# Patient Record
Sex: MALE | Race: WHITE | NOT HISPANIC OR LATINO | Employment: FULL TIME | URBAN - NONMETROPOLITAN AREA
[De-identification: names, ages, dates, MRNs, and addresses within clinical notes are randomized per-mention and may not be internally consistent; named-entity substitution may affect disease eponyms.]

---

## 2018-01-09 ENCOUNTER — OFFICE VISIT CONVERTED (OUTPATIENT)
Dept: FAMILY MEDICINE CLINIC | Age: 53
End: 2018-01-09
Attending: NURSE PRACTITIONER

## 2018-01-23 ENCOUNTER — OFFICE VISIT CONVERTED (OUTPATIENT)
Dept: FAMILY MEDICINE CLINIC | Age: 53
End: 2018-01-23
Attending: NURSE PRACTITIONER

## 2018-02-12 ENCOUNTER — OFFICE VISIT CONVERTED (OUTPATIENT)
Dept: FAMILY MEDICINE CLINIC | Age: 53
End: 2018-02-12
Attending: NURSE PRACTITIONER

## 2018-02-13 LAB
ALBUMIN SERPL-MCNC: 4 G/DL
ALBUMIN/GLOB SERPL: 1.5 {RATIO}
ALP SERPL-CCNC: 41 IU/L
ALT SERPL-CCNC: 14 IU/L
AST SERPL-CCNC: 12 IU/L
BILIRUB SERPL-MCNC: 0.7 MG/DL
BUN SERPL-MCNC: 20 MG/DL
BUN/CREAT SERPL: 29
CALCIUM SERPL-MCNC: 9.4 MG/DL
CHLORIDE SERPL-SCNC: 101 MMOL/L
CHOLEST SERPL-MCNC: 155 MG/DL
CO2 SERPL-SCNC: 26 MMOL/L
CONV TOTAL PROTEIN: 6.6 G/DL
CREAT UR-MCNC: 0.68 MG/DL
ERYTHROCYTE [DISTWIDTH] IN BLOOD BY AUTOMATED COUNT: 13 %
GLOBULIN UR ELPH-MCNC: 2.6 G/DL
GLUCOSE SERPL-MCNC: 170 MG/DL
HBA1C MFR BLD: 8.2 %
HCT VFR BLD AUTO: 45.2 %
HDLC SERPL-MCNC: 52 MG/DL
HGB BLD-MCNC: 15.4 G/DL
LDLC SERPL CALC-MCNC: 68 MG/DL
MCH RBC QN AUTO: 30.9 PG
MCHC RBC AUTO-ENTMCNC: 34.1 G/DL
MCV RBC AUTO: 91 FL
MICROALBUMIN UR-MCNC: 3.7 UG/ML
PLATELET # BLD AUTO: 236 X10E3/UL
POTASSIUM SERPL-SCNC: 4.7 MMOL/L
RBC # BLD AUTO: 4.99 X10E6/UL
SODIUM SERPL-SCNC: 141 MMOL/L
TRIGL SERPL-MCNC: 173 MG/DL
TSH SERPL-ACNC: 2.91 UIU/ML
VLDLC SERPL-MCNC: 35 MG/DL
WBC # BLD AUTO: 6.8 X10E3/UL

## 2018-02-21 ENCOUNTER — OFFICE VISIT CONVERTED (OUTPATIENT)
Dept: FAMILY MEDICINE CLINIC | Age: 53
End: 2018-02-21
Attending: NURSE PRACTITIONER

## 2018-03-01 ENCOUNTER — OFFICE VISIT CONVERTED (OUTPATIENT)
Dept: OTHER | Facility: HOSPITAL | Age: 53
End: 2018-03-01
Attending: PODIATRIST

## 2018-03-15 ENCOUNTER — OFFICE VISIT CONVERTED (OUTPATIENT)
Dept: OTHER | Facility: HOSPITAL | Age: 53
End: 2018-03-15
Attending: PODIATRIST

## 2018-03-27 ENCOUNTER — OFFICE VISIT CONVERTED (OUTPATIENT)
Dept: FAMILY MEDICINE CLINIC | Age: 53
End: 2018-03-27
Attending: NURSE PRACTITIONER

## 2018-04-16 ENCOUNTER — OFFICE VISIT CONVERTED (OUTPATIENT)
Dept: FAMILY MEDICINE CLINIC | Age: 53
End: 2018-04-16
Attending: NURSE PRACTITIONER

## 2018-04-17 LAB
APPEARANCE UR: ABNORMAL
BILIRUB SERPL-MCNC: NEGATIVE MG/DL
CASTS URNS MICRO: ABNORMAL
CASTS URNS QL MICRO: PRESENT /LPF
COLOR UR: YELLOW
CONV BACTERIA IN URINE MICRO: ABNORMAL
CONV UROBILINOGEN IN URINE BY AUTOMATED TEST STRIP: 0.2 MG/DL
GLUCOSE SERPL-MCNC: NEGATIVE MG/DL
HEMOCCULT STL QL IA: NEGATIVE
KETONES UR QL STRIP: NEGATIVE
LEUKOCYTE ESTERASE UR QL STRIP: NEGATIVE
MICRO URNS: ABNORMAL
MUCOUS THREADS URNS QL MICRO: PRESENT
NITRITE UR QL STRIP: NEGATIVE
PH FLD: 5.5 [PH]
PROT FLD-MCNC: NEGATIVE G/DL
PSA SERPL-MCNC: 0.6 NG/ML
RBC # BLD AUTO: ABNORMAL /HPF
SP GR UR: 1.02
SQUAMOUS SPT QL MICRO: ABNORMAL /HPF
WBC # BLD AUTO: ABNORMAL /HPF

## 2018-04-19 ENCOUNTER — OFFICE VISIT CONVERTED (OUTPATIENT)
Dept: OTHER | Facility: HOSPITAL | Age: 53
End: 2018-04-19
Attending: PODIATRIST

## 2018-05-03 ENCOUNTER — OFFICE VISIT CONVERTED (OUTPATIENT)
Dept: OTHER | Facility: HOSPITAL | Age: 53
End: 2018-05-03
Attending: PODIATRIST

## 2018-05-08 ENCOUNTER — OFFICE VISIT CONVERTED (OUTPATIENT)
Dept: OTHER | Facility: HOSPITAL | Age: 53
End: 2018-05-08
Attending: INTERNAL MEDICINE

## 2018-05-15 ENCOUNTER — OFFICE VISIT CONVERTED (OUTPATIENT)
Dept: FAMILY MEDICINE CLINIC | Age: 53
End: 2018-05-15
Attending: NURSE PRACTITIONER

## 2018-05-17 ENCOUNTER — OFFICE VISIT CONVERTED (OUTPATIENT)
Dept: OTHER | Facility: HOSPITAL | Age: 53
End: 2018-05-17
Attending: PODIATRIST

## 2018-05-18 LAB
ALBUMIN SERPL-MCNC: 4.2 G/DL
ALBUMIN/GLOB SERPL: 1.8 {RATIO}
ALP SERPL-CCNC: 43 IU/L
ALT SERPL-CCNC: 14 IU/L
AST SERPL-CCNC: 14 IU/L
BILIRUB SERPL-MCNC: 0.5 MG/DL
BNP SERPL-MCNC: 12 PG/ML
BUN SERPL-MCNC: 17 MG/DL
BUN/CREAT SERPL: 20
CALCIUM SERPL-MCNC: 9.4 MG/DL
CHLORIDE SERPL-SCNC: 102 MMOL/L
CHOLEST SERPL-MCNC: 136 MG/DL
CO2 SERPL-SCNC: 26 MMOL/L
CONV TOTAL PROTEIN: 6.5 G/DL
CREAT 24H UR-MCNC: 197.5 MG/DL
CREAT UR-MCNC: 0.83 MG/DL
GLOBULIN UR ELPH-MCNC: 2.3 G/DL
GLUCOSE SERPL-MCNC: 73 MG/DL
HBA1C MFR BLD: 6.8 %
HDLC SERPL-MCNC: 40 MG/DL
LDLC SERPL CALC-MCNC: 37 MG/DL
MICROALBUMIN UR-MCNC: 19.7 UG/ML
MICROALBUMIN/CREAT UR: 10 MG/G CREAT
POTASSIUM SERPL-SCNC: 4.1 MMOL/L
SODIUM SERPL-SCNC: 143 MMOL/L
TRIGL SERPL-MCNC: 296 MG/DL
TSH SERPL-ACNC: 3.24 UIU/ML
VLDLC SERPL-MCNC: 59 MG/DL

## 2018-07-02 ENCOUNTER — OFFICE VISIT CONVERTED (OUTPATIENT)
Dept: FAMILY MEDICINE CLINIC | Age: 53
End: 2018-07-02
Attending: NURSE PRACTITIONER

## 2018-07-19 ENCOUNTER — OFFICE VISIT CONVERTED (OUTPATIENT)
Dept: OTHER | Facility: HOSPITAL | Age: 53
End: 2018-07-19
Attending: PODIATRIST

## 2018-08-21 ENCOUNTER — OFFICE VISIT CONVERTED (OUTPATIENT)
Dept: FAMILY MEDICINE CLINIC | Age: 53
End: 2018-08-21
Attending: NURSE PRACTITIONER

## 2018-08-28 ENCOUNTER — OFFICE VISIT CONVERTED (OUTPATIENT)
Dept: OTHER | Facility: HOSPITAL | Age: 53
End: 2018-08-28
Attending: INTERNAL MEDICINE

## 2018-11-06 ENCOUNTER — OFFICE VISIT CONVERTED (OUTPATIENT)
Dept: FAMILY MEDICINE CLINIC | Age: 53
End: 2018-11-06
Attending: NURSE PRACTITIONER

## 2018-11-15 ENCOUNTER — OFFICE VISIT CONVERTED (OUTPATIENT)
Dept: PODIATRY | Facility: CLINIC | Age: 53
End: 2018-11-15
Attending: PODIATRIST

## 2018-12-01 ENCOUNTER — OFFICE VISIT CONVERTED (OUTPATIENT)
Dept: FAMILY MEDICINE CLINIC | Age: 53
End: 2018-12-01
Attending: FAMILY MEDICINE

## 2019-01-07 ENCOUNTER — OFFICE VISIT CONVERTED (OUTPATIENT)
Dept: FAMILY MEDICINE CLINIC | Age: 54
End: 2019-01-07
Attending: NURSE PRACTITIONER

## 2019-01-07 ENCOUNTER — HOSPITAL ENCOUNTER (OUTPATIENT)
Dept: OTHER | Facility: HOSPITAL | Age: 54
Discharge: HOME | End: 2019-01-07
Attending: NURSE PRACTITIONER

## 2019-02-13 ENCOUNTER — OFFICE VISIT CONVERTED (OUTPATIENT)
Dept: FAMILY MEDICINE CLINIC | Age: 54
End: 2019-02-13
Attending: NURSE PRACTITIONER

## 2019-02-19 ENCOUNTER — HOSPITAL ENCOUNTER (OUTPATIENT)
Dept: OTHER | Facility: HOSPITAL | Age: 54
Setting detail: RECURRING SERIES
Discharge: HOME | End: 2019-03-22
Attending: NURSE PRACTITIONER

## 2019-03-26 ENCOUNTER — HOSPITAL ENCOUNTER (OUTPATIENT)
Dept: OTHER | Facility: HOSPITAL | Age: 54
Discharge: HOME | End: 2019-03-26
Attending: NURSE PRACTITIONER

## 2019-03-26 ENCOUNTER — OFFICE VISIT CONVERTED (OUTPATIENT)
Dept: FAMILY MEDICINE CLINIC | Age: 54
End: 2019-03-26
Attending: NURSE PRACTITIONER

## 2019-03-26 LAB
ALBUMIN SERPL-MCNC: 4.2 G/DL (ref 3.5–5)
ALBUMIN/GLOB SERPL: 1.4 {RATIO} (ref 1.4–2.6)
ALP SERPL-CCNC: 53 U/L (ref 56–119)
ALT SERPL-CCNC: 17 U/L (ref 10–40)
ANION GAP SERPL CALC-SCNC: 14 MMOL/L (ref 8–19)
AST SERPL-CCNC: 15 U/L (ref 15–50)
BASOPHILS # BLD MANUAL: 0.04 10*3/UL (ref 0–0.2)
BASOPHILS NFR BLD MANUAL: 0.4 % (ref 0–3)
BILIRUB SERPL-MCNC: 0.87 MG/DL (ref 0.2–1.3)
BUN SERPL-MCNC: 16 MG/DL (ref 5–25)
BUN/CREAT SERPL: 24 {RATIO} (ref 6–20)
CALCIUM SERPL-MCNC: 9.1 MG/DL (ref 8.7–10.4)
CHLORIDE SERPL-SCNC: 103 MMOL/L (ref 99–111)
CHOLEST SERPL-MCNC: 120 MG/DL (ref 107–200)
CHOLEST/HDLC SERPL: 2.4 {RATIO} (ref 3–6)
CONV CO2: 28 MMOL/L (ref 22–32)
CONV CREATININE URINE, RANDOM: 206.8 MG/DL (ref 10–300)
CONV MICROALBUM.,U,RANDOM: 28.3 MG/L (ref 0–20)
CONV TOTAL PROTEIN: 7.2 G/DL (ref 6.3–8.2)
CREAT UR-MCNC: 0.68 MG/DL (ref 0.7–1.2)
DEPRECATED RDW RBC AUTO: 40.1 FL
EOSINOPHIL # BLD MANUAL: 0.03 10*3/UL (ref 0–0.7)
EOSINOPHIL NFR BLD MANUAL: 0.3 % (ref 0–7)
ERYTHROCYTE [DISTWIDTH] IN BLOOD BY AUTOMATED COUNT: 12.1 % (ref 11.5–14.5)
EST. AVERAGE GLUCOSE BLD GHB EST-MCNC: 151 MG/DL
GFR SERPLBLD BASED ON 1.73 SQ M-ARVRAT: >60 ML/MIN/{1.73_M2}
GLOBULIN UR ELPH-MCNC: 3 G/DL (ref 2–3.5)
GLUCOSE SERPL-MCNC: 164 MG/DL (ref 70–99)
GRANS (ABSOLUTE): 7.46 10*3/UL (ref 2–8)
GRANS: 67.8 % (ref 30–85)
HBA1C MFR BLD: 15.5 G/DL (ref 14–18)
HBA1C MFR BLD: 6.9 % (ref 3.5–5.7)
HCT VFR BLD AUTO: 44.8 % (ref 42–52)
HDLC SERPL-MCNC: 49 MG/DL (ref 40–60)
IMM GRANULOCYTES # BLD: 0.02 10*3/UL (ref 0–0.54)
IMM GRANULOCYTES NFR BLD: 0.2 % (ref 0–0.43)
LDLC SERPL CALC-MCNC: 50 MG/DL (ref 70–100)
LYMPHOCYTES # BLD MANUAL: 2.33 10*3/UL (ref 1–5)
LYMPHOCYTES NFR BLD MANUAL: 10.1 % (ref 3–10)
MCH RBC QN AUTO: 31.3 PG (ref 27–31)
MCHC RBC AUTO-ENTMCNC: 34.6 G/DL (ref 33–37)
MCV RBC AUTO: 90.3 FL (ref 80–96)
MICROALBUMIN/CREAT UR: 13.7 MG/G{CRE} (ref 0–25)
MONOCYTES # BLD AUTO: 1.11 10*3/UL (ref 0.2–1.2)
OSMOLALITY SERPL CALC.SUM OF ELEC: 297 MOSM/KG (ref 273–304)
PLATELET # BLD AUTO: 257 10*3/UL (ref 130–400)
PMV BLD AUTO: 9 FL (ref 7.4–10.4)
POTASSIUM SERPL-SCNC: 4.2 MMOL/L (ref 3.5–5.3)
RBC # BLD AUTO: 4.96 10*6/UL (ref 4.7–6.1)
SODIUM SERPL-SCNC: 141 MMOL/L (ref 135–147)
TRIGL SERPL-MCNC: 103 MG/DL (ref 40–150)
TSH SERPL-ACNC: 2.52 M[IU]/L (ref 0.27–4.2)
VARIANT LYMPHS NFR BLD MANUAL: 21.2 % (ref 20–45)
VLDLC SERPL-MCNC: 21 MG/DL (ref 5–37)
WBC # BLD AUTO: 10.99 10*3/UL (ref 4.8–10.8)

## 2019-03-29 LAB
SHBG SERPL-SCNC: 41.2 NMOL/L (ref 19.3–76.4)
TESTOST SERPL-MCNC: 334 NG/DL (ref 264–916)
TESTOSTERONE, FREE: 2.9 PG/ML (ref 7.2–24)

## 2019-04-06 ENCOUNTER — HOSPITAL ENCOUNTER (OUTPATIENT)
Dept: OTHER | Facility: HOSPITAL | Age: 54
Discharge: HOME | End: 2019-04-06
Attending: FAMILY MEDICINE

## 2019-04-18 ENCOUNTER — OFFICE VISIT CONVERTED (OUTPATIENT)
Dept: UROLOGY | Facility: CLINIC | Age: 54
End: 2019-04-18
Attending: NURSE PRACTITIONER

## 2019-04-18 ENCOUNTER — HOSPITAL ENCOUNTER (OUTPATIENT)
Dept: UROLOGY | Facility: CLINIC | Age: 54
Discharge: HOME | End: 2019-04-18
Attending: NURSE PRACTITIONER

## 2019-04-18 LAB — PSA SERPL-MCNC: 1 NG/ML (ref 0–4)

## 2019-04-29 ENCOUNTER — OFFICE VISIT CONVERTED (OUTPATIENT)
Dept: FAMILY MEDICINE CLINIC | Age: 54
End: 2019-04-29
Attending: NURSE PRACTITIONER

## 2019-06-12 ENCOUNTER — OFFICE VISIT CONVERTED (OUTPATIENT)
Dept: FAMILY MEDICINE CLINIC | Age: 54
End: 2019-06-12
Attending: NURSE PRACTITIONER

## 2019-07-08 ENCOUNTER — HOSPITAL ENCOUNTER (OUTPATIENT)
Dept: OTHER | Facility: HOSPITAL | Age: 54
Discharge: HOME | End: 2019-07-08
Attending: NURSE PRACTITIONER

## 2019-07-08 ENCOUNTER — OFFICE VISIT CONVERTED (OUTPATIENT)
Dept: FAMILY MEDICINE CLINIC | Age: 54
End: 2019-07-08
Attending: NURSE PRACTITIONER

## 2019-07-08 LAB
ALBUMIN SERPL-MCNC: 4 G/DL (ref 3.5–5)
ALBUMIN/GLOB SERPL: 1.5 {RATIO} (ref 1.4–2.6)
ALP SERPL-CCNC: 59 U/L (ref 56–119)
ALT SERPL-CCNC: 15 U/L (ref 10–40)
ANION GAP SERPL CALC-SCNC: 16 MMOL/L (ref 8–19)
AST SERPL-CCNC: 19 U/L (ref 15–50)
BILIRUB SERPL-MCNC: 1.23 MG/DL (ref 0.2–1.3)
BUN SERPL-MCNC: 14 MG/DL (ref 5–25)
BUN/CREAT SERPL: 21 {RATIO} (ref 6–20)
CALCIUM SERPL-MCNC: 8.9 MG/DL (ref 8.7–10.4)
CHLORIDE SERPL-SCNC: 102 MMOL/L (ref 99–111)
CHOLEST SERPL-MCNC: 99 MG/DL (ref 107–200)
CHOLEST/HDLC SERPL: 2.6 {RATIO} (ref 3–6)
CONV CO2: 25 MMOL/L (ref 22–32)
CONV CREATININE URINE, RANDOM: 312.7 MG/DL (ref 10–300)
CONV MICROALBUM.,U,RANDOM: 15.6 MG/L (ref 0–20)
CONV TOTAL PROTEIN: 6.7 G/DL (ref 6.3–8.2)
CREAT UR-MCNC: 0.68 MG/DL (ref 0.7–1.2)
EST. AVERAGE GLUCOSE BLD GHB EST-MCNC: 131 MG/DL
GFR SERPLBLD BASED ON 1.73 SQ M-ARVRAT: >60 ML/MIN/{1.73_M2}
GLOBULIN UR ELPH-MCNC: 2.7 G/DL (ref 2–3.5)
GLUCOSE SERPL-MCNC: 103 MG/DL (ref 70–99)
HBA1C MFR BLD: 6.2 % (ref 3.5–5.7)
HDLC SERPL-MCNC: 38 MG/DL (ref 40–60)
LDLC SERPL CALC-MCNC: 36 MG/DL (ref 70–100)
MICROALBUMIN/CREAT UR: 5 MG/G{CRE} (ref 0–25)
OSMOLALITY SERPL CALC.SUM OF ELEC: 289 MOSM/KG (ref 273–304)
POTASSIUM SERPL-SCNC: 4.1 MMOL/L (ref 3.5–5.3)
SODIUM SERPL-SCNC: 139 MMOL/L (ref 135–147)
TRIGL SERPL-MCNC: 124 MG/DL (ref 40–150)
VLDLC SERPL-MCNC: 25 MG/DL (ref 5–37)

## 2019-09-27 ENCOUNTER — OFFICE VISIT CONVERTED (OUTPATIENT)
Dept: FAMILY MEDICINE CLINIC | Age: 54
End: 2019-09-27
Attending: NURSE PRACTITIONER

## 2019-09-27 ENCOUNTER — HOSPITAL ENCOUNTER (OUTPATIENT)
Dept: OTHER | Facility: HOSPITAL | Age: 54
Discharge: HOME | End: 2019-09-27
Attending: NURSE PRACTITIONER

## 2019-09-29 LAB — BACTERIA SPEC AEROBE CULT: NORMAL

## 2019-10-15 ENCOUNTER — HOSPITAL ENCOUNTER (OUTPATIENT)
Dept: OTHER | Facility: HOSPITAL | Age: 54
Discharge: HOME | End: 2019-10-15

## 2019-10-15 ENCOUNTER — OFFICE VISIT CONVERTED (OUTPATIENT)
Dept: FAMILY MEDICINE CLINIC | Age: 54
End: 2019-10-15
Attending: NURSE PRACTITIONER

## 2019-10-29 ENCOUNTER — OFFICE VISIT CONVERTED (OUTPATIENT)
Dept: FAMILY MEDICINE CLINIC | Age: 54
End: 2019-10-29
Attending: NURSE PRACTITIONER

## 2019-11-08 ENCOUNTER — HOSPITAL ENCOUNTER (OUTPATIENT)
Dept: OTHER | Facility: HOSPITAL | Age: 54
Discharge: HOME | End: 2019-11-08
Attending: NURSE PRACTITIONER

## 2019-11-08 LAB
ALBUMIN SERPL-MCNC: 4.2 G/DL (ref 3.5–5)
ALBUMIN/GLOB SERPL: 1.7 {RATIO} (ref 1.4–2.6)
ALP SERPL-CCNC: 46 U/L (ref 56–119)
ALT SERPL-CCNC: 16 U/L (ref 10–40)
ANION GAP SERPL CALC-SCNC: 19 MMOL/L (ref 8–19)
AST SERPL-CCNC: 18 U/L (ref 15–50)
BILIRUB SERPL-MCNC: 0.9 MG/DL (ref 0.2–1.3)
BUN SERPL-MCNC: 21 MG/DL (ref 5–25)
BUN/CREAT SERPL: 26 {RATIO} (ref 6–20)
CALCIUM SERPL-MCNC: 9.7 MG/DL (ref 8.7–10.4)
CHLORIDE SERPL-SCNC: 98 MMOL/L (ref 99–111)
CONV CO2: 25 MMOL/L (ref 22–32)
CONV TOTAL PROTEIN: 6.7 G/DL (ref 6.3–8.2)
CREAT UR-MCNC: 0.81 MG/DL (ref 0.7–1.2)
ERYTHROCYTE [SEDIMENTATION RATE] IN BLOOD: 7 MM/H (ref 0–20)
EST. AVERAGE GLUCOSE BLD GHB EST-MCNC: 123 MG/DL
GFR SERPLBLD BASED ON 1.73 SQ M-ARVRAT: >60 ML/MIN/{1.73_M2}
GLOBULIN UR ELPH-MCNC: 2.5 G/DL (ref 2–3.5)
GLUCOSE SERPL-MCNC: 83 MG/DL (ref 70–99)
HBA1C MFR BLD: 5.9 % (ref 3.5–5.7)
OSMOLALITY SERPL CALC.SUM OF ELEC: 288 MOSM/KG (ref 273–304)
POTASSIUM SERPL-SCNC: 3.8 MMOL/L (ref 3.5–5.3)
SODIUM SERPL-SCNC: 138 MMOL/L (ref 135–147)

## 2019-12-03 ENCOUNTER — HOSPITAL ENCOUNTER (OUTPATIENT)
Dept: OTHER | Facility: HOSPITAL | Age: 54
Setting detail: RECURRING SERIES
Discharge: HOME | End: 2020-01-14
Attending: ORTHOPAEDIC SURGERY

## 2019-12-17 ENCOUNTER — OFFICE VISIT CONVERTED (OUTPATIENT)
Dept: FAMILY MEDICINE CLINIC | Age: 54
End: 2019-12-17
Attending: FAMILY MEDICINE

## 2020-01-21 ENCOUNTER — OFFICE VISIT CONVERTED (OUTPATIENT)
Dept: FAMILY MEDICINE CLINIC | Age: 55
End: 2020-01-21
Attending: NURSE PRACTITIONER

## 2020-02-06 ENCOUNTER — HOSPITAL ENCOUNTER (OUTPATIENT)
Dept: OTHER | Facility: HOSPITAL | Age: 55
Discharge: HOME | End: 2020-02-06
Attending: NURSE PRACTITIONER

## 2020-02-06 LAB
ALBUMIN SERPL-MCNC: 4.1 G/DL (ref 3.5–5)
ALBUMIN/GLOB SERPL: 1.5 {RATIO} (ref 1.4–2.6)
ALP SERPL-CCNC: 43 U/L (ref 56–119)
ALT SERPL-CCNC: 21 U/L (ref 10–40)
ANION GAP SERPL CALC-SCNC: 18 MMOL/L (ref 8–19)
AST SERPL-CCNC: 19 U/L (ref 15–50)
BILIRUB SERPL-MCNC: 0.96 MG/DL (ref 0.2–1.3)
BUN SERPL-MCNC: 25 MG/DL (ref 5–25)
BUN/CREAT SERPL: 28 {RATIO} (ref 6–20)
CALCIUM SERPL-MCNC: 9.6 MG/DL (ref 8.7–10.4)
CHLORIDE SERPL-SCNC: 102 MMOL/L (ref 99–111)
CHOLEST SERPL-MCNC: 100 MG/DL (ref 107–200)
CHOLEST/HDLC SERPL: 2.8 {RATIO} (ref 3–6)
CONV CO2: 24 MMOL/L (ref 22–32)
CONV CREATININE URINE, RANDOM: 234.8 MG/DL (ref 10–300)
CONV MICROALBUM.,U,RANDOM: 17.8 MG/L (ref 0–20)
CONV TOTAL PROTEIN: 6.8 G/DL (ref 6.3–8.2)
CREAT UR-MCNC: 0.9 MG/DL (ref 0.7–1.2)
EST. AVERAGE GLUCOSE BLD GHB EST-MCNC: 128 MG/DL
GFR SERPLBLD BASED ON 1.73 SQ M-ARVRAT: >60 ML/MIN/{1.73_M2}
GLOBULIN UR ELPH-MCNC: 2.7 G/DL (ref 2–3.5)
GLUCOSE SERPL-MCNC: 106 MG/DL (ref 70–99)
HBA1C MFR BLD: 6.1 % (ref 3.5–5.7)
HDLC SERPL-MCNC: 36 MG/DL (ref 40–60)
LDLC SERPL CALC-MCNC: 38 MG/DL (ref 70–100)
MICROALBUMIN/CREAT UR: 7.6 MG/G{CRE} (ref 0–25)
OSMOLALITY SERPL CALC.SUM OF ELEC: 295 MOSM/KG (ref 273–304)
POTASSIUM SERPL-SCNC: 4.3 MMOL/L (ref 3.5–5.3)
SODIUM SERPL-SCNC: 140 MMOL/L (ref 135–147)
TRIGL SERPL-MCNC: 132 MG/DL (ref 40–150)
VLDLC SERPL-MCNC: 26 MG/DL (ref 5–37)

## 2020-04-30 ENCOUNTER — HOSPITAL ENCOUNTER (OUTPATIENT)
Dept: OTHER | Facility: HOSPITAL | Age: 55
Discharge: HOME | End: 2020-04-30
Attending: PODIATRIST

## 2020-04-30 LAB
ANION GAP SERPL CALC-SCNC: 16 MMOL/L (ref 8–19)
BASOPHILS # BLD MANUAL: 0.04 10*3/UL (ref 0–0.2)
BASOPHILS NFR BLD MANUAL: 0.5 % (ref 0–3)
BUN SERPL-MCNC: 21 MG/DL (ref 5–25)
BUN/CREAT SERPL: 24 {RATIO} (ref 6–20)
CALCIUM SERPL-MCNC: 9.6 MG/DL (ref 8.7–10.4)
CHLORIDE SERPL-SCNC: 103 MMOL/L (ref 99–111)
CONV CO2: 25 MMOL/L (ref 22–32)
CREAT UR-MCNC: 0.89 MG/DL (ref 0.7–1.2)
DEPRECATED RDW RBC AUTO: 43 FL
EOSINOPHIL # BLD MANUAL: 0.1 10*3/UL (ref 0–0.7)
EOSINOPHIL NFR BLD MANUAL: 1.3 % (ref 0–7)
ERYTHROCYTE [DISTWIDTH] IN BLOOD BY AUTOMATED COUNT: 12.4 % (ref 11.5–14.5)
GFR SERPLBLD BASED ON 1.73 SQ M-ARVRAT: >60 ML/MIN/{1.73_M2}
GLUCOSE SERPL-MCNC: 102 MG/DL (ref 70–99)
GRANS (ABSOLUTE): 4.06 10*3/UL (ref 2–8)
GRANS: 53.2 % (ref 30–85)
HBA1C MFR BLD: 15.2 G/DL (ref 14–18)
HCT VFR BLD AUTO: 45 % (ref 42–52)
IMM GRANULOCYTES # BLD: 0.01 10*3/UL (ref 0–0.54)
IMM GRANULOCYTES NFR BLD: 0.1 % (ref 0–0.43)
LYMPHOCYTES # BLD MANUAL: 2.44 10*3/UL (ref 1–5)
LYMPHOCYTES NFR BLD MANUAL: 13 % (ref 3–10)
MCH RBC QN AUTO: 31.6 PG (ref 27–31)
MCHC RBC AUTO-ENTMCNC: 33.8 G/DL (ref 33–37)
MCV RBC AUTO: 93.6 FL (ref 80–96)
MONOCYTES # BLD AUTO: 0.99 10*3/UL (ref 0.2–1.2)
OSMOLALITY SERPL CALC.SUM OF ELEC: 293 MOSM/KG (ref 273–304)
PLATELET # BLD AUTO: 241 10*3/UL (ref 130–400)
PMV BLD AUTO: 9 FL (ref 7.4–10.4)
POTASSIUM SERPL-SCNC: 4.2 MMOL/L (ref 3.5–5.3)
RBC # BLD AUTO: 4.81 10*6/UL (ref 4.7–6.1)
SODIUM SERPL-SCNC: 140 MMOL/L (ref 135–147)
VARIANT LYMPHS NFR BLD MANUAL: 31.9 % (ref 20–45)
WBC # BLD AUTO: 7.64 10*3/UL (ref 4.8–10.8)

## 2020-05-08 PROCEDURE — 88304 TISSUE EXAM BY PATHOLOGIST: CPT | Performed by: PODIATRIST

## 2020-05-11 ENCOUNTER — LAB REQUISITION (OUTPATIENT)
Dept: LAB | Facility: HOSPITAL | Age: 55
End: 2020-05-11

## 2020-05-11 DIAGNOSIS — M19.272 SECONDARY OSTEOARTHRITIS, LEFT ANKLE AND FOOT: ICD-10-CM

## 2020-05-11 DIAGNOSIS — M25.572 PAIN IN LEFT ANKLE AND JOINTS OF LEFT FOOT: ICD-10-CM

## 2020-05-11 DIAGNOSIS — R26.2 DIFFICULTY IN WALKING, NOT ELSEWHERE CLASSIFIED: ICD-10-CM

## 2020-05-12 LAB
LAB AP CASE REPORT: NORMAL
PATH REPORT.FINAL DX SPEC: NORMAL
PATH REPORT.GROSS SPEC: NORMAL

## 2020-07-31 ENCOUNTER — OFFICE VISIT CONVERTED (OUTPATIENT)
Dept: FAMILY MEDICINE CLINIC | Age: 55
End: 2020-07-31
Attending: NURSE PRACTITIONER

## 2020-08-01 ENCOUNTER — HOSPITAL ENCOUNTER (OUTPATIENT)
Dept: OTHER | Facility: HOSPITAL | Age: 55
Discharge: HOME | End: 2020-08-01
Attending: NURSE PRACTITIONER

## 2020-08-01 LAB
ALBUMIN SERPL-MCNC: 3.8 G/DL (ref 3.5–5)
ALBUMIN/GLOB SERPL: 1.4 {RATIO} (ref 1.4–2.6)
ALP SERPL-CCNC: 40 U/L (ref 56–119)
ALT SERPL-CCNC: 18 U/L (ref 10–40)
ANION GAP SERPL CALC-SCNC: 15 MMOL/L (ref 8–19)
APPEARANCE UR: CLEAR
AST SERPL-CCNC: 19 U/L (ref 15–50)
BACTERIA UR QL AUTO: ABNORMAL
BILIRUB SERPL-MCNC: 1.15 MG/DL (ref 0.2–1.3)
BILIRUB UR QL: NEGATIVE
BUN SERPL-MCNC: 26 MG/DL (ref 5–25)
BUN/CREAT SERPL: 32 {RATIO} (ref 6–20)
CALCIUM SERPL-MCNC: 10 MG/DL (ref 8.7–10.4)
CASTS URNS QL MICRO: ABNORMAL /[LPF]
CHLORIDE SERPL-SCNC: 101 MMOL/L (ref 99–111)
CHOLEST SERPL-MCNC: 112 MG/DL (ref 107–200)
CHOLEST/HDLC SERPL: 2.7 {RATIO} (ref 3–6)
COLOR UR: YELLOW
CONV CO2: 27 MMOL/L (ref 22–32)
CONV CREATININE URINE, RANDOM: 122.7 MG/DL (ref 10–300)
CONV LEUKOCYTE ESTERASE: NEGATIVE
CONV MICROALBUM.,U,RANDOM: <12 MG/L (ref 0–20)
CONV TOTAL PROTEIN: 6.5 G/DL (ref 6.3–8.2)
CONV UROBILINOGEN IN URINE BY AUTOMATED TEST STRIP: 1 {EHRLICHU}/DL (ref 0.1–1)
CREAT UR-MCNC: 0.81 MG/DL (ref 0.7–1.2)
EPI CELLS #/AREA URNS HPF: ABNORMAL /[HPF]
EST. AVERAGE GLUCOSE BLD GHB EST-MCNC: 117 MG/DL
GFR SERPLBLD BASED ON 1.73 SQ M-ARVRAT: >60 ML/MIN/{1.73_M2}
GLOBULIN UR ELPH-MCNC: 2.7 G/DL (ref 2–3.5)
GLUCOSE 24H UR-MCNC: NEGATIVE MG/DL
GLUCOSE SERPL-MCNC: 99 MG/DL (ref 70–99)
HBA1C MFR BLD: 5.7 % (ref 3.5–5.7)
HDLC SERPL-MCNC: 42 MG/DL (ref 40–60)
HGB UR QL STRIP: NEGATIVE
KETONES UR QL STRIP: NEGATIVE MG/DL
LDLC SERPL CALC-MCNC: 43 MG/DL (ref 70–100)
MICROALBUMIN/CREAT UR: 9.8 MG/G{CRE} (ref 0–25)
MUCOUS THREADS URNS QL MICRO: ABNORMAL
NITRITE UR-MCNC: NEGATIVE MG/ML
OSMOLALITY SERPL CALC.SUM OF ELEC: 291 MOSM/KG (ref 273–304)
PH UR STRIP.AUTO: 7 [PH] (ref 5–8)
POTASSIUM SERPL-SCNC: 4.5 MMOL/L (ref 3.5–5.3)
PROT UR-MCNC: NEGATIVE MG/DL
RBC # BLD AUTO: ABNORMAL /[HPF]
SODIUM SERPL-SCNC: 138 MMOL/L (ref 135–147)
SP GR UR STRIP: 1.02 (ref 1–1.03)
SPECIMEN SOURCE: ABNORMAL
TRIGL SERPL-MCNC: 133 MG/DL (ref 40–150)
UNIDENT CRYS URNS QL MICRO: ABNORMAL /[HPF]
VLDLC SERPL-MCNC: 27 MG/DL (ref 5–37)
WBC #/AREA URNS HPF: ABNORMAL /[HPF]

## 2020-08-27 ENCOUNTER — OFFICE VISIT CONVERTED (OUTPATIENT)
Dept: FAMILY MEDICINE CLINIC | Age: 55
End: 2020-08-27
Attending: FAMILY MEDICINE

## 2020-08-27 ENCOUNTER — HOSPITAL ENCOUNTER (OUTPATIENT)
Dept: OTHER | Facility: HOSPITAL | Age: 55
Discharge: HOME | End: 2020-08-27
Attending: FAMILY MEDICINE

## 2020-08-29 LAB — SARS-COV-2 RNA SPEC QL NAA+PROBE: NOT DETECTED

## 2021-01-11 ENCOUNTER — OFFICE VISIT CONVERTED (OUTPATIENT)
Dept: FAMILY MEDICINE CLINIC | Age: 56
End: 2021-01-11

## 2021-01-20 ENCOUNTER — OFFICE VISIT CONVERTED (OUTPATIENT)
Dept: FAMILY MEDICINE CLINIC | Age: 56
End: 2021-01-20
Attending: NURSE PRACTITIONER

## 2021-01-27 ENCOUNTER — HOSPITAL ENCOUNTER (OUTPATIENT)
Dept: OTHER | Facility: HOSPITAL | Age: 56
Discharge: HOME | End: 2021-01-27
Attending: NURSE PRACTITIONER

## 2021-01-27 LAB
ALBUMIN SERPL-MCNC: 3.9 G/DL (ref 3.5–5)
ALBUMIN/GLOB SERPL: 1.4 {RATIO} (ref 1.4–2.6)
ALP SERPL-CCNC: 52 U/L (ref 56–119)
ALT SERPL-CCNC: 16 U/L (ref 10–40)
ANION GAP SERPL CALC-SCNC: 15 MMOL/L (ref 8–19)
AST SERPL-CCNC: 17 U/L (ref 15–50)
BASOPHILS # BLD MANUAL: 0.04 10*3/UL (ref 0–0.2)
BASOPHILS NFR BLD MANUAL: 0.6 % (ref 0–3)
BILIRUB SERPL-MCNC: 1 MG/DL (ref 0.2–1.3)
BUN SERPL-MCNC: 18 MG/DL (ref 5–25)
BUN/CREAT SERPL: 25 {RATIO} (ref 6–20)
CALCIUM SERPL-MCNC: 9.4 MG/DL (ref 8.7–10.4)
CHLORIDE SERPL-SCNC: 104 MMOL/L (ref 99–111)
CHOLEST SERPL-MCNC: 96 MG/DL (ref 107–200)
CHOLEST/HDLC SERPL: 1.8 {RATIO} (ref 3–6)
CONV CO2: 28 MMOL/L (ref 22–32)
CONV TOTAL PROTEIN: 6.7 G/DL (ref 6.3–8.2)
CREAT UR-MCNC: 0.73 MG/DL (ref 0.7–1.2)
DEPRECATED RDW RBC AUTO: 46.1 FL
EOSINOPHIL # BLD MANUAL: 0.13 10*3/UL (ref 0–0.7)
EOSINOPHIL NFR BLD MANUAL: 1.9 % (ref 0–7)
ERYTHROCYTE [DISTWIDTH] IN BLOOD BY AUTOMATED COUNT: 13 % (ref 11.5–14.5)
EST. AVERAGE GLUCOSE BLD GHB EST-MCNC: 111 MG/DL
GFR SERPLBLD BASED ON 1.73 SQ M-ARVRAT: >60 ML/MIN/{1.73_M2}
GLOBULIN UR ELPH-MCNC: 2.8 G/DL (ref 2–3.5)
GLUCOSE SERPL-MCNC: 88 MG/DL (ref 70–99)
GRANS (ABSOLUTE): 3.39 10*3/UL (ref 2–8)
GRANS: 49.2 % (ref 30–85)
HBA1C MFR BLD: 15 G/DL (ref 14–18)
HBA1C MFR BLD: 5.5 % (ref 3.5–5.7)
HCT VFR BLD AUTO: 45.2 % (ref 42–52)
HDLC SERPL-MCNC: 53 MG/DL (ref 40–60)
IMM GRANULOCYTES # BLD: 0.02 10*3/UL (ref 0–0.54)
IMM GRANULOCYTES NFR BLD: 0.3 % (ref 0–0.43)
LDLC SERPL CALC-MCNC: 30 MG/DL (ref 70–100)
LYMPHOCYTES # BLD MANUAL: 2.49 10*3/UL (ref 1–5)
LYMPHOCYTES NFR BLD MANUAL: 11.9 % (ref 3–10)
MCH RBC QN AUTO: 31.5 PG (ref 27–31)
MCHC RBC AUTO-ENTMCNC: 33.2 G/DL (ref 33–37)
MCV RBC AUTO: 95 FL (ref 80–96)
MONOCYTES # BLD AUTO: 0.82 10*3/UL (ref 0.2–1.2)
OSMOLALITY SERPL CALC.SUM OF ELEC: 295 MOSM/KG (ref 273–304)
PLATELET # BLD AUTO: 234 10*3/UL (ref 130–400)
PMV BLD AUTO: 8.9 FL (ref 7.4–10.4)
POTASSIUM SERPL-SCNC: 4.5 MMOL/L (ref 3.5–5.3)
RBC # BLD AUTO: 4.76 10*6/UL (ref 4.7–6.1)
SODIUM SERPL-SCNC: 142 MMOL/L (ref 135–147)
TRIGL SERPL-MCNC: 66 MG/DL (ref 40–150)
TSH SERPL-ACNC: 1.37 M[IU]/L (ref 0.27–4.2)
VARIANT LYMPHS NFR BLD MANUAL: 36.1 % (ref 20–45)
VLDLC SERPL-MCNC: 13 MG/DL (ref 5–37)
WBC # BLD AUTO: 6.89 10*3/UL (ref 4.8–10.8)

## 2021-02-19 ENCOUNTER — APPOINTMENT (OUTPATIENT)
Dept: VACCINE CLINIC | Facility: HOSPITAL | Age: 56
End: 2021-02-19

## 2021-02-24 ENCOUNTER — IMMUNIZATION (OUTPATIENT)
Dept: VACCINE CLINIC | Facility: HOSPITAL | Age: 56
End: 2021-02-24

## 2021-02-24 PROCEDURE — 91300 HC SARSCOV02 VAC 30MCG/0.3ML IM: CPT | Performed by: INTERNAL MEDICINE

## 2021-02-24 PROCEDURE — 0001A: CPT | Performed by: INTERNAL MEDICINE

## 2021-03-17 ENCOUNTER — IMMUNIZATION (OUTPATIENT)
Dept: VACCINE CLINIC | Facility: HOSPITAL | Age: 56
End: 2021-03-17

## 2021-03-17 PROCEDURE — 0002A: CPT | Performed by: INTERNAL MEDICINE

## 2021-03-17 PROCEDURE — 91300 HC SARSCOV02 VAC 30MCG/0.3ML IM: CPT | Performed by: INTERNAL MEDICINE

## 2021-05-15 VITALS
DIASTOLIC BLOOD PRESSURE: 56 MMHG | SYSTOLIC BLOOD PRESSURE: 106 MMHG | HEIGHT: 64 IN | WEIGHT: 295.37 LBS | HEART RATE: 106 BPM | BODY MASS INDEX: 50.43 KG/M2 | TEMPERATURE: 97.9 F

## 2021-05-16 VITALS — WEIGHT: 295 LBS | HEART RATE: 99 BPM | BODY MASS INDEX: 50.36 KG/M2 | OXYGEN SATURATION: 96 % | HEIGHT: 64 IN

## 2021-05-16 VITALS — HEIGHT: 65 IN | BODY MASS INDEX: 49.15 KG/M2 | OXYGEN SATURATION: 95 % | WEIGHT: 295 LBS | HEART RATE: 95 BPM

## 2021-05-16 VITALS — BODY MASS INDEX: 48.48 KG/M2 | HEIGHT: 65 IN | HEART RATE: 95 BPM | WEIGHT: 291 LBS | OXYGEN SATURATION: 96 %

## 2021-05-16 VITALS — OXYGEN SATURATION: 96 % | HEART RATE: 94 BPM | HEIGHT: 64 IN | WEIGHT: 294 LBS | BODY MASS INDEX: 50.19 KG/M2

## 2021-05-16 VITALS — BODY MASS INDEX: 49.65 KG/M2 | HEART RATE: 93 BPM | HEIGHT: 65 IN | WEIGHT: 298 LBS | OXYGEN SATURATION: 97 %

## 2021-05-16 VITALS — WEIGHT: 299 LBS | BODY MASS INDEX: 49.81 KG/M2 | OXYGEN SATURATION: 97 % | HEART RATE: 99 BPM | HEIGHT: 65 IN

## 2021-05-16 VITALS
DIASTOLIC BLOOD PRESSURE: 74 MMHG | OXYGEN SATURATION: 96 % | HEART RATE: 91 BPM | SYSTOLIC BLOOD PRESSURE: 137 MMHG | HEIGHT: 64 IN | WEIGHT: 291 LBS | BODY MASS INDEX: 49.68 KG/M2

## 2021-05-18 NOTE — PROGRESS NOTES
Fabio JuárezJustin 1965     Office/Outpatient Visit    Visit Date: Mon, Jul 8, 2019 08:54 am    Provider: Karley Amor N.P. (Assistant: Sarah Spurling, MA)    Location: [facility]        Electronically signed by Karley Amor N.P. on  07/08/2019 09:58:37 AM                             SUBJECTIVE:        CC:     Mauri is a 54 year old White male.  This is a follow-up visit.  Blood work/Refills.          HPI:         Patient to be evaluated for mixed hyperlipidemia and hypertriglyceridemia.  Compliance with treatment has been good.  He specifically denies associated symptoms, including muscle pain and weakness.          Additionally, he presents with history of hypertension.  he did not bring his blood pressure diary, but says that pressures have been okay.  He is tolerating the medication well without side effects.  Compliance with treatment has been good.          Dx with type 2 diabetes; specifically, this is type 2, non-insulin requiring diabetes.  Compliance with treatment has been good.  Patient's diabetes was first diagnosed several years ago.  He follows no particular diet.  Current meds include an oral hypoglycemic and insulin/injectable ( ozempic ).  Most recent lab results include Weight (lb):  292.4 (03/26/2019), Systolic BP:  125 (03/26/2019), Diastolic BP:  77 (03/26/2019), Hemoglobin A1c:  6.9 (%) (03/26/2019), LDL:  50 (mg/dL) (03/26/2019).  has been losing weight since on the ozempic     has been continuing to have the bilateral shoulder pain.  Worse at night - feels like a tooth ache sometimes numbness and burning but is always present.  Is already on Mobic and oxycodone chronically.  no position improves or worsens other than at night when in bed     ROS:     CONSTITUTIONAL:  Positive for unintentional weight loss ( since on this medication ).   Negative for chills, fatigue or fever.      CARDIOVASCULAR:  Negative for chest pain, orthopnea, paroxysmal nocturnal dyspnea and  pedal edema.      RESPIRATORY:  Negative for dyspnea and cough.      MUSCULOSKELETAL:  Positive for limb pain ( bilateral upper extremity pain ) and bialteral shoulder and upper arm.      NEUROLOGICAL:  Positive for tingling in both arms occasionally.          PMH/FMH/SH:     Last Reviewed on 7/08/2019 09:58 AM by Karley Amor    Past Medical History:             PAST MEDICAL HISTORY         Sleep Apnea     hemochromatosis     Renal Stones: dx'd in 2017 - March;     Chronic Pain: affecting the low back;     Type 2 Diabetes         CURRENT MEDICAL PROVIDERS:    Cardiologist: Pancho chavira - Manages CAD, HTN, Mixed hyperlipidemia - Annual follow up    Dermatologist: Deshawn (PRN only)    E/N/T: Pardeep (Trach) - PRN -    Pulmonologist: Candie - PRN    Pain Management - Flaget (every 3 months and PRN)    [facility] -  Dr Lewis  - annual PRN  follow up([place]) Podiatrist Dr. Mc         PREVENTIVE HEALTH MAINTENANCE             COLORECTAL CANCER SCREENING: Up to date (colonoscopy q10y; sigmoidoscopy q5y; Cologuard q3y) was last done 3/2017, Results are in chart; colonoscopy with normal results     DENTAL CLEANING: does not go     EYE EXAM: was last done 5/18/2018 [facility]         PAST MEDICAL HISTORY         Positive for    Sleep Apnea,    tracheostomy and    lung nodule;     Positive for    Gastroesophageal Reflux Disease;     Positive for    Chronic Pain;     Positive for    Type 2 Diabetes: uncontrolled; ;          hemachromatosis     Positive for    Allergies;         Surgical History:         Cholecystectomy: 7-2013;     Joint Replacement: BOTH KNEES;;; Right knee 1-2016      Appendectomy    Tonsillectomy/Adenoidectomy    tracheostomy secondary to sleep apnea;    right achilles tendon repair 10/2018 ([facility]);     Procedures: heat cath 11-3-15, left ventriulogram, coronary angiogram     COLONOSCOPY: was last done 7-28-14 with normal results Dr Anderson         Family History:      Father: ;  Myocardial Infarction     Mother: Arrhythmia     Brother(s): 2 brother(s) total; 1 ;  Hypertension;  cerebral aneurysm  at 38;  Type 2 Diabetes     Positive for Coronary Artery Disease.      Positive for Type 2 Diabetes ( mother ).          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          Alcohol: Frequency: Socially         Substance Abuse History:     Last Reviewed on 3/26/2019 12:13 PM by Karley Amor    None         Mental Health History:     Last Reviewed on 3/26/2019 12:13 PM by Karley Amor    NEGATIVE         Communicable Diseases (eg STDs):     Last Reviewed on 3/26/2019 12:13 PM by Karley Amor    Reportable health conditions; NEGATIVE             Current Problems:     Last Reviewed on 2019 09:58 AM by Karley Amor    Dizziness     Hypertension     Mixed hyperlipidemia and hypertriglyceridemia     CAD     Other hemochromatosis     Low back pain     Peripheral neuropathy     Chronic tension type headache     Obstructive sleep apnea     Tracheostomy status     Testosterone deficiency     History of GERD     Type 2 diabetes     Arm pain     R otitis media     Shoulder pain     Foot pain         Immunizations:     Fluzone (3 + years dose) 2013     Fluzone (3 + years dose) 2017     Fluzone pf (3+ years dose) 2018     Fluzone Quadrivalent (3+ years) 2018     Pneomovax 2016     Tdap (Tetanus, reduced diph, acellular pertussis) 3/26/2019         Allergies:     Last Reviewed on 2019 08:57 AM by Spurling, Sarah C      No Known Drug Allergies.         Current Medications:     Last Reviewed on 2019 09:00 AM by Spurling, Sarah C    Baclofen 10mg Tablet 1/2 to 1 tablet at bedtime for low back pain/muscle  spasms     Metformin HCl 500mg Tablet Take 2 tablet(s) by mouth bid     Pioglitazone HCl 45mg Tablet Take 1 tablet(s) by mouth daily     Hydrochlorothiazide (HCTZ) 12.5mg Tablet 1 po daily     Lisinopril 10mg Tablet 1 in am     Lipitor 80mg Tablet One PO Q HS     Glimepiride 2mg Tablet ONE AND A HALF TABLET DAILY     Mobic 15mg Tablet 1 tab daily     Cetirizine HCl 5mg Tablet Take 1 tablet(s) by mouth daily     Neurontin 100mg Capsules Take 2 capsule(s) by mouth tid     Fexofenadine HCl 180mg Tablet 1 tab daily     Fluticasone Propionate 50mcg/1actuation Nasal Spray 1 spray each nostil daily     OTC Probiotic with Vitamin C take one capsule once daily     Oxycodone HCl 5mg Capsules Take 1 capsule(s) by mouth q 6 hr prn for pain     Vitamin E 400IU Capsules 2 tab q day     Gabapentin 300mg Capsules 1 capsule po bid         OBJECTIVE:        Vitals:         Historical:     06/12/2019  Wt:   272.6lbs        Current: 7/8/2019 9:01:51 AM    Ht:  5 ft, 6 in;  Wt: 268.4 lbs;  BMI: 43.3    T: 97.4 F (oral);  BP: 117/73 mm Hg (right arm, sitting);  P: 73 bpm (right arm (BP Cuff), sitting);  sCr: 0.68 mg/dL;  GFR: 137.95        Exams:     PHYSICAL EXAM:     GENERAL: Vitals recorded well developed, well nourished;  well groomed;  no apparent distress;     NECK:  supple, full ROM; no thyromegaly; no carotid bruits;     RESPIRATORY: normal respiratory rate and pattern with no distress; normal breath sounds with no rales, rhonchi, wheezes or rubs;     CARDIOVASCULAR: normal rate; rhythm is regular;  normal S1; normal S2; no systolic murmur; no cyanosis; no edema;     MUSCULOSKELETAL:  Normal range of motion, strength and tone;     NEUROLOGICAL:  cranial nerves, motor and sensory function, reflexes, gait and coordination are all intact;     PSYCHIATRIC:  appropriate affect and demeanor; normal speech pattern; grossly normal memory;         ASSESSMENT:           272.2   E78.2  Mixed hyperlipidemia and hypertriglyceridemia               DDx:     401.1   I10  Hypertension              DDx:     250.00   E11.40  Type 2 diabetes              DDx:     729.5   M25.512  Arm pain              DDx:     719.41   M25.511  Shoulder pain              DDx:         ORDERS:         Radiology/Test Orders:       [id number]  Radiologic examination, spine, cervical; minimum of four views  (Send-Out)           Lab Orders:       [id number]  [facility] [place] [address] CMP A1C LIPID AND MICRO ALBUM CR RATIO: [id number]  (Send-Out)                   PLAN:          Mixed hyperlipidemia and hypertriglyceridemia ok to fill x 3 months when needed          Hypertension ok to fill x 3 months when needed          Type 2 diabetes     LABORATORY:  Labs ordered to be performed today include Diabetes Panel 2;CMP, A1C, Lipid, Microalbumin:Creatinine Ratio.            Orders:       [id number]  [facility] [place] [address] CMP A1C LIPID AND MICRO ALBUM CR RATIO: [id number]  (Send-Out)            Arm pain will repeat the xray - previous bulging disc - this may be due to worsening Cspine - otherwise plan on PT referral         RADIOLOGY:  I have ordered a C-Spine x-ray series to be done today.            Orders:       [id number]  Radiologic examination, spine, cervical; minimum of four views  (Send-Out)            Shoulder pain as above             CHARGE CAPTURE:           Primary Diagnosis:     272.2 Mixed hyperlipidemia and hypertriglyceridemia            E78.2    Mixed hyperlipidemia              Orders:          [id number]   Office/outpatient visit; established patient, level 4  (In-House)           401.1 Hypertension            I10    Essential (primary) hypertension    250.00 Type 2 diabetes            E11.40    Type 2 diabetes mellitus with diabetic neuropathy, unspecified    729.5 Arm pain            M25.512    Pain in left shoulder    719.41 Shoulder pain            M25.511    Pain in right shoulder

## 2021-05-18 NOTE — PROGRESS NOTES
Fabio Juárez  1965     Office/Outpatient Visit    Visit Date: Mon, Jan 11, 2021 01:01 pm    Provider: Sheila Thorpe N.P. (Assistant: Gris Nunez, )    Location: [facility]        Electronically signed by Sheila Thorpe N.P. on  01/11/2021 01:20:34 PM                             Subjective:        CC: Mauri is a 55 year old White male.  ear pain, headache, sore throat         HPI: Patient report right ear pain, sore throat, headache for 2 days now. Denies fever, nausea, vomiting, diarrhea. Covid + in November. No known exposure to illness. No OTC medications.     ROS:     CONSTITUTIONAL:  Negative for fatigue and fever.      EYES:  Negative for blurred vision.      E/N/T:  Positive for ear pain and sore throat.   Negative for nasal congestion, frequent rhinorrhea or sinus pressure.      CARDIOVASCULAR:  Negative for chest pain and palpitations.      RESPIRATORY:  Negative for recent cough and dyspnea.      MUSCULOSKELETAL:  Negative for arthralgias and myalgias.      NEUROLOGICAL:  Positive for headaches.   Negative for dizziness or weakness.      PSYCHIATRIC:  Negative for anxiety and depression.          Past Medical History / Family History / Social History:         Last Reviewed on 1/11/2021 01:11 PM by Sheila Thorpe    Past Medical History:             PAST MEDICAL HISTORY         Sleep Apnea     hemochromatosis     Renal Stones: dx'd in 2017 - March;     Chronic Pain: affecting the low back;     Type 2 Diabetes         CURRENT MEDICAL PROVIDERS:    Cardiologist: Pancho chavira - Manages CAD, HTN, Mixed hyperlipidemia - Annual follow up    Dermatologist: Deshawn (PRN only)    E/N/T: Pardeep (Trach) - PRN -    Pulmonologist: Candie - PRN    Pain Management - Flaget (every 3 months and PRN)    [facility] -  Dr Lewis  - annual PRN  follow up([place]) Podiatrist Dr. Mc         PREVENTIVE HEALTH MAINTENANCE             COLORECTAL CANCER SCREENING: Up to date  (colonoscopy q10y; sigmoidoscopy q5y; Cologuard q3y) was last done 3/2017, Results are in chart; colonoscopy with normal results     DENTAL CLEANING: does not go     EYE EXAM: was last done 2018 [facility]         PAST MEDICAL HISTORY         Positive for    Sleep Apnea,    tracheostomy and    lung nodule;     Positive for    Gastroesophageal Reflux Disease;     Positive for    Chronic Pain;     Positive for    Type 2 Diabetes: uncontrolled; ;         hemachromatosis     Positive for    Allergies;         Surgical History:         Cholecystectomy: ;     Joint Replacement: BOTH KNEES;;; Right knee -     Appendectomy    Tonsillectomy/Adenoidectomy    tracheostomy secondary to sleep apnea;    right achilles tendon repair 10/2018 ([facility]);     Procedures: heat cath 11-3-15, left ventriulogram, coronary angiogram     COLONOSCOPY: was last done 14 with normal results Dr Anderson         Family History:     Father: ;  Myocardial Infarction     Mother: Arrhythmia     Brother(s): 2 brother(s) total; 1 ;  Hypertension;  cerebral aneurysm  at 38;  Type 2 Diabetes     Positive for Coronary Artery Disease.      Positive for Type 2 Diabetes ( mother ).          Social History:     Occupation: [employer] / [employer]     Marital Status:      Children: 2 children and 1 step-child         Tobacco/Alcohol/Supplements:     Last Reviewed on 2021 01:11 PM by Sheila Thorpe    Tobacco: He has never smoked.          Alcohol: Frequency: Socially         Substance Abuse History:     Last Reviewed on 2021 01:11 PM by Sheila Thorpe    None         Mental Health History:     Last Reviewed on 2021 01:11 PM by Sheila Thorpe    NEGATIVE         Communicable Diseases (eg STDs):     Last Reviewed on 2019 08:28 PM by Pancho Wyatt    Reportable health conditions; NEGATIVE         Immunizations:     Fluzone (3 + years dose) 2013    Fluzone (3 +  years dose) 1/18/2017    Fluzone pf (3+ years dose) 1/9/2018    Fluzone Quadrivalent (3+ years) 11/6/2018    Fluzone Quadrivalent (3+ years) 10/29/2019    Pneomovax 8/8/2016    Tdap (Tetanus, reduced diph, acellular pertussis) 3/26/2019        Allergies:     Last Reviewed on 1/11/2021 01:11 PM by Sheila Thorpe    No Known Allergies.        Current Medications:     Last Reviewed on 1/11/2021 01:11 PM by Sheila Thorpe    Omega-3 acid ethyl esters 1 gram oral capsule [Take 1 capsule(s) by mouth bid]    baclofen 10 mg oral tablet [TAKE 1/2 TO 1 TABLET BY MOUTH AT BEDTIME FOR LOWER BACK PAIN/ MUSCLE SPASMS]    atorvastatin 80 mg oral tablet [TAKE ONE TABLET BY MOUTH EVERY NIGHT AT BEDTIME]    Fluticasone Propionate 50 mcg/actuation Intranasal Spray, Suspension [1 or 2 sprays in each nostril qday ]    lisinopriL 10 mg oral tablet [TAKE ONE TABLET BY MOUTH EVERY MORNING]    cetirizine 5 mg oral tablet [Take 1 tablet(s) by mouth daily]    glimepiride 2 mg oral tablet [TAKE 1 AND 1/2 TABLETS BY MOUTH DAILY]    Gabapentin 300 mg oral capsule [1 capsule po bid]    hydroCHLOROthiazide 12.5 mg oral tablet [1 po daily]    oxyCODONE 5 mg oral capsule [Take 1 capsule(s) by mouth q 6 hr prn for pain]    traZODone 50 mg oral tablet [TAKE ONE-HALF TO ONE TABLET BY MOUTH EVERY NIGHT AT BEDTIME]    Ozempic 1 mg/dose (2 mg/1.5 mL) subcutaneous Pen Injector [DIAL AND INJECT SUBCUTANEOUSLY 1 MG WEEKLY]    DICLOFENAC SODIUM DR 75 MG TBEC  [take one tablet PO BID]    pioglitazone-metformin  mg oral tablet [TAKE TWO TABLETS BY MOUTH TWICE A DAY]        Objective:        Vitals:         Current: 1/11/2021 1:04:54 PM    Ht:  5 ft, 6 in;  Wt: 263.7 lbs;  BMI: 42.6T: 97 F (temporal);  BP: 127/82 mm Hg (left arm, sitting);  P: 75 bpm (left arm (BP Cuff), sitting);  sCr: 0.81 mg/dL;  GFR: 113.66O2 Sat: 97 % (room air)        Exams:     PHYSICAL EXAM:     GENERAL: Vitals recorded well developed, well nourished;  no apparent distress;      EYES: extraocular movements intact; conjunctiva and cornea are normal; PERRLA;     E/N/T: EARS: the left TM is bulging and the right TM is bulging, dull, has fluid behind it, and red;  NOSE:  normal nasal mucosa, septum, turbinates, and sinuses; OROPHARYNX:  normal mucosa, dentition, gingiva, and posterior pharynx;     RESPIRATORY: normal appearance and symmetric expansion of chest wall; normal respiratory rate and pattern with no distress; normal breath sounds with no rales, rhonchi, wheezes or rubs; trach in place;     CARDIOVASCULAR: normal rate; rhythm is regular;  no systolic murmur; no edema;     GASTROINTESTINAL: nontender; normal bowel sounds;     LYMPHATIC: no enlargement of cervical or facial nodes;     MUSCULOSKELETAL: normal gait;     NEUROLOGIC: mental status: alert and oriented x 3;     PSYCHIATRIC:  appropriate affect and demeanor; normal speech pattern; grossly normal memory;         Assessment:         H66.001   Acute suppurative otitis media without spontaneous rupture of ear drum, right ear           ORDERS:         Meds Prescribed:       [New Rx] amoxicillin 500 mg oral tablet [take 1 tablet (500 mg) by oral route 2 times a day for 10 days], #20 (twenty) tablets, Refills: 0 (zero)                 Plan:         Acute suppurative otitis media without spontaneous rupture of ear drum, right eareducated patient on abx use and ear infection. Rest and fluids. Call with any concerns.           Prescriptions:       [New Rx] amoxicillin 500 mg oral tablet [take 1 tablet (500 mg) by oral route 2 times a day for 10 days], #20 (twenty) tablets, Refills: 0 (zero)             Charge Capture:         Primary Diagnosis:     H66.001  Acute suppurative otitis media without spontaneous rupture of ear drum, right ear           Orders:      [id number]  Office/outpatient visit; established patient, level 3  (In-House)

## 2021-05-18 NOTE — PROGRESS NOTES
Fabio Juárez 1965     Office/Outpatient Visit    Visit Date: Tue, Oct 15, 2019 02:13 pm    Provider: Kalyn Lira N.P. (Assistant: Jami Randolph MA)    Location: [facility]        Electronically signed by Kalyn Lira N.P. on  10/18/2019 11:29:54 PM                             SUBJECTIVE:        CC:     Mauri is a 54 year old White male.  presents today due to complaints of pain around right side rib area X 2 years, right shoulder pain X 6 months, flu shot (not taking Vit E)         HPI:         Shoulder pain details; pt states R shoulder pain x 6 months or longer. States no injury. States progressing pain with popping noises when lifting items. States feeling weakness when he lifts items. States picking up heavy items at work. Taking pain pills for his back and helps his shoulder.      ROS:     CONSTITUTIONAL:  Negative for chills, fatigue, fever and weight change.      CARDIOVASCULAR:  Negative for chest pain, orthopnea, paroxysmal nocturnal dyspnea and pedal edema.      RESPIRATORY:  Negative for dyspnea and cough.      GASTROINTESTINAL:  Negative for abdominal pain, heartburn, constipation, diarrhea, and stool changes.      PSYCHIATRIC:  Negative for anxiety and depression.          PMH/FMH/SH:     Last Reviewed on 10/15/2019 02:36 PM by Kalyn Lira    Past Medical History:             PAST MEDICAL HISTORY         Sleep Apnea     hemochromatosis     Renal Stones: dx'd in 2017 - March;     Chronic Pain: affecting the low back;     Type 2 Diabetes         CURRENT MEDICAL PROVIDERS:    Cardiologist: Pancho chavira - Manages CAD, HTN, Mixed hyperlipidemia - Annual follow up    Dermatologist: Deshawn (PRN only)    E/N/T: Pardeep (Trach) - PRN -    Pulmonologist: Candie - PRN    Pain Management - Flaget (every 3 months and PRN)    [facility] -  Dr Lewis  - annual PRN  follow up([facility]) Podiatrist Dr. Mc         PREVENTIVE HEALTH MAINTENANCE              COLORECTAL CANCER SCREENING: Up to date (colonoscopy q10y; sigmoidoscopy q5y; Cologuard q3y) was last done 3/2017, Results are in chart; colonoscopy with normal results     DENTAL CLEANING: does not go     EYE EXAM: was last done 2018 [facility]         PAST MEDICAL HISTORY         Positive for    Sleep Apnea,    tracheostomy and    lung nodule;     Positive for    Gastroesophageal Reflux Disease;     Positive for    Chronic Pain;     Positive for    Type 2 Diabetes: uncontrolled; ;          hemachromatosis     Positive for    Allergies;         Surgical History:         Cholecystectomy: ;     Joint Replacement: BOTH KNEES;;; Right knee -      Appendectomy    Tonsillectomy/Adenoidectomy    tracheostomy secondary to sleep apnea;    right achilles tendon repair 10/2018 ([facility]);     Procedures: heat cath 11-3-15, left ventriulogram, coronary angiogram     COLONOSCOPY: was last done 14 with normal results Dr Anderson         Family History:     Father: ;  Myocardial Infarction     Mother: Arrhythmia     Brother(s): 2 brother(s) total; 1 ;  Hypertension;  cerebral aneurysm  at 38;  Type 2 Diabetes     Positive for Coronary Artery Disease.      Positive for Type 2 Diabetes ( mother ).          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          Alcohol: Frequency: Socially         Substance Abuse History:     Last Reviewed on 10/15/2019 02:36 PM by Kalyn Lira    None         Mental Health History:     Last Reviewed on 10/15/2019 02:36 PM by Kalyn Lira    NEGATIVE         Communicable Diseases (eg STDs):     Last Reviewed on 10/15/2019 02:36 PM by Kalyn Lira    Reportable health conditions; NEGATIVE             Current  Problems:     Last Reviewed on 10/15/2019 02:36 PM by Kalyn Lira    Dizziness     Hypertension     Mixed hyperlipidemia and hypertriglyceridemia     CAD     Other hemochromatosis     Low back pain     Peripheral neuropathy     Chronic tension type headache     Obstructive sleep apnea     Tracheostomy status     Testosterone deficiency     History of GERD     Type 2 diabetes     L otitis media     Diarrhea     Sore throat     Shoulder pain     Foot pain         Immunizations:     Fluzone (3 + years dose) 1/8/2013     Fluzone (3 + years dose) 1/18/2017     Fluzone pf (3+ years dose) 1/9/2018     Fluzone Quadrivalent (3+ years) 11/6/2018     Pneomovax 8/8/2016     Tdap (Tetanus, reduced diph, acellular pertussis) 3/26/2019         Allergies:     Last Reviewed on 10/15/2019 02:36 PM by Kalyn Lira      No Known Drug Allergies.         Current Medications:     Last Reviewed on 10/15/2019 02:36 PM by Kalyn Lira    Lipitor 80mg Tablet One PO Q HS     Omega-3 acid ethyl esters 1gm Capsules Take 1 capsule(s) by mouth bid     Hydrochlorothiazide (HCTZ) 12.5mg Tablet 1 po daily     Cetirizine HCl 5mg Tablet Take 1 tablet(s) by mouth daily     Pioglitazone HCl 45mg Tablet Take 1 tablet(s) by mouth daily     Glimepiride 2mg Tablet ONE AND A HALF TABLET DAILY     Baclofen 10mg Tablet 1/2 to 1 tablet at bedtime for low back pain/muscle spasms     Metformin HCl 500mg Tablet Take 2 tablet(s) by mouth bid     Lisinopril 10mg Tablet 1 in am     Mobic 15mg Tablet 1 tab daily     Neurontin 100mg Capsules Take 2 capsule(s) by mouth tid     Trazodone HCl 50mg Tablet 1/2 -- 1 tablet at bedtime     Ozempic pen     Gabapentin 300mg Capsules 1 capsule po bid     Fluticasone Propionate 50mcg/1actuation Nasal Spray 1 or 2 sprays in each nostril qday     OTC Probiotic with Vitamin C take one capsule once daily     Oxycodone HCl 5mg Capsules Take 1 capsule(s) by mouth q 6 hr prn for pain     Vitamin E 400IU Capsules 2 tab q  day         OBJECTIVE:        Vitals:         Current: 10/15/2019 2:17:41 PM    Ht:  5 ft, 6 in;  Wt: 252.8 lbs;  BMI: 40.8    T: 97.8 F (oral);  BP: 108/79 mm Hg (left arm, sitting);  P: 107 bpm (left arm (BP Cuff), sitting);  sCr: 0.68 mg/dL;  GFR: 134.48        Exams:     PHYSICAL EXAM:     GENERAL: Vitals recorded well developed, well nourished;  well groomed;  no apparent distress;     EYES: lids and lacrimal system are normal in appearance; extraocular movements intact; conjunctiva and cornea are normal; PERRLA;     E/N/T:  normal EACs, TMs, nasal/oral mucosa, teeth, gingiva, and oropharynx;     NECK:  supple, full ROM; no thyromegaly; no carotid bruits;     RESPIRATORY: normal respiratory rate and pattern with no distress; normal breath sounds with no rales, rhonchi, wheezes or rubs;     CARDIOVASCULAR: normal rate; rhythm is regular;  normal S1; normal S2; no systolic murmur; no cyanosis; no edema;     GASTROINTESTINAL: nontender, nondistended; no hepatosplenomegaly or masses; no bruits;     SKIN:  no significant rashes or lesions; no suspicious moles;     MUSCULOSKELETAL:  Normal range of motion, strength and tone;     NEUROLOGICAL:  cranial nerves, motor and sensory function, reflexes, gait and coordination are all intact;     PSYCHIATRIC:  appropriate affect and demeanor; normal speech pattern; grossly normal memory;         ASSESSMENT:           786.50   S23.41XD  Rib pain              DDx:     719.41   M25.511  Shoulder pain              DDx:         ORDERS:         Radiology/Test Orders:       [id number]  Radiologic exam chest 2 views  (Send-Out)         [id number]  Radiologic exam, ribs, left; 3 views  (Send-Out)         [id number]  MRI Right upper extremity, w/o contrast  (Send-Out)                   PLAN:          Rib pain         RADIOLOGY:  I have ordered CXR 2 view with PA and Left Unilateral Rib 2 view Chest XRAY Left Unilateral Rib to be done today.            Orders:       [id number]  Radiologic exam chest 2  views  (Send-Out)         [id number]  Radiologic exam, ribs, left; 3 views  (Send-Out)            Shoulder pain         FOLLOW-UP TESTING #1:    RADIOLOGY:  I have ordered a right shoulder MRI to be done today.            Orders:       [id number]  MRI Right upper extremity, w/o contrast  (Send-Out)               CHARGE CAPTURE:           Primary Diagnosis:     786.50 Rib pain            S23.41XD    Sprain of ribs, subsequent encounter    719.41 Shoulder pain            M25.511    Pain in right shoulder

## 2021-05-18 NOTE — PROGRESS NOTES
Fabio Juárez  1965     Office/Outpatient Visit    Visit Date: Tue, Dec 17, 2019 10:12 am    Provider: Pancho Wyatt MD (Assistant: Elsi Dacosta MA)    Location: [facility]        Electronically signed by Pancho Wyatt MD on  12/17/2019 08:29:25 PM                             Subjective:        CC: (NOT TAKING PROBIOTIC, ALPRAZOLAM) Mauri is a 54 year old White male.  He presents with bilateral ear pain.          HPI:       Pt has bilateral ear pain for 3-4 days. He believes he has 8-9 ear infections this year and he has been on several different antibiotics. He saw Dr. Aleksandr Randolph about 1 year ago to evaluate his tracheotomy (placed in 1996 for NEAL).    ROS:     CONSTITUTIONAL:  Negative for chills, fatigue and fever.      E/N/T:  Positive for ear pain ( bilateral ) and trachetomy in place.      CARDIOVASCULAR:  Negative for chest pain, orthopnea, paroxysmal nocturnal dyspnea and pedal edema.      RESPIRATORY:  Negative for dyspnea and cough.      GASTROINTESTINAL:  Positive for abdominal pain ( LUQ ).      MUSCULOSKELETAL:  Positive for joint stiffness (right shoulder).      PSYCHIATRIC:  Negative for anxiety and depression.          Past Medical History / Family History / Social History:         Last Reviewed on 12/17/2019 08:28 PM by Pancho Wyatt    Past Medical History:             PAST MEDICAL HISTORY         Sleep Apnea     hemochromatosis     Renal Stones: dx'd in 2017 - March;     Chronic Pain: affecting the low back;     Type 2 Diabetes         CURRENT MEDICAL PROVIDERS:    Cardiologist: Pancho chavira - Manages CAD, HTN, Mixed hyperlipidemia - Annual follow up    Dermatologist: Deshawn (PRN only)    E/N/T: Pardeep (Trach) - PRN -    Pulmonologist: Candie - PRN    Pain Management - Flaget (every 3 months and PRN)    [facility] -  Dr Lewis  - annual PRN  follow up([facility]) Podiatrist Dr. Mc         PREVENTIVE HEALTH MAINTENANCE             COLORECTAL  CANCER SCREENING: Up to date (colonoscopy q10y; sigmoidoscopy q5y; Cologuard q3y) was last done 3/2017, Results are in chart; colonoscopy with normal results     DENTAL CLEANING: does not go     EYE EXAM: was last done 2018 [facility]         PAST MEDICAL HISTORY         Positive for    Sleep Apnea,    tracheostomy and    lung nodule;     Positive for    Gastroesophageal Reflux Disease;     Positive for    Chronic Pain;     Positive for    Type 2 Diabetes: uncontrolled; ;         hemachromatosis     Positive for    Allergies;         Surgical History:         Cholecystectomy: ;     Joint Replacement: BOTH KNEES;;; Right knee      Appendectomy    Tonsillectomy/Adenoidectomy    tracheostomy secondary to sleep apnea;    right achilles tendon repair 10/2018 ([facility]);     Procedures: heat cath 11-3-15, left ventriulogram, coronary angiogram     COLONOSCOPY: was last done 14 with normal results Dr Anderson         Family History:     Father: ;  Myocardial Infarction     Mother: Arrhythmia     Brother(s): 2 brother(s) total; 1 ;  Hypertension;  cerebral aneurysm  at 38;  Type 2 Diabetes     Positive for Coronary Artery Disease.      Positive for Type 2 Diabetes ( mother ).          Social History:     Occupation: [employer] / [employer]     Marital Status:      Children: 2 children and 1 step-child         Tobacco/Alcohol/Supplements:     Last Reviewed on 2019 08:28 PM by Pancho Wyatt    Tobacco: He has never smoked.          Alcohol: Frequency: Socially         Substance Abuse History:     Last Reviewed on 2019 08:28 PM by Pancho Wyatt    None         Mental Health History:     Last Reviewed on 2019 08:28 PM by Pancho Wyatt    NEGATIVE         Communicable Diseases (eg STDs):     Last Reviewed on 2019 08:28 PM by Pancho Wyatt    Reportable health conditions; NEGATIVE         Current Problems:     Last  Reviewed on 12/17/2019 08:28 PM by Pancho Wyatt    Type 2 diabetes mellitus with diabetic neuropathy, unspecified    Type 2 diabetes    History of GERD    Testosterone deficiency    Other testicular dysfunction    Obstructive sleep apnea    Chronic tension type headache    Chronic tension-type headache, intractable    Chronic tension-type headache, not intractable    Obstructive sleep apnea (adult) (pediatric)    Tracheostomy status    Tracheostomy status    Diabetes mellitus due to underlying condition with diabetic neuropathy, unspecified    Peripheral neuropathy    Low back pain    Low back pain    Other hemochromatosis     Pain in right foot    Foot pain    CAD    Mixed hyperlipidemia and hypertriglyceridemia    Mixed hyperlipidemia    Essential (primary) hypertension    Hypertension    Shoulder pain    Dizziness and giddiness    Pain in right shoulder    Dizziness    Sprain of ribs, subsequent encounter    Rib pain    Claustrophobia    Abdominal pain, other specified site    Claustrophobia    Acute serous otitis media, right ear    Upper abdominal pain, unspecified        Immunizations:     Fluzone (3 + years dose) 1/8/2013    Fluzone (3 + years dose) 1/18/2017    Fluzone pf (3+ years dose) 1/9/2018    Fluzone Quadrivalent (3+ years) 11/6/2018    Fluzone Quadrivalent (3+ years) 10/29/2019    Pneomovax 8/8/2016    Tdap (Tetanus, reduced diph, acellular pertussis) 3/26/2019        Allergies:     Last Reviewed on 12/17/2019 08:28 PM by Pancho Wyatt    No Known Allergies.        Current Medications:     Last Reviewed on 12/17/2019 08:28 PM by Pancho Wyatt    meloxicam 15 mg oral tablet [TAKE ONE TABLET BY MOUTH DAILY]    metFORMIN 500 mg oral tablet [TAKE TWO TABLETS BY MOUTH TWICE A DAY]    Omega-3 acid ethyl esters 1 gram oral capsule [Take 1 capsule(s) by mouth bid]    Neurontin 100mg Capsules [Take 2 capsule(s) by mouth tid]    Baclofen 10 mg oral tablet [1/2 to 1 tablet at bedtime for low back  pain/muscle spasms]    Lipitor 80mg Tablet [One PO Q HS]    Fluticasone Propionate 50 mcg/actuation Intranasal Spray, Suspension [1 or 2 sprays in each nostril qday ]    Lisinopril 10 mg oral tablet [1 in am ]    cetirizine 5 mg oral tablet [Take 1 tablet(s) by mouth daily]    pioglitazone 45 mg oral tablet [Take 1 tablet(s) by mouth daily]    Glimepiride 2 mg oral tablet [ONE AND A HALF TABLET DAILY]    Glimepiride 2 mg oral tablet [one every am]    Gabapentin 300 mg oral capsule [1 capsule po bid]    OTC Probiotic with Vitamin C take one capsule once daily     oxyCODONE 5 mg oral capsule [Take 1 capsule(s) by mouth q 6 hr prn for pain]    hydroCHLOROthiazide 12.5 mg oral tablet [1 po daily]    traZODone 50 mg oral tablet [1/2 -- 1 tablet at bedtime]    Ozempic pen     Alprazolam 1 mg oral tablet [1/2 1 hour prior to procedure then the other half 30 min before]        Objective:        Vitals:         Current: 12/17/2019 10:19:08 AM    Ht:  5 ft, 6 in;  Wt: 260.4 lbs;  BMI: 42.0T: 97.3 F (oral);  BP: 105/68 mm Hg (left arm, sitting);  P: 67 bpm (left arm (BP Cuff), sitting);  sCr: 0.81 mg/dL;  GFR: 114.33        Exams:     PHYSICAL EXAM:     GENERAL: Vitals recorded well developed, well nourished;  well groomed;  no apparent distress;     E/N/T: EARS: external auditory canal erythematous on the right;  the right TM is red;     NECK: tracheostomy w/ collar in place;     RESPIRATORY: normal respiratory rate and pattern with no distress; normal breath sounds with no rales, rhonchi, wheezes or rubs;     CARDIOVASCULAR: normal rate; rhythm is regular;  normal S1; normal S2; no systolic murmur; no cyanosis; no edema;     GASTROINTESTINAL: nontender, nondistended; no hepatosplenomegaly or masses; no bruits;     MUSCULOSKELETAL: normal gait; normal overall tone     NEUROLOGICAL:  cranial nerves, motor and sensory function, reflexes, gait and coordination are all intact;     PSYCHIATRIC:  appropriate affect and demeanor;  normal speech pattern; grossly normal memory;         Assessment:         H65.01   Acute serous otitis media, right ear           ORDERS:         Meds Prescribed:       [New Rx] Augmentin 875-125 mg oral tablet [take 1 tablet by oral route every 12 hours], #10 (ten) tablets, Refills: 0 (zero)         Procedures Ordered:       REFER  Referral to Specialist or Other Facility  (Send-Out)                      Plan:         Acute serous otitis media, right earPt referred to ENT Aleksandr Randolph for recurrent AOM, chronic sinusitis, and because he has a tracheotomy in place and he would benefit from having this evaluated annually. Pt prescribed augmentin for right AOM.         REFERRALS:  Referral initiated to an E/N/T ( Dr Nathanael Randolph, [facility] ).            Prescriptions:       [New Rx] Augmentin 875-125 mg oral tablet [take 1 tablet by oral route every 12 hours], #10 (ten) tablets, Refills: 0 (zero)           Orders:       REFER  Referral to Specialist or Other Facility  (Send-Out)                  Charge Capture:         Primary Diagnosis:     H65.01  Acute serous otitis media, right ear           Orders:      [id number]  Office/outpatient visit; established patient, level 3  (In-House)

## 2021-05-18 NOTE — PROGRESS NOTES
Fabio Juárez 1965     Office/Outpatient Visit    Visit Date: Wed, Feb 21, 2018 09:49 am    Provider: Karley Amor N.P. (Assistant: Phyllis Trent RN)    Location: [facility]        Electronically signed by Karley Amro N.P. on  02/21/2018 01:55:14 PM                             SUBJECTIVE:        CC:     Mauri is a 52 year old White male.  Right foot pain         HPI:         Patient to be evaluated for foot pain.  Today's visit is for evaluation of the right foot.  The location of the discomfort is primarily the heel and Achilles' tendon.  It radiates to the calf.  The pain initially began several years ago.  The precipitating event was does work on feet all day.  Past history is significant for diagnosed with plantar fasciitis years ago, was treated with injections and has been using NSAIDs, ice/roll, previous  shoe inserts.      ROS:     CONSTITUTIONAL:  Negative for chills, fatigue, fever and weight change.      CARDIOVASCULAR:  Negative for chest pain, orthopnea, paroxysmal nocturnal dyspnea and pedal edema.      RESPIRATORY:  Negative for dyspnea and cough.      GASTROINTESTINAL:  Negative for abdominal pain, heartburn, constipation, diarrhea, and stool changes.      MUSCULOSKELETAL:  Positive for limb pain ( right foot ).      PSYCHIATRIC:  Negative for anxiety and depression.          PMH/FMH/SH:     Last Reviewed on 6/20/2016 02:30 PM by Karley Amor    Past Medical History:             PAST MEDICAL HISTORY         Sleep Apnea     hemochromatosis     Chronic Pain: affecting the low back;     Type 2 Diabetes         CURRENT MEDICAL PROVIDERS:    Cardiologist: Pancho chavira -Last visit - 10/2016 - (recommend 1 year follow up)    Dermatologist    Pulmonologist: Dr. Davis    pain mgmt    liver specialist Dr Lewis         PREVENTIVE HEALTH MAINTENANCE             COLORECTAL CANCER SCREENING: Up to date (colonoscopy q10y; sigmoidoscopy q5y; Cologuard q3y) was last done  3/2017; colonoscopy with normal results     DENTAL CLEANING: does not go     EYE EXAM: was last done  Jeff     INFLUENZA VACCINE: was last done          PAST MEDICAL HISTORY         Positive for    Sleep Apnea,    tracheostomy and    lung nodule;     Positive for    Gastroesophageal Reflux Disease;     Positive for    Chronic Pain;     Positive for    Type 2 Diabetes: uncontrolled; ;          hemachromatosis     Positive for    Allergies;         CURRENT MEDICAL PROVIDERS:    E/N/T: Pardeep         Surgical History:         Cholecystectomy: ;     Joint Replacement: BOTH KNEES;;; Right knee       Appendectomy    Tonsillectomy/Adenoidectomy    tracheostomy secondary to sleep apnea;     Procedures: heat cath 11-3-15, left ventriulogram, coronary angiogram     COLONOSCOPY: was last done 14 with normal results Dr Anderson         Family History:     Father: ;  Myocardial Infarction     Mother: Arrhythmia     Brother(s): 2 brother(s) total; 1 ;  Hypertension;  cerebral aneurysm  at 38;  Type 2 Diabetes     Positive for Coronary Artery Disease.      Positive for Type 2 Diabetes ( mother ).          Social History:     Occupation: [employer] / [employer]     Marital Status:      Children: 2 children and 1 step-child         Tobacco/Alcohol/Supplements:     Last Reviewed on 2018 10:40 AM by Nancy Alvarez    Tobacco: He has never smoked.          Alcohol: Frequency: Socially         Substance Abuse History:     Last Reviewed on 2016 02:30 PM by Karley Amor        Mental Health History:     Last Reviewed on 2016 02:30 PM by Karley Amor        Communicable Diseases (eg STDs):     Last Reviewed on 2016 02:30 PM by Karley Amor            Current Problems:     Last Reviewed on 5/10/2017 06:37 AM by Karley Amor    Leukocytosis, unspecified     Lung nodule     Other hemochromatosis     Low back pain      "Peripheral neuropathy     Chronic tension type headache     Obstructive sleep apnea     Tracheostomy status     Testosterone deficiency     History of GERD     Hypercholesterolemia     Type 2 diabetes     Edema     Foot pain     Acute bronchitis         Immunizations:     Fluzone (3 + years dose) 1/8/2013     Fluzone (3 + years dose) 1/18/2017     Fluzone pf (3+ years dose) 1/9/2018         Allergies:     Last Reviewed on 2/21/2018 09:52 AM by Phyllis Trent      No Known Drug Allergies.         Current Medications:     Last Reviewed on 2/21/2018 09:52 AM by Phyllis Trent    Baclofen 10mg Tablet 1/2 to 1 tablet at bedtime for low back pain/muscle spasms     Lipitor 80mg Tablet One PO Q HS     Metformin HCl 500mg Tablet Take 2 tablet(s) by mouth bid     Pioglitazone HCl 45mg Tablet Take 1 tablet(s) by mouth daily     Mobic 15mg Tablet 1 tab daily     Neurontin 100mg Capsules Take 2 capsule(s) by mouth tid     Bydureon 2mg/1vial Injection Suspension, Extended-Release Inject 2 mg subcutaneously q week     BD Ultra-Fine Short Pen Needle 31G x 5/16\"  Pen Needle use daily with Victoza  DX E11.9     Glimepiride 2mg Tablet Take 1 tablet daily with first main meal of the day for 2 weeks then increase to 2 tablets  daily     Fexofenadine HCl 180mg Tablet 1 tab daily     Lovaza 1gm Capsules Take 1 capsule(s) by mouth bid     Oxycodone/Acetaminophen  5mg/300mg Tablet Take 1 tablet(s) by mouth q6h prn for pain     Vitamin E 400IU Capsules 2 tab q day     Gabapentin 300mg Capsules 1 cap TID     Lisinopril 10mg Tablet 1 in am         OBJECTIVE:        Vitals:         Current: 2/21/2018 9:51:32 AM    Ht:  5 ft, 6 in;  Wt: 295.4 lbs;  BMI: 47.7    T: 97 F (oral);  BP: 112/71 mm Hg (left arm, sitting);  P: 79 bpm (left arm (BP Cuff), sitting);  sCr: 0.68 mg/dL;  GFR: 146.89        Exams:     PHYSICAL EXAM:     GENERAL: Vitals recorded well developed, well nourished;  well groomed;  no apparent distress;     NECK:  supple, full " ROM; no thyromegaly; no carotid bruits;     RESPIRATORY: normal respiratory rate and pattern with no distress; normal breath sounds with no rales, rhonchi, wheezes or rubs;     CARDIOVASCULAR: normal rate; rhythm is regular;  normal S1; normal S2; no systolic murmur; no cyanosis; no edema;     GASTROINTESTINAL: nontender, nondistended; no hepatosplenomegaly or masses; no bruits;     SKIN:  no significant rashes or lesions; no suspicious moles;     MUSCULOSKELETAL: gait: affected by a right leg limp;  pain with range of motion in: right ankle flexion and extension;     NEUROLOGICAL:  cranial nerves, motor and sensory function, reflexes, gait and coordination are all intact;     PSYCHIATRIC:  appropriate affect and demeanor; normal speech pattern; grossly normal memory;         ASSESSMENT:           728.71   M72.2  Plantar fasciitis              DDx:         ORDERS:         Procedures Ordered:       REFER  Referral to Specialist or Other Facility  (Send-Out)                   PLAN:          Plantar fasciitis         REFERRALS:  Referral initiated to a podiatrist ( Dr. Dr. Hubbard - right foot pain/ previous injections ).      RECOMMENDATIONS given include: instructed Mauri to get some more shoe inserts, stretches for feet, continue with NSAIDs, and ice roll to feet - will send to podiatry for evalaution and treatment - deferred xray today - previous xray from 2012.  School/Work Excuse for Today           Orders:       REFER  Referral to Specialist or Other Facility  (Send-Out)             Patient Education Handouts:       [facility] Medication Compliance              Patient Recommendations:        For  Plantar fasciitis:     I also recommend instructed Mauri to get some more shoe inserts, stretches for feet, continue with NSAIDs, and ice roll to feet - will send to podiatry for evalaution and treatment - deferred xray today - previous xray from 2012.              CHARGE CAPTURE:           Primary Diagnosis:     728.71  Plantar fasciitis            M72.2    Plantar fascial fibromatosis              Orders:          [id number]   Office/outpatient visit; established patient, level 3  (In-House)               ADDENDUMS:      ____________________________________    Date: 02/21/2018 03:17 PM    Author: Christina Yoo         Visit Note Faxed to:        User Entered Recipient; Number [phone]     Health Summary Faxed to:        User Entered Recipient; Number [phone]

## 2021-05-18 NOTE — PROGRESS NOTES
Fabio Juárez 1965     Office/Outpatient Visit    Visit Date: Mon, Feb 12, 2018 10:35 am    Provider: Karley Amor N.P. (Assistant: Nancy Alvarez MA)    Location: [facility]        Electronically signed by Karley Amor N.P. on  02/12/2018 01:50:13 PM                             SUBJECTIVE:        CC:     Mauri is a 52 year old White male.  Patient complains of pain in the right foot.          HPI:         Patient to be evaluated for foot pain.  Today's visit is for evaluation of the right foot.  The location of the discomfort is primarily the heel and Achilles' tendon.  It radiates to the calf.  The pain initially began 5 days ago.  No precipitating event or injury is identified.  He characterizes the pain as of moderate severity and pain worse in the morning, better as the day goes on - but worsens the next morning.  Palliative factors include rest.  The pain increases with compression and dorsiflexion.  Past history is significant for previous xray from 2010 showed moderate spur on right heel.          Concerning type 2 diabetes, specifically, this is type 2, non-insulin requiring diabetes.  Compliance with treatment has been fair.  Patient's diabetes was first diagnosed several years ago.  He follows no particular diet.  Current meds include an oral hypoglycemic.  Not applicable He reports home blood glucose readings have been fairly good, with average fasting glucoses running in the 120-150 mg/dL range.  Most recent lab results include Hemoglobin A1c:  8.3 (%) (08/07/2017),  8.0 (%) (10/30/2017).      ROS:     CONSTITUTIONAL:  Negative for chills, fatigue and fever.      CARDIOVASCULAR:  Negative for chest pain and pedal edema.      RESPIRATORY:  Negative for recent cough and dyspnea.      GASTROINTESTINAL:  Negative for abdominal pain, constipation, diarrhea, heartburn, nausea and vomiting.      GENITOURINARY:  Negative for dysuria and change in urine stream.       MUSCULOSKELETAL:  Positive for limb pain ( right foot/ heel/ calf ).   Negative for arthralgias or myalgias.      NEUROLOGICAL:  Negative for dizziness, fainting, headaches and paresthesias.          PMH/FMH/SH:     Last Reviewed on 2016 02:30 PM by Karley Amor    Past Medical History:             PAST MEDICAL HISTORY         Sleep Apnea     hemochromatosis     Chronic Pain: affecting the low back;     Type 2 Diabetes         CURRENT MEDICAL PROVIDERS:    Cardiologist: Pancho chavira -Last visit - 10/2016 - (recommend 1 year follow up)    Dermatologist    Pulmonologist: Dr. Davis    pain mgmt    liver specialist Dr Lewis         PREVENTIVE HEALTH MAINTENANCE             COLORECTAL CANCER SCREENING: Up to date (colonoscopy q10y; sigmoidoscopy q5y; Cologuard q3y) was last done 3/2017; colonoscopy with normal results     DENTAL CLEANING: does not go     EYE EXAM: was last done  Jeff     INFLUENZA VACCINE: was last done          PAST MEDICAL HISTORY         Positive for    Sleep Apnea,    tracheostomy and    lung nodule;     Positive for    Gastroesophageal Reflux Disease;     Positive for    Chronic Pain;     Positive for    Type 2 Diabetes: uncontrolled; ;          hemachromatosis     Positive for    Allergies;         CURRENT MEDICAL PROVIDERS:    E/N/T: Pardeep         Surgical History:         Cholecystectomy: ;     Joint Replacement: BOTH KNEES;;; Right knee -      Appendectomy    Tonsillectomy/Adenoidectomy    tracheostomy secondary to sleep apnea;     Procedures: heat cath 11-3-15, left ventriulogram, coronary angiogram     COLONOSCOPY: was last done 14 with normal results Dr Anderson         Family History:     Father: ;  Myocardial Infarction     Mother: Arrhythmia     Brother(s): 2 brother(s) total; 1 ;  Hypertension;  cerebral aneurysm  at 38;  Type 2 Diabetes     Positive for Coronary Artery Disease.      Positive for Type 2 Diabetes  ( mother ).          Social History:     Occupation: [employer]     Marital Status:      Children: 2 children and 1 step-child         Tobacco/Alcohol/Supplements:     Last Reviewed on 2/12/2018 10:40 AM by Nancy Alvarez    Tobacco: He has never smoked.          Alcohol: Frequency: Socially         Substance Abuse History:     Last Reviewed on 6/20/2016 02:30 PM by Karley Amor        Mental Health History:     Last Reviewed on 6/20/2016 02:30 PM by Karley Amor        Communicable Diseases (eg STDs):     Last Reviewed on 6/20/2016 02:30 PM by Karley Amor            Current Problems:     Last Reviewed on 5/10/2017 06:37 AM by Karley Amor    Leukocytosis, unspecified     Lung nodule     Other hemochromatosis     Low back pain     Peripheral neuropathy     Chronic tension type headache     Obstructive sleep apnea     Tracheostomy status     Testosterone deficiency     History of GERD     Hypercholesterolemia     Type 2 diabetes     Edema     Foot pain     Acute bronchitis         Immunizations:     Fluzone (3 + years dose) 1/8/2013     Fluzone (3 + years dose) 1/18/2017     Fluzone pf (3+ years dose) 1/9/2018         Allergies:     Last Reviewed on 2/12/2018 10:35 AM by Nancy Alvarez      No Known Drug Allergies.         Current Medications:     Last Reviewed on 2/12/2018 10:40 AM by Nancy Alvarez    Lipitor 80mg Tablet One PO Q HS     Metformin HCl 500mg Tablet Take 2 tablet(s) by mouth bid     Pioglitazone HCl 45mg Tablet Take 1 tablet(s) by mouth daily     Baclofen 10mg Tablet 1/2 to 1 tablet at bedtime for low back pain/muscle spasms     Mobic 15mg Tablet 1 tab daily     Neurontin 100mg Capsules Take 2 capsule(s) by mouth tid     Glimepiride 2mg Tablet Take 1 tablet daily with first main meal of the day for 2 weeks then increase to 2 tablets  daily     Fexofenadine HCl 180mg Tablet 1 tab daily     Lovaza 1gm Capsules Take 1 capsule(s) by mouth bid      Oxycodone/Acetaminophen  5mg/300mg Tablet Take 1 tablet(s) by mouth q6h prn for pain     Vitamin E 400IU Capsules 2 tab q day         OBJECTIVE:        Vitals:         Current: 2/12/2018 10:38:06 AM    Ht:  5 ft, 6 in;  Wt: 292.6 lbs;  BMI: 47.2    T: 97 F (oral);  BP: 116/76 mm Hg (left arm, sitting);  P: 84 bpm (left arm (BP Cuff), sitting);  sCr: 0.55 mg/dL;  GFR: 180.88        Exams:     PHYSICAL EXAM:     GENERAL: Vitals recorded well developed, well nourished;  no apparent distress;     EYES: PERRL, EOMI     E/N/T: EARS: external auditory canal normal bilaterally;  bilateral TMs are normal;  NOSE:  normal nasal mucosa, septum, turbinates, and sinuses; OROPHARYNX:  normal mucosa, dentition, gingiva, and posterior pharynx;     NECK: range of motion is normal;     RESPIRATORY: normal appearance and symmetric expansion of chest wall; normal respiratory rate and pattern with no distress; normal breath sounds with no rales, rhonchi, wheezes or rubs;     CARDIOVASCULAR: normal rate; rhythm is regular;  no edema;     GASTROINTESTINAL: nontender; normal bowel sounds; no organomegaly;     LYMPHATIC: no enlargement of cervical or facial nodes; no supraclavicular nodes;     MUSCULOSKELETAL: normal gait; normal range of motion of all major muscle groups; pain with range of motion in: right ankle right foot with dorsiflexion, rotation;     NEUROLOGIC: mental status: alert and oriented x 3;     PSYCHIATRIC: appropriate affect and demeanor; normal speech pattern; normal thought and perception;         ASSESSMENT           729.5   M79.674  Foot pain              DDx:     250.00   E11.40  Type 2 diabetes              DDx:     782.3   R60.0  Edema              DDx:         ORDERS:         Meds Prescribed:       Mobic (Meloxicam) 15mg Tablet 1 tab daily  #30 (Thirty) tablet(s) Refills: 2         Lab Orders:       [id number]  [id number] [facility] CBC without diff  (Send-Out)         [id number] [facility] Comp Metabolic Panel (14)   (Send-Out)         [id number]  [id number] [facility] Hemoglobin A1c  (Send-Out)         [id number]  [id number] [facility] Lipid Panel (Excludes LDL/HDL ratio)  (Send-Out)         [id number]  [id number] [facility] Microalbumin, Random Urine  (Send-Out)         [id number]  [id number] [facility] TSH  (Send-Out)                   PLAN:          Foot pain           Prescriptions:       Mobic (Meloxicam) 15mg Tablet 1 tab daily  #30 (Thirty) tablet(s) Refills: 2          Type 2 diabetes     LABORATORY:  Labs ordered to be performed today at [facility] include CBC W/O DIFF.  CMP HgbA1C Lipid panel Microalbumin, random urine He does not need rerfills at this time to the best of his knowledge.  We will get labs and determine need for further appt.  He would like something to help him lose some of his weight.            Orders:       [id number]  [id number] [facility] CBC without diff  (Send-Out)         [id number]  [id number] [facility] Comp Metabolic Panel (14)  (Send-Out)         [id number]  [id number] [facility] Hemoglobin A1c  (Send-Out)         [id number]  [id number] [facility] Lipid Panel (Excludes LDL/HDL ratio)  (Send-Out)         [id number]  [id number] [facility] Microalbumin, Random Urine  (Send-Out)            Edema     LABORATORY:  Labs ordered to be performed today at [facility] include.  TSH     RECOMMENDATIONS given include: This may be related to the weight gain,  I do not see anything that appears to be clot, We will have him elevate his foot/leg, weight loss instructions.            Orders:       [id number]  [id number] [facility] TSH  (Send-Out)               Patient Recommendations:        For  Edema:     I also recommend This may be related to the weight gain,  I do not see anything that appears to be clot, We will have him elevate his foot/leg, weight loss instructions.              CHARGE CAPTURE           **Please note: ICD descriptions below are intended for billing purposes only and may not represent clinical diagnoses**        Primary Diagnosis:         729.5 Foot pain            M79.674    Pain in right toe(s)               Orders:          [id number]   Office/outpatient visit; established patient, level 4  (In-House)           250.00 Type 2 diabetes            E11.40    Type 2 diabetes mellitus with diabetic neuropathy, unspecified    782.3 Edema            R60.0    Localized edema

## 2021-05-18 NOTE — PROGRESS NOTES
Fabio Juárez 1965     Office/Outpatient Visit    Visit Date: Sat, Dec 1, 2018 10:06 am    Provider: Pancho Wyatt MD (Assistant: Sarah Spurling, MA)    Location: [facility]        Electronically signed by Pancho Wyatt MD on  12/09/2018 10:53:00 PM                             SUBJECTIVE:        CC:     Mauri is a 53 year old White male.  Ears are hurting, pt thinks he has a sinus infection.          HPI:     Sinus and nasal congestion and pain for the last day or so. Maybe a little chest congestion. No fever but patient does have diarrhea. Pt has a tracheotomy for chronic sleep apnea (placed in 1996). Coughing up a lot of yellow phlegm. Tracheostomy feels sore, which is not typical for him. He has a little bit of a sore throat.     As above, pt has a sore throat in setting of chronic tracheotomy for sleep apnea.     ROS:     CONSTITUTIONAL:  Negative for fatigue and fever.      EYES:  Negative for blurred vision.      E/N/T:  Positive for nasal congestion, sinus pressure and sore throat ( acute ).   Negative for diminished hearing.      CARDIOVASCULAR:  Negative for chest pain and palpitations.      RESPIRATORY:  Positive for recent cough ( with copious amounts of purulent sputum ).   Negative for dyspnea.      GASTROINTESTINAL:  Positive for diarrhea.   Negative for abdominal pain, constipation, nausea or vomiting.      GENITOURINARY:  Negative for dysuria.      MUSCULOSKELETAL:  Negative for arthralgias and myalgias.      NEUROLOGICAL:  Negative for paresthesias and weakness.      PSYCHIATRIC:  Negative for anxiety, depression and sleep disturbance.          PMH/FMH/SH:     Last Reviewed on 12/09/2018 10:50 PM by Pancho Wyatt    Past Medical History:             PAST MEDICAL HISTORY         Sleep Apnea     hemochromatosis     Renal Stones: dx'd in 2017 - March;     Chronic Pain: affecting the low back;     Type 2 Diabetes         CURRENT MEDICAL PROVIDERS:    Cardiologist: Pancho  cait - Manages CAD, HTN, Mixed hyperlipidemia - Annual follow up    Dermatologist: Deshawn (PRN only)    E/N/T: Pardeep (Trach) - PRN -    Pulmonologist: Candie - PRN    Pain Management - Flaget (every 3 months and PRN)    [facility] -  Dr Lewis  - annual follow up Podiatrist Dr. Hubbard (PRN)         PREVENTIVE HEALTH MAINTENANCE             COLORECTAL CANCER SCREENING: Up to date (colonoscopy q10y; sigmoidoscopy q5y; Cologuard q3y) was last done 3/2017, Results are in chart; colonoscopy with normal results     DENTAL CLEANING: does not go     EYE EXAM: was last done 2018 [facility]         PAST MEDICAL HISTORY         Positive for    Sleep Apnea,    tracheostomy and    lung nodule;     Positive for    Gastroesophageal Reflux Disease;     Positive for    Chronic Pain;     Positive for    Type 2 Diabetes: uncontrolled; ;          hemachromatosis     Positive for    Allergies;         Surgical History:         Cholecystectomy: ;     Joint Replacement: BOTH KNEES;;; Right knee -      Appendectomy    Tonsillectomy/Adenoidectomy    tracheostomy secondary to sleep apnea;    right achilles tendon repair 10/2018 ([place]);     Procedures: heat cath 11-3-15, left ventriulogram, coronary angiogram     COLONOSCOPY: was last done 14 with normal results Dr Anderson         Family History:     Father: ;  Myocardial Infarction     Mother: Arrhythmia     Brother(s): 2 brother(s) total; 1 ;  Hypertension;  cerebral aneurysm  at 38;  Type 2 Diabetes     Positive for Coronary Artery Disease.      Positive for Type 2 Diabetes ( mother ).          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          Alcohol: Frequency: Socially          Substance Abuse History:     Last Reviewed on 12/09/2018 10:50 PM by Pancho Wyatt    None         Mental Health History:     Last Reviewed on 12/09/2018 10:50 PM by Pancho Wyatt    NEGATIVE         Communicable Diseases (eg STDs):     Last Reviewed on 12/09/2018 10:50 PM by Pancho Wyatt    Reportable health conditions; NEGATIVE             Current Problems:     Last Reviewed on 12/09/2018 10:50 PM by Pancho Wyatt    Mixed hyperlipidemia and hypertriglyceridemia     CAD     Other hemochromatosis     Low back pain     Peripheral neuropathy     Chronic tension type headache     Obstructive sleep apnea     Tracheostomy status     Testosterone deficiency     History of GERD     Type 2 diabetes     Sore throat     Acute sinusitis, other     Insomnia     Foot pain         Immunizations:     Fluzone (3 + years dose) 1/8/2013     Fluzone (3 + years dose) 1/18/2017     Fluzone pf (3+ years dose) 1/9/2018     Fluzone Quadrivalent (3+ years) 11/6/2018     Pneomovax 8/8/2016         Allergies:     Last Reviewed on 12/09/2018 10:50 PM by Pancho Wyatt      No Known Drug Allergies.         Current Medications:     Last Reviewed on 12/09/2018 10:50 PM by Pancho Wyatt    Glimepiride 2mg Tablet ONE AND A HALF TABLET DAILY     Hydrochlorothiazide (HCTZ) 12.5mg Tablet 1 po daily     Lipitor 80mg Tablet One PO Q HS     Lisinopril 10mg Tablet 1 in am     Metformin HCl 500mg Tablet Take 2 tablet(s) by mouth bid     Mobic 15mg Tablet 1 tab daily     Pioglitazone HCl 45mg Tablet Take 1 tablet(s) by mouth daily     Baclofen 10mg Tablet 1/2 to 1 tablet at bedtime for low back pain/muscle spasms     Cetirizine HCl 5mg Tablet Take 1 tablet(s) by mouth daily     Neurontin 100mg Capsules Take 2 capsule(s) by mouth tid     Trazodone HCl 50mg Tablet 1/2 -- 1 tablet at bedtime     Bydureon 2mg/1vial Injection Suspension, Extended-Release Inject 2 mg subcutaneously q week     Fluticasone Propionate 50mcg/1actuation Nasal Spray  1 or 2 sprays in each nostril qday     OTC Probiotic with Vitamin C take one capsule once daily     Oxycodone HCl 5mg Capsules Take 1 capsule(s) by mouth q 6 hr prn for pain     Vitamin E 400IU Capsules 2 tab q day         OBJECTIVE:        Vitals:         Current: 12/1/2018 10:16:00 AM    Ht:  5 ft, 6 in;  Wt: 294.8 lbs;  BMI: 47.6    T: 97.6 F (oral);  BP: 117/73 mm Hg (right arm, sitting);  P: 93 bpm (right arm (BP Cuff), sitting);  sCr: 0.74 mg/dL;  GFR: 133.39        Exams:     PHYSICAL EXAM:     GENERAL: Vitals recorded well developed, well nourished, obese;  well groomed;  no apparent distress;     NECK: trachea is tracheotomy with trach collar in place;     RESPIRATORY: normal respiratory rate and pattern with no distress; normal breath sounds with no rales, rhonchi, wheezes or rubs;     CARDIOVASCULAR: normal rate; rhythm is regular;  normal S1; normal S2; no systolic murmur; no cyanosis; no edema;     SKIN: surgical site without s/s infection, no active drainage, no tenderness over the site;     MUSCULOSKELETAL:  Normal range of motion, strength and tone;     NEUROLOGICAL:  cranial nerves, motor and sensory function, reflexes, gait and coordination are all intact;     PSYCHIATRIC:  appropriate affect and demeanor; normal speech pattern; grossly normal memory;         Lab/Test Results:             Rapid Strep Screen:  Negative (12/01/2018),             ASSESSMENT           461.8   J01.90  Acute sinusitis, other              DDx:     462   J02.8  Sore throat              DDx:         ORDERS:         Meds Prescribed:       Azithromycin 250mg Tablet 2 po today, 1 po x 4 days  #1 (One) tablet(s) Refills: 0         Lab Orders:       [id number]  Group A Streptococcus detection by immunoassay with direct optical observation  (In-House)         [id number]  [facility] Throat culture, strep  (Send-Out)                   PLAN:          Acute sinusitis, other Rapid strep is negative, although given patient's tracheotmy and new  onset sore throat will give z-pack.           Prescriptions:       Azithromycin 250mg Tablet 2 po today, 1 po x 4 days  #1 (One) tablet(s) Refills: 0          Sore throat           Orders:       [id number]  Group A Streptococcus detection by immunoassay with direct optical observation  (In-House)         [id number]  [facility] Throat culture, strep  (Send-Out)               CHARGE CAPTURE           **Please note: ICD descriptions below are intended for billing purposes only and may not represent clinical diagnoses**        Primary Diagnosis:         461.8 Acute sinusitis, other            J01.90    Acute sinusitis, unspecified              Orders:          [id number]   Office/outpatient visit; established patient, level 4  (In-House)           462 Sore throat            J02.8    Acute pharyngitis due to other specified organisms              Orders:          [id number]   Group A Streptococcus detection by immunoassay with direct optical observation  (In-House)

## 2021-05-18 NOTE — PROGRESS NOTES
Fabio Juárez 1965     Office/Outpatient Visit    Visit Date: Tue, Aug 21, 2018 04:15 pm    Provider: Karley Amor N.P. (Assistant: Flaca Ontiveros LPN)    Location: [facility]        Electronically signed by Karley Amor N.P. on  08/26/2018 07:04:51 PM                             SUBJECTIVE:        CC:     Mauri is a 53 year old White male.  This is a follow-up visit.  diabetes         HPI:         Mauri presents with type 2 diabetes.  Specifically, this is type 2, non-insulin requiring diabetes.  Compliance with treatment has been fair; he does not follow a diet and exercise regimen.  Patient's diabetes was first diagnosed several years ago.  He follows no particular diet.  Depression screen is performed and is negative.  He reports home blood glucose readings have been high, with average fasting readings in the mid-200s mg/dL range.  Most recent lab results include [facility] Diabetes Mellitus Flowsheet.      Regarding the ongoing foot pain - surgery planned  September 4  achilles tendon tear with Dr. Hubbard         Additionally, he presents with history of upper respiratory illness.  these have been present for the past 10 days.  The symptoms include body aches, ear complaints, headache, nasal congestion, nasal discharge, sinus pain/pressure and sneezing.  He denies exposure to ill contacts.  He has already tried to relieve the symptoms with antihistamines and nasal spray.  Medical history is significant for allergies and frequent sinusitis.      ROS:     CONSTITUTIONAL:  Negative for chills, fatigue, fever and weight change.      E/N/T:  Positive for ear pain, nasal congestion, frequent rhinorrhea and sinus pressure.      CARDIOVASCULAR:  Negative for chest pain, orthopnea, paroxysmal nocturnal dyspnea and pedal edema.      RESPIRATORY:  Positive for recent cough ( seems to be exacerbated by allergies ).   Negative for dyspnea or cough.      PSYCHIATRIC:  Negative for anxiety and  depression.          PMH/FMH/SH:     Last Reviewed on 3/29/2018 12:43 PM by Karley Amor    Past Medical History:             PAST MEDICAL HISTORY         Sleep Apnea     hemochromatosis     Renal Stones: dx'd in  - March;     Chronic Pain: affecting the low back;     Type 2 Diabetes         CURRENT MEDICAL PROVIDERS:    Cardiologist: Pancho chavira - Manages CAD, HTN, Mixed hyperlipidemia - Annual follow up    Dermatologist: Deshawn (PRN only)    E/N/T: Pardeep (Trach) - PRN -    Pulmonologist: Melissa (annually & PRN)    Pain Management - Flaget (every 3 months and PRN)    [facility] -  Dr Lewis  - annual follow up Podiatrist Dr. Hubbard (PRN)         PREVENTIVE HEALTH MAINTENANCE             COLORECTAL CANCER SCREENING: Up to date (colonoscopy q10y; sigmoidoscopy q5y; Cologuard q3y) was last done 3/2017, Results are in chart; colonoscopy with normal results     DENTAL CLEANING: does not go     EYE EXAM: was last done  [place]         PAST MEDICAL HISTORY         Positive for    Sleep Apnea,    tracheostomy and    lung nodule;     Positive for    Gastroesophageal Reflux Disease;     Positive for    Chronic Pain;     Positive for    Type 2 Diabetes: uncontrolled; ;          hemachromatosis     Positive for    Allergies;         Surgical History:         Cholecystectomy: ;     Joint Replacement: BOTH KNEES;;; Right knee -      Appendectomy    Tonsillectomy/Adenoidectomy    tracheostomy secondary to sleep apnea;     Procedures: heat cath 11-3-15, left ventriulogram, coronary angiogram     COLONOSCOPY: was last done 14 with normal results Dr Anderson         Family History:     Father: ;  Myocardial Infarction     Mother: Arrhythmia     Brother(s): 2 brother(s) total; 1 ;  Hypertension;  cerebral aneurysm  at 38;  Type 2 Diabetes     Positive for Coronary Artery Disease.      Positive for Type 2 Diabetes ( mother ).          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          Alcohol: Frequency: Socially         Substance Abuse History:     Last Reviewed on 3/29/2018 12:43 PM by Karley Amor    None         Mental Health History:     Last Reviewed on 3/29/2018 12:43 PM by Karley Amor    NEGATIVE         Communicable Diseases (eg STDs):     Last Reviewed on 3/29/2018 12:43 PM by Karley Amor    Reportable health conditions; NEGATIVE             Current Problems:     Last Reviewed on 3/29/2018 12:43 PM by Karley Amor    Mixed hyperlipidemia and hypertriglyceridemia     CAD     Lung nodule     Other hemochromatosis     Low back pain     Peripheral neuropathy     Chronic tension type headache     Obstructive sleep apnea     Tracheostomy status     Testosterone deficiency     History of GERD     Type 2 diabetes     Upper respiratory illness     Costochondritis     Insomnia     Foot pain         Immunizations:     Fluzone (3 + years dose) 1/8/2013     Fluzone (3 + years dose) 1/18/2017     Fluzone pf (3+ years dose) 1/9/2018     Pneomovax 8/8/2016         Allergies:     Last Reviewed on 7/02/2018 03:11 PM by Monica Pride      No Known Drug Allergies.         Current Medications:     Last Reviewed on 8/21/2018 04:25 PM by Bertha Busby    Mobic 15mg Tablet 1 tab daily     Baclofen 10mg Tablet 1/2 to 1 tablet at bedtime for low back pain/muscle spasms     Cetirizine HCl 5mg Tablet Take 1 tablet(s) by mouth daily     Metformin HCl 500mg Tablet Take 2 tablet(s) by mouth bid     Pioglitazone HCl 45mg Tablet Take 1 tablet(s) by mouth daily     Lipitor 80mg Tablet One PO Q HS     Neurontin 100mg Capsules Take 2 capsule(s) by mouth tid     Fluticasone Propionate 50mcg/1actuation Nasal Spray 1 or 2 sprays in  each nostril qday     OTC Probiotic with Vitamin C take one capsule once daily     Oxycodone HCl 5mg Capsules Take 1 capsule(s) by mouth q 6 hr prn for pain     Vitamin E 400IU Capsules 2 tab q day     Bydureon 2mg/1vial Injection Suspension, Extended-Release Inject 2 mg subcutaneously q week     Glimepiride 2mg Tablet one every am     Hydrochlorothiazide (HCTZ) 12.5mg Tablet 1 po daily     Lisinopril 10mg Tablet 1 in am     Omega-3 acid ethyl esters 1gm Capsules Take 1 capsule(s) by mouth bid         OBJECTIVE:        Vitals:         Historical:     07/02/2018  Wt:   291.7lbs        Current: 8/21/2018 4:27:27 PM    Ht:  5 ft, 6 in;  Wt: 284.6 lbs;  BMI: 45.9    T: 97.5 F (oral);  BP: 117/80 mm Hg (right arm, sitting);  P: 110 bpm (right arm (BP Cuff), sitting);  sCr: 0.83 mg/dL;  GFR: 117.16        Exams:     PHYSICAL EXAM:     GENERAL: Vitals recorded well developed, well nourished;  well groomed;  no apparent distress;     EYES: lids and lacrimal system are normal in appearance; extraocular movements intact; conjunctiva and cornea are normal; PERRLA;     E/N/T:  normal EACs, TMs, nasal/oral mucosa, teeth, gingiva, and oropharynx;     NECK:  supple, full ROM; no thyromegaly; no carotid bruits;     RESPIRATORY: normal respiratory rate and pattern with no distress; normal breath sounds with no rales, rhonchi, wheezes or rubs;     CARDIOVASCULAR: normal rate; rhythm is regular;  normal S1; normal S2; no systolic murmur; no cyanosis; no edema;     GASTROINTESTINAL: nontender, nondistended; no hepatosplenomegaly or masses; no bruits;     SKIN:  no significant rashes or lesions; no suspicious moles;     MUSCULOSKELETAL:  Normal range of motion, strength and tone;     NEUROLOGICAL:  cranial nerves, motor and sensory function, reflexes, gait and coordination are all intact;     PSYCHIATRIC:  appropriate affect and demeanor; normal speech pattern; grossly normal memory;         ASSESSMENT:           250.00   E11.40  Type 2  diabetes              DDx:     729.5   M79.671  Foot pain              DDx:     465.8   J20.9  Upper respiratory illness              DDx:     272.2   E78.2  Mixed hyperlipidemia and hypertriglyceridemia              DDx:         ORDERS:         Meds Prescribed:       Refill of: Baclofen 10mg Tablet 1/2 to 1 tablet at bedtime for low back pain/muscle spasms  #30 (Thirty) tablet(s) Refills: 2       Refill of: Cetirizine HCl 5mg Tablet Take 1 tablet(s) by mouth daily  #90 (Ninety) tablet(s) Refills: 2       Refill of: Metformin HCl 500mg Tablet Take 2 tablet(s) by mouth bid  #180 (One [place]cad[place] and Eighty) tablet(s) Refills: 0       Refill of: Pioglitazone HCl 45mg Tablet Take 1 tablet(s) by mouth daily  #90 (Ninety) tablet(s) Refills: 0       Refill of: Lisinopril 10mg Tablet 1 in am  #90 (Ninety) tablet(s) Refills: 0       Refill of: Omega-3 acid ethyl esters 1gm Capsules Take 1 capsule(s) by mouth bid  #180 (One [place] and Eighty) capsule(s) Refills: 1       Refill of: Bydureon (Exenatide) 2mg/1vial Injection Suspension, Extended-Release Inject 2 mg subcutaneously q week QS for 90 day(s) Refills: 0       Refill of: Glimepiride 2mg Tablet one every am  #90 (Ninety) tablet(s) Refills: 0       Refill of: Hydrochlorothiazide (HCTZ) 12.5mg Tablet 1 po daily  #90 (Ninety) tablet(s) Refills: 0         Lab Orders:       FUTURE  Future order to be done at patients convenience  (Send-Out)         [id number]  [facility] [place] [address] LIPID,CMP, A1C: [id number], [id number], [id number]  (Send-Out)                   PLAN:          Type 2 diabetes         FOLLOW-UP TESTING #1: FOLLOW-UP LABORATORY:  Labs to be scheduled in the future include Diabetes Panel 1; CMP, Lipid, A1C.            Prescriptions:       Refill of: Metformin HCl 500mg Tablet Take 2 tablet(s) by mouth bid  #180 (One [place]cad[place] and Eighty) tablet(s) Refills: 0       Refill of: Pioglitazone HCl 45mg Tablet Take 1 tablet(s) by mouth daily  #90 (Ninety) tablet(s) Refills: 0       Refill of: Bydureon  (Exenatide) 2mg/1vial Injection Suspension, Extended-Release Inject 2 mg subcutaneously q week QS for 90 day(s) Refills: 0       Refill of: Glimepiride 2mg Tablet one every am  #90 (Ninety) tablet(s) Refills: 0       Refill of: Lisinopril 10mg Tablet 1 in am  #90 (Ninety) tablet(s) Refills: 0           Orders:       FUTURE  Future order to be done at patients convenience  (Send-Out)         [id number]  [address] LIPID,CMP, A1C: [id number], [id number], [id number]  (Send-Out)            Foot pain follow up with Dr. Hubbard as scheduled          Upper respiratory illness           Prescriptions:       Refill of: Cetirizine HCl 5mg Tablet Take 1 tablet(s) by mouth daily  #90 (Ninety) tablet(s) Refills: 2          Mixed hyperlipidemia and hypertriglyceridemia           Prescriptions:       Refill of: Omega-3 acid ethyl esters 1gm Capsules Take 1 capsule(s) by mouth bid  #180 (One Vincent and Eighty) capsule(s) Refills: 1             Other Prescriptions:       Refill of: Baclofen 10mg Tablet 1/2 to 1 tablet at bedtime for low back pain/muscle spasms  #30 (Thirty) tablet(s) Refills: 2       Refill of: Hydrochlorothiazide (HCTZ) 12.5mg Tablet 1 po daily  #90 (Ninety) tablet(s) Refills: 0         Patient Recommendations:        For  Type 2 diabetes:             The following laboratory testing has been ordered:             CHARGE CAPTURE:           Primary Diagnosis:     250.00 Type 2 diabetes            E11.40    Type 2 diabetes mellitus with diabetic neuropathy, unspecified              Orders:          [id number]   Office/outpatient visit; established patient, level 4  (In-House)           729.5 Foot pain            M79.671    Pain in right foot    465.8 Upper respiratory illness            J20.9    Acute bronchitis, unspecified    272.2 Mixed hyperlipidemia and hypertriglyceridemia            E78.2    Mixed hyperlipidemia

## 2021-05-18 NOTE — PROGRESS NOTES
Fabio JuárezJustin 1965     Office/Outpatient Visit    Visit Date: Tue, Nov 6, 2018 01:00 pm    Provider: Karley Amor N.P. (Assistant: Russ Schneider LPN)    Location: [facility]        Electronically signed by Karley Amor N.P. on  11/07/2018 08:41:50 AM                             SUBJECTIVE:        CC:     Mauri is a 53 year old White male.  This is a follow-up visit.  diabetes         HPI: med refills, influenza vaccine         Patient presents with type 2 diabetes.  Specifically, this is type 2, non-insulin requiring diabetes.  Compliance with treatment has been fair; he does not follow a diet and exercise regimen.  Patient's diabetes was first diagnosed several years ago.          In regard to the foot pain, this involves the right foot. recent achilles surgery - reports doing well - he did have some complications of infection after the suregery that he went to ER and got treated for;  He also reports that he has been unable to wear the boot/shoe that was recommended due to causing mal-alignment of his foot;  schedlued for follow upwith Dr. Hubbard December         Additionally, he presents with history of insomnia.  this has been noted for the past several years.  Sleep has been disrupted by a delay in initiation of sleep and frequent awakenings.  On average, he estimates that he gets 3-5 hours of sleep per night.  Medical history is positive for sleep apnea.  Current medications include an antihypertensive and a diuretic.  Remedies that he has already tried include OTC sleep aids and benadryl.          Mixed hyperlipidemia and hypertriglyceridemia details; compliance with treatment has been good.  He specifically denies associated symptoms, including muscle pain and weakness.  Most recent lab tests include Total Cholesterol:  137 (mg/dL) (09/05/2018), HDL:  45 (mg/dL) (09/05/2018), Triglycerides:  191 (mg/dL) (09/05/2018), LDL:  54 (mg/dL) (09/05/2018), VLDL Cholesterol:  38  (mg/dl) (09/05/2018), CHOL/HDLC RATIO:  3.0 (NA) (09/05/2018).  Concurrent health problems include diabetes and hypertension.      ROS:     CONSTITUTIONAL:  Negative for chills, fatigue, fever and weight change.      CARDIOVASCULAR:  Negative for chest pain, orthopnea, paroxysmal nocturnal dyspnea and pedal edema.      RESPIRATORY:  Negative for dyspnea and cough.      GASTROINTESTINAL:  Negative for abdominal pain, heartburn, constipation, diarrhea, and stool changes.      MUSCULOSKELETAL:  Positive for right foot  - healing but the pain is improved.      INTEGUMENTARY:  Positive for surgical scar healing.      PSYCHIATRIC:  Positive for insomnia.   Negative for anxiety, crying spells, depression, feelings of stress or sadness.          PMH/FMH/SH:     Last Reviewed on 11/06/2018 01:17 PM by Karley Amor    Past Medical History:             PAST MEDICAL HISTORY         Sleep Apnea     hemochromatosis     Renal Stones: dx'd in 2017 - March;     Chronic Pain: affecting the low back;     Type 2 Diabetes         CURRENT MEDICAL PROVIDERS:    Cardiologist: Pancho chavira - Manages CAD, HTN, Mixed hyperlipidemia - Annual follow up    Dermatologist: Deshawn (PRN only)    E/N/T: Pardeep (Trach) - PRN -    Pulmonologist: Candie - PRN    Pain Management - Flaget (every 3 months and PRN)    [facility] -  Dr Lewis  - annual follow up Podiatrist Dr. Hubbard (PRN)         PREVENTIVE HEALTH MAINTENANCE             COLORECTAL CANCER SCREENING: Up to date (colonoscopy q10y; sigmoidoscopy q5y; Cologuard q3y) was last done 3/2017, Results are in chart; colonoscopy with normal results     DENTAL CLEANING: does not go     EYE EXAM: was last done 2015 [place]         PAST MEDICAL HISTORY         Positive for    Sleep Apnea,    tracheostomy and    lung nodule;     Positive for    Gastroesophageal Reflux Disease;     Positive for    Chronic Pain;     Positive for    Type 2 Diabetes: uncontrolled; ;           hemachromatosis     Positive for    Allergies;         Surgical History:         Cholecystectomy: ;     Joint Replacement: BOTH KNEES;;; Right knee       Appendectomy    Tonsillectomy/Adenoidectomy    tracheostomy secondary to sleep apnea;     Procedures: heat cath 11-3-15, left ventriulogram, coronary angiogram     COLONOSCOPY: was last done 14 with normal results Dr Anderson         Family History:     Father: ;  Myocardial Infarction     Mother: Arrhythmia     Brother(s): 2 brother(s) total; 1 ;  Hypertension;  cerebral aneurysm  at 38;  Type 2 Diabetes     Positive for Coronary Artery Disease.      Positive for Type 2 Diabetes ( mother ).          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          Alcohol: Frequency: Socially         Substance Abuse History:     Last Reviewed on 3/29/2018 12:43 PM by Karley Amor    None         Mental Health History:     Last Reviewed on 3/29/2018 12:43 PM by Karley Amor    NEGATIVE         Communicable Diseases (eg STDs):     Last Reviewed on 3/29/2018 12:43 PM by Karley Amor    Reportable health conditions; NEGATIVE             Current Problems:     Last Reviewed on 3/29/2018 12:43 PM by Karley Amor    Mixed hyperlipidemia and hypertriglyceridemia     CAD     Other hemochromatosis     Low back pain     Peripheral neuropathy     Chronic tension type headache     Obstructive sleep apnea     Tracheostomy status     Testosterone deficiency     History of GERD     Type 2 diabetes     Insomnia     Foot pain         Immunizations:     Fluzone (3 + years dose) 2013     Fluzone (3 + years dose) 2017     Fluzone pf (3+ years dose) 2018     Fluzone Quadrivalent (3+ years) 2018      Pneomovax 8/8/2016         Allergies:     Last Reviewed on 11/06/2018 01:09 PM by Russ Schneider      No Known Drug Allergies.         Current Medications:     Last Reviewed on 8/21/2018 04:25 PM by Bertha Busby    Lipitor 80mg Tablet One PO Q HS     Glimepiride 2mg Tablet ONE AND A HALF TABLET DAILY     Baclofen 10mg Tablet 1/2 to 1 tablet at bedtime for low back pain/muscle spasms     Cetirizine HCl 5mg Tablet Take 1 tablet(s) by mouth daily     Metformin HCl 500mg Tablet Take 2 tablet(s) by mouth bid     Pioglitazone HCl 45mg Tablet Take 1 tablet(s) by mouth daily     Mobic 15mg Tablet 1 tab daily     Neurontin 100mg Capsules Take 2 capsule(s) by mouth tid     Bydureon 2mg/1vial Injection Suspension, Extended-Release Inject 2 mg subcutaneously q week     Fluticasone Propionate 50mcg/1actuation Nasal Spray 1 or 2 sprays in each nostril qday     OTC Probiotic with Vitamin C take one capsule once daily     Oxycodone HCl 5mg Capsules Take 1 capsule(s) by mouth q 6 hr prn for pain     Vitamin E 400IU Capsules 2 tab q day     Hydrochlorothiazide (HCTZ) 12.5mg Tablet 1 po daily     Lisinopril 10mg Tablet 1 in am         OBJECTIVE:        Vitals:         Historical:     08/21/2018  Wt:   284.6lbs    07/02/2018  Wt:   291.7lbs        Current: 11/6/2018 1:08:31 PM    Ht:  5 ft, 6 in;  Wt: 292.6 lbs;  BMI: 47.2    T: 96.5 F (oral);  BP: 110/69 mm Hg (right arm, sitting);  P: 94 bpm (finger clip, sitting);  sCr: 0.74 mg/dL;  GFR: 132.97        Exams:     PHYSICAL EXAM:     GENERAL: Vitals recorded well developed, well nourished, obese;  well groomed;  no apparent distress;     NECK:  supple, full ROM; no thyromegaly; no carotid bruits;     RESPIRATORY: normal respiratory rate and pattern with no distress; normal breath sounds with no rales, rhonchi, wheezes or rubs;     CARDIOVASCULAR: normal rate; rhythm is regular;  normal S1; normal S2; no systolic murmur; no cyanosis; no edema;     SKIN: surgical site without  s/s infection, no active drainage, no tenderness over the site;     MUSCULOSKELETAL:  Normal range of motion, strength and tone;     NEUROLOGICAL:  cranial nerves, motor and sensory function, reflexes, gait and coordination are all intact;     PSYCHIATRIC:  appropriate affect and demeanor; normal speech pattern; grossly normal memory;         Procedures:     Vaccination against other viral diseases, Influenza     1. Influenza, seasonal (children 3 years to adult): 0.5 ml unit dose given IM in the right upper arm; administered by AND;  lot number [id number]; expires 6/30/19 Regarding contraindications to an Influenza vaccine:        No contraindications were noted.              ASSESSMENT:           250.00   E11.40  Type 2 diabetes              DDx:     V04.81   Z23  Vaccination against other viral diseases, Influenza              DDx:     729.5   M79.671  Foot pain              DDx:     Insomnia    780.52   G47.00  Insomnia              DDx:     272.2   E78.2  Mixed hyperlipidemia and hypertriglyceridemia              DDx:         ORDERS:         Meds Prescribed:       Refill of: Lipitor (Atorvastatin Calcium) 80mg Tablet One PO Q HS  #90 (Ninety) tablet(s) Refills: 0       Refill of: Glimepiride 2mg Tablet ONE AND A HALF TABLET DAILY  #135 (One [place] and [place] Five) tablet(s) Refills: 0       Refill of: Hydrochlorothiazide (HCTZ) 12.5mg Tablet 1 po daily  #90 (Ninety) tablet(s) Refills: 0       Refill of: Lisinopril 10mg Tablet 1 in am  #90 (Ninety) tablet(s) Refills: 0       Refill of: Metformin HCl 500mg Tablet Take 2 tablet(s) by mouth bid  #180 (One [place] and Eighty) tablet(s) Refills: 0       Refill of: Pioglitazone HCl 45mg Tablet Take 1 tablet(s) by mouth daily  #90 (Ninety) tablet(s) Refills: 0       Refill of: Mobic (Meloxicam) 15mg Tablet 1 tab daily  #90 (Ninety) tablet(s) Refills: 0       Refill of: Bydureon (Exenatide) 2mg/1vial Injection Suspension, Extended-Release Inject 2 mg subcutaneously q week  QS for 90 day(s) Refills: 0       Trazodone HCl 50mg Tablet 1/2 -- 1 tablet at bedtime  #20 (Twenty) tablet(s) Refills: 0         Procedures Ordered:       79464  Immunization administration; one vaccine  (In-House)           Other Orders:       83565  Influenza virus vaccine, quadrivalent, split virus, preservative free 3 years of age & older  (In-House)                   PLAN:          Type 2 diabetes Follow up appt Late February or March - Wellness visit at that time as well           Prescriptions:       Refill of: Glimepiride 2mg Tablet ONE AND A HALF TABLET DAILY  #135 (One [place] and Thirty Five) tablet(s) Refills: 0       Refill of: Lisinopril 10mg Tablet 1 in am  #90 (Ninety) tablet(s) Refills: 0       Refill of: Metformin HCl 500mg Tablet Take 2 tablet(s) by mouth bid  #180 (One [place] and Eighty) tablet(s) Refills: 0       Refill of: Pioglitazone HCl 45mg Tablet Take 1 tablet(s) by mouth daily  #90 (Ninety) tablet(s) Refills: 0       Refill of: Bydureon (Exenatide) 2mg/1vial Injection Suspension, Extended-Release Inject 2 mg subcutaneously q week QS for 90 day(s) Refills: 0          Vaccination against other viral diseases, Influenza           Orders:       41262  Influenza virus vaccine, quadrivalent, split virus, preservative free 3 years of age & older  (In-House)         11395  Immunization administration; one vaccine  (In-House)            Foot pain           Prescriptions:       Refill of: Mobic (Meloxicam) 15mg Tablet 1 tab daily  #90 (Ninety) tablet(s) Refills: 0          Insomnia           Prescriptions:       Trazodone HCl 50mg Tablet 1/2 -- 1 tablet at bedtime  #20 (Twenty) tablet(s) Refills: 0          Mixed hyperlipidemia and hypertriglyceridemia           Prescriptions:       Refill of: Lipitor (Atorvastatin Calcium) 80mg Tablet One PO Q HS  #90 (Ninety) tablet(s) Refills: 0       Refill of: Hydrochlorothiazide (HCTZ) 12.5mg Tablet 1 po daily  #90 (Ninety) tablet(s) Refills: 0              CHARGE CAPTURE:           Primary Diagnosis:     250.00 Type 2 diabetes            E11.40    Type 2 diabetes mellitus with diabetic neuropathy, unspecified              Orders:          71529   Office/outpatient visit; established patient, level 4  (In-House)           V04.81 Vaccination against other viral diseases, Influenza            Z23    Encounter for immunization              Orders:          98700   Influenza virus vaccine, quadrivalent, split virus, preservative free 3 years of age & older  (In-House)             26740   Immunization administration; one vaccine  (In-House)           729.5 Foot pain            M79.671    Pain in right foot    Insomnia    780.52 Insomnia            G47.00    Insomnia, unspecified    272.2 Mixed hyperlipidemia and hypertriglyceridemia            E78.2    Mixed hyperlipidemia

## 2021-05-18 NOTE — PROGRESS NOTES
Fabio JuárezJustin 1965     Office/Outpatient Visit    Visit Date: Wed, Feb 13, 2019 03:01 pm    Provider: Karley Amor N.P. (Assistant: Sarah Spurling, MA)    Location: [facility]        Electronically signed by Karley Amor N.P. on  02/17/2019 10:41:53 PM                             SUBJECTIVE:        CC:     Mauri is a 53 year old White male.  Pulled something in his left arm, and it's hurting across his chest down his arm. (left) No med changes.          HPI:         Complaint of arm pain..  The symptom began 3 days ago.  The severity is of moderate intensity.  This is the first episode of this type of pain.  The typical duration of an episode is the majority of the day.  Aggravating factors include lifting arm or reaching across his chest or reaching behind him.  Symptoms are relieved with rest.  Associated symptoms include myalgias.  He denies nausea or sweats.  has been reaching overhead a lot for work     ROS:     CONSTITUTIONAL:  Negative for chills, fatigue, fever and weight change.      CARDIOVASCULAR:  Negative for chest pain, orthopnea, paroxysmal nocturnal dyspnea and pedal edema.      RESPIRATORY:  Negative for dyspnea and cough.      GASTROINTESTINAL:  Negative for abdominal pain, heartburn, constipation, diarrhea, and stool changes.      MUSCULOSKELETAL:  Positive for anterior left side chest wall pain (pec area).      NEUROLOGICAL:  Negative for paresthesias and weakness.          PMH/FMH/SH:     Last Reviewed on 1/07/2019 11:04 AM by Maddie Alvarez    Past Medical History:             PAST MEDICAL HISTORY         Sleep Apnea     hemochromatosis     Renal Stones: dx'd in 2017 - March;     Chronic Pain: affecting the low back;     Type 2 Diabetes         CURRENT MEDICAL PROVIDERS:    Cardiologist: Pancho chavira - Manages CAD, HTN, Mixed hyperlipidemia - Annual follow up    Dermatologist: Deshawn (PRN only)    E/N/T: Pardeep (Trach) - PRN -    Pulmonologist: Candie -  PRN    Pain Management - Flaget (every 3 months and PRN)    [facility] -  Dr Lewis  - annual follow up Podiatrist Dr. Hubbard (PRN)         PREVENTIVE HEALTH MAINTENANCE             COLORECTAL CANCER SCREENING: Up to date (colonoscopy q10y; sigmoidoscopy q5y; Cologuard q3y) was last done 3/2017, Results are in chart; colonoscopy with normal results     DENTAL CLEANING: does not go     EYE EXAM: was last done 2018 [place] [facility]         PAST MEDICAL HISTORY         Positive for    Sleep Apnea,    tracheostomy and    lung nodule;     Positive for    Gastroesophageal Reflux Disease;     Positive for    Chronic Pain;     Positive for    Type 2 Diabetes: uncontrolled; ;          hemachromatosis     Positive for    Allergies;         Surgical History:         Cholecystectomy: ;     Joint Replacement: BOTH KNEES;;; Right knee       Appendectomy    Tonsillectomy/Adenoidectomy    tracheostomy secondary to sleep apnea;    right achilles tendon repair 10/2018 ([place]);     Procedures: heat cath 11-3-15, left ventriulogram, coronary angiogram     COLONOSCOPY: was last done 14 with normal results Dr Anderson         Family History:     Father: ;  Myocardial Infarction     Mother: Arrhythmia     Brother(s): 2 brother(s) total; 1 ;  Hypertension;  cerebral aneurysm  at 38;  Type 2 Diabetes     Positive for Coronary Artery Disease.      Positive for Type 2 Diabetes ( mother ).          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          Alcohol: Frequency: Socially         Substance Abuse History:     Last Reviewed on 2018 10:50 PM by Pancho Wyatt         Mental Health History:     Last Reviewed on 2018 10:50 PM by  Pancho Wyatt    NEGATIVE         Communicable Diseases (eg STDs):     Last Reviewed on 12/09/2018 10:50 PM by Pancho Wyatt    Reportable health conditions; NEGATIVE             Current Problems:     Last Reviewed on 12/09/2018 10:50 PM by Pancho Wyatt    Mixed hyperlipidemia and hypertriglyceridemia     CAD     Other hemochromatosis     Low back pain     Peripheral neuropathy     Chronic tension type headache     Obstructive sleep apnea     Tracheostomy status     Testosterone deficiency     History of GERD     Type 2 diabetes     Shoulder pain     Arm pain     Acute bronchitis     Foot pain         Immunizations:     Fluzone (3 + years dose) 1/8/2013     Fluzone (3 + years dose) 1/18/2017     Fluzone pf (3+ years dose) 1/9/2018     Fluzone Quadrivalent (3+ years) 11/6/2018     Pneomovax 8/8/2016         Allergies:     Last Reviewed on 2/13/2019 03:01 PM by Spurling, Sarah C      No Known Drug Allergies.         Current Medications:     Last Reviewed on 2/13/2019 03:06 PM by Spurling, Sarah C    Baclofen 10mg Tablet 1/2 to 1 tablet at bedtime for low back pain/muscle spasms     Lisinopril 10mg Tablet 1 in am     Metformin HCl 500mg Tablet Take 2 tablet(s) by mouth bid     Lipitor 80mg Tablet One PO Q HS     Glimepiride 2mg Tablet ONE AND A HALF TABLET DAILY     Hydrochlorothiazide (HCTZ) 12.5mg Tablet 1 po daily     Mobic 15mg Tablet 1 tab daily     Pioglitazone HCl 45mg Tablet Take 1 tablet(s) by mouth daily     Cetirizine HCl 5mg Tablet Take 1 tablet(s) by mouth daily     Neurontin 100mg Capsules Take 2 capsule(s) by mouth tid     Bydureon 2mg/1pen Injection Suspension, Extended-Release Inject 2 mg subcutaneously q week     Trazodone HCl 50mg Tablet 1/2 -- 1 tablet at bedtime     Fluticasone Propionate 50mcg/1actuation Nasal Spray 1 or 2 sprays in each nostril qday     OTC Probiotic with Vitamin C take one capsule once daily     Oxycodone HCl 5mg Capsules Take 1 capsule(s) by mouth q 6 hr prn for pain      Vitamin E 400IU Capsules 2 tab q day         OBJECTIVE:        Vitals:         Historical:     01/07/2019  Wt:   296.6lbs        Current: 2/13/2019 3:08:54 PM    Ht:  5 ft, 6 in;  Wt: 300.6 lbs;  BMI: 48.5    T: 97.8 F (oral);  BP: 118/77 mm Hg (left arm, sitting);  P: 77 bpm (left arm (BP Cuff), sitting);  sCr: 0.74 mg/dL;  GFR: 134.50        Exams:     PHYSICAL EXAM:     GENERAL: Vitals recorded well developed, well nourished;  well groomed;  no apparent distress;     NECK:  supple, full ROM; no thyromegaly; no carotid bruits;     RESPIRATORY: normal respiratory rate and pattern with no distress; normal breath sounds with no rales, rhonchi, wheezes or rubs;     CARDIOVASCULAR: normal rate; rhythm is regular;  normal S1; normal S2; no systolic murmur; no cyanosis; no edema;     MUSCULOSKELETAL: normal gait; normal range of motion of all major muscle groups; pain with range of motion in: left shoulder abduction, internal rotation, and external rotation;     NEUROLOGICAL:  cranial nerves, motor and sensory function, reflexes, gait and coordination are all intact;     PSYCHIATRIC:  appropriate affect and demeanor; normal speech pattern; grossly normal memory;         ASSESSMENT:           729.5   M25.512  Arm pain              DDx:     719.41   M25.512  Shoulder pain              DDx:         ORDERS:         Meds Prescribed:       Capsaicin 0.1% Cream Apply thin film to affected area tid  #57 ([place]) gm Refills: 0       Refill of: Metformin HCl 500mg Tablet Take 2 tablet(s) by mouth bid  #180 (One [place] and Eighty) tablet(s) Refills: 0         Procedures Ordered:       REFER  Referral to Specialist or Other Facility  (Send-Out)                   PLAN:          Arm pain         REFERRALS:  Referral initiated to physical therapy ( [facility] ).            Prescriptions:       Capsaicin 0.1% Cream Apply thin film to affected area tid  #57 ([place]) gm Refills: 0            Orders:       REFER  Referral to Specialist or Other Facility  (Send-Out)            Shoulder pain to pt for evaluation and treatment             Other Prescriptions:       Refill of: Metformin HCl 500mg Tablet Take 2 tablet(s) by mouth bid  #180 (One [place] and Eighty) tablet(s) Refills: 0         CHARGE CAPTURE:           Primary Diagnosis:     729.5 Arm pain            M25.512    Pain in left shoulder              Orders:          [id number]   Office/outpatient visit; established patient, level 3  (In-House)           719.41 Shoulder pain            M25.512    Pain in left shoulder

## 2021-05-18 NOTE — PROGRESS NOTES
Fabio Juárez. 1965     Office/Outpatient Visit    Visit Date: Tue, May 15, 2018 03:03 pm    Provider: Karley Amor N.P. (Assistant: Nancy Alvarez MA)    Location: [facility]        Electronically signed by Karley Amor N.P. on  05/18/2018 10:54:43 PM                             SUBJECTIVE:        CC:     Mauri is a 53 year old White male.  Patient complains of swelling in both legs.          HPI:         Patient complains of edema.  This has been present for 1-2 months.  The swelling has primarily involved the lower legs.  The degree of edema has been getting worse recently.  He denies associated calf pain, dyspnea, extremity pain and orthopnea.  Pertinent current medications include NSAIDs and pioglitizone.          Concerning type 2 diabetes, specifically, this is type 2, non-insulin requiring diabetes.  Compliance with treatment has been fair.  Patient's diabetes was first diagnosed several years ago.  He follows no particular diet.  Current meds include an oral hypoglycemic and insulin.  Most recent lab results include Weight (lb):  292.6 (02/12/2018), Systolic BP:  116 (02/12/2018), Diastolic BP:  76 (02/12/2018), Hemoglobin A1c:  8.2 (%) (02/12/2018), HDL:  52 (mg/dL) (02/12/2018), Triglycerides:  173 (mg/dL) (02/12/2018).      ROS:     CONSTITUTIONAL:  Negative for chills, fatigue and fever.      CARDIOVASCULAR:  Positive for pedal edema ( moderate; bilaterally ).   Negative for chest pain or orthopnea.      RESPIRATORY:  Negative for recent cough and dyspnea.      MUSCULOSKELETAL:  Positive for right foot pain.   Negative for arthralgias or myalgias.      INTEGUMENTARY:  Negative for pruritis and rash.      NEUROLOGICAL:  Negative for dizziness, fainting, headaches and paresthesias.          PMH/FMH/SH:     Last Reviewed on 3/29/2018 12:43 PM by Karley Amor    Past Medical History:             PAST MEDICAL HISTORY         Sleep Apnea     hemochromatosis     Renal  Stones: dx'd in  - March;     Chronic Pain: affecting the low back;     Type 2 Diabetes         CURRENT MEDICAL PROVIDERS:    Cardiologist: Pancho chavira - Manages CAD, HTN, Mixed hyperlipidemia - Annual follow up    Dermatologist: Deshawn (PRN only)    E/N/T: Pardeep (Trach) - PRN -    Pulmonologist: Melissa (annually & PRN)    Pain Management - Flaget (every 3 months and PRN)    [facility] -  Dr Lewis  - annual follow up Podiatrist Dr. Hubbard (PRN)         PREVENTIVE HEALTH MAINTENANCE             COLORECTAL CANCER SCREENING: Up to date (colonoscopy q10y; sigmoidoscopy q5y; Cologuard q3y) was last done 3/2017, Results are in chart; colonoscopy with normal results     DENTAL CLEANING: does not go     EYE EXAM: was last done  Jeff         PAST MEDICAL HISTORY         Positive for    Sleep Apnea,    tracheostomy and    lung nodule;     Positive for    Gastroesophageal Reflux Disease;     Positive for    Chronic Pain;     Positive for    Type 2 Diabetes: uncontrolled; ;          hemachromatosis     Positive for    Allergies;         Surgical History:         Cholecystectomy: ;     Joint Replacement: BOTH KNEES;;; Right knee -      Appendectomy    Tonsillectomy/Adenoidectomy    tracheostomy secondary to sleep apnea;     Procedures: heat cath 11-3-15, left ventriulogram, coronary angiogram     COLONOSCOPY: was last done 14 with normal results Dr Anderson         Family History:     Father: ;  Myocardial Infarction     Mother: Arrhythmia     Brother(s): 2 brother(s) total; 1 ;  Hypertension;  cerebral aneurysm  at 38;  Type 2 Diabetes     Positive for Coronary Artery Disease.      Positive for Type 2 Diabetes ( mother ).          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          Alcohol: Frequency: Socially         Substance Abuse History:     Last Reviewed on 3/29/2018 12:43 PM by Karley Amor    None         Mental Health History:     Last Reviewed on 3/29/2018 12:43 PM by Karley Amor    NEGATIVE         Communicable Diseases (eg STDs):     Last Reviewed on 3/29/2018 12:43 PM by Karley Amor    Reportable health conditions; NEGATIVE             Current Problems:     Last Reviewed on 3/29/2018 12:43 PM by Karley Amor    Mixed hyperlipidemia and hypertriglyceridemia     CAD     Lung nodule     Other hemochromatosis     Low back pain     Peripheral neuropathy     Chronic tension type headache     Obstructive sleep apnea     Tracheostomy status     Testosterone deficiency     History of GERD     Type 2 diabetes     Edema     Urinary retention, other specified retention     Allergic rhinitis, unspecified cause     Foot pain         Immunizations:     Fluzone (3 + years dose) 1/8/2013     Fluzone (3 + years dose) 1/18/2017     Fluzone pf (3+ years dose) 1/9/2018     Pneomovax 8/8/2016         Allergies:     Last Reviewed on 5/15/2018 03:14 PM by Nancy Alvarez      No Known Drug Allergies.         Current Medications:     Last Reviewed on 5/15/2018 03:04 PM by Nancy Alvarez    Lipitor 80mg Tablet One PO Q HS     Metformin HCl 500mg Tablet Take 2 tablet(s) by mouth bid     Pioglitazone HCl 45mg Tablet Take 1 tablet(s) by mouth daily     Baclofen 10mg Tablet 1/2 to 1 tablet at bedtime for low back pain/muscle spasms     Cetirizine HCl 5mg Tablet Take 1 tablet(s) by mouth daily     Mobic 15mg Tablet 1 tab daily     Neurontin 100mg Capsules Take 2 capsule(s) by mouth tid     Glimepiride 2mg Tablet one every am     Bydureon 2mg/1vial Injection Suspension, Extended-Release Inject 2 mg subcutaneously q week     Lisinopril 10mg Tablet 1 in am     Vitamin E 400IU Capsules 2 tab q day     Fluticasone Propionate  50mcg/1actuation Nasal Spray 1 or 2 sprays in each nostril qday     Omega-3 acid ethyl esters 1gm Capsules Take 1 capsule(s) by mouth bid     OTC Probiotic with Vitamin C take one capsule once daily     Oxycodone HCl 5mg Capsules Take 1 capsule(s) by mouth q 6 hr prn for pain         OBJECTIVE:        Vitals:         Current: 5/15/2018 3:07:54 PM    Ht:  5 ft, 6 in;  Wt: 299.6 lbs;  BMI: 48.4    T: 97 F (oral);  BP: 113/73 mm Hg (left arm, sitting);  P: 86 bpm (left arm (BP Cuff), sitting);  sCr: 0.68 mg/dL;  GFR: 146.16        Exams:     PHYSICAL EXAM:     GENERAL: Vitals recorded well developed, well nourished;  no apparent distress;     NECK: range of motion is normal;     RESPIRATORY: normal appearance and symmetric expansion of chest wall; normal respiratory rate and pattern with no distress; normal breath sounds with no rales, rhonchi, wheezes or rubs;     CARDIOVASCULAR: normal rate; rhythm is regular;  no edema;     LYMPHATIC: no enlargement of cervical or facial nodes; no supraclavicular nodes;     MUSCULOSKELETAL: normal gait; normal range of motion of all major muscle groups; no limb or joint pain with range of motion; right foot with walking shoe;     NEUROLOGIC: mental status: alert and oriented x 3;     PSYCHIATRIC: appropriate affect and demeanor; normal speech pattern; normal thought and perception;         ASSESSMENT           782.3   R60.0  Edema              DDx:     250.00   E11.40  Type 2 diabetes              DDx:         ORDERS:         Meds Prescribed:       Hydrochlorothiazide (HCTZ) 12.5mg Tablet 1 po daily  #30 (Thirty) tablet(s) Refills: 2       Refill of: Baclofen 10mg Tablet 1/2 to 1 tablet at bedtime for low back pain/muscle spasms  #30 (Thirty) tablet(s) Refills: 1       Refill of: Cetirizine HCl 5mg Tablet Take 1 tablet(s) by mouth daily  #30 (Thirty) tablet(s) Refills: 2       Refill of: Omega-3 acid ethyl esters 1gm Capsules Take 1 capsule(s) by mouth bid  #60 (Sixty) capsule(s)  Refills: 1       Refill of: Metformin HCl 500mg Tablet Take 2 tablet(s) by mouth bid  #60 (Sixty) tablet(s) Refills: 2       Refill of: Pioglitazone HCl 45mg Tablet Take 1 tablet(s) by mouth daily  #30 (Thirty) tablet(s) Refills: 2       Refill of: Glimepiride 2mg Tablet one every am  #30 (Thirty) tablet(s) Refills: 2       Refill of: Bydureon (Exenatide) 2mg/1vial Injection Suspension, Extended-Release Inject 2 mg subcutaneously q week QS for 30 day(s) Refills: 2       Refill of: Lisinopril 10mg Tablet 1/2 tab daily  #45 (Forty Five) tablet(s) Refills: 0         Lab Orders:       [id number]  [id number] Diabetes 2 Panel [facility]: CMP A1C Lipid and Micro Albumin  (Send-Out)         [id number]  [id number] [facility] Nat[facility] Peptide  (Send-Out)         [id number]  [id number] [facility] TSH  (Send-Out)                   PLAN:          Edema     LABORATORY:  Labs ordered to be performed today at [facility] include BNP.  TSH           Prescriptions:       Hydrochlorothiazide (HCTZ) 12.5mg Tablet 1 po daily  #30 (Thirty) tablet(s) Refills: 2       Refill of: Lisinopril 10mg Tablet 1/2 tab daily  #45 (Forty Five) tablet(s) Refills: 0           Orders:       [id number]  [id number] [facility] B-Type Natriuretic Peptide  (Send-Out)         [id number]  [id number] [facility] TSH  (Send-Out)            Type 2 diabetes     LABORATORY:  Labs ordered to be performed today at [facility] include Diabetes 2 panel: CMP, A1C, Lipid MicroAlbumin.            Prescriptions:       Refill of: Metformin HCl 500mg Tablet Take 2 tablet(s) by mouth bid  #60 (Sixty) tablet(s) Refills: 2       Refill of: Pioglitazone HCl 45mg Tablet Take 1 tablet(s) by mouth daily  #30 (Thirty) tablet(s) Refills: 2       Refill of: Glimepiride 2mg Tablet one every am  #30 (Thirty) tablet(s) Refills: 2       Refill of: Bydureon (Exenatide) 2mg/1vial Injection Suspension, Extended-Release Inject 2 mg subcutaneously q week QS for 30 day(s) Refills: 2           Orders:       [id number]  [id number] Diabetes 2 Panel [facility]: CMP  A1C Lipid and Micro Albumin  (Send-Out)               Other Prescriptions:       Refill of: Baclofen 10mg Tablet 1/2 to 1 tablet at bedtime for low back pain/muscle spasms  #30 (Thirty) tablet(s) Refills: 1       Refill of: Cetirizine HCl 5mg Tablet Take 1 tablet(s) by mouth daily  #30 (Thirty) tablet(s) Refills: 2       Refill of: Omega-3 acid ethyl esters 1gm Capsules Take 1 capsule(s) by mouth bid  #60 (Sixty) capsule(s) Refills: 1         CHARGE CAPTURE           **Please note: ICD descriptions below are intended for billing purposes only and may not represent clinical diagnoses**        Primary Diagnosis:         782.3 Edema            R60.0    Localized edema              Orders:          [id number]   Office/outpatient visit; established patient, level 4  (In-House)           250.00 Type 2 diabetes            E11.40    Type 2 diabetes mellitus with diabetic neuropathy, unspecified

## 2021-05-18 NOTE — PROGRESS NOTES
Fabio Juárez. 1965     Office/Outpatient Visit    Visit Date: Mon, Jul 2, 2018 03:06 pm    Provider: Karley Amor N.P. (Assistant: Monica Pride LPN)    Location: [facility]        Electronically signed by Karley Amor N.P. on  07/05/2018 10:39:54 AM                             SUBJECTIVE:        CC:     Mauri is a 53 year old White male.  trouble sleeping, wanting something to  help.          HPI:         Patient complains of insomnia.  This has been noted for the past several years.  Sleep has been disrupted by a delay in initiation of sleep.  On average, he estimates that he gets 2-3 good hours of sleeping hours of sleep per night.  Associated symptoms include apnea.  He denies symptoms of manic symptoms or snoring.  Medical history is positive for sleep apnea, was previously on Ambien and plugged trach in place - opens at night.  He is not taking any medications currently.  Remedies that he has already tried include tries to open trach.      continues with the abdominal pain - no improvement but does come and go - worse when breathing in and after have been at work all day - pain is more in the flank/ side area     ROS:     CONSTITUTIONAL:  Negative for chills, fatigue, fever and weight change.      CARDIOVASCULAR:  Negative for chest pain, orthopnea, paroxysmal nocturnal dyspnea and pedal edema.      RESPIRATORY:  Negative for dyspnea and cough.      GASTROINTESTINAL:  Negative for abdominal pain, heartburn, constipation, diarrhea, and stool changes.      MUSCULOSKELETAL:  Positive for left flank pain.  groin, testicle pain        PSYCHIATRIC:  Negative for anxiety and depression.          PMH/FMH/SH:     Last Reviewed on 3/29/2018 12:43 PM by Karley Amor    Past Medical History:             PAST MEDICAL HISTORY         Sleep Apnea     hemochromatosis     Renal Stones: dx'd in 2017 - March;     Chronic Pain: affecting the low back;     Type 2 Diabetes         CURRENT  MEDICAL PROVIDERS:    Cardiologist: Pancho chavira - Manages CAD, HTN, Mixed hyperlipidemia - Annual follow up    Dermatologist: Deshawn (PRN only)    E/N/T: Pardeep (Trach) - PRN -    Pulmonologist: Melissa (annually & PRN)    Pain Management - Flaget (every 3 months and PRN)    [facility] -  Dr Lewis  - annual follow up Podiatrist Dr. Hubbard (PRN)         PREVENTIVE HEALTH MAINTENANCE             COLORECTAL CANCER SCREENING: Up to date (colonoscopy q10y; sigmoidoscopy q5y; Cologuard q3y) was last done 3/2017, Results are in chart; colonoscopy with normal results     DENTAL CLEANING: does not go     EYE EXAM: was last done  Jeff         PAST MEDICAL HISTORY         Positive for    Sleep Apnea,    tracheostomy and    lung nodule;     Positive for    Gastroesophageal Reflux Disease;     Positive for    Chronic Pain;     Positive for    Type 2 Diabetes: uncontrolled; ;          hemachromatosis     Positive for    Allergies;         Surgical History:         Cholecystectomy: ;     Joint Replacement: BOTH KNEES;;; Right knee -      Appendectomy    Tonsillectomy/Adenoidectomy    tracheostomy secondary to sleep apnea;     Procedures: heat cath 11-3-15, left ventriulogram, coronary angiogram     COLONOSCOPY: was last done 14 with normal results Dr Anderson         Family History:     Father: ;  Myocardial Infarction     Mother: Arrhythmia     Brother(s): 2 brother(s) total; 1 ;  Hypertension;  cerebral aneurysm  at 38;  Type 2 Diabetes     Positive for Coronary Artery Disease.      Positive for Type 2 Diabetes ( mother ).          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          Alcohol: Frequency: Socially         Substance  Abuse History:     Last Reviewed on 3/29/2018 12:43 PM by Karley Amor    None         Mental Health History:     Last Reviewed on 3/29/2018 12:43 PM by Karley Amor    NEGATIVE         Communicable Diseases (eg STDs):     Last Reviewed on 3/29/2018 12:43 PM by Karley Amor    Reportable health conditions; NEGATIVE             Current Problems:     Last Reviewed on 3/29/2018 12:43 PM by Karley Amor    Mixed hyperlipidemia and hypertriglyceridemia     CAD     Lung nodule     Other hemochromatosis     Low back pain     Peripheral neuropathy     Chronic tension type headache     Obstructive sleep apnea     Tracheostomy status     Testosterone deficiency     History of GERD     Type 2 diabetes     Edema     Foot pain         Immunizations:     Fluzone (3 + years dose) 1/8/2013     Fluzone (3 + years dose) 1/18/2017     Fluzone pf (3+ years dose) 1/9/2018     Pneomovax 8/8/2016         Allergies:     Last Reviewed on 7/02/2018 03:11 PM by Monica Pride      No Known Drug Allergies.         Current Medications:     Last Reviewed on 7/02/2018 03:11 PM by Monica Pride    Baclofen 10mg Tablet 1/2 to 1 tablet at bedtime for low back pain/muscle spasms     Cetirizine HCl 5mg Tablet Take 1 tablet(s) by mouth daily     Metformin HCl 500mg Tablet Take 2 tablet(s) by mouth bid     Pioglitazone HCl 45mg Tablet Take 1 tablet(s) by mouth daily     Lipitor 80mg Tablet One PO Q HS     Mobic 15mg Tablet 1 tab daily     Neurontin 100mg Capsules Take 2 capsule(s) by mouth tid     Bydureon 2mg/1vial Injection Suspension, Extended-Release Inject 2 mg subcutaneously q week     Glimepiride 2mg Tablet one every am     Hydrochlorothiazide (HCTZ) 12.5mg Tablet 1 po daily     Omega-3 acid ethyl esters 1gm Capsules Take 1 capsule(s) by mouth bid     Fluticasone Propionate 50mcg/1actuation Nasal Spray 1 or 2 sprays in each nostril qday     OTC Probiotic with Vitamin C take one capsule once daily     Oxycodone  HCl 5mg Capsules Take 1 capsule(s) by mouth q 6 hr prn for pain     Vitamin E 400IU Capsules 2 tab q day         OBJECTIVE:        Vitals:         Current: 7/2/2018 3:10:19 PM    Ht:  5 ft, 6 in;  Wt: 291.7 lbs;  BMI: 47.1    BP: 127/79 mm Hg (left arm, sitting);  P: 91 bpm (left arm (BP Cuff), sitting);  sCr: 0.83 mg/dL;  GFR: 118.40        Exams:     PHYSICAL EXAM:     GENERAL: Vitals recorded well developed, well nourished;  well groomed;  no apparent distress;     NECK:  supple, full ROM; no thyromegaly; no carotid bruits;     RESPIRATORY: normal respiratory rate and pattern with no distress; normal breath sounds with no rales, rhonchi, wheezes or rubs;     CARDIOVASCULAR: normal rate; rhythm is regular;  normal S1; normal S2; no systolic murmur; no cyanosis; no edema;     GASTROINTESTINAL: nontender, nondistended; no hepatosplenomegaly or masses; no bruits; nontender; normal bowel sounds;     MUSCULOSKELETAL: tenderness over lateral lower edge of rib;     NEUROLOGICAL:  cranial nerves, motor and sensory function, reflexes, gait and coordination are all intact;     PSYCHIATRIC:  appropriate affect and demeanor; normal speech pattern; grossly normal memory;         Lab/Test Results:             Glucose, Urine:  500 mg/dL (07/02/2018),     Bilirubin, urine:  Negative (07/02/2018),     Ketones, Urine Strip:  Trace (07/02/2018),     Specific Gravity, urine:  1.025 (07/02/2018),     Blood in Urine:  negative (07/02/2018),     pH, urine:  5.5 (07/02/2018),     Protein Urine QL:  negative (07/02/2018),     Urobilinogen, urine:  0.2 E.U./dL (07/02/2018),     Nitrite, Urine:  Negative (07/02/2018),     Leukoctyes, urine:  Negative (07/02/2018),     Appearance:  Clear (07/02/2018),     collection source:  Clean-catch (07/02/2018),     Color:  Dark Yellow (07/02/2018),     Performed by::  tls (07/02/2018),             ASSESSMENT:           780.52   G47.00  Insomnia              DDx:     733.6   M94.0  Costochondritis               DDx:         ORDERS:         Lab Orders:       [id number]  Urinalysis, automated, without microscopy  (In-House)                   PLAN:          Insomnia         RECOMMENDATIONS given include: recommend that Rnnie use cpap at night, take OTC melatonin starting at 2 mg 30 min before bedtime  - if no sleep, may take another 2 mg  - max dose at HS is 8 mg  return to office if no improvement.           Costochondritis     LABORATORY:  Labs ordered to be performed today include UA dip in office no micro.      RECOMMENDATIONS given include: feel that this is costochondritis - heat application will help and try.            Orders:       [id number]  Urinalysis, automated, without microscopy  (In-House)               Patient Recommendations:        For  Insomnia:     I also recommend recommend that Rnnie use cpap at night, take OTC melatonin starting at 2 mg 30 min before bedtime  - if no sleep, may take another 2 mg  - max dose at HS is 8 mg  return to office if no improvement.          For  Costochondritis:     I also recommend feel that this is costochondritis - heat application will help and try.              CHARGE CAPTURE:           Primary Diagnosis:     780.52 Insomnia            G47.00    Insomnia, unspecified              Orders:          [id number]   Office/outpatient visit; established patient, level 3  (In-House)           733.6 Costochondritis            M94.0    Chondrocostal junction syndrome [Tietze]              Orders:          [id number]   Urinalysis, automated, without microscopy  (In-House)

## 2021-05-18 NOTE — PROGRESS NOTES
Fabio JuárezJustin 1965     Office/Outpatient Visit    Visit Date: Tue, Oct 29, 2019 03:12 pm    Provider: Karley Amor N.P. (Assistant: Sarah Spurling, MA)    Location: [facility]        Electronically signed by Karley Amor N.P. on  10/31/2019 05:27:32 PM                             SUBJECTIVE:        CC:     Mauri is a 54 year old White male.  Was here on the 15th, he said he is no better. (shoulder pain)         HPI:         Patient to be evaluated for abdominal pain, other specified site.  This is located primarily in the left flank and Other.  It began years ago.  The onset of pain occurred when he is riding equipment at work, when sits gets better.  He denies diarrhea.  it is on the left  lower rib margin as well as the upper left abdomen He did have an EGD in 2014 for the same complaint which was completely negative     Regarding shoulder pain, Mauri went over for the MRI this morning and was unable to perform due to he still had some clautrophobic feelings .  Even with the open MRI he felt that the machine was too close to his face and felt suffocated.  He may need to be medicated prior to exam     ROS:     CONSTITUTIONAL:  Negative for chills, fatigue and fever.      CARDIOVASCULAR:  Negative for chest pain, orthopnea, paroxysmal nocturnal dyspnea and pedal edema.      RESPIRATORY:  Negative for dyspnea and cough.      GASTROINTESTINAL:  Positive for abdominal pain ( LUQ ).      MUSCULOSKELETAL:  Positive for joint stiffness (right shoulder).      PSYCHIATRIC:  Negative for anxiety and depression.          PMH/FMH/SH:     Last Reviewed on 10/15/2019 02:36 PM by Kalyn Lira    Past Medical History:             PAST MEDICAL HISTORY         Sleep Apnea     hemochromatosis     Renal Stones: dx'd in 2017 - March;     Chronic Pain: affecting the low back;     Type 2 Diabetes         CURRENT MEDICAL PROVIDERS:    Cardiologist: Pancho chavira - Manages CAD, HTN, Mixed  hyperlipidemia - Annual follow up    Dermatologist: Deshawn (PRN only)    E/N/T: Pardeep (Trach) - PRN -    Pulmonologist: Candie - PRN    Pain Management - Flaget (every 3 months and PRN)    [facility] -  Dr Lewis  - annual PRN  follow up([place]) Podiatrist Dr. Mc         PREVENTIVE HEALTH MAINTENANCE             COLORECTAL CANCER SCREENING: Up to date (colonoscopy q10y; sigmoidoscopy q5y; Cologuard q3y) was last done 3/2017, Results are in chart; colonoscopy with normal results     DENTAL CLEANING: does not go     EYE EXAM: was last done 2018 [facility] and [facility]         PAST MEDICAL HISTORY         Positive for    Sleep Apnea,    tracheostomy and    lung nodule;     Positive for    Gastroesophageal Reflux Disease;     Positive for    Chronic Pain;     Positive for    Type 2 Diabetes: uncontrolled; ;          hemachromatosis     Positive for    Allergies;         Surgical History:         Cholecystectomy: ;     Joint Replacement: BOTH KNEES;;; Right knee       Appendectomy    Tonsillectomy/Adenoidectomy    tracheostomy secondary to sleep apnea;    right achilles tendon repair 10/2018 ([facility]);     Procedures: heat cath 11-3-15, left ventriulogram, coronary angiogram     COLONOSCOPY: was last done 14 with normal results Dr Anderson         Family History:     Father: ;  Myocardial Infarction     Mother: Arrhythmia     Brother(s): 2 brother(s) total; 1 ;  Hypertension;  cerebral aneurysm  at 38;  Type 2 Diabetes     Positive for Coronary Artery Disease.      Positive for Type 2 Diabetes ( mother ).          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          Alcohol: Frequency: Socially         Substance Abuse History:     Last  Reviewed on 10/15/2019 02:36 PM by Kalyn Lira    None         Mental Health History:     Last Reviewed on 10/15/2019 02:36 PM by Kalyn Lira    NEGATIVE         Communicable Diseases (eg STDs):     Last Reviewed on 10/15/2019 02:36 PM by Kalyn Lira    Reportable health conditions; NEGATIVE             Current Problems:     Last Reviewed on 10/15/2019 02:36 PM by Kalyn Lira    Claustrophobia     Dizziness     Hypertension     Mixed hyperlipidemia and hypertriglyceridemia     CAD     Other hemochromatosis     Low back pain     Peripheral neuropathy     Chronic tension type headache     Obstructive sleep apnea     Tracheostomy status     Testosterone deficiency     History of GERD     Type 2 diabetes     Abdominal pain, other specified site     Shoulder pain     Rib pain     L otitis media     Diarrhea     Sore throat     Foot pain         Immunizations:     Fluzone (3 + years dose) 1/8/2013     Fluzone (3 + years dose) 1/18/2017     Fluzone pf (3+ years dose) 1/9/2018     Fluzone Quadrivalent (3+ years) 11/6/2018     Fluzone Quadrivalent (3+ years) 10/29/2019     Pneomovax 8/8/2016     Tdap (Tetanus, reduced diph, acellular pertussis) 3/26/2019         Allergies:     Last Reviewed on 10/29/2019 03:15 PM by Spurling, Sarah C      No Known Drug Allergies.         Current Medications:     Last Reviewed on 10/29/2019 03:16 PM by Spurling, Sarah C    Lisinopril 10mg Tablet 1 in am     Lipitor 80mg Tablet One PO Q HS     Omega-3 acid ethyl esters 1gm Capsules Take 1 capsule(s) by mouth bid     Hydrochlorothiazide (HCTZ) 12.5mg Tablet 1 po daily     Cetirizine HCl 5mg Tablet Take 1 tablet(s) by mouth daily     Pioglitazone HCl 45mg Tablet Take 1 tablet(s) by mouth daily     Glimepiride 2mg Tablet ONE AND A HALF TABLET DAILY     Baclofen 10mg Tablet 1/2 to 1 tablet at bedtime for low back pain/muscle spasms     Metformin HCl 500mg Tablet Take 2 tablet(s) by mouth bid     Mobic 15mg Tablet 1 tab  daily     Neurontin 100mg Capsules Take 2 capsule(s) by mouth tid     Trazodone HCl 50mg Tablet 1/2 -- 1 tablet at bedtime     Ozempic pen     Gabapentin 300mg Capsules 1 capsule po bid     Fluticasone Propionate 50mcg/1actuation Nasal Spray 1 or 2 sprays in each nostril qday     OTC Probiotic with Vitamin C take one capsule once daily     Oxycodone HCl 5mg Capsules Take 1 capsule(s) by mouth q 6 hr prn for pain         OBJECTIVE:        Vitals:         Current: 10/29/2019 3:17:38 PM    Ht:  5 ft, 6 in;  Wt: 259.2 lbs;  BMI: 41.8    T: 97.6 F (oral);  BP: 113/66 mm Hg (right arm, sitting);  P: 73 bpm (right arm (BP Cuff), sitting);  sCr: 0.68 mg/dL;  GFR: 135.92        Exams:     PHYSICAL EXAM:     GENERAL: Vitals recorded well developed, well nourished;  well groomed;  no apparent distress;     RESPIRATORY: normal respiratory rate and pattern with no distress; normal breath sounds with no rales, rhonchi, wheezes or rubs;     CARDIOVASCULAR: normal rate; rhythm is regular;  normal S1; normal S2; no systolic murmur; no cyanosis; no edema;     GASTROINTESTINAL: nontender, nondistended; no hepatosplenomegaly or masses; no bruits;     MUSCULOSKELETAL: decreased range of motion noted in: left shoulder abduction and internal rotation;  pain with range of motion in: left shoulder abduction and internal rotation;     NEUROLOGICAL:  cranial nerves, motor and sensory function, reflexes, gait and coordination are all intact;     PSYCHIATRIC:  appropriate affect and demeanor; normal speech pattern; grossly normal memory;         Lab/Test Results:             Glucose, Urine:  Neg (10/29/2019),     Bilirubin, urine:  Negative (10/29/2019),     Ketones, Urine Strip:  Negative (10/29/2019),     Specific Gravity, urine:  1.025 (10/29/2019),     Blood in Urine:  negative (10/29/2019),     pH, urine:  6.0 (10/29/2019),     Protein Urine QL:  negative (10/29/2019),     Urobilinogen, urine:  1.0 E.U./dL (10/29/2019),     Nitrite, Urine:   Negative (10/29/2019),     Leukoctyes, urine:  Negative (10/29/2019),     Appearance:  Clear (10/29/2019),     collection source:  Clean-catch (10/29/2019),     Color:  Yellow (10/29/2019),     Performed by::  lulú @ 1547 (10/29/2019),             Procedures:     Vaccination against other viral diseases, Influenza     1. Influenza, seasonal PF (children 3 years to adult): 0.5 ml unit dose given IM in the left upper arm; administered by ael;  lot number [id number]; expires 6-30-20 Regarding contraindications to an Influenza vaccine: Denies moderate/severe illness with/without fever; serious reaction to eggs, egg proteins, gentamicin, gelatin, arginine, neomycin or polymixin; serious reaction after recieving previous influenza vaccines; and history of Guillain-Melbourne Syndrome.              ASSESSMENT:           789.09   R10.10  Abdominal pain, other specified site              DDx:     786.50   S23.41XD  Rib pain              DDx:     719.41   M25.511  Shoulder pain              DDx:     300.29   F40.240  Claustrophobia              DDx:     250.00   E11.40  Type 2 diabetes              DDx:     V04.81   Z23  Vaccination against other viral diseases, Influenza              DDx:         ORDERS:         Meds Prescribed:       Alprazolam 1mg Tablet 1/2 1 hour prior to procedure then the other half 30 min before  #1 (One) tablet(s) Refills: 0         Lab Orders:       [id number]  BDUA - [facility] Urine dipstick  (In-House)         14557  SED - [facility] Sedimentation rate, non-automated ESR  (Send-Out)         30792  COMP - [facility] Comp. Metabolic Panel  (Send-Out)         82370  A1CEG - [facility] Hemoglobin A1C  (Send-Out)           Procedures Ordered:       [id number]  Immunization administration; one vaccine  (In-House)           Other Orders:       [id number]  Fluzone Quadrivalent no preservative 3 yrs of age & older  (In-House)                   PLAN:          Abdominal pain, other specified site     LABORATORY:  Labs ordered to be performed today include  UA dip only.            Orders:       [id number]  BDUA - [facility] Urine dipstick  (In-House)            Rib pain     LABORATORY:  Labs ordered to be performed today include ESR.            Orders:       [id number]  SED - [facility] Sedimentation rate, non-automated ESR  (Send-Out)            Shoulder pain medication sent for premedication for MRI - we will await the report once this is complete          Claustrophobia           Prescriptions:       Alprazolam 1mg Tablet 1/2 1 hour prior to procedure then the other half 30 min before  #1 (One) tablet(s) Refills: 0          Type 2 diabetes     LABORATORY:  Labs ordered to be performed today include Comprehensive metabolic panel and HgbA1C.            Orders:       [id number]  COMP - [facility] Comp. Metabolic Panel  (Send-Out)         58051  A1CEG - [facility] Hemoglobin A1C  (Send-Out)            Vaccination against other viral diseases, Influenza     Influenza Quadrivalent psv free shot 3 & up           Orders:       [id number]  Fluzone Quadrivalent no preservative 3 yrs of age & older  (In-House)         56138  Immunization administration; one vaccine  (In-House)               CHARGE CAPTURE:           Primary Diagnosis:     789.09 Abdominal pain, other specified site            R10.10    Upper abdominal pain, unspecified              Orders:          37240   Office/outpatient visit; established patient, level 4  (In-House)             35128   BDUA - [facility] Urine dipstick  (In-House)           786.50 Rib pain            S23.41XD    Sprain of ribs, subsequent encounter    719.41 Shoulder pain            M25.511    Pain in right shoulder    300.29 Claustrophobia            F40.240    Claustrophobia    250.00 Type 2 diabetes            E11.40    Type 2 diabetes mellitus with diabetic neuropathy, unspecified    V04.81 Vaccination against other viral diseases, Influenza            Z23    Encounter for immunization              Orders:          [id number]   Fluzone Quadrivalent no preservative 3 yrs of age & older   (In-House)             35636   Immunization administration; one vaccine  (In-House)

## 2021-05-18 NOTE — PROGRESS NOTES
Fabio Juárez  1965     Office/Outpatient Visit    Visit Date: Thu, Aug 27, 2020 10:03 am    Provider: Anton Vera MD (Assistant: Jami Randolph MA)    Location: [facility]        Electronically signed by Anton Vera MD on  2020 10:57:20 AM                             Subjective:        CC: Mauri is a 55 year old White male.  Patient presents today with complaints of nausea and diarrhea X 1 week         HPI:       Stomach bothering him a lot in the past week and ears are hurting. Feels like he wants to throw up, but hasn't.  Had diarrhea yesterday x 6 at work. No fever.  Slight cough. No change in sense of smell or taste.He does have HA. Not been around anyone sick. Work at [employer] in [place]).  No exposure to covid he knows of in past couple weeks, but coworker  of covid early on in Spring. Not eaten anything out of ordinary. Lives with wife, son, and step-daughter some.     ROS:     CONSTITUTIONAL:  Negative for chills, fatigue and fever.      CARDIOVASCULAR:  Negative for chest pain, orthopnea, paroxysmal nocturnal dyspnea and pedal edema.      RESPIRATORY:  Negative for dyspnea and cough.      GASTROINTESTINAL:  Negative for abdominal pain, heartburn, constipation, diarrhea, and stool changes.      GENITOURINARY:  Negative for dysuria and polyuria.      PSYCHIATRIC:  Negative for anxiety and depression.          Past Medical History / Family History / Social History:         Last Reviewed on 2019 08:28 PM by Pancho Wyatt    Past Medical History:             PAST MEDICAL HISTORY         Sleep Apnea     hemochromatosis     Renal Stones: dx'd in  - March;     Chronic Pain: affecting the low back;     Type 2 Diabetes         CURRENT MEDICAL PROVIDERS:    Cardiologist: Pancho chavira - Manages CAD, HTN, Mixed hyperlipidemia - Annual follow up    Dermatologist: Deshawn (PRN only)    E/N/T: Pardeep (Trach) - PRN -     Pulmonologist: Candie - PRN    Pain Management - Flaget (every 3 months and PRN)    [facility] -  Dr Lewis  - annual PRN  follow up([place]) Podiatrist Dr. Mc         PREVENTIVE HEALTH MAINTENANCE             COLORECTAL CANCER SCREENING: Up to date (colonoscopy q10y; sigmoidoscopy q5y; Cologuard q3y) was last done 3/2017, Results are in chart; colonoscopy with normal results     DENTAL CLEANING: does not go     EYE EXAM: was last done 2018 [facility]         PAST MEDICAL HISTORY         Positive for    Sleep Apnea,    tracheostomy and    lung nodule;     Positive for    Gastroesophageal Reflux Disease;     Positive for    Chronic Pain;     Positive for    Type 2 Diabetes: uncontrolled; ;         hemachromatosis     Positive for    Allergies;         Surgical History:         Cholecystectomy: ;     Joint Replacement: BOTH KNEES;;; Right knee      Appendectomy    Tonsillectomy/Adenoidectomy    tracheostomy secondary to sleep apnea;    right achilles tendon repair 10/2018 ([facility]);     Procedures: heat cath 11-3-15, left ventriulogram, coronary angiogram     COLONOSCOPY: was last done 14 with normal results Dr Anderson         Family History:     Father: ;  Myocardial Infarction     Mother: Arrhythmia     Brother(s): 2 brother(s) total; 1 ;  Hypertension;  cerebral aneurysm  at 38;  Type 2 Diabetes     Positive for Coronary Artery Disease.      Positive for Type 2 Diabetes ( mother ).          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          Alcohol: Frequency: Socially         Substance Abuse History:     Last Reviewed on 2019 08:28 PM by Pancho Wyatt         Mental Health History:     Last Reviewed on  12/17/2019 08:28 PM by Pancho Wyatt    NEGATIVE         Communicable Diseases (eg STDs):     Last Reviewed on 12/17/2019 08:28 PM by Pancho Wyatt    Reportable health conditions; NEGATIVE         Allergies:     Last Reviewed on 7/31/2020 03:00 PM by Marni Car    No Known Allergies.        Current Medications:     Last Reviewed on 7/31/2020 03:00 PM by Marni Car    Omega-3 acid ethyl esters 1 gram oral capsule [Take 1 capsule(s) by mouth bid]    baclofen 10 mg oral tablet [TAKE 1/2 TO 1 TABLET BY MOUTH AT BEDTIME FOR LOWER BACK PAIN/MUSCLE SPASMS]    atorvastatin 80 mg oral tablet [TAKE ONE TABLET BY MOUTH EVERY NIGHT AT BEDTIME]    Fluticasone Propionate 50 mcg/actuation Intranasal Spray, Suspension [1 or 2 sprays in each nostril qday ]    lisinopriL 10 mg oral tablet [TAKE ONE TABLET BY MOUTH EVERY MORNING]    cetirizine 5 mg oral tablet [Take 1 tablet(s) by mouth daily]    glimepiride 2 mg oral tablet [TAKE 1 AND 1/2 TABLETS BY MOUTH DAILY]    Gabapentin 300 mg oral capsule [1 capsule po bid]    oxyCODONE 5 mg oral capsule [Take 1 capsule(s) by mouth q 6 hr prn for pain]    hydroCHLOROthiazide 12.5 mg oral tablet [1 po daily]    traZODone 50 mg oral tablet [TAKE ONE-HALF TO ONE TABLET BY MOUTH EVERY NIGHT AT BEDTIME]    Ozempic 1 mg/dose (2 mg/1.5 mL) subcutaneous Pen Injector [DIAL AND INJECT SUBCUTANEOUSLY 1 MG WEEKLY]    DICLOFENAC SODIUM DR 75 MG TBEC  [take one tablet PO BID]    pioglitazone-metformin  mg oral tablet [TAKE TWO TABLETS BY MOUTH TWICE A DAY]    clarithromycin 500 mg oral tablet [take 1 tablet (500 mg) by oral route 2 times per day x 14 days  STOP ATORVASTATIN WHILE TAKING THIS MEDICATION]    amoxicillin 500 mg oral tablet [take 2 tablet (1000mg) by oral route bid X 14 days]    omeprazole 20 mg oral tablet, delayed release (enteric coated) [take 1 tablet BID x 14 days]        Objective:        Vitals:         Current: 8/27/2020 10:06:58 AM    Ht:  5 ft, 6 in;  Wt: 253  lbs;  BMI: 40.8T: 97 F (temporal);  BP: 105/81 mm Hg (left arm, sitting);  P: 76 bpm (left arm (BP Cuff), sitting);  sCr: 0.81 mg/dL;  GFR: 111.67O2 Sat: 97 % (room air)        Exams:     PHYSICAL EXAM:     GENERAL: obese;  well groomed;  no apparent distress;     EYES: Nonicteric and with unremarkable lids, iris and pupils;     NECK: carotid exam reveals no bruits; Tracheostomy.     RESPIRATORY: normal respiratory rate and pattern with no distress; normal breath sounds with no rales, rhonchi, wheezes or rubs;     CARDIOVASCULAR: normal rate; rhythm is regular;  no systolic murmur;     GASTROINTESTINAL: mild epigastric tenderness;  no guarding;  no rebound tenderness;  normal bowel sounds; no organomegaly; no masses;     LYMPHATIC: no enlargement of cervical or facial nodes;     MUSCULOSKELETAL: normal overall tone No pedal edema.;     NEUROLOGIC: No lateralizing deficit.;     PSYCHIATRIC:  appropriate affect and demeanor; normal speech pattern; grossly normal memory;         Assessment:         R19.7   Diarrhea, unspecified           ORDERS:         Radiology/Test Orders:       [id number]  COVID 19 Testing [facility]  (Send-Out)                      Plan:         Diarrhea, unspecifiedHe doesn't look toxic at all. Probably a viral gastroenteritis. Could use imodium for diarrhea. Stay hydrated. Asked him to wait until he hears results of covid test before getting the H. Pylori breath test.          Orders:       [id number]  COVID 19 Testing [facility]  (Send-Out)                  Charge Capture:         Primary Diagnosis:     R19.7  Diarrhea, unspecified           Orders:      [id number]  Office/outpatient visit; established patient, level 3  (In-House)

## 2021-05-18 NOTE — PROGRESS NOTES
Fabio Juárez 1965     Office/Outpatient Visit    Visit Date: Tue, Jan 9, 2018 05:15 pm    Provider: Karley Amor N.P. (Assistant: Phyllis Trent RN)    Location: [facility]        Electronically signed by Karley Amor N.P. on  01/12/2018 05:46:15 PM                             SUBJECTIVE:        CC:     Mauri is a 52 year old White male.  Left earache         HPI:         Otalgia details;     Mauri complains of left ear pain.  Associated symptoms include cough and headache.  He denies fever.  The symptoms began 2 weeks ago.  Pertinent history includes allergies and trach in place.  He has already tried to relieve the symptoms with OTC sinus medication.      ROS:     CONSTITUTIONAL:  Negative for chills, fatigue, fever and weight change.      E/N/T:  Positive for ear pain, nasal congestion and sinus pressure.      CARDIOVASCULAR:  Negative for chest pain, orthopnea, paroxysmal nocturnal dyspnea and pedal edema.      RESPIRATORY:  Positive for recent cough.   Negative for dyspnea or cough.      GASTROINTESTINAL:  Positive for diarrhea.   Negative for nausea or vomiting.      NEUROLOGICAL:  Positive for headaches.      PSYCHIATRIC:  Negative for anxiety and depression.          [facility]/FM/SH:     Last Reviewed on 6/20/2016 02:30 PM by Karley Amor    Past Medical History:             PAST MEDICAL HISTORY         Sleep Apnea     hemochromatosis     Chronic Pain: affecting the low back;     Type 2 Diabetes         CURRENT MEDICAL PROVIDERS:    Cardiologist: Pancho chavira -Last visit - 10/2016 - (recommend 1 year follow up)    Dermatologist    Pulmonologist: Dr. Davis    pain mgmt    liver specialist Dr Lewis         PREVENTIVE HEALTH MAINTENANCE             COLORECTAL CANCER SCREENING: Up to date (colonoscopy q10y; sigmoidoscopy q5y; Cologuard q3y) was last done 3/2017; colonoscopy with normal results     DENTAL CLEANING: does not go     EYE EXAM: was last done 2014 [place] and  ed     INFLUENZA VACCINE: was last done          PAST MEDICAL HISTORY         Positive for    Sleep Apnea,    tracheostomy and    lung nodule;     Positive for    Gastroesophageal Reflux Disease;     Positive for    Chronic Pain;     Positive for    Type 2 Diabetes: uncontrolled; ;          hemachromatosis     Positive for    Allergies;         CURRENT MEDICAL PROVIDERS:    E/N/T: Pardeep         Surgical History:         Cholecystectomy: ;     Joint Replacement: BOTH KNEES;;; Right knee       Appendectomy    Tonsillectomy/Adenoidectomy    tracheostomy secondary to sleep apnea;     Procedures: heat cath 11-3-15, left ventriulogram, coronary angiogram     COLONOSCOPY: was last done 14 with normal results Dr Anderson         Family History:     Father: ;  Myocardial Infarction     Mother: Arrhythmia     Brother(s): 2 brother(s) total; 1 ;  Hypertension;  cerebral aneurysm  at 38;  Type 2 Diabetes     Positive for Coronary Artery Disease.      Positive for Type 2 Diabetes ( mother ).          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          Alcohol: Frequency: Socially         Substance Abuse History:     Last Reviewed on 2016 02:30 PM by Karley Amor        Mental Health History:     Last Reviewed on 2016 02:30 PM by Karley Amor        Communicable Diseases (eg STDs):     Last Reviewed on 2016 02:30 PM by Karley Amor            Current Problems:     Last Reviewed on 5/10/2017 06:37 AM by Karley Amor    Leukocytosis, unspecified     Lung nodule     Other hemochromatosis     Low back pain     Peripheral neuropathy     Chronic tension type headache     Obstructive sleep apnea     Tracheostomy status      Testosterone deficiency     History of GERD     Hypercholesterolemia     Type 2 diabetes     Otalgia         Immunizations:     Fluzone (3 + years dose) 1/8/2013     Fluzone (3 + years dose) 1/18/2017     Fluzone pf (3+ years dose) 1/9/2018         Allergies:     Last Reviewed on 1/09/2018 05:19 PM by Phyllis Trent      No Known Drug Allergies.         Current Medications:     Last Reviewed on 1/09/2018 05:19 PM by Phyllis Trent    Lipitor 80mg Tablet One PO Q HS     Metformin HCl 500mg Tablet Take 2 tablet(s) by mouth bid     Baclofen 10mg Tablet 1/2 to 1 tablet at bedtime for low back pain/muscle spasms     Mobic 15mg Tablet 1 tab daily     Cetirizine HCl 5mg Tablet Take 1 tablet(s) by mouth daily     Jardiance 10mg Tablet Take 1 tablet(s) by mouth qam     Neurontin 100mg Capsules Take 2 capsule(s) by mouth tid     Hydrocodone/Acetaminophen 10mg/325mg Tablet 1 tab 4-6hrs prn     Vitamin E 400IU Capsules 2 tab q day     Pioglitazone HCl 45mg Tablet Take 1 tablet(s) by mouth daily     Lovaza 1gm Capsules Take 1 capsule(s) by mouth bid         OBJECTIVE:        Vitals:         Current: 1/9/2018 5:18:23 PM    Ht:  5 ft, 6 in;  Wt: 284.9 lbs;  BMI: 46.0    T: 97.8 F (oral);  BP: 127/72 mm Hg (left arm, sitting);  P: 92 bpm (left arm (BP Cuff), sitting);  sCr: 0.55 mg/dL;  GFR: 178.84        Exams:     PHYSICAL EXAM:     GENERAL: Vitals recorded well developed, well nourished;  well groomed;  no apparent distress;     E/N/T: EARS: the left TM is red and the right TM is bulging;     NECK:  supple, full ROM; no thyromegaly; no carotid bruits;     RESPIRATORY: normal respiratory rate and pattern with no distress; normal breath sounds with no rales, rhonchi, wheezes or rubs;     CARDIOVASCULAR: normal rate; rhythm is regular;  normal S1; normal S2; no systolic murmur; no cyanosis; no edema;     GASTROINTESTINAL: nontender, nondistended; no hepatosplenomegaly or masses; no bruits;     SKIN:  no significant rashes or  lesions; no suspicious moles;     MUSCULOSKELETAL:  Normal range of motion, strength and tone;     NEUROLOGICAL:  cranial nerves, motor and sensory function, reflexes, gait and coordination are all intact;     PSYCHIATRIC:  appropriate affect and demeanor; normal speech pattern; grossly normal memory;         Procedures:     Vaccination against other viral diseases, Influenza     1. Influenza, seasonal PF (children 3 years to adult): 0.5 ml unit dose given IM in the right upper arm; administered by [facility];  lot number [id number]; expires 6/30/18 Regarding contraindications to an Influenza vaccine: Denies moderate/severe illness with/without fever; serious reaction to eggs, egg proteins, gentamicin, gelatin, arginine, neomycin or polymixin; serious reaction after recieving previous influenza vaccines; and history of Guillain-Agenda Syndrome.              ASSESSMENT:           V04.81   Z23  Vaccination against other viral diseases, Influenza              DDx:     388.70   H92.02  Otalgia              DDx:         ORDERS:         Meds Prescribed:       Augmentin (Amoxicillin/Clavulanate) 875mg/125mg Tablet 1 tab bid X 10 days  #20 (Twenty) tablet(s) Refills: 0         Procedures Ordered:       79313  Immunization administration; one vaccine  (In-House)           Other Orders:       63842  Influenza virus vaccine, quadrivalent, split virus, preservative free 3 years of age & older  (In-House)                   PLAN:          Vaccination against other viral diseases, Influenza           Orders:       60428  Immunization administration; one vaccine  (In-House)         67814  Influenza virus vaccine, quadrivalent, split virus, preservative free 3 years of age & older  (In-House)            [facility]     School/Work Excuse for Today Tomorrow           Prescriptions:       Augmentin (Amoxicillin/Clavulanate) 875mg/125mg Tablet 1 tab bid X 10 days  #20 (Twenty) tablet(s) Refills: 0             CHARGE CAPTURE:           Primary  Diagnosis:     V04.81 Vaccination against other viral diseases, Influenza            Z23    Encounter for immunization              Orders:          72098   Office/outpatient visit; established patient, level 3  (In-House)             86804   Immunization administration; one vaccine  (In-House)             15909   Influenza virus vaccine, quadrivalent, split virus, preservative free 3 years of age & older  (In-House)           388.70 Otalgia            H92.02    Otalgia, left ear

## 2021-05-18 NOTE — PROGRESS NOTES
Fabio Juárez 1965     Office/Outpatient Visit    Visit Date: Wed, Jun 12, 2019 10:31 am    Provider: Karley Amor N.P. (Assistant: Gris Nunez)    Location: [facility]        Electronically signed by Karley Amor N.P. on  06/12/2019 11:12:17 AM                             SUBJECTIVE:        CC:     Mauri is a 54 year old White male.  dizziness, right ear and shoulder pain x 2 weeks         HPI:         Mauri presents with dizziness.  This first began approximately 2 weeks ago.  He describes the sensation as imbalance.  This is moderate in severity.  He states that it is worsened with sudden position changes.  Associated symptoms include neck pain, shoulder pain on the same side.  Current medications include antihistamines.          Concerning shoulder pain, he complains of right shoulder pain.  The location of the pain is anterior.  It radiates to the neck.  The pain initially started 2 weeks ago.  The apparent precipitating event was usually sleeps on that side.  he is having some neck pain as well that may be related to the ear     ROS:     CONSTITUTIONAL:  Negative for chills, fatigue and fever.      E/N/T:  Positive for ear pain ( right ).   Negative for diminished hearing.      CARDIOVASCULAR:  Negative for chest pain and pedal edema.      RESPIRATORY:  Negative for recent cough and dyspnea.      MUSCULOSKELETAL:  Positive for limb pain ( right shoulder ).   Negative for arthralgias or myalgias.      NEUROLOGICAL:  Positive for dizziness and headaches.      ALLERGIC/IMMUNOLOGIC:  Positive for seasonal allergies.          PMH/FMH/SH:     Last Reviewed on 6/12/2019 10:51 AM by Karley Amor    Past Medical History:             PAST MEDICAL HISTORY         Sleep Apnea     hemochromatosis     Renal Stones: dx'd in 2017 - March;     Chronic Pain: affecting the low back;     Type 2 Diabetes         CURRENT MEDICAL PROVIDERS:    Cardiologist: Pancho chavira - Manages CAD, HTN, Mixed  hyperlipidemia - Annual follow up    Dermatologist: Deshawn (PRN only)    E/N/T: Pardeep (Trach) - PRN -    Pulmonologist: Candie - PRN    Pain Management - Flaget (every 3 months and PRN)    [facility] -  Dr Lewis  - annual PRN  follow up([place]) Podiatrist Dr. Mc         PREVENTIVE HEALTH MAINTENANCE             COLORECTAL CANCER SCREENING: Up to date (colonoscopy q10y; sigmoidoscopy q5y; Cologuard q3y) was last done 3/2017, Results are in chart; colonoscopy with normal results     DENTAL CLEANING: does not go     EYE EXAM: was last done 2018 [place] and ed         PAST MEDICAL HISTORY         Positive for    Sleep Apnea,    tracheostomy and    lung nodule;     Positive for    Gastroesophageal Reflux Disease;     Positive for    Chronic Pain;     Positive for    Type 2 Diabetes: uncontrolled; ;          hemachromatosis     Positive for    Allergies;         Surgical History:         Cholecystectomy: ;     Joint Replacement: BOTH KNEES;;; Right knee       Appendectomy    Tonsillectomy/Adenoidectomy    tracheostomy secondary to sleep apnea;    right achilles tendon repair 10/2018 ([place]);     Procedures: heat cath 11-3-15, left ventriulogram, coronary angiogram     COLONOSCOPY: was last done 14 with normal results Dr Anderson         Family History:     Father: ;  Myocardial Infarction     Mother: Arrhythmia     Brother(s): 2 brother(s) total; 1 ;  Hypertension;  cerebral aneurysm  at 38;  Type 2 Diabetes     Positive for Coronary Artery Disease.      Positive for Type 2 Diabetes ( mother ).          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          Alcohol: Frequency: Socially         Substance Abuse History:     Last  Reviewed on 3/26/2019 12:13 PM by Karley Amor    None         Mental Health History:     Last Reviewed on 3/26/2019 12:13 PM by Karley Amor    NEGATIVE         Communicable Diseases (eg STDs):     Last Reviewed on 3/26/2019 12:13 PM by Karley Amor    Reportable health conditions; NEGATIVE             Current Problems:     Last Reviewed on 6/12/2019 10:52 AM by Karley Amor    Dizziness     Hypertension     Mixed hyperlipidemia and hypertriglyceridemia     CAD     Other hemochromatosis     Low back pain     Peripheral neuropathy     Chronic tension type headache     Obstructive sleep apnea     Tracheostomy status     Testosterone deficiency     History of GERD     Type 2 diabetes     R otitis media     Shoulder pain     Upper respiratory infection     Foot pain         Immunizations:     Fluzone (3 + years dose) 1/8/2013     Fluzone (3 + years dose) 1/18/2017     Fluzone pf (3+ years dose) 1/9/2018     Fluzone Quadrivalent (3+ years) 11/6/2018     Pneomovax 8/8/2016     Tdap (Tetanus, reduced diph, acellular pertussis) 3/26/2019         Allergies:     Last Reviewed on 6/12/2019 10:31 AM by Gris Nunez      No Known Drug Allergies.         Current Medications:     Last Reviewed on 6/12/2019 10:31 AM by Gris Nunez    Hydrochlorothiazide (HCTZ) 12.5mg Tablet 1 po daily     Metformin HCl 500mg Tablet Take 2 tablet(s) by mouth bid     Baclofen 10mg Tablet 1/2 to 1 tablet at bedtime for low back pain/muscle spasms     Pioglitazone HCl 45mg Tablet Take 1 tablet(s) by mouth daily     Lisinopril 10mg Tablet 1 in am     Lipitor 80mg Tablet One PO Q HS     Glimepiride 2mg Tablet ONE AND A HALF TABLET DAILY     Mobic 15mg Tablet 1 tab daily     Cetirizine HCl 5mg Tablet Take 1 tablet(s) by mouth daily     Neurontin 100mg Capsules Take 2 capsule(s) by mouth tid     Fluticasone Propionate 50mcg/1actuation Nasal Spray 1 spray each nostil daily     Ozempic pen 1mg doses Injection 1 injection SC qweek     OTC  Probiotic with Vitamin C take one capsule once daily     Oxycodone HCl 5mg Capsules Take 1 capsule(s) by mouth q 6 hr prn for pain     Vitamin E 400IU Capsules 2 tab q day         OBJECTIVE:        Vitals:         Historical:     04/29/2019  Wt:   286.2lbs    03/26/2019  Wt:   292.4lbs        Current: 6/12/2019 10:36:59 AM    Ht:  5 ft, 6 in;  Wt: 272.6 lbs;  BMI: 44.0    T: 98.2 F (oral);  BP: 107/66 mm Hg (left arm, sitting);  P: 79 bpm (left arm (BP Cuff), sitting);  sCr: 0.68 mg/dL;  GFR: 138.86        Exams:     PHYSICAL EXAM:     GENERAL: Vitals recorded well developed, well nourished;  no apparent distress;     E/N/T: EARS: external auditory canal normal bilaterally;  the left TM is retracted and the right TM is bulging, dull, and red;  NOSE:  normal nasal mucosa, septum, turbinates, and sinuses; OROPHARYNX:  normal mucosa, dentition, gingiva, and posterior pharynx;     NECK: range of motion is normal;     RESPIRATORY: normal appearance and symmetric expansion of chest wall; normal respiratory rate and pattern with no distress; normal breath sounds with no rales, rhonchi, wheezes or rubs;     CARDIOVASCULAR: normal rate; rhythm is regular;  no edema;     GASTROINTESTINAL: nontender; normal bowel sounds; no organomegaly;     LYMPHATIC: no enlargement of cervical or facial nodes; no supraclavicular nodes;     MUSCULOSKELETAL: normal gait; normal range of motion of all major muscle groups; no limb or joint pain with range of motion;     NEUROLOGIC: mental status: alert and oriented x 3;     PSYCHIATRIC: appropriate affect and demeanor; normal speech pattern; normal thought and perception;         ASSESSMENT           780.4   R42  Dizziness              DDx:     719.41   M25.511  Shoulder pain              DDx:     382.00   H66.91  R otitis media              DDx:         ORDERS:         Meds Prescribed:       Fexofenadine HCl 180mg Tablet 1 tab daily  #28 (Twenty Eight) tablet(s) Refills: 0       Levaquin  (Levofloxacin ) 500mg Tablet Take 1 tablet(s) by mouth daily  X 7 days  #7 (Seven) tablet(s) Refills: 0                 PLAN:          Dizziness           Prescriptions:       Fexofenadine HCl 180mg Tablet 1 tab daily  #28 (Twenty Eight) tablet(s) Refills: 0          Shoulder pain Recommend heat applications and topical ointment  follow up if worsening or no improvement in 2-4 weeks          R otitis media recurrent OTitis media - I am going to try something a little stronger to see if this clears and recommend that he follow up if no better in 7-10 days           Prescriptions:       Levaquin (Levofloxacin ) 500mg Tablet Take 1 tablet(s) by mouth daily  X 7 days  #7 (Seven) tablet(s) Refills: 0             CHARGE CAPTURE           **Please note: ICD descriptions below are intended for billing purposes only and may not represent clinical diagnoses**        Primary Diagnosis:         780.4 Dizziness            R42    Dizziness and giddiness              Orders:          78261   Office/outpatient visit; established patient, level 3  (In-House)           719.41 Shoulder pain            M25.511    Pain in right shoulder    382.00 R otitis media            H66.91    Otitis media, unspecified, right ear

## 2021-05-18 NOTE — PROGRESS NOTES
Fabio JuárezJustin 1965     Office/Outpatient Visit    Visit Date: Tue, Mar 27, 2018 01:17 pm    Provider: Karley Amor N.P. (Assistant: Terri Alonzo MA)    Location: [facility]        Electronically signed by Karley Amor N.P. on  03/29/2018 12:47:43 PM                             SUBJECTIVE:        CC:     Mauri is a 53 year old White male.  diabetes meds - checkup         HPI:         Complaint of obstructive sleep apnea..  The symptom began years ago.  currently uses bipap at night with trach - Trach is in place only for his sleep apnea -         Dx with low back pain; other details: Regarding low back pain - currently under the care of pain management - every 3 month visit - occasionally more frequently if needed.          Complaint of lung nodule..  The symptom began one year ago.  Prior work-up has included a CT scan and a PET scan ( normal ).  Mauri is concerned today that Dr. Torres is not following up on the nodule that was previously concerning on a CT - however, he did have a PET scan that was noted to be normal (per his report)  He would like to change over to a pulmonary Dr. that a friend referred named Dr. Thornton in [facility].          Hypercholesterolemia details; he cannot recall when the diagnosis of hypercholesterolemia was made.  Current treatment includes a lipid lowering agent.  Compliance with treatment has been fair; he does not follow a diet and exercise regimen.  He denies experiencing any hypercholesterolemia related symptoms.  Most recent lab tests include Total Cholesterol:  155 (mg/dL) (02/12/2018), HDL:  52 (mg/dL) (02/12/2018), Triglycerides:  173 (mg/dL) (02/12/2018), VLDL Cholesterol:  35 (mg/dL) (02/12/2018), LDL Cholesterol:  68 (mg/dL) (02/12/2018).          Complaint of tracheostomy status..  Prior work-up has included Was seen by ENT for evaluation of infected trach last year - reprted that ENT only needed to see him on an as needed basis -  no current complaints of trach problems, no current odor or drainage from the trach or the trach insertion site.          Dx with foot pain; today's visit is for evaluation of the right foot.  The location of the discomfort is primarily the distal foot.  It radiates to the wear boot when up on feet - follow up in 4 weeks with podiatry.  The pain initially began several months ago.  Other details: Was recently seen by podiatry injection given - with no improvment-Per Miles report, he told him that he felt this was stemming from his hip - he has been Referred to PT and  placed in an orthopedic boot to be worn for 6-8 weeks - and instructed to follow up there in 6 weeks  - currently report that the pain is improved with the boot in place.          Type 2 diabetes details; specifically, this is type 2, non-insulin requiring diabetes.  Compliance with treatment has been fair; he does not follow a diet and exercise regimen.  Patient's diabetes was first diagnosed several years ago.  He follows no particular diet.  Primary symptoms reported include leg cramps and peripheral neuropathy.  He specifically denies blurred vision or weight loss.  Current meds include an oral hypoglycemic ( Actos, Amaryl, and Glucophage ), insulin/injectable ( bydureon ), an ACE inhibitor, and a lipid lowering agent.  The hypoglycemic episodes are typically mild with blood sugars recorded at 80s(which he feels too low per report).  He reports home blood glucose readings have been fairly good, with average fasting glucoses running in the 120-150 mg/dL range.  Most recent lab results include Hemoglobin A1c:  8.2 (%) (02/12/2018).  In regard to preventative care, he performs foot self-exams several days per week.  MILES REPORTS THAT SINCE INCREASING THE GLIMEPIRIDE TO 2 TABS, HE FEELS LIKE HIS BLOOD SUGAR IS GETTING LOW - WHEN HE CHECKS THE LEVELS, IT IS AROUND 80 WHICH IS A LEVEL THAT MAKE HIM SHAKY AND WEAK.  This typically occurs in the later  afternoon         In regard to the health checkup, he cannot recall when he last had a physical exam.  Depression screen is performed and is negative.      ROS:     CONSTITUTIONAL:  Negative for chills, fatigue and fever.      EYES:  Negative for blurred vision.      E/N/T:  Negative for ear pain and sore throat.      CARDIOVASCULAR:  Negative for chest pain and pedal edema.      RESPIRATORY:  Positive for trach without further odor, drainage - plugged during day.   Negative for recent cough or dyspnea.      GASTROINTESTINAL:  Negative for abdominal pain, constipation, diarrhea, nausea and vomiting.      GENITOURINARY:  Negative for dysuria and change in urine stream.      MUSCULOSKELETAL:  Positive for back pain ( chronic; Pain management ) and limb pain ( right foot - currently under care of Dr. Carmen ).   Negative for arthralgias.      INTEGUMENTARY:  Negative for atypical mole(s) and rash.      NEUROLOGICAL:  Negative for dizziness, headaches and paresthesias.      HEMATOLOGIC/LYMPHATIC:  Positive for history of hemochromotosis - follwed by Dr. Negro (liver specialist) annually.      ENDOCRINE:  Positive for DM - managed PCP.   Negative for hair loss, polydipsia or polyphagia.      ALLERGIC/IMMUNOLOGIC:  Negative for seasonal allergies.      PSYCHIATRIC:  Positive for (sleep apnea).   Negative for anxiety, depression, feelings of stress, anhedonia, difficulty concentrating, recreational drug use, sadness or suicidal thoughts.          PMH/FMH/SH:     Last Reviewed on 3/29/2018 12:43 PM by Karley Amor    Past Medical History:             PAST MEDICAL HISTORY         Sleep Apnea     hemochromatosis     Renal Stones: dx'd in 2017 - March;     Chronic Pain: affecting the low back;     Type 2 Diabetes         CURRENT MEDICAL PROVIDERS:    Cardiologist: Pancho chavira - Manages CAD, HTN, Mixed hyperlipidemia - Annual follow up    Dermatologist: Deshawn (PRN only)    E/N/T: Pardeep (Trach) - PRN -     Pulmonologist: Melissa (annually & PRN)    Pain Management - Flaget (every 3 months and PRN)    [facility] -  Dr Lewis  - annual follow up Podiatrist Dr. Hubbard (PRN)         PREVENTIVE HEALTH MAINTENANCE             COLORECTAL CANCER SCREENING: Up to date (colonoscopy q10y; sigmoidoscopy q5y; Cologuard q3y) was last done 3/2017, Results are in chart; colonoscopy with normal results     DENTAL CLEANING: does not go     EYE EXAM: was last done  Jeff         PAST MEDICAL HISTORY         Positive for    Sleep Apnea,    tracheostomy and    lung nodule;     Positive for    Gastroesophageal Reflux Disease;     Positive for    Chronic Pain;     Positive for    Type 2 Diabetes: uncontrolled; ;          hemachromatosis     Positive for    Allergies;         Surgical History:         Cholecystectomy: ;     Joint Replacement: BOTH KNEES;;; Right knee       Appendectomy    Tonsillectomy/Adenoidectomy    tracheostomy secondary to sleep apnea;     Procedures: heat cath 11-3-15, left ventriulogram, coronary angiogram     COLONOSCOPY: was last done 14 with normal results Dr Anderson         Family History:     Father: ;  Myocardial Infarction     Mother: Arrhythmia     Brother(s): 2 brother(s) total; 1 ;  Hypertension;  cerebral aneurysm  at 38;  Type 2 Diabetes     Positive for Coronary Artery Disease.      Positive for Type 2 Diabetes ( mother ).          Social History:     Occupation: [employer] / [employer]     Marital Status:      Children: 2 children and 1 step-child         Tobacco/Alcohol/Supplements:     Last Reviewed on 3/29/2018 12:43 PM by Karley Amor    Tobacco: He has never smoked.          Alcohol: Frequency: Socially         Substance Abuse History:     Last Reviewed on 3/29/2018 12:43 PM by Karley Amor    None         Mental Health History:     Last Reviewed on 3/29/2018 12:43 PM by Karley Amor     NEGATIVE         Communicable Diseases (eg STDs):     Last Reviewed on 3/29/2018 12:43 PM by Karley mAor    Reportable health conditions; NEGATIVE             Current Problems:     Last Reviewed on 3/29/2018 12:43 PM by Karley Amor    Mixed hyperlipidemia and hypertriglyceridemia     CAD     Lung nodule     Other hemochromatosis     Low back pain     Peripheral neuropathy     Chronic tension type headache     Obstructive sleep apnea     Tracheostomy status     Testosterone deficiency     History of GERD     Type 2 diabetes     Allergic rhinitis, unspecified cause     Plantar fasciitis     Foot pain         Immunizations:     Fluzone (3 + years dose) 1/8/2013     Fluzone (3 + years dose) 1/18/2017     Fluzone pf (3+ years dose) 1/9/2018         Allergies:     Last Reviewed on 3/29/2018 12:43 PM by Karley Amor      No Known Drug Allergies.         Current Medications:     Last Reviewed on 3/29/2018 12:43 PM by Karley Amor    Baclofen 10mg Tablet 1/2 to 1 tablet at bedtime for low back pain/muscle spasms     Metformin HCl 500mg Tablet Take 2 tablet(s) by mouth bid     Cetirizine HCl 5mg Tablet Take 1 tablet(s) by mouth daily     Lipitor 80mg Tablet One PO Q HS     Pioglitazone HCl 45mg Tablet Take 1 tablet(s) by mouth daily     Mobic 15mg Tablet 1 tab daily     Neurontin 100mg Capsules Take 2 capsule(s) by mouth tid     Glimepiride 2mg Tablet Take 1 tablet daily with first main meal of the day for 2 weeks then increase to 2 tablets  daily     Bydureon 2mg/1vial Injection Suspension, Extended-Release Inject 2 mg subcutaneously q week     Lovaza 1gm Capsules Take 1 capsule(s) by mouth bid     Gabapentin 300mg Capsules 1 cap TID     Lisinopril 10mg Tablet 1 in am     Oxycodone/Acetaminophen  5mg/300mg Tablet Take 1 tablet(s) by mouth q6h prn for pain     Vitamin E 400IU Capsules 2 tab q day         OBJECTIVE:        Vitals:         Historical:     02/21/2018  Wt:   295.4lbs        Current:  3/27/2018 1:19:59 PM    Ht:  5 ft, 6 in;  Wt: 300.8 lbs;  BMI: 48.6    T: 97.2 F (oral);  BP: 110/65 mm Hg (left arm, sitting);  P: 91 bpm (left arm (BP Cuff), sitting);  sCr: 0.68 mg/dL;  GFR: 146.41        Exams:     PHYSICAL EXAM:     GENERAL: Vitals recorded well developed, obese;  no apparent distress;     E/N/T: EARS: external auditory canal normal bilaterally;  bilateral TMs are normal;  OROPHARYNX:  normal mucosa, dentition, gingiva, and posterior pharynx;     NECK: range of motion is normal; tracheostomy in place(no s/s infection, plugged);     RESPIRATORY: normal appearance and symmetric expansion of chest wall; normal respiratory rate and pattern with no distress; normal breath sounds with no rales, rhonchi, wheezes or rubs;     CARDIOVASCULAR: normal rate; rhythm is regular;     LYMPHATIC: no enlargement of cervical or facial nodes; no supraclavicular nodes;     MUSCULOSKELETAL: gait: affected by a limp and walking shoe in place to right foot;  normal range of motion of all major muscle groups; no limb or joint pain with range of motion;     NEUROLOGIC: mental status: alert and oriented x 3;     PSYCHIATRIC: appropriate affect and demeanor; normal speech pattern; normal thought and perception;         ASSESSMENT           V70.0   Z00.00  Health checkup              DDx:     250.00   E11.40  Type 2 diabetes              DDx:     729.5   M79.671  Foot pain              DDx:     327.23   G47.33  Obstructive sleep apnea              DDx:     V44.0   Z93.0  Tracheostomy status              DDx:     724.2   M54.5  Low back pain              DDx:     518.89   R91.8  Lung nodule              DDx:     272.0   E78.2  Hypercholesterolemia              DDx:         ORDERS:         Meds Prescribed:       Refill of: Glimepiride 2mg Tablet one every am  #30 (Thirty) tablet(s) Refills: 1         Procedures Ordered:       REFER  Referral to Specialist or Other Facility  (Send-Out)                   PLAN:          Health  checkup continue with specialist as scheduled and recommended,          Type 2 diabetes         RECOMMENDATIONS given include: Since Mauri is now on the Bydureon, we will cut the Glimepiride back down to the 1 tablet, have him continue to check his blood sugar - if if continues to stay in the 80s and he feels bad, we will see how his levels look with omitting it completely.  We discussed need for him to come in every 3 months for DM follow up and he is in agreement..      FOLLOW-UP: Schedule a follow-up visit in 3 months..            Prescriptions:       Refill of: Glimepiride 2mg Tablet one every am  #30 (Thirty) tablet(s) Refills: 1          Foot pain Follow up with Dr. Hubbard as scheduled - wear boot as recommended         FOLLOW-UP: Keep currently scheduled appointment..           Obstructive sleep apnea         REFERRALS:  Referral initiated to a pulmonologist ( Dr. Thornton ([facility]); transfer care per request - NEAL, lung nodule ).      RECOMMENDATIONS given include: Follow up with Pulmonary (will refer to new pulmonologist as requested).            Orders:       REFER  Referral to Specialist or Other Facility  (Send-Out)            Tracheostomy status         RECOMMENDATIONS given include: continue to maintain cleaning schedule - follow up with ENT at least every 2 years for evaluation.           Low back pain         RECOMMENDATIONS given include: continue pain management as scheduled - medications managed with that practice.           Lung nodule         RECOMMENDATIONS given include: Mauri is concerned that there is no follow up per Dr. Torres on his lung nodule - howeverr, he did undergo a PET scan and noted no active areas of concern including lymph (per Dr. Torres note)  Mauri would like to see Dr. Thornton in [facility] at the patient request, as he feels he would at least like someone elses opinion  - We will obtain the PET report and refer as requested.           Hypercholesterolemia          RECOMMENDATIONS given include: managed by Cardiology- Lovaza /statin.              Patient Recommendations:        For  Type 2 diabetes:     I also recommend Since Mauri is now on the Bydureon, we will cut the Glimepiride back down to the 1 tablet, have him continue to check his blood sugar - if if continues to stay in the 80s and he feels bad, we will see how his levels look with omitting it completely.  We discussed need for him to come in every 3 months for DM follow up and he is in agreement..  Schedule a follow-up visit in 3 months.                APPOINTMENT INFORMATION:        Monday Tuesday Wednesday Thursday Friday Saturday Sunday            Time:___________________AM  PM   Date:_____________________         For  Foot pain:                     APPOINTMENT INFORMATION:        Monday Tuesday Wednesday Thursday Friday Saturday Sunday            Time:___________________AM  PM   Date:_____________________         For  Obstructive sleep apnea:     I also recommend Follow up with Pulmonary (will refer to new pulmonologist as requested).          For  Tracheostomy status:     I also recommend continue to maintain cleaning schedule - follow up with ENT at least every 2 years for evaluation.          For  Low back pain:     I also recommend continue pain management as scheduled - medications managed with that practice.          For  Lung nodule:     I also recommend Mauri is concerned that there is no follow up per Dr. Torres on his lung nodule - howeverr, he did undergo a PET scan and noted no active areas of concern including lymph (per Dr. Torres note)  Mauri would like to see Dr. Thornton in [facility] at the patient request, as he feels he would at least like someone elses opinion  - We will obtain the PET report and refer as requested.          For  Hypercholesterolemia:     I also recommend managed by Cardiology- Lovaza /statin.              CHARGE CAPTURE           **Please note:  ICD descriptions below are intended for billing purposes only and may not represent clinical diagnoses**        Primary Diagnosis:         V70.0 Health checkup            Z00.00    Encounter for general adult medical examination without abnormal findings              Orders:          31593   Preventive medicine, established patient, age 40-64 years  (In-House)           250.00 Type 2 diabetes            E11.40    Type 2 diabetes mellitus with diabetic neuropathy, unspecified              Orders:          40963 -25  Office/outpatient visit; established patient, level 4  (In-House)           729.5 Foot pain            M79.671    Pain in right foot    327.23 Obstructive sleep apnea            G47.33    Obstructive sleep apnea (adult) (pediatric)    V44.0 Tracheostomy status            Z93.0    Tracheostomy status    724.2 Low back pain            M54.5    Low back pain    518.89 Lung nodule            R91.8    Other nonspecific abnormal finding of lung field    272.0 Hypercholesterolemia            E78.2    Mixed hyperlipidemia

## 2021-05-18 NOTE — PROGRESS NOTES
Fabio Juárez KRYSTEN  1965     Office/Outpatient Visit    Visit Date: Tue, Jan 21, 2020 04:03 pm    Provider: Karley Amor N.P. (Assistant: Spurling, Sarah C, MA)    Location: [facility]        Electronically signed by Karley Amor N.P. on  01/22/2020 01:12:50 PM                             Subjective:        CC: Mauri is a 54 year old White male.  This is a follow-up visit.          HPI:           Additionally, he presents with history of type 2 diabetes mellitus with diabetic neuropathy, unspecified.  compliance with treatment has been good.  He specifically denies associated symptoms, including nocturia, polydipsia and polyuria.  He reports home blood glucose readings have been excellent, with average fasting glucoses running <120 mg/dL. He checks his glucose 1 to 2 times daily.            With regard to the mixed hyperlipidemia, compliance with treatment has been good.  He specifically denies associated symptoms, including muscle pain and weakness.        nodule developed top of left foot - has went down since first noticed 2 days ago,  no other associated symptoms    ROS:     CONSTITUTIONAL:  Negative for chills, fatigue and fever.      CARDIOVASCULAR:  Negative for chest pain, orthopnea, paroxysmal nocturnal dyspnea and pedal edema.      RESPIRATORY:  Negative for dyspnea and cough.      GASTROINTESTINAL:  Negative for abdominal pain, heartburn, constipation, diarrhea, and stool changes.      MUSCULOSKELETAL:  Positive for right foot nodule.          Past Medical History / Family History / Social History:         Last Reviewed on 12/17/2019 08:28 PM by Pancho Wyatt    Past Medical History:             PAST MEDICAL HISTORY         Sleep Apnea     hemochromatosis     Renal Stones: dx'd in 2017 - March;     Chronic Pain: affecting the low back;     Type 2 Diabetes         CURRENT MEDICAL PROVIDERS:    Cardiologist: Pancho chavira - Manages CAD, HTN, Mixed hyperlipidemia - Annual  follow up    Dermatologist: Deshawn (PRN only)    E/N/T: Pardeep (Trach) - PRN -    Pulmonologist: Candie - PRN    Pain Management - Flaget (every 3 months and PRN)    [facility] -  Dr Lewis  - annual PRN  follow up([facility]) Podiatrist Dr. Mc         PREVENTIVE HEALTH MAINTENANCE             COLORECTAL CANCER SCREENING: Up to date (colonoscopy q10y; sigmoidoscopy q5y; Cologuard q3y) was last done 3/2017, Results are in chart; colonoscopy with normal results     DENTAL CLEANING: does not go     EYE EXAM: was last done 2018 [place] and ed         PAST MEDICAL HISTORY         Positive for    Sleep Apnea,    tracheostomy and    lung nodule;     Positive for    Gastroesophageal Reflux Disease;     Positive for    Chronic Pain;     Positive for    Type 2 Diabetes: uncontrolled; ;         hemachromatosis     Positive for    Allergies;         Surgical History:         Cholecystectomy: ;     Joint Replacement: BOTH KNEES;;; Right knee -     Appendectomy    Tonsillectomy/Adenoidectomy    tracheostomy secondary to sleep apnea;    right achilles tendon repair 10/2018 ([place]);     Procedures: heat cath 11-3-15, left ventriulogram, coronary angiogram     COLONOSCOPY: was last done 14 with normal results Dr Anderson         Family History:     Father: ;  Myocardial Infarction     Mother: Arrhythmia     Brother(s): 2 brother(s) total; 1 ;  Hypertension;  cerebral aneurysm  at 38;  Type 2 Diabetes     Positive for Coronary Artery Disease.      Positive for Type 2 Diabetes ( mother ).          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          Alcohol: Frequency: Socially         Substance Abuse History:     Last Reviewed on 2019  08:28 PM by Pancho Wyatt    None         Mental Health History:     Last Reviewed on 12/17/2019 08:28 PM by Pancho Wyatt    NEGATIVE         Communicable Diseases (eg STDs):     Last Reviewed on 12/17/2019 08:28 PM by Pancho Wyatt    Reportable health conditions; NEGATIVE         Current Problems:     Last Reviewed on 1/21/2020 04:03 PM by Spurling, Sarah C    Type 2 diabetes mellitus with diabetic neuropathy, unspecified    Other testicular dysfunction    Chronic tension-type headache, intractable    Chronic tension-type headache, not intractable    Obstructive sleep apnea (adult) (pediatric)    Tracheostomy status    Diabetes mellitus due to underlying condition with diabetic neuropathy, unspecified    Low back pain    Pain in right foot    Mixed hyperlipidemia    Essential (primary) hypertension    Dizziness and giddiness    Pain in right shoulder    Sprain of ribs, subsequent encounter    Claustrophobia    Acute serous otitis media, right ear        Immunizations:     Fluzone (3 + years dose) 1/8/2013    Fluzone (3 + years dose) 1/18/2017    Fluzone pf (3+ years dose) 1/9/2018    Fluzone Quadrivalent (3+ years) 11/6/2018    Fluzone Quadrivalent (3+ years) 10/29/2019    Pneomovax 8/8/2016    Tdap (Tetanus, reduced diph, acellular pertussis) 3/26/2019        Allergies:     Last Reviewed on 1/21/2020 04:03 PM by Spurling, Sarah C    No Known Allergies.        Current Medications:     Last Reviewed on 1/21/2020 04:09 PM by Spurling, Sarah C    meloxicam 15 mg oral tablet [TAKE ONE TABLET BY MOUTH DAILY]    metFORMIN 500 mg oral tablet [TAKE TWO TABLETS BY MOUTH TWICE A DAY]    Omega-3 acid ethyl esters 1 gram oral capsule [Take 1 capsule(s) by mouth bid]    Baclofen 10 mg oral tablet [1/2 to 1 tablet at bedtime for low back pain/muscle spasms]    atorvastatin 80 mg oral tablet [TAKE ONE TABLET BY MOUTH EVERY NIGHT AT BEDTIME]    Fluticasone Propionate 50 mcg/actuation Intranasal Spray, Suspension [1 or 2  sprays in each nostril qday ]    lisinopriL 10 mg oral tablet [TAKE ONE TABLET BY MOUTH EVERY MORNING]    cetirizine 5 mg oral tablet [Take 1 tablet(s) by mouth daily]    pioglitazone 45 mg oral tablet [Take 1 tablet(s) by mouth daily]    glimepiride 2 mg oral tablet [TAKE 1 AND 1/2 TABLET BY MOUTH ONCE DAILY]    Glimepiride 2 mg oral tablet [one every am]    Gabapentin 300 mg oral capsule [1 capsule po bid]    oxyCODONE 5 mg oral capsule [Take 1 capsule(s) by mouth q 6 hr prn for pain]    hydroCHLOROthiazide 12.5 mg oral tablet [1 po daily]    traZODone 50 mg oral tablet [TAKE ONE-HALF TO ONE TABLET BY MOUTH EVERY NIGHT AT BEDTIME]    Ozempic pen     DICLOFENAC SODIUM DR 75 MG TBEC  [take one tablet PO BID]        Objective:        Vitals:         Current: 1/21/2020 4:11:40 PM    Ht:  5 ft, 6 in;  Wt: 261.6 lbs;  BMI: 42.2T: 98.7 F (oral);  BP: 116/70 mm Hg (left arm, sitting);  P: 79 bpm (left arm (BP Cuff), sitting);  sCr: 0.81 mg/dL;  GFR: 114.55        Exams:     PHYSICAL EXAM:     GENERAL: Vitals recorded well developed, well nourished;  well groomed;  no apparent distress;     NECK:  supple, full ROM; no thyromegaly; no carotid bruits;     RESPIRATORY: normal respiratory rate and pattern with no distress; normal breath sounds with no rales, rhonchi, wheezes or rubs;     CARDIOVASCULAR: normal rate; rhythm is regular;  normal S1; normal S2; no systolic murmur; no cyanosis; no edema;     MUSCULOSKELETAL: firm marble sized nodule to dorsal surface of left foot- non tender;     NEUROLOGICAL:  cranial nerves, motor and sensory function, reflexes, gait and coordination are all intact;     PSYCHIATRIC:  appropriate affect and demeanor; normal speech pattern; grossly normal memory;     Left foot exam    Protective sensation using Monofilament test: NORMAL sensation. Patient detects .07 grams of force which is considered normal.    Vascular status: normal peripheral vascular exam with palpable dorsal pedal and  posterior tibal pulses and brisk digital capillary refill    Skin is intact without sores or ulcers    Right foot exam    Protective sensation using Monofilament test: NORMAL sensation. Patient detects .07 grams of force which is considered normal.    Vascular status: normal peripheral vascular exam with palpable dorsal pedal and posterior tibal pulses and brisk digital capillary refill    Skin is intact without sores or ulcers         Assessment:         Z13.31   Encounter for screening for depression       E11.40   Type 2 diabetes mellitus with diabetic neuropathy, unspecified       E78.2   Mixed hyperlipidemia       R22.42   Localized swelling, mass and lump, left lower limb           ORDERS:         Meds Prescribed:       [Refilled] pioglitazone 45 mg oral tablet [Take 1 tablet(s) by mouth daily], #90 (ninety) tablets, Refills: 0 (zero)       [Refilled] cetirizine 5 mg oral tablet [Take 1 tablet(s) by mouth daily], #90 (ninety) tablets, Refills: 1 (one)       [Refilled] hydroCHLOROthiazide 12.5 mg oral tablet [1 po daily], #90 (ninety) tablets, Refills: 1 (one)       [Refilled] atorvastatin 80 mg oral tablet [TAKE ONE TABLET BY MOUTH EVERY NIGHT AT BEDTIME], #90 (ninety) tablets, Refills: 1 (one)       [Refilled] metFORMIN 500 mg oral tablet [TAKE TWO TABLETS BY MOUTH TWICE A DAY], #360 (three hundred and sixty) tablets, Refills: 0 (zero)       [Refilled] glimepiride 2 mg oral tablet [TAKE 1 AND 1/2 TABLET BY MOUTH ONCE DAILY], #135 (one hundred and thirty five) tablets, Refills: 0 (zero)       [Refilled] traZODone 50 mg oral tablet [TAKE ONE-HALF TO ONE TABLET BY MOUTH EVERY NIGHT AT BEDTIME], #30 (thirty) tablets, Refills: 2 (two)       [Refilled] lisinopriL 10 mg oral tablet [TAKE ONE TABLET BY MOUTH EVERY MORNING], #90 (ninety) tablets, Refills: 1 (one)       [Refilled] Ozempic 1 mg/dose (2 mg/1.5 mL) subcutaneous Pen Injector [1 injection SC qweek], #4 (four) each, Refills: 3 (three)         Radiology/Test  Orders:       [id number]  Left radiologic examination, foot; complete, minimum of three views  (Send-Out)              Lab Orders:       [id number]  DIAB2 - [facility] CMP A1C LIPID AND MICRO ALBUM CR RATIO: [id number]  (Send-Out)              Other Orders:         Depression screen negative  (In-House)            2028F  Foot examination performed (includes examination through visual inspection, sensory exam with monofilament, and pulse exam - report when any of the three components are completed) (DM)4  (In-House)                      Plan:         Encounter for screening for depression    MIPS PHQ-9 Depression Screening: Completed form scanned and in chart; Total Score 3; Negative Depression Screen           Orders:         Depression screen negative  (In-House)              Type 2 diabetes mellitus with diabetic neuropathy, unspecified    LABORATORY:  Labs ordered to be performed today include Diabetes Panel 2;CMP, A1C, Lipid, Microalbumin:Creatinine Ratio.            Prescriptions:       [Refilled] pioglitazone 45 mg oral tablet [Take 1 tablet(s) by mouth daily], #90 (ninety) tablets, Refills: 0 (zero)       [Refilled] metFORMIN 500 mg oral tablet [TAKE TWO TABLETS BY MOUTH TWICE A DAY], #360 (three hundred and sixty) tablets, Refills: 0 (zero)       [Refilled] glimepiride 2 mg oral tablet [TAKE 1 AND 1/2 TABLET BY MOUTH ONCE DAILY], #135 (one hundred and thirty five) tablets, Refills: 0 (zero)       [Refilled] lisinopriL 10 mg oral tablet [TAKE ONE TABLET BY MOUTH EVERY MORNING], #90 (ninety) tablets, Refills: 1 (one)       [Refilled] Ozempic 1 mg/dose (2 mg/1.5 mL) subcutaneous Pen Injector [1 injection SC qweek], #4 (four) each, Refills: 3 (three)           Orders:       [id number]  DIAB2 - [facility] CMP A1C LIPID AND MICRO ALBUM CR RATIO: [id number]  (Send-Out)              Mixed hyperlipidemia          Prescriptions:       [Refilled] atorvastatin 80 mg oral tablet [TAKE ONE TABLET BY  MOUTH EVERY NIGHT AT BEDTIME], #90 (ninety) tablets, Refills: 1 (one)         Localized swelling, mass and lump, left lower limbIF swelling persists or worsens, will come in and get xray         RADIOLOGY:  I have ordered a left foot x-ray to be done today.            Orders:       [id number]  Left radiologic examination, foot; complete, minimum of three views  (Send-Out)                  Other Prescriptions:       [Refilled] cetirizine 5 mg oral tablet [Take 1 tablet(s) by mouth daily], #90 (ninety) tablets, Refills: 1 (one)       [Refilled] hydroCHLOROthiazide 12.5 mg oral tablet [1 po daily], #90 (ninety) tablets, Refills: 1 (one)       [Refilled] traZODone 50 mg oral tablet [TAKE ONE-HALF TO ONE TABLET BY MOUTH EVERY NIGHT AT BEDTIME], #30 (thirty) tablets, Refills: 2 (two)         Other Orders      2028F  Foot examination performed (includes examination through visual inspection, sensory exam with monofilament, and pulse exam - report when any of the three components are completed) (DM)4  (In-House)              Charge Capture:         Primary Diagnosis:     Z13.31  Encounter for screening for depression           Orders:      [id number]  Office/outpatient visit; established patient, level 4  (In-House)              Depression screen negative  (In-House)              E11.40  Type 2 diabetes mellitus with diabetic neuropathy, unspecified     E78.2  Mixed hyperlipidemia     R22.42  Localized swelling, mass and lump, left lower limb         Other Orders:       2028F  Foot examination performed (includes examination through visual inspection, sensory exam with monofilament, and pulse exam - report when any of the three components are completed) (DM)4  (In-House)

## 2021-05-18 NOTE — PROGRESS NOTES
Fabio Juárez 1965     Office/Outpatient Visit    Visit Date: Mon, Apr 29, 2019 03:59 pm    Provider: Kraley Amor N.P. (Assistant: Sarah Spurling, MA)    Location: [facility]        Electronically signed by Karley Amor N.P. on  04/30/2019 09:37:49 AM                             SUBJECTIVE:        CC:     Mauri is a 54 year old White male.  Pt thinks his ozempic pen is making him sick. He said he has felt bad since last Wednesday.          HPI:         Complaint of upper respiratory infection..  The symptom began 1 week ago.  The severity is of moderate intensity.  He estimates that the frequency of this symptom is seasonal.  Associated symptoms include abdominal pain, diarrhea, headache, nausea, sore throat and bialteral ear pain, sore throat, sinus congestion.  He denies fever.  typically takes fluticasone, but has not been taking;  has not been taking the cetirazine (possibily)  He is also concerned that some of these symptoms may be due to the ozempic that he recently started on.  He has only taken one dose and started feeling sick 1-2 days after taking     ROS:     CONSTITUTIONAL:  Positive for fatigue.   Negative for chills or fever.      E/N/T:  Positive for ear pain, nasal congestion, sinus pressure and sore throat.      CARDIOVASCULAR:  Negative for chest pain.      RESPIRATORY:  Negative for recent cough and dyspnea.      GASTROINTESTINAL:  Positive for abdominal pain, abdominal bloating, diarrhea and nausea.   Negative for vomiting.      NEUROLOGICAL:  Positive for headaches.      ALLERGIC/IMMUNOLOGIC:  Positive for perennial allergies.          PMH/FMH/SH:     Last Reviewed on 3/26/2019 12:13 PM by Karley Amor    Past Medical History:             PAST MEDICAL HISTORY         Sleep Apnea     hemochromatosis     Renal Stones: dx'd in 2017 - March;     Chronic Pain: affecting the low back;     Type 2 Diabetes         CURRENT MEDICAL PROVIDERS:    Cardiologist: Pancho  cait - Manages CAD, HTN, Mixed hyperlipidemia - Annual follow up    Dermatologist: Deshawn (PRN only)    E/N/T: Pardeep (Trach) - PRN -    Pulmonologist: Candie - PRN    Pain Management - Flaget (every 3 months and PRN)    [facility] -  Dr Lewis  - annual PRN  follow up([place]) Podiatrist Dr. Mc         PREVENTIVE HEALTH MAINTENANCE             COLORECTAL CANCER SCREENING: Up to date (colonoscopy q10y; sigmoidoscopy q5y; Cologuard q3y) was last done 3/2017, Results are in chart; colonoscopy with normal results     DENTAL CLEANING: does not go     EYE EXAM: was last done 2018 [facility]         PAST MEDICAL HISTORY         Positive for    Sleep Apnea,    tracheostomy and    lung nodule;     Positive for    Gastroesophageal Reflux Disease;     Positive for    Chronic Pain;     Positive for    Type 2 Diabetes: uncontrolled; ;          hemachromatosis     Positive for    Allergies;         Surgical History:         Cholecystectomy: ;     Joint Replacement: BOTH KNEES;;; Right knee       Appendectomy    Tonsillectomy/Adenoidectomy    tracheostomy secondary to sleep apnea;    right achilles tendon repair 10/2018 ([place]);     Procedures: heat cath 11-3-15, left ventriulogram, coronary angiogram     COLONOSCOPY: was last done 14 with normal results Dr Anderson         Family History:     Father: ;  Myocardial Infarction     Mother: Arrhythmia     Brother(s): 2 brother(s) total; 1 ;  Hypertension;  cerebral aneurysm  at 38;  Type 2 Diabetes     Positive for Coronary Artery Disease.      Positive for Type 2 Diabetes ( mother ).          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          Alcohol: Frequency: Socially          Substance Abuse History:     Last Reviewed on 3/26/2019 12:13 PM by Karley Amor    None         Mental Health History:     Last Reviewed on 3/26/2019 12:13 PM by Karley Amor    NEGATIVE         Communicable Diseases (eg STDs):     Last Reviewed on 3/26/2019 12:13 PM by Karley Amor    Reportable health conditions; NEGATIVE             Current Problems:     Last Reviewed on 3/26/2019 12:13 PM by Karley Amor    Hypertension     Mixed hyperlipidemia and hypertriglyceridemia     CAD     Other hemochromatosis     Low back pain     Peripheral neuropathy     Chronic tension type headache     Obstructive sleep apnea     Tracheostomy status     Testosterone deficiency     History of GERD     Type 2 diabetes     Upper respiratory infection     Superficial abrasion of upper arm     LLL abdominal pain     Insomnia     Allergic rhinitis, unspecified cause     Screening for depression     Foot pain         Immunizations:     Fluzone (3 + years dose) 1/8/2013     Fluzone (3 + years dose) 1/18/2017     Fluzone pf (3+ years dose) 1/9/2018     Fluzone Quadrivalent (3+ years) 11/6/2018     Pneomovax 8/8/2016     Tdap (Tetanus, reduced diph, acellular pertussis) 3/26/2019         Allergies:     Last Reviewed on 4/29/2019 04:05 PM by Spurling, Sarah C      No Known Drug Allergies.         Current Medications:     Last Reviewed on 4/29/2019 04:05 PM by Spurling, Sarah C    Metformin HCl 500mg Tablet Take 2 tablet(s) by mouth bid     Hydrochlorothiazide (HCTZ) 12.5mg Tablet 1 po daily     Baclofen 10mg Tablet 1/2 to 1 tablet at bedtime for low back pain/muscle spasms     Lisinopril 10mg Tablet 1 in am     Lipitor 80mg Tablet One PO Q HS     Glimepiride 2mg Tablet ONE AND A HALF TABLET DAILY     Mobic 15mg Tablet 1 tab daily     Pioglitazone HCl 45mg Tablet Take 1 tablet(s) by mouth daily     Cetirizine HCl 5mg Tablet Take 1 tablet(s) by mouth daily     Neurontin 100mg Capsules Take 2 capsule(s) by mouth tid      Omega-3 acid ethyl esters 1gm Capsules Take 1 capsule(s) by mouth bid     Trazodone HCl 50mg Tablet 1/2 -- 1 tablet at bedtime     Ozempic pen 1mg doses Injection 1 injection SC qweek     Fluticasone Propionate 50mcg/1actuation Nasal Spray 1 or 2 sprays in each nostril qday     OTC Probiotic with Vitamin C take one capsule once daily     Oxycodone HCl 5mg Capsules Take 1 capsule(s) by mouth q 6 hr prn for pain     Vitamin E 400IU Capsules 2 tab q day         OBJECTIVE:        Vitals:         Current: 4/29/2019 4:06:57 PM    Ht:  5 ft, 6 in;  Wt: 286.2 lbs;  BMI: 46.2    T: 97.9 F (oral);  BP: 115/69 mm Hg (left arm, sitting);  P: 96 bpm (left arm (BP Cuff), sitting);  sCr: 0.68 mg/dL;  GFR: 141.76        Exams:     PHYSICAL EXAM:     GENERAL: Vitals recorded well developed, well nourished;  well groomed;  no apparent distress;     E/N/T: EARS: external auditory canal edematous and erythematous;  the right TM is bulging and has fluid behind it and both TMs are pink;     NECK:  supple, full ROM; no thyromegaly; no carotid bruits;     RESPIRATORY: normal respiratory rate and pattern with no distress; normal breath sounds with no rales, rhonchi, wheezes or rubs; tracheostomy in place and plugged;     CARDIOVASCULAR: normal rate; rhythm is regular;  normal S1; normal S2; no systolic murmur; no cyanosis; no edema;     GASTROINTESTINAL: nontender, nondistended; no hepatosplenomegaly or masses; no bruits; nontender; hyperactive bowel sounds;     LYMPHATIC: no enlargement of cervical or facial nodes; no supraclavicular nodes;     MUSCULOSKELETAL:  Normal range of motion, strength and tone;     NEUROLOGICAL:  cranial nerves, motor and sensory function, reflexes, gait and coordination are all intact;     PSYCHIATRIC:  appropriate affect and demeanor; normal speech pattern; grossly normal memory;         ASSESSMENT:           465.9   J01.00  Upper respiratory infection              DDx:     250.00   E11.40  Type 2 diabetes               DDx:         ORDERS:         Meds Prescribed:       Cefdinir 300mg Capsules 1 bid for 10 days  #20 (Twenty) capsule(s) Refills: 0       Fluticasone Propionate 50mcg/1actuation Nasal Spray 1 spray each nostil daily  #1 (One) gm Refills: 2                 PLAN:          Upper respiratory infection treating with antibiotic due to respiratory status and trach.  Resume allergy medication over the next 2-4 weeks           Prescriptions:       Cefdinir 300mg Capsules 1 bid for 10 days  #20 (Twenty) capsule(s) Refills: 0       Fluticasone Propionate 50mcg/1actuation Nasal Spray 1 spray each nostil daily  #1 (One) gm Refills: 2          Type 2 diabetes We will have him continue the Ozempic for 1-2 more doses, this may improve after he has been on it for a bit of time.  However, he does not wish to continue if this does not get better after the next dose             CHARGE CAPTURE:           Primary Diagnosis:     465.9 Upper respiratory infection            J01.00    Acute maxillary sinusitis, unspecified              Orders:          [id number]   Office/outpatient visit; established patient, level 3  (In-House)           250.00 Type 2 diabetes            E11.40    Type 2 diabetes mellitus with diabetic neuropathy, unspecified

## 2021-05-18 NOTE — PROGRESS NOTES
Fabio JuárezJustin 1965     Office/Outpatient Visit    Visit Date: Tue, Mar 26, 2019 11:58 am    Provider: Karley Amor N.P. (Assistant: Sarah Spurling, MA)    Location: [facility]        Electronically signed by Karley Amor N.P. on  03/28/2019 05:31:27 PM                             SUBJECTIVE:        CC:     Mauri is a 54 year old White male.  Prevenative Exam.          HPI:         Patient complains of health checkup.  His last physical exam was 1 year ago.  Depression screening is positive for stressed (custody of nieces and nephews vuong).      Smoking Status:  Nonsmoker         PHQ-9 Depression Screening: Completed form scanned and in chart; Total Score 5 Alcohol Consumption Screening: Completed form scanned and in chart; Total Score 1     Regarding Peripheral neuropathy, taking gabapentin  - managed by Pain management     chronic back pain managed by [facility] 3 month appts         With regard to the type 2 diabetes, compliance with treatment has been good.  He specifically denies associated symptoms, including nocturia, polydipsia and polyuria.  He reports home blood glucose readings have been excellent, with average fasting glucoses running <120 mg/dL. He checks his glucose 1 to 2 times daily.  In regard to preventative care, his last ophthalmology exam was in 5/2018 - due in May.      Physical Activity: ** Exercises infrequently; feels shaky at 80-90 likes the ozempic - has lost some weight since starting         With regard to the mixed hyperlipidemia and hypertriglyceridemia, current treatment includes a lipid lowering agent.  Compliance with treatment has been good.  He specifically denies associated symptoms, including muscle pain and weakness.  Most recent lab tests include Total Cholesterol:  137 (mg/dL) (09/05/2018), HDL:  45 (mg/dL) (09/05/2018), Triglycerides:  191 (mg/dL) (09/05/2018), LDL:  54 (mg/dL) (09/05/2018), VLDL Cholesterol:  38 (mg/dl)  (09/05/2018), CHOL/HDLC RATIO:  3.0 (NA) (09/05/2018).  Concurrent health problems include diabetes and hypertension.      seasonal - currently taking cetirazine and fluticasone PRN     regarding insomnia - taking trazodone 1-2 tabs at HS     On HCTZ and lisinopril for HTN - managed by Sonu in cardiology  - annual follow up next appt due         Complaint of testosterone deficiency..  previous diagnosis of low testosterone - reports that he does have ED - does feel fatigued most days - he is unsure when his levels were last checked and does not remember being on medication  - would like to have this checked again     tension HA stable - no problems with this at this time     His trach is intended to help with this - he does not use a cpap at night but states that he is doing well     Trach status - plugged much of the day - [place] PRN - Pulmonary recommended removal due to risk of infection - he declines  - aware of risks and reports cares for trach as recommended - no complaints today         Regarding foot pain - did go to podiatry - had surgery -but does not feel like this helped at all - he is having continued pain and discussed this with PT when was there for his shoulder and would like to see if PT would be able to help         Regarding groin pain - his left groin has been bothering him for some time.  He reports that the pain is intermittent, does not know if it is in relation to heavy lifting, although he does do a lot of heavy lifting at work.  This has been going on for about 3 months.      ROS:     CONSTITUTIONAL:  Negative for chills, fatigue and fever.      E/N/T:  Negative for ear pain, diminished hearing, hoarseness and sore tongue.      CARDIOVASCULAR:  Negative for chest pain and pedal edema.      RESPIRATORY:  Negative for recent cough, dyspnea and frequent wheezing.      GASTROINTESTINAL:  Positive for left groin pain (3-4 month).   Negative for abdominal pain, constipation, diarrhea,  heartburn, nausea or vomiting.      GENITOURINARY:  Negative for dysuria, nocturia, urinary incontinence and change in urine stream.      MUSCULOSKELETAL:  Positive for limb pain ( left foot pain ).   Negative for arthralgias, joint stiffness or myalgias.      INTEGUMENTARY:  Negative for pruritis and rash.      NEUROLOGICAL:  Negative for dizziness, fainting, headaches and paresthesias.      ENDOCRINE:  Negative for hair loss, polydipsia and polyphagia.      ALLERGIC/IMMUNOLOGIC:  Negative for seasonal allergies.      PSYCHIATRIC:  Negative for anxiety, depression, sleep disturbance and suicidal thoughts.          PMH/FMH/SH:     Last Reviewed on 3/26/2019 12:13 PM by Karley Amor    Past Medical History:             PAST MEDICAL HISTORY         Sleep Apnea     hemochromatosis     Renal Stones: dx'd in 2017 - March;     Chronic Pain: affecting the low back;     Type 2 Diabetes         CURRENT MEDICAL PROVIDERS:    Cardiologist: Pancho chavira - Manages CAD, HTN, Mixed hyperlipidemia - Annual follow up    Dermatologist: Deshawn (PRN only)    E/N/T: Pardeep (Trach) - PRN -    Pulmonologist: Candie - PRN    [facility] - [facility] (every 3 months and PRN)    [facility] -  Dr Lewis  - annual PRN  follow up([facility]) Podiatrist Dr. Mc         PREVENTIVE HEALTH MAINTENANCE             COLORECTAL CANCER SCREENING: Up to date (colonoscopy q10y; sigmoidoscopy q5y; Cologuard q3y) was last done 3/2017, Results are in chart; colonoscopy with normal results     DENTAL CLEANING: does not go     EYE EXAM: was last done 5/18/2018 [facility] and [facility]         PAST MEDICAL HISTORY         Positive for    Sleep Apnea,    tracheostomy and    lung nodule;     Positive for    Gastroesophageal Reflux Disease;     Positive for    Chronic Pain;     Positive for    Type 2 Diabetes: uncontrolled; ;          hemachromatosis     Positive for    Allergies;         Surgical History:         Cholecystectomy: 7-2013;      Joint Replacement: BOTH KNEES;;; Right knee       Appendectomy    Tonsillectomy/Adenoidectomy    tracheostomy secondary to sleep apnea;    right achilles tendon repair 10/2018 ([facility]);     Procedures: heat cath 11-3-15, left ventriulogram, coronary angiogram     COLONOSCOPY: was last done 14 with normal results Dr Anderson         Family History:     Father: ;  Myocardial Infarction     Mother: Arrhythmia     Brother(s): 2 brother(s) total; 1 ;  Hypertension;  cerebral aneurysm  at 38;  Type 2 Diabetes     Positive for Coronary Artery Disease.      Positive for Type 2 Diabetes ( mother ).          Social History:     Occupation: [employer] / [employer]     Marital Status:      Children: 2 children and 1 step-child         Tobacco/Alcohol/Supplements:     Last Reviewed on 3/26/2019 12:13 PM by Karley Amor    Tobacco: He has never smoked.          Alcohol: Frequency: Socially         Substance Abuse History:     Last Reviewed on 3/26/2019 12:13 PM by Karley Amor    None         Mental Health History:     Last Reviewed on 3/26/2019 12:13 PM by Karley Amor    NEGATIVE         Communicable Diseases (eg STDs):     Last Reviewed on 3/26/2019 12:13 PM by Karley Amor    Reportable health conditions; NEGATIVE             Current Problems:     Last Reviewed on 3/26/2019 12:13 PM by Karley Amor    Hypertension     Mixed hyperlipidemia and hypertriglyceridemia     CAD     Other hemochromatosis     Low back pain     Peripheral neuropathy     Chronic tension type headache     Obstructive sleep apnea     Tracheostomy status     Testosterone deficiency     History of GERD     Type 2 diabetes     Superficial abrasion of upper arm     LLL abdominal pain     Insomnia     Allergic rhinitis, unspecified cause     Screening for depression     Shoulder pain     Arm pain     Foot pain         Immunizations:     Fluzone (3 + years dose) 2013     Fluzone  (3 + years dose) 1/18/2017     Fluzone pf (3+ years dose) 1/9/2018     Fluzone Quadrivalent (3+ years) 11/6/2018     Pneomovax 8/8/2016     Tdap (Tetanus, reduced diph, acellular pertussis) 3/26/2019         Allergies:     Last Reviewed on 3/26/2019 12:13 PM by Karley Amor      No Known Drug Allergies.         Current Medications:     Last Reviewed on 3/26/2019 12:13 PM by Karley Amor    Metformin HCl 500mg Tablet Take 2 tablet(s) by mouth bid     Hydrochlorothiazide (HCTZ) 12.5mg Tablet 1 po daily     Baclofen 10mg Tablet 1/2 to 1 tablet at bedtime for low back pain/muscle spasms     Lisinopril 10mg Tablet 1 in am     Lipitor 80mg Tablet One PO Q HS     Glimepiride 2mg Tablet ONE AND A HALF TABLET DAILY     Mobic 15mg Tablet 1 tab daily     Pioglitazone HCl 45mg Tablet Take 1 tablet(s) by mouth daily     Cetirizine HCl 5mg Tablet Take 1 tablet(s) by mouth daily     Neurontin 100mg Capsules Take 2 capsule(s) by mouth tid     Trazodone HCl 50mg Tablet 1/2 -- 1 tablet at bedtime     Ozempic pen 1mg doses Injection 1 injection SC qweek     Fluticasone Propionate 50mcg/1actuation Nasal Spray 1 or 2 sprays in each nostril qday     OTC Probiotic with Vitamin C take one capsule once daily     Oxycodone HCl 5mg Capsules Take 1 capsule(s) by mouth q 6 hr prn for pain     Vitamin E 400IU Capsules 2 tab q day         OBJECTIVE:        Vitals:         Current: 3/26/2019 12:08:33 PM    Ht:  5 ft, 6 in;  Wt: 292.4 lbs;  BMI: 47.2    T: 97.5 F (oral);  BP: 125/77 mm Hg (left arm, sitting);  P: 94 bpm (left arm (BP Cuff), sitting);  sCr: 0.74 mg/dL;  GFR: 131.46        Exams:     PHYSICAL EXAM:     GENERAL: Vitals recorded well developed, well nourished;  no apparent distress;     EYES: PERRL, EOMI     E/N/T: EARS: external auditory canal normal bilaterally;  bilateral TMs are normal;  NOSE:  normal nasal mucosa, septum, turbinates, and sinuses; OROPHARYNX:  normal mucosa, dentition, gingiva, and posterior pharynx;      NECK: range of motion is normal;     RESPIRATORY: normal appearance and symmetric expansion of chest wall; normal respiratory rate and pattern with no distress; normal breath sounds with no rales, rhonchi, wheezes or rubs; capped trach present  - site clean and dry without drainage or odor;     CARDIOVASCULAR: normal rate; rhythm is regular;  no edema;     GASTROINTESTINAL: nontender; normal bowel sounds; left inguinal hernia present hernia present;     LYMPHATIC: no enlargement of cervical or facial nodes; no supraclavicular nodes;     SKIN:  Normal;     MUSCULOSKELETAL: gait: affected by a limp;  normal range of motion of all major muscle groups; no limb or joint pain with range of motion;     NEUROLOGIC: mental status: alert and oriented x 3;     PSYCHIATRIC: appropriate affect and demeanor; normal speech pattern; normal thought and perception;         Procedures:     Superficial abrasion of upper arm     1. Tdap: 0.5 ml unit dose given IM in the right upper arm; administered by mnp;  lot number [id number]; expires 6/1/21             ASSESSMENT           V70.0   Z00.00  Health checkup              DDx:     V79.0   Z13.89  Screening for depression              DDx:     356.9   E08.40  Peripheral neuropathy              DDx:     724.2   M54.5  Low back pain              DDx:     250.00   E11.40  Type 2 diabetes              DDx:     272.2   E78.2  Mixed hyperlipidemia and hypertriglyceridemia              DDx:     477.9   J30.0   J30.9  Allergic rhinitis, unspecified cause              DDx:     780.52   G47.00  Insomnia              DDx:     401.1   I10  Hypertension              DDx:     257.2   E29.8  Testosterone deficiency              DDx:     339.12   G44.221   G44.229  Chronic tension type headache              DDx:     327.23   G47.33  Obstructive sleep apnea              DDx:     V44.0   Z93.0  Tracheostomy status              DDx:     789.04   R10.32  LLL abdominal pain              DDx:     912.0   S50.811A   Superficial abrasion of upper arm              DDx:     729.5   M79.671  Foot pain              DDx:         ORDERS:         Radiology/Test Orders:       3017F  Colorectal CA screen results documented and reviewed (PV)  (In-House)         2022F  Dilated retinal eye exam w/interpret by ophthalmologist/optometrist documented/reviewed (DM)4  (In-House)           Lab Orders:       [id number]  TFTSG - [facility] Testosterone, free and Total weakly bound  (Send-Out)         07463  PHYSF - [facility] PHYSICAL: CMP, CBC, TSH, LIPID: [id number], [id number], [id number], [id number]  (Send-Out)         98357  A1CEG - [facility] Hemoglobin A1C  (Send-Out)         43326  CRISTINA - [facility] Microablbumin, quantitative  (Send-Out)           Procedures Ordered:       83704  Tdap vaccine, when administered to individuals 7 years or older, for intramuscular use  (In-House)         REFER  Referral to Specialist or Other Facility  (Send-Out)         57937  Immunization administration; one vaccine  (In-House)         REFER  Referral to Specialist or Other Facility  (Send-Out)           Other Orders:       68689  Computed tomography, abdomen and pelvis; without contrast material  (Send-Out)         1101F  Pt screen for fall risk; document no falls in past year or only 1 fall w/o injury in past year (SELVIN)  (In-House)           Negative EtOH screen  (In-House)           Calculated BMI above the upper parameter and a follow-up plan was documented in the medical record  (In-House)           Depression screen negative  (In-House)                   PLAN:          Health checkup     LABORATORY:  Labs ordered to be performed today include PHYSICAL PANEL; CMP, CBC, TSH, LIPID.  CASSIDY Has had no falls in the past year Vaccines Flu and Pneumonia updated in Shot record    DIABETIC EYE EXAM: Diabetic Eye Exam during this calendar year and results are in chart Negative alcohol screen     COLORECTAL CANCER SCREENING: Results are in chart     BMI Elevated - Follow-Up Plan: He was provided  education on weight loss strategies           Orders:       [id number]  [facility] PHYSICAL: CMP, CBC, TSH, LIPID: [id number], [id number], [id number], 11892  (Send-Out)         1101F  Pt screen for fall risk; document no falls in past year or only 1 fall w/o injury in past year (SELVIN)  (In-House)           Negative EtOH screen  (In-House)         3017F  Colorectal CA screen results documented and reviewed (PV)  (In-House)           Calculated BMI above the upper parameter and a follow-up plan was documented in the medical record  (In-House)         2022F  Dilated retinal eye exam w/interpret by ophthalmologist/optometrist documented/reviewed (DM)4  (In-House)            Screening for depression     MIPS Positive Depression Screen but after further evaluation the patient does not have a diagnosis of depression.            Orders:         Depression screen negative  (In-House)            Peripheral neuropathy continue gabapentin per pain management          Low back pain continue current treatment with pain management          Type 2 diabetes Mauri will let us know when he needs refills on his medication  - doing well on Ozempic - losing weight and blood sugar seems to be better controlled     LABORATORY:  Labs ordered to be performed today include HgbA1C and urine microalbumin.            Orders:       34597  A1C - [facility] Hemoglobin A1C  (Send-Out)         38987  [facility] Microablbumin, quantitative  (Send-Out)            Mixed hyperlipidemia and hypertriglyceridemia continue medication  - will let us know when he needs refills          Allergic rhinitis, unspecified cause continue cetirazine / fluticasone PRN and daily during season changes          Insomnia trazodone working well - does not completely sleep through the night , but great improvement since starting          Hypertension continue appt with cardiology as recommended - medication fills when needed          Testosterone deficiency prior history of ED with  low testosterone - he does not remember if he was ever put on supplements - he would like a referral if his levels are low for recommendations - discussed ED could be medication related - labs pending     LABORATORY:  Labs ordered to be performed today include Testosterone free and total.      REFERRALS:  Referral initiated to a urologist ( for evaluation of ED ).            Orders:       64818  TFTSG - [facility] Testosterone, free and Total weakly bound  (Send-Out)         REFER  Referral to Specialist or Other Facility  (Send-Out)            Chronic tension type headache no current concerns - will have him follow up PRN          Obstructive sleep apnea do recommend that he use a CPAP as recommended          Tracheostomy status Mauri states that his trach does get an odor on occasion when he develops a cough.  He did have a previous infection  - Instructed Mauri to follow up if the odor develops we will need to culture again          LLL abdominal pain         FOLLOW-UP TESTING #1:    RADIOLOGY:  I have ordered Test to be ordered CT of abdomen and pelvis w/o contrast to be done today.            Orders:       28764  Computed tomography, abdomen and pelvis; without contrast material  (Send-Out)            Superficial abrasion of upper arm         IMMUNIZATIONS given today: TDAP.            Orders:       38422  Tdap vaccine, when administered to individuals 7 years or older, for intramuscular use  (In-House)         16993  Immunization administration; one vaccine  (In-House)            Foot pain         REFERRALS:  Referral initiated to physical therapy ( [facility] ).            Orders:       REFER  Referral to Specialist or Other Facility  (Send-Out)               CHARGE CAPTURE           **Please note: ICD descriptions below are intended for billing purposes only and may not represent clinical diagnoses**        Primary Diagnosis:         V70.0 Health checkup            Z00.00    Encounter  for general adult medical examination without abnormal findings              Orders:          54682   Preventive medicine, established patient, age 40-64 years  (In-House)             1101F   Pt screen for fall risk; document no falls in past year or only 1 fall w/o injury in past year (SELVIN)  (In-House)                Negative EtOH screen  (In-House)             3017F   Colorectal CA screen results documented and reviewed (PV)  (In-House)                Calculated BMI above the upper parameter and a follow-up plan was documented in the medical record  (In-House)             2022F   Dilated retinal eye exam w/interpret by ophthalmologist/optometrist documented/reviewed (DM)4  (In-House)           V79.0 Screening for depression            Z13.89    Encounter for screening for other disorder              Orders:          04805 -25  Office/outpatient visit; established patient, level 4  (In-House)                Depression screen negative  (In-House)           356.9 Peripheral neuropathy            E08.40    Diabetes mellitus due to underlying condition with diabetic neuropathy, unspecified    724.2 Low back pain            M54.5    Low back pain    250.00 Type 2 diabetes            E11.40    Type 2 diabetes mellitus with diabetic neuropathy, unspecified    272.2 Mixed hyperlipidemia and hypertriglyceridemia            E78.2    Mixed hyperlipidemia    477.9 Allergic rhinitis, unspecified cause            J30.0    Vasomotor rhinitis           J30.9    Allergic rhinitis, unspecified    780.52 Insomnia            G47.00    Insomnia, unspecified    401.1 Hypertension            I10    Essential (primary) hypertension    257.2 Testosterone deficiency            E29.8    Other testicular dysfunction    339.12 Chronic tension type headache            G44.221    Chronic tension-type headache, intractable           G44.229    Chronic tension-type headache, not intractable    327.23 Obstructive sleep apnea             G47.33    Obstructive sleep apnea (adult) (pediatric)    V44.0 Tracheostomy status            Z93.0    Tracheostomy status    789.04 LLL abdominal pain            R10.32    Left lower quadrant pain    912.0 Superficial abrasion of upper arm            S50.811A    Abrasion of right forearm, initial encounter              Orders:          19348   Tdap vaccine, when administered to individuals 7 years or older, for intramuscular use  (In-House)             25924   Immunization administration; one vaccine  (In-House)           729.5 Foot pain            M79.671    Pain in right foot        ADDENDUMS:      ____________________________________    Addendum: 04/18/2019 10:29 AM - Mariaa Santana         Visit Note Faxed to:        Pati Lorenz  (Urology); Number [phone]

## 2021-05-18 NOTE — PROGRESS NOTES
Fabio Juárez 1965     Office/Outpatient Visit    Visit Date: Mon, Apr 16, 2018 10:53 am    Provider: Karley Amor N.P. (Assistant: Terri Alonzo MA)    Location: [facility]        Electronically signed by Karley Amor N.P. on  04/19/2018 11:11:39 PM                             SUBJECTIVE:        CC:     Mauri is a 53 year old White male.  fluid retention         HPI:         Patient complains of edema.  This has been present for one week.  The swelling has primarily involved the lower extremities.  The degree of edema has been getting worse recently.  He denies associated calf pain, dyspnea, extremity pain, lower extremity skin changes, orthopnea and prominent veins.  Pertinent current medications include NSAIDs.  MOBIC Medical history is pertinent for diabetes.  History is negative for congestive heart failure, history of DVT or renal failure.  has been walking in a walking boot for right foot pain         regarding urinary retention, having some problems feeling like not emptying bladder - feels as if has to go - sometimes urgency - no history of prostate problems     ROS:     CONSTITUTIONAL:  Negative for chills, fatigue and fever.      CARDIOVASCULAR:  Positive for pedal edema ( mild ).   Negative for chest pain or orthopnea.      RESPIRATORY:  Negative for recent cough and dyspnea.      GENITOURINARY:  Positive for urinary retention.      MUSCULOSKELETAL:  Positive for limb pain ( both feet - right worse than left ).      NEUROLOGICAL:  Negative for dizziness, fainting, headaches and paresthesias.          PMH/FMH/SH:     Last Reviewed on 3/29/2018 12:43 PM by Karley Amor    Past Medical History:             PAST MEDICAL HISTORY         Sleep Apnea     hemochromatosis     Renal Stones: dx'd in 2017 - March;     Chronic Pain: affecting the low back;     Type 2 Diabetes         CURRENT MEDICAL PROVIDERS:    Cardiologist: Pancho chavira - Manages CAD, HTN, Mixed  hyperlipidemia - Annual follow up    Dermatologist: Deshawn (PRN only)    E/N/T: Pardeep (Trach) - PRN -    Pulmonologist: Melissa (annually & PRN)    Pain Management - Flaget (every 3 months and PRN)    [facility] -  Dr Lewis  - annual follow up Podiatrist Dr. Hubbard (PRN)         PREVENTIVE HEALTH MAINTENANCE             COLORECTAL CANCER SCREENING: Up to date (colonoscopy q10y; sigmoidoscopy q5y; Cologuard q3y) was last done 3/2017, Results are in chart; colonoscopy with normal results     DENTAL CLEANING: does not go     EYE EXAM: was last done  [place]         PAST MEDICAL HISTORY         Positive for    Sleep Apnea,    tracheostomy and    lung nodule;     Positive for    Gastroesophageal Reflux Disease;     Positive for    Chronic Pain;     Positive for    Type 2 Diabetes: uncontrolled; ;          hemachromatosis     Positive for    Allergies;         Surgical History:         Cholecystectomy: ;     Joint Replacement: BOTH KNEES;;; Right knee -      Appendectomy    Tonsillectomy/Adenoidectomy    tracheostomy secondary to sleep apnea;     Procedures: heat cath 11-3-15, left ventriulogram, coronary angiogram     COLONOSCOPY: was last done 14 with normal results Dr Anderson         Family History:     Father: ;  Myocardial Infarction     Mother: Arrhythmia     Brother(s): 2 brother(s) total; 1 ;  Hypertension;  cerebral aneurysm  at 38;  Type 2 Diabetes     Positive for Coronary Artery Disease.      Positive for Type 2 Diabetes ( mother ).          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          Alcohol: Frequency: Socially         Substance Abuse History:     Last Reviewed on 3/29/2018 12:43 PM by Karley Amor  KRYSTEN    None         Mental Health History:     Last Reviewed on 3/29/2018 12:43 PM by Karley Amor    NEGATIVE         Communicable Diseases (eg STDs):     Last Reviewed on 3/29/2018 12:43 PM by Karley Amor    Reportable health conditions; NEGATIVE             Current Problems:     Last Reviewed on 3/29/2018 12:43 PM by Karley Amor    Mixed hyperlipidemia and hypertriglyceridemia     CAD     Lung nodule     Other hemochromatosis     Low back pain     Peripheral neuropathy     Chronic tension type headache     Obstructive sleep apnea     Tracheostomy status     Testosterone deficiency     History of GERD     Type 2 diabetes     Urinary retention, other specified retention     Edema     Allergic rhinitis, unspecified cause     Plantar fasciitis     Foot pain         Immunizations:     Fluzone (3 + years dose) 1/8/2013     Fluzone (3 + years dose) 1/18/2017     Fluzone pf (3+ years dose) 1/9/2018     Pneomovax 8/8/2016         Allergies:     Last Reviewed on 4/16/2018 10:56 AM by Terri Alonzo      No Known Drug Allergies.         Current Medications:     Last Reviewed on 3/29/2018 12:43 PM by Karley Amor    Lipitor 80mg Tablet One PO Q HS     Baclofen 10mg Tablet 1/2 to 1 tablet at bedtime for low back pain/muscle spasms     Metformin HCl 500mg Tablet Take 2 tablet(s) by mouth bid     Cetirizine HCl 5mg Tablet Take 1 tablet(s) by mouth daily     Pioglitazone HCl 45mg Tablet Take 1 tablet(s) by mouth daily     Mobic 15mg Tablet 1 tab daily     Neurontin 100mg Capsules Take 2 capsule(s) by mouth tid     Glimepiride 2mg Tablet one every am     Bydureon 2mg/1vial Injection Suspension, Extended-Release Inject 2 mg subcutaneously q week     Gabapentin 300mg Capsules 1 cap TID     Lisinopril 10mg Tablet 1 in am     Oxycodone/Acetaminophen  5mg/300mg Tablet Take 1 tablet(s) by mouth q6h prn for pain     Vitamin E 400IU Capsules 2 tab q day     <none>     Cipro 500mg Tablet Take 1 tablet(s) by mouth  q12h for 14 days         OBJECTIVE:        Vitals:         Current: 4/16/2018 10:55:31 AM    Ht:  5 ft, 6 in;  Wt: 193.8 lbs;  BMI: 31.3    T: 97.2 F (oral);  BP: 124/78 mm Hg (left arm, sitting);  P: 79 bpm (left arm (BP Cuff), sitting);  sCr: 0.68 mg/dL;  GFR: 121.46        Exams:     PHYSICAL EXAM:     GENERAL: Vitals recorded well developed, well nourished;  no apparent distress;     NECK: range of motion is normal;     RESPIRATORY: normal appearance and symmetric expansion of chest wall; normal respiratory rate and pattern with no distress; normal breath sounds with no rales, rhonchi, wheezes or rubs; trach in place - capped/ uncovered     CARDIOVASCULAR: normal rate; rhythm is regular;  trace pedal edema;     GASTROINTESTINAL: nontender; normal bowel sounds; no organomegaly;     LYMPHATIC: no enlargement of cervical or facial nodes; no supraclavicular nodes;     MUSCULOSKELETAL: normal gait; normal range of motion of all major muscle groups; no limb or joint pain with range of motion;     NEUROLOGIC: mental status: alert and oriented x 3;     PSYCHIATRIC: appropriate affect and demeanor; normal speech pattern; normal thought and perception;         ASSESSMENT           782.3   R60.0  Edema              DDx:     250.00   E11.40  Type 2 diabetes              DDx:     272.2   E78.2  Mixed hyperlipidemia and hypertriglyceridemia              DDx:     788.29   R33.8  Urinary retention, other specified retention              DDx:         ORDERS:         Meds Prescribed:       Furosemide 40mg Tablets 1 by mouth daily  #5 (Five) tablet(s) Refills: 0       Refill of: Metformin HCl 500mg Tablet Take 2 tablet(s) by mouth bid  #60 (Sixty) tablet(s) Refills: 2       Refill of: Pioglitazone HCl 45mg Tablet Take 1 tablet(s) by mouth daily  #30 (Thirty) tablet(s) Refills: 2       Refill of: Glimepiride 2mg Tablet one every am  #30 (Thirty) tablet(s) Refills: 2       Refill of: Lipitor (Atorvastatin Calcium) 80mg Tablet One PO  Q HS  #90 (Ninety) tablet(s) Refills: 0         Lab Orders:       [id number]  [id number] [facility] Urinalysis complete, automated, with micro  (Send-Out)         [id number]  [id number] [facility] Prostate-Specific Ag, Serum  (Send-Out)         *  [facility]  ([facility])                   PLAN:          Edema         RECOMMENDATIONS given include: instructed to cut back on Mobic - may be from the use of NSAIDs, elevate feet - will given some diuretics to reduce swelling and monitor over time - recommend compression hose as well - cut back on sodium 2300mg.      FOLLOW-UP: Advised to call if there is no improvement 4 weeks.  .            Prescriptions:       Furosemide 40mg Tablets 1 by mouth daily  #5 (Five) tablet(s) Refills: 0          Type 2 diabetes needing refills on medications           Prescriptions:       Refill of: Metformin HCl 500mg Tablet Take 2 tablet(s) by mouth bid  #60 (Sixty) tablet(s) Refills: 2       Refill of: Pioglitazone HCl 45mg Tablet Take 1 tablet(s) by mouth daily  #30 (Thirty) tablet(s) Refills: 2       Refill of: Glimepiride 2mg Tablet one every am  #30 (Thirty) tablet(s) Refills: 2          Mixed hyperlipidemia and hypertriglyceridemia           Prescriptions:       Refill of: Lipitor (Atorvastatin Calcium) 80mg Tablet One PO Q HS  #90 (Ninety) tablet(s) Refills: 0          Urinary retention, other specified retention     LABORATORY:  Labs ordered to be performed today include urinalysis with micro.  LABORATORY:  Labs ordered to be performed today at [facility] include PSA.            Orders:       [id number]  [id number] [facility] Urinalysis complete, automated, with micro  (Send-Out)         [id number]  [id number] [facility] Prostate-Specific Ag, Serum  (Send-Out)         *  [facility] Medicare screening PSA  (Send-Out)               Patient Recommendations:        For  Edema:     I also recommend instructed to cut back on Mobic - may be from the use of NSAIDs, elevate feet - will given some diuretics to reduce  swelling and monitor over time - recommend compression hose as well - cut back on sodium 2300mg.  Follow-up by phone if no improvement in 4 weeks.                APPOINTMENT INFORMATION:        Monday Tuesday Wednesday Thursday Friday Saturday Sunday            Time:___________________AM  PM   Date:_____________________             CHARGE CAPTURE           **Please note: ICD descriptions below are intended for billing purposes only and may not represent clinical diagnoses**        Primary Diagnosis:         782.3 Edema            R60.0    Localized edema              Orders:          [id number]   Office/outpatient visit; established patient, level 4  (In-House)           250.00 Type 2 diabetes            E11.40    Type 2 diabetes mellitus with diabetic neuropathy, unspecified    272.2 Mixed hyperlipidemia and hypertriglyceridemia            E78.2    Mixed hyperlipidemia    788.29 Urinary retention, other specified retention            R33.8    Other retention of urine        ADDENDUMS:      ____________________________________    Date: 04/16/2018 02:29 PM    Author: Christina Yoo         Visit Note Faxed to:        Frank Schreiber (Pulmonary Critical Care Medicine); Number [phone]

## 2021-05-18 NOTE — PROGRESS NOTES
"Fabio Juárez. 1965     Office/Outpatient Visit    Visit Date: Fri, Sep 27, 2019 08:42 am    Provider: Karley Amor N.P. (Assistant: Stiven Judge)    Location: [facility]        Electronically signed by Karley Amor N.P. on  09/27/2019 01:53:00 PM                             SUBJECTIVE:        CC:     Mauri is a 54 year old White male.  presents today due to sore throat, diarrhea, vomiting, x3 days         HPI:         He complains of a sore throat.  This has been noted for the past 3 days.  The discomfort occurs constantly.  Associated symptoms include right earache, headache, nasal congestion, nausea, sinus pain and \"swollen glands\".  He denies fever.  He has not tried any medications for symptomatic relief.  Medical history is significant for allergies and has a trach.          Dx with diarrhea; this has been a problem for the past 3 days.  He estimates the stool frequency at 3 to 4 per day.  Stools are described as watery.  The quantity of stool is moderate with each bowel movement.  Associated symptoms include nausea and vomiting.  He denies associated fever.  he has not been able to hold much down over the past few days- his blood sugars are staying ok for now     ROS:     CONSTITUTIONAL:  Negative for chills, fatigue and fever.      E/N/T:  Positive for ear pain ( right ), nasal congestion and sore throat.      CARDIOVASCULAR:  Negative for chest pain, orthopnea, paroxysmal nocturnal dyspnea and pedal edema.      RESPIRATORY:  Negative for dyspnea and cough.      GASTROINTESTINAL:  Positive for diarrhea and vomiting.   Negative for abdominal pain or nausea.          [facility]/:     Last Reviewed on 7/08/2019 09:58 AM by Karley Amor    Past Medical History:             PAST MEDICAL HISTORY         Sleep Apnea     hemochromatosis     Renal Stones: dx'd in 2017 - March;     Chronic Pain: affecting the low back;     Type 2 Diabetes         CURRENT MEDICAL PROVIDERS:    " Cardiologist: Pancho chavira - Manages CAD, HTN, Mixed hyperlipidemia - Annual follow up    Dermatologist: Deshawn (PRN only)    E/N/T: Pardeep (Trach) - PRN -    Pulmonologist: Candie - PRN    Pain Management - Flaget (every 3 months and PRN)    [facility] -  Dr Lewis  - annual PRN  follow up([place]) Podiatrist Dr. Mc         PREVENTIVE HEALTH MAINTENANCE             COLORECTAL CANCER SCREENING: Up to date (colonoscopy q10y; sigmoidoscopy q5y; Cologuard q3y) was last done 3/2017, Results are in chart; colonoscopy with normal results     DENTAL CLEANING: does not go     EYE EXAM: was last done 2018 [place] and [facility]         PAST MEDICAL HISTORY         Positive for    Sleep Apnea,    tracheostomy and    lung nodule;     Positive for    Gastroesophageal Reflux Disease;     Positive for    Chronic Pain;     Positive for    Type 2 Diabetes: uncontrolled; ;          hemachromatosis     Positive for    Allergies;         Surgical History:         Cholecystectomy: ;     Joint Replacement: BOTH KNEES;;; Right knee       Appendectomy    Tonsillectomy/Adenoidectomy    tracheostomy secondary to sleep apnea;    right achilles tendon repair 10/2018 ([place]);     Procedures: heat cath 11-3-15, left ventriulogram, coronary angiogram     COLONOSCOPY: was last done 14 with normal results Dr Anderson         Family History:     Father: ;  Myocardial Infarction     Mother: Arrhythmia     Brother(s): 2 brother(s) total; 1 ;  Hypertension;  cerebral aneurysm  at 38;  Type 2 Diabetes     Positive for Coronary Artery Disease.      Positive for Type 2 Diabetes ( mother ).          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          Alcohol:  Frequency: Socially         Substance Abuse History:     Last Reviewed on 3/26/2019 12:13 PM by Karley Amor    None         Mental Health History:     Last Reviewed on 3/26/2019 12:13 PM by Karley Amor    NEGATIVE         Communicable Diseases (eg STDs):     Last Reviewed on 3/26/2019 12:13 PM by Karley Amor    Reportable health conditions; NEGATIVE             Current Problems:     Last Reviewed on 7/08/2019 09:58 AM by Karley Amor    Dizziness     Hypertension     Mixed hyperlipidemia and hypertriglyceridemia     CAD     Other hemochromatosis     Low back pain     Peripheral neuropathy     Chronic tension type headache     Obstructive sleep apnea     Tracheostomy status     Testosterone deficiency     History of GERD     Type 2 diabetes     Shoulder pain     Foot pain         Immunizations:     Fluzone (3 + years dose) 1/8/2013     Fluzone (3 + years dose) 1/18/2017     Fluzone pf (3+ years dose) 1/9/2018     Fluzone Quadrivalent (3+ years) 11/6/2018     Pneomovax 8/8/2016     Tdap (Tetanus, reduced diph, acellular pertussis) 3/26/2019         Allergies:     Last Reviewed on 7/08/2019 08:57 AM by Spurling, Sarah C      No Known Drug Allergies.         Current Medications:     Last Reviewed on 7/08/2019 09:00 AM by Spurling, Sarah C    Hydrochlorothiazide (HCTZ) 12.5mg Tablet 1 po daily     Cetirizine HCl 5mg Tablet Take 1 tablet(s) by mouth daily     Pioglitazone HCl 45mg Tablet Take 1 tablet(s) by mouth daily     Glimepiride 2mg Tablet ONE AND A HALF TABLET DAILY     Baclofen 10mg Tablet 1/2 to 1 tablet at bedtime for low back pain/muscle spasms     Metformin HCl 500mg Tablet Take 2 tablet(s) by mouth bid     Lisinopril 10mg Tablet 1 in am     Lipitor 80mg Tablet One PO Q HS     Mobic 15mg Tablet 1 tab daily     Neurontin 100mg Capsules Take 2 capsule(s) by mouth tid     Gabapentin 300mg Capsules 1 capsule po bid     Fluticasone Propionate 50mcg/1actuation Nasal Spray 1 or 2 sprays in each  nostril qday     OTC Probiotic with Vitamin C take one capsule once daily     Oxycodone HCl 5mg Capsules Take 1 capsule(s) by mouth q 6 hr prn for pain     Vitamin E 400IU Capsules 2 tab q day         OBJECTIVE:        Vitals:         Historical:     07/08/2019  BP:   117/73 mm Hg ( (right arm, , sitting, );)     07/08/2019  Wt:   268.4lbs        Current: 9/27/2019 8:50:05 AM    Ht:  5 ft, 6 in;  Wt: 252 lbs;  BMI: 40.7    T: 97.7 F (oral);  BP: 107/67 mm Hg (left arm, sitting);  P: 90 bpm (left arm (BP Cuff), sitting);  sCr: 0.68 mg/dL;  GFR: 134.30        Exams:     PHYSICAL EXAM:     GENERAL: Vitals recorded well developed, well nourished;  well groomed;  no apparent distress;     E/N/T: EARS: the left TM is dull, retracted, and yellow and the right TM is bulging and has fluid behind it;  OROPHARYNX: posterior pharynx shows erythema;     NECK:  supple, full ROM; no thyromegaly; no carotid bruits;     RESPIRATORY: normal respiratory rate and pattern with no distress; normal breath sounds with no rales, rhonchi, wheezes or rubs;     CARDIOVASCULAR: normal rate; rhythm is regular;  normal S1; normal S2; no systolic murmur; no cyanosis; no edema;     GASTROINTESTINAL: nontender, nondistended; no hepatosplenomegaly or masses; no bruits;     LYMPHATIC: no enlargement of cervical or facial nodes; no supraclavicular nodes;     MUSCULOSKELETAL:  Normal range of motion, strength and tone;     NEUROLOGICAL:  cranial nerves, motor and sensory function, reflexes, gait and coordination are all intact;     PSYCHIATRIC:  appropriate affect and demeanor; normal speech pattern; grossly normal memory;         Lab/Test Results:             Rapid Strep Screen:  Negative (09/27/2019),     Performed by::  lissy (09/27/2019),             ASSESSMENT:           462   J02.8  Sore throat              DDx:     462   J02.8  Sore throat              DDx:     787.91   R19.7  Diarrhea              DDx:     382.00   H66.002  L otitis media               DDx:     250.00   E11.40  Type 2 diabetes              DDx:         ORDERS:         Meds Prescribed:       Cefdinir 300mg Capsules 1 bid x 5 days  #10 (Ten) capsule(s) Refills: 0         Lab Orders:       [id number]  Group A Streptococcus detection by immunoassay with direct optical observation  (In-House)         [id number]  [facility] Throat culture, strep  (Send-Out)                   PLAN:          Sore throat           Orders:       [id number]  Group A Streptococcus detection by immunoassay with direct optical observation  (In-House)         [id number]  [facility] Throat culture, strep  (Send-Out)            Sore throat resume fluticasone daily;  change allergy medication to allegra over the next couple of weeks          Diarrhea BRAT diet, may last 3-5 days  anything beyond that, we should check a stool specimen;  If unable to stay probperly hydrated, to ER ; If unable to maintain blood sugar - will need to go to ER          L otitis media           Prescriptions:       Cefdinir 300mg Capsules 1 bid x 5 days  #10 (Ten) capsule(s) Refills: 0          Type 2 diabetes instructed to monitor his blood sugar with the vomiting and diarrhea - may need to adjust medictions or follow up if blood sugars become too low             CHARGE CAPTURE:           Primary Diagnosis:     462 Sore throat            J02.8    Acute pharyngitis due to other specified organisms              Orders:          [id number]   Office/outpatient visit; established patient, level 4  (In-House)             13134   Group A Streptococcus detection by immunoassay with direct optical observation  (In-House)           462 Sore throat            J02.8    Acute pharyngitis due to other specified organisms    787.91 Diarrhea            R19.7    Diarrhea, unspecified    382.00 L otitis media            H66.002    Acute suppurative otitis media without spontaneous rupture of ear drum, left ear    250.00 Type 2 diabetes            E11.40    Type 2 diabetes mellitus with  diabetic neuropathy, unspecified

## 2021-05-18 NOTE — PROGRESS NOTES
Fabio JuárezJustin 1965     Office/Outpatient Visit    Visit Date: Mon, Jan 7, 2019 10:54 am    Provider: Maddie Alvarez N.P. (Assistant: Stiven Judge)    Location: [facility]        Electronically signed by Maddie Alvarez N.P. on  01/07/2019 01:05:45 PM                             SUBJECTIVE:        CC:     Mauri is a 53 year old White male.  presents today due to nose stopped up, headache, sneezing alot, cough         HPI:         Mauri presents with upper respiratory illness.  These have been present for the past 4 weeks.  The symptoms include body aches, Chills, productive cough, ear pain,  frontal and maxillary sinus pain and pressure, a moderate amount of, yellowish/green sputum production and wheezing.  He reports recent exposure to illness from family members.  He has already tried to relieve the symptoms with Azithromycin on 12/1/18.  Medical history is significant for URI in .          Additionally, he presents with history of mixed hyperlipidemia and hypertriglyceridemia.  current treatment includes Lipitor.  Compliance with treatment has been good; he takes his medication as directed and follows up as directed.  Most recent lab tests include Total Cholesterol:  137 (mg/dL) (09/05/2018), HDL:  45 (mg/dL) (09/05/2018), Triglycerides:  191 (mg/dL) (09/05/2018), LDL:  54 (mg/dL) (09/05/2018).      ROS:     CONSTITUTIONAL:  Negative for fever and night sweats.      E/N/T:  Negative for hoarseness and sore throat.      CARDIOVASCULAR:  Negative for chest pain, dizziness and palpitations.      RESPIRATORY:  Negative for dyspnea and pleuritic chest pain.          PMH/FMH/SH:     Last Reviewed on 1/07/2019 11:04 AM by Maddie Alvarez    Past Medical History:             PAST MEDICAL HISTORY         Sleep Apnea     hemochromatosis     Renal Stones: dx'd in 2017 - March;     Chronic Pain: affecting the low back;     Type 2 Diabetes         CURRENT MEDICAL PROVIDERS:     Cardiologist: Pancho chavira - Manages CAD, HTN, Mixed hyperlipidemia - Annual follow up    Dermatologist: Deshawn (PRN only)    E/N/T: Pardeep (Trach) - PRN -    Pulmonologist: Candie - PRN    Pain Management - Flaget (every 3 months and PRN)    [facility] -  Dr Lewis  - annual follow up Podiatrist Dr. Hubbard (PRN)         PREVENTIVE HEALTH MAINTENANCE             COLORECTAL CANCER SCREENING: Up to date (colonoscopy q10y; sigmoidoscopy q5y; Cologuard q3y) was last done 3/2017, Results are in chart; colonoscopy with normal results     DENTAL CLEANING: does not go     EYE EXAM: was last done 2018 [facility]         PAST MEDICAL HISTORY         Positive for    Sleep Apnea,    tracheostomy and    lung nodule;     Positive for    Gastroesophageal Reflux Disease;     Positive for    Chronic Pain;     Positive for    Type 2 Diabetes: uncontrolled; ;          hemachromatosis     Positive for    Allergies;         Surgical History:         Cholecystectomy: ;     Joint Replacement: BOTH KNEES;;; Right knee       Appendectomy    Tonsillectomy/Adenoidectomy    tracheostomy secondary to sleep apnea;    right achilles tendon repair 10/2018 ([facility]);     Procedures: heat cath 11-3-15, left ventriulogram, coronary angiogram     COLONOSCOPY: was last done 14 with normal results Dr Anderson         Family History:     Father: ;  Myocardial Infarction     Mother: Arrhythmia     Brother(s): 2 brother(s) total; 1 ;  Hypertension;  cerebral aneurysm  at 38;  Type 2 Diabetes     Positive for Coronary Artery Disease.      Positive for Type 2 Diabetes ( mother ).          Social History:     Occupation: [employer] / [employer]     Marital Status:      Children: 2 children and 1 step-child         Tobacco/Alcohol/Supplements:     Last Reviewed on 2019 11:04 AM by Maddie Alvarez    Tobacco: He has never smoked.          Alcohol: Frequency:  Socially             Immunizations:     Fluzone (3 + years dose) 1/8/2013     Fluzone (3 + years dose) 1/18/2017     Fluzone pf (3+ years dose) 1/9/2018     Fluzone Quadrivalent (3+ years) 11/6/2018     Pneomovax 8/8/2016         Allergies:     Last Reviewed on 1/07/2019 11:03 AM by Maddie Alvarez      No Known Drug Allergies.         Current Medications:     Last Reviewed on 1/07/2019 11:04 AM by Maddie Alvarez    Glimepiride 2mg Tablet ONE AND A HALF TABLET DAILY     Hydrochlorothiazide (HCTZ) 12.5mg Tablet 1 po daily     Lipitor 80mg Tablet One PO Q HS     Lisinopril 10mg Tablet 1 in am     Metformin HCl 500mg Tablet Take 2 tablet(s) by mouth bid     Mobic 15mg Tablet 1 tab daily     Pioglitazone HCl 45mg Tablet Take 1 tablet(s) by mouth daily     Baclofen 10mg Tablet 1/2 to 1 tablet at bedtime for low back pain/muscle spasms     Cetirizine HCl 5mg Tablet Take 1 tablet(s) by mouth daily     Neurontin 100mg Capsules Take 2 capsule(s) by mouth tid     Trazodone HCl 50mg Tablet 1/2 -- 1 tablet at bedtime     Bydureon 2mg/1vial Injection Suspension, Extended-Release Inject 2 mg subcutaneously q week     Fluticasone Propionate 50mcg/1actuation Nasal Spray 1 or 2 sprays in each nostril qday     OTC Probiotic with Vitamin C take one capsule once daily     Oxycodone HCl 5mg Capsules Take 1 capsule(s) by mouth q 6 hr prn for pain     Vitamin E 400IU Capsules 2 tab q day         OBJECTIVE:        Vitals:         Current: O2: 95% 1/7/2019 10:59:02 AM    Ht:  5 ft, 6 in;  Wt: 296.6 lbs;  BMI: 47.9    T: 98 F (oral);  BP: 120/77 mm Hg (right arm, sitting);  P: 92 bpm (right arm (BP Cuff), sitting);  sCr: 0.74 mg/dL;  GFR: 133.74    O2 Sat: 95 %        Exams:     PHYSICAL EXAM:     GENERAL:  well developed and nourished; appropriately groomed; in no apparent distress;     E/N/T: EARS: external auditory canal normal bilaterally;  left TM is normal and the right TM is has fluid behind it and mild redness;  NOSE: bilateral  maxillary sinus tenderness present; OROPHARYNX: posterior pharynx, including tonsils, tongue, and uvula are normal;     RESPIRATORY: normal respiratory rate and pattern with no distress; expiratory wheezes in the RLL;     CARDIOVASCULAR: normal rate; rhythm is regular;  no systolic murmur;     LYMPHATIC: no enlargement of cervical or facial nodes;     PSYCHIATRIC:  appropriate affect and demeanor; normal speech pattern; grossly normal memory;         Lab/Test Results:             Influenza A and B:  Negative (01/07/2019),     Performed by::  mihir (01/07/2019),             ASSESSMENT           466.0   J20.9  Acute bronchitis              DDx:     272.2   E78.2  Mixed hyperlipidemia and hypertriglyceridemia              DDx:         ORDERS:         Meds Prescribed:       Refill of: Lipitor (Atorvastatin Calcium) 80mg Tablet One PO Q HS  #90 (Ninety) tablet(s) Refills: 1       Augmentin (Amoxicillin/Clavulanate) 875mg/125mg Tablet 1 TAB BID  #20 (Twenty) tablet(s) Refills: 0         Radiology/Test Orders:       [id number]  Radiologic exam chest 2 views  (Send-Out)           Lab Orders:       FUTURE  Future order to be done at patients convenience  (Send-Out)         94726  [facility] CMP AND LIPID: [id number], [id number]  (Send-Out)         55003  Infectious agent antigen detection by immunoassay; Influenza  (In-House)         86370  Infectious agent antigen detection by immunoassay; Influenza  (In-House)           Other Orders:       12690  Noninvasive ear or pulse oximetry for oxygen saturation; single determination  (In-House)                   PLAN:          Acute bronchitis flu screen negative and sent for CXR         RADIOLOGY:  I have ordered a chest x-ray (PA and lateral) to be done today.      RECOMMENDATIONS given include: rest and increase oral fluid intake.      FOLLOW-UP: Advised to call if there is no improvement in 2-4 day(s).            Prescriptions:       Augmentin (Amoxicillin/Clavulanate) 875mg/125mg Tablet 1 TAB  BID  #20 (Twenty) tablet(s) Refills: 0           Orders:       [id number]  Radiologic exam chest 2 views  (Send-Out)         31889  Infectious agent antigen detection by immunoassay; Influenza  (In-House)         22261  Infectious agent antigen detection by immunoassay; Influenza  (In-House)         36155  Noninvasive ear or pulse oximetry for oxygen saturation; single determination  (In-House)            Mixed hyperlipidemia and hypertriglyceridemia         FOLLOW-UP TESTING #1: FOLLOW-UP LABORATORY:  Labs to be scheduled in the future include HTN/Lipid Panel: CMP, Lipid.      RECOMMENDATIONS given include: exercise, low cholesterol/low fat diet, and weight loss.      FOLLOW-UP: due to F/U in 2mos on lab work, future order for labs given to pt.            Prescriptions:       Refill of: Lipitor (Atorvastatin Calcium) 80mg Tablet One PO Q HS  #90 (Ninety) tablet(s) Refills: 1           Orders:       FUTURE  Future order to be done at patients convenience  (Send-Out)         08062  [facility] CMP AND LIPID: [id number], [id number]  (Send-Out)               Patient Recommendations:        For  Mixed hyperlipidemia and hypertriglyceridemia:             The following laboratory testing has been ordered: Maintain a regular exercise program. Reduce the amount of cholesterol and saturated fat in your diet. Try to lose some weight; even modest weight reduction can improve your blood pressure.              CHARGE CAPTURE           **Please note: ICD descriptions below are intended for billing purposes only and may not represent clinical diagnoses**        Primary Diagnosis:         466.0 Acute bronchitis            J20.9    Acute bronchitis, unspecified              Orders:          21897   Office/outpatient visit; established patient, level 4  (In-House)             17945   Infectious agent antigen detection by immunoassay; Influenza  (In-House)             96326   Infectious agent antigen detection by immunoassay; Influenza  (In-House)              78258   Noninvasive ear or pulse oximetry for oxygen saturation; single determination  (In-House)           272.2 Mixed hyperlipidemia and hypertriglyceridemia            E78.2    Mixed hyperlipidemia

## 2021-05-18 NOTE — PROGRESS NOTES
LeonardaterriFabio KRYSTEN  1965     Office/Outpatient Visit    Visit Date: Fri, Jul 31, 2020 02:58 pm    Provider: Karley Amor N.P. (Assistant: Marni Car MA)    Location: [facility]        Electronically signed by Karley Amor N.P. on  08/02/2020 11:38:02 PM                             Subjective:        CC: Mauri is a 55 year old White male.  Follow up on medications. No longer taking meloxicam         HPI:           Patient to be evaluated for type 2 diabetes mellitus with diabetic neuropathy, unspecified.  Compliance with treatment has been good.  He specifically denies associated symptoms, including nocturia, polydipsia and polyuria.  He reports home blood glucose readings have been fairly good, with average fasting glucoses running in the 120-150 mg/dL range. He checks his glucose 1 to 2 times daily. He checks his glucose 1.            Additionally, he presents with history of left upper quadrant pain.  this is located primarily in the left upper quadrant.  around the rib cage and is very sore to touchThere is some radiation to the left flank.  It began several months ago.  The onset of pain occurred when working moving around.  He characterizes it as burning.  It is of moderate intensity.  The typical duration is quite variable.  Associated symptoms include constipation and belching -.  He denies melena or diarrhea.  tried to treat for MSK nature - ineffective       Will be following up with [facility]  for is right foot.  Previous surgery did not resolve.  He was told that he should have had PT on that foot after surgery - he will consider alternative treatment in the future if continues to bother him    ROS:     CONSTITUTIONAL:  Negative for chills, fatigue and fever.      CARDIOVASCULAR:  Negative for chest pain and pedal edema.      RESPIRATORY:  Negative for recent cough and dyspnea.      GASTROINTESTINAL:  Positive for belching.   Negative for abdominal pain, acid  reflux symptoms, constipation, diarrhea, heartburn, nausea or vomiting.      MUSCULOSKELETAL:  Positive for right foot at heel and achilles tendon and left upper abdominal / left flank area.   Negative for arthralgias or myalgias.      NEUROLOGICAL:  Negative for dizziness, fainting, headaches and paresthesias.      ENDOCRINE:  Negative for hair loss, polydipsia and polyphagia.      ALLERGIC/IMMUNOLOGIC:  Negative for seasonal allergies.      PSYCHIATRIC:  Negative for anxiety, depression and suicidal thoughts.          Past Medical History / Family History / Social History:         Last Reviewed on 12/17/2019 08:28 PM by Pancho Wyatt    Past Medical History:             PAST MEDICAL HISTORY         Sleep Apnea     hemochromatosis     Renal Stones: dx'd in 2017 - March;     Chronic Pain: affecting the low back;     Type 2 Diabetes         CURRENT MEDICAL PROVIDERS:    Cardiologist: Pancho chavira - Manages CAD, HTN, Mixed hyperlipidemia - Annual follow up    Dermatologist: Deshawn (PRN only)    E/N/T: Pardeep (Trach) - PRN -    Pulmonologist: Candie - PRN    Pain Management - Flaget (every 3 months and PRN)    [facility] -  Dr Lewis  - annual PRN  follow up([place]) Podiatrist Dr. Mc         PREVENTIVE HEALTH MAINTENANCE             COLORECTAL CANCER SCREENING: Up to date (colonoscopy q10y; sigmoidoscopy q5y; Cologuard q3y) was last done 3/2017, Results are in chart; colonoscopy with normal results     DENTAL CLEANING: does not go     EYE EXAM: was last done 5/18/2018 [facility]         PAST MEDICAL HISTORY         Positive for    Sleep Apnea,    tracheostomy and    lung nodule;     Positive for    Gastroesophageal Reflux Disease;     Positive for    Chronic Pain;     Positive for    Type 2 Diabetes: uncontrolled; ;         hemachromatosis     Positive for    Allergies;         Surgical History:         Cholecystectomy: 7-2013;     Joint Replacement: BOTH KNEES;;; Right knee 1-2016      Appendectomy    Tonsillectomy/Adenoidectomy    tracheostomy secondary to sleep apnea;    right achilles tendon repair 10/2018 ([facility]);     Procedures: heat cath 11-3-15, left ventriulogram, coronary angiogram     COLONOSCOPY: was last done 14 with normal results Dr Anderson         Family History:     Father: ;  Myocardial Infarction     Mother: Arrhythmia     Brother(s): 2 brother(s) total; 1 ;  Hypertension;  cerebral aneurysm  at 38;  Type 2 Diabetes     Positive for Coronary Artery Disease.      Positive for Type 2 Diabetes ( mother ).          Social History:     Occupation: [employer] / [employer]     Marital Status:      Children: 2 children and 1 step-child         Tobacco/Alcohol/Supplements:     Last Reviewed on 2020 03:00 PM by Marni Car    Tobacco: He has never smoked.          Alcohol: Frequency: Socially         Substance Abuse History:     Last Reviewed on 2019 08:28 PM by Pancho Wyatt    None         Mental Health History:     Last Reviewed on 2019 08:28 PM by Pancho Wyatt    NEGATIVE         Communicable Diseases (eg STDs):     Last Reviewed on 2019 08:28 PM by Pancho Wyatt    Reportable health conditions; NEGATIVE         Current Problems:     Last Reviewed on 2020 04:03 PM by Spurling, Sarah C    Type 2 diabetes mellitus with diabetic neuropathy, unspecified    Other testicular dysfunction    Chronic tension-type headache, intractable    Chronic tension-type headache, not intractable    Obstructive sleep apnea (adult) (pediatric)    Tracheostomy status    Diabetes mellitus due to underlying condition with diabetic neuropathy, unspecified    Low back pain    Pain in right foot    Mixed hyperlipidemia    Essential (primary) hypertension    Pain in right shoulder    Dizziness and giddiness    Sprain of ribs, subsequent encounter    Claustrophobia    Acute serous otitis media, right ear    Localized  swelling, mass and lump, left lower limb    Encounter for screening for depression    Encounter for other preprocedural examination    Left upper quadrant pain    Preoperative examination - other specified        Immunizations:     Fluzone (3 + years dose) 1/8/2013    Fluzone (3 + years dose) 1/18/2017    Fluzone pf (3+ years dose) 1/9/2018    Fluzone Quadrivalent (3+ years) 11/6/2018    Fluzone Quadrivalent (3+ years) 10/29/2019    Pneomovax 8/8/2016    Tdap (Tetanus, reduced diph, acellular pertussis) 3/26/2019        Allergies:     Last Reviewed on 7/31/2020 03:00 PM by Marni Car    No Known Allergies.        Current Medications:     Last Reviewed on 7/31/2020 03:00 PM by Marni Car    Omega-3 acid ethyl esters 1 gram oral capsule [Take 1 capsule(s) by mouth bid]    baclofen 10 mg oral tablet [TAKE 1/2 TO 1 TABLET BY MOUTH AT BEDTIME FOR LOWER BACK PAIN/MUSCLE SPASMS]    atorvastatin 80 mg oral tablet [TAKE ONE TABLET BY MOUTH EVERY NIGHT AT BEDTIME]    Fluticasone Propionate 50 mcg/actuation Intranasal Spray, Suspension [1 or 2 sprays in each nostril qday ]    lisinopriL 10 mg oral tablet [TAKE ONE TABLET BY MOUTH EVERY MORNING]    cetirizine 5 mg oral tablet [Take 1 tablet(s) by mouth daily]    glimepiride 2 mg oral tablet [TAKE 1 AND 1/2 TABLETS BY MOUTH DAILY]    Gabapentin 300 mg oral capsule [1 capsule po bid]    oxyCODONE 5 mg oral capsule [Take 1 capsule(s) by mouth q 6 hr prn for pain]    hydroCHLOROthiazide 12.5 mg oral tablet [1 po daily]    traZODone 50 mg oral tablet [TAKE ONE-HALF TO ONE TABLET BY MOUTH EVERY NIGHT AT BEDTIME]    Ozempic 1 mg/dose (2 mg/1.5 mL) subcutaneous Pen Injector [DIAL AND INJECT SUBCUTANEOUSLY 1 MG WEEKLY]    DICLOFENAC SODIUM DR 75 MG TBEC  [take one tablet PO BID]    pioglitazone-metformin  mg oral tablet [TAKE TWO TABLETS BY MOUTH TWICE A DAY]        Objective:        Vitals:         Current: 7/31/2020 3:02:32 PM    Ht:  5 ft, 6 in;  Wt: 258.8 lbs;  BMI:  41.8T: 97.1 F (temporal);  BP: 103/63 mm Hg (left arm, sitting);  P: 81 bpm (left arm (BP Cuff), sitting);  sCr: 0.9 mg/dL;  GFR: 101.48        Exams:     PHYSICAL EXAM:     GENERAL: Vitals recorded well developed, well nourished;  no apparent distress;     RESPIRATORY: normal appearance and symmetric expansion of chest wall; normal respiratory rate and pattern with no distress; normal breath sounds with no rales, rhonchi, wheezes or rubs;     CARDIOVASCULAR: normal rate; rhythm is regular;  no edema;     GASTROINTESTINAL: mild LUQ tenderness;  normal bowel sounds; no organomegaly;     LYMPHATIC: no enlargement of cervical or facial nodes; no supraclavicular nodes;     MUSCULOSKELETAL: gait: affected by a right leg limp;  normal range of motion of all major muscle groups; no limb or joint pain with range of motion;     NEUROLOGIC: mental status: alert and oriented x 3;     PSYCHIATRIC: appropriate affect and demeanor; normal speech pattern; normal thought and perception;         Assessment:         E11.40   Type 2 diabetes mellitus with diabetic neuropathy, unspecified       R10.12   Left upper quadrant pain       M79.671   Pain in right foot           ORDERS:         Meds Prescribed:       [Refilled] cetirizine 5 mg oral tablet [Take 1 tablet(s) by mouth daily], #90 (ninety) tablets, Refills: 1 (one)       [Refilled] hydroCHLOROthiazide 12.5 mg oral tablet [1 po daily], #90 (ninety) tablets, Refills: 1 (one)       [Refilled] atorvastatin 80 mg oral tablet [TAKE ONE TABLET BY MOUTH EVERY NIGHT AT BEDTIME], #90 (ninety) tablets, Refills: 1 (one)       [Refilled] lisinopriL 10 mg oral tablet [TAKE ONE TABLET BY MOUTH EVERY MORNING], #90 (ninety) tablets, Refills: 1 (one)       [Refilled] Ozempic 1 mg/dose (2 mg/1.5 mL) subcutaneous Pen Injector [DIAL AND INJECT SUBCUTANEOUSLY 1 MG WEEKLY], #3 (three) unspecified, Refills: 2 (two)       [Refilled] glimepiride 2 mg oral tablet [TAKE 1 AND 1/2 TABLETS BY MOUTH DAILY],  #135 (one hundred and thirty five) tablets, Refills: 0 (zero)       [Refilled] pioglitazone-metformin  mg oral tablet [TAKE TWO TABLETS BY MOUTH TWICE A DAY], #360 (three hundred and sixty) tablets, Refills: 0 (zero)         Lab Orders:       [id number]  DIAB2 - [facility] CMP A1C LIPID AND MICRO ALBUM CR RATIO: [id number]  (Send-Out)            05622  HPUBT - [facility] H.pylori Breath test  (Send-Out)            92880  [facility] - [facility] Urinalysis, automated, with micro  (Send-Out)                      Plan:         Type 2 diabetes mellitus with diabetic neuropathy, unspecified    LABORATORY:  Labs ordered to be performed today include Diabetes Panel 2;CMP, A1C, Lipid, Microalbumin:Creatinine Ratio.            Prescriptions:       [Refilled] Ozempic 1 mg/dose (2 mg/1.5 mL) subcutaneous Pen Injector [DIAL AND INJECT SUBCUTANEOUSLY 1 MG WEEKLY], #3 (three) unspecified, Refills: 2 (two)       [Refilled] pioglitazone-metformin  mg oral tablet [TAKE TWO TABLETS BY MOUTH TWICE A DAY], #360 (three hundred and sixty) tablets, Refills: 0 (zero)       [Refilled] glimepiride 2 mg oral tablet [TAKE 1 AND 1/2 TABLETS BY MOUTH DAILY], #135 (one hundred and thirty five) tablets, Refills: 0 (zero)       [Refilled] lisinopriL 10 mg oral tablet [TAKE ONE TABLET BY MOUTH EVERY MORNING], #90 (ninety) tablets, Refills: 1 (one)       [Refilled] atorvastatin 80 mg oral tablet [TAKE ONE TABLET BY MOUTH EVERY NIGHT AT BEDTIME], #90 (ninety) tablets, Refills: 1 (one)           Orders:       [id number]  DIAB2 - [facility] CMP A1C LIPID AND MICRO ALBUM CR RATIO: [id number]  (Send-Out)              Left upper quadrant painwill check for Hpylori - consider imaging    LABORATORY:  Labs ordered to be performed today include H. pylori breath test and urinalysis with micro.            Orders:       [id number]  HPUBT - [facility] H.pylori Breath test  (Send-Out)            24425  [facility] - [facility] Urinalysis, automated, with micro  (Send-Out)               Pain in right footfollow up with podiatry as recommended            Other Prescriptions:       [Refilled] cetirizine 5 mg oral tablet [Take 1 tablet(s) by mouth daily], #90 (ninety) tablets, Refills: 1 (one)       [Refilled] hydroCHLOROthiazide 12.5 mg oral tablet [1 po daily], #90 (ninety) tablets, Refills: 1 (one)         Charge Capture:         Primary Diagnosis:     E11.40  Type 2 diabetes mellitus with diabetic neuropathy, unspecified           Orders:      [id number]  Office/outpatient visit; established patient, level 4  (In-House)              R10.12  Left upper quadrant pain     M79.671  Pain in right foot

## 2021-05-18 NOTE — PROGRESS NOTES
Fabio Juárez  1965     Office/Outpatient Visit    Visit Date: Wed, Jan 20, 2021 03:55 pm    Provider: Karley Amor N.P. (Assistant: Shirley Walters MA)    Location: [facility]        Electronically signed by Karley Amor N.P. on  01/24/2021 09:15:25 PM                             Subjective:        CC: Mauri is a 55 year old White male.  This is a follow-up visit.  check up / refills         HPI:           PHQ-9 Depression Screening: Completed form scanned and in chart; Total Score 10           Mauri is here for routine periodic health screening and examination.  His last physical exam was 2 years ago.  He's had vision screening done 1 year ago.      Physical Activity: ** Exercises infrequently; He is current with his influenza immunization.            Dx with type 2 diabetes mellitus with diabetic neuropathy, unspecified; specifically, this is type 2, non-insulin requiring diabetes.  Compliance with treatment has been good.  He follows no particular diet.  He specifically denies associated symptoms, including nocturia, polydipsia and polyuria.  He reports home blood glucose readings have been excellent, with average fasting glucoses running <120 mg/dL.  Most recent lab results include Creatinine, Serum:  0.81 (mg/dl) (08/01/2020), Estimated Avg Glucose:  128 (mg/dL) (02/06/2020),  117 (mg/dL) (08/01/2020), Glom Filt Rate, Est:  >60 (ml/min/1.73m2) (02/06/2020),  >60 (ml/min/1.73m2) (08/01/2020), Hemoglobin A1c:  6.1 (%) (02/06/2020),  5.7 (%) (08/01/2020), Total Cholesterol:  100 (mg/dL) (02/06/2020),  112 (mg/dL) (08/01/2020), HDL:  36 (mg/dL) (02/06/2020),  42 (mg/dL) (08/01/2020), Triglycerides:  132 (mg/dL) (02/06/2020),  133 (mg/dL) (08/01/2020), LDL:  38 (mg/dL) (02/06/2020),  43 (mg/dL) (08/01/2020), VLDL Cholesterol:  26 (mg/dl) (02/06/2020),  27 (mg/dl) (08/01/2020), CHOL/HDLC RATIO:  2.8 (NA) (02/06/2020),  2.7 (NA) (08/01/2020).  Mauri would like to try to increase  his ozempic - he has noticed that he is beginning to lose weight again.  He does have some periods where he feels that his blood sugar is too low but is generally in the low 80s when he feels this way and it is generally in the afternoon       no cpap      Complaint of obstructive sleep apnea (adult) (pediatric)..  The symptom began years ago.  Does not wear a cpap           Complaint of tracheostomy status..  trach in place  was told probably would need to have this removed- he declines with Pulmonary/ENT  no current problems or concerns at this time           In regard to the low back pain, the location is primarily in the lumbar spine.  currently under the care of [facility].  Takes oxycodone and gabapentin   follows up every 3 months           Mixed hyperlipidemia details; he cannot recall when the diagnosis of hypercholesterolemia was made.  Current treatment includes a lipid lowering agent.  Compliance with treatment has been good.  He specifically denies associated symptoms, including muscle pain and weakness.            Concerning essential (primary) hypertension, compliance with treatment has been good.  He is tolerating the medication well without side effects.  He did not bring his blood pressure diary, but says that pressures have been okay.        Treated for Hpylori in the past  His LUQ pain is still persistant.  Did improve but is back  did not follow up with test for cure as recommended          Complaint of cOVID-19..  The symptom began several months ago.  History of Covid 19 infection.  He does still have fatigue, but for the most part, feels improved           Complaint of chronic tension-type headache, intractable..  off an on HA - but able to control with OTC           Complaint of pain in right foot..  Mauri has been having problems with his feet again .  Has been working extra hours at work, long days on feet on platform/concrete  Works 12 hours shifts     ROS:      CONSTITUTIONAL:  Positive for fatigue and unintentional weight gain.   Negative for chills or fever.      CARDIOVASCULAR:  Positive for dizziness ( when his BS gets low, nothing related to his heart ).   Negative for chest pain or pedal edema.      RESPIRATORY:  Positive for trach in place without concerns.   Negative for recent cough, dyspnea or pleuritic chest pain.      GASTROINTESTINAL:  Positive for abdominal pain ( LUQ ).   Negative for constipation, diarrhea, heartburn, nausea or vomiting.      GENITOURINARY:  Negative for dysuria and change in urine stream.      MUSCULOSKELETAL:  Positive for back pain ( chronic ).   Negative for arthralgias or myalgias.      INTEGUMENTARY:  Negative for atypical mole(s), pruritis and rash.      NEUROLOGICAL:  Positive for headaches ( controlled ).      ENDOCRINE:  Negative for hair loss, polydipsia and polyphagia.      ALLERGIC/IMMUNOLOGIC:  Positive for seasonal allergies.      PSYCHIATRIC:  Negative for anxiety, depression, sleep disturbance and suicidal thoughts.          Past Medical History / Family History / Social History:         Last Reviewed on 1/20/2021 04:14 PM by Karley Amor    Past Medical History:             PAST MEDICAL HISTORY         Sleep Apnea     hemochromatosis     Renal Stones: dx'd in 2017 - March;     Chronic Pain: affecting the low back;     Type 2 Diabetes         CURRENT MEDICAL PROVIDERS:    Cardiologist: Pancho chavira - Manages CAD, HTN, Mixed hyperlipidemia - Annual follow up    Dermatologist: Deshawn (PRN only)    E/N/T: Pardeep (Trach) - PRN -    Pulmonologist: Candie - PRN    Pain Management - [facility] (every 3 months and PRN)    [facility] -  Dr Lewis  - annual PRN  follow up([facility]) Podiatrist Dr. Elliott         PREVENTIVE HEALTH MAINTENANCE             COLORECTAL CANCER SCREENING: Up to date (colonoscopy q10y; sigmoidoscopy q5y; Cologuard q3y) was last done 3/2017, Results are in chart; colonoscopy with normal results      DENTAL CLEANING: does not go     EYE EXAM: was last done 2018 [facility]         PAST MEDICAL HISTORY         Positive for    Sleep Apnea,    tracheostomy and    lung nodule;     Positive for    Gastroesophageal Reflux Disease;     Positive for    Chronic Pain;     Positive for    Type 2 Diabetes: uncontrolled; ;         hemachromatosis     Positive for    Allergies;         Surgical History:         Cholecystectomy: ;     Joint Replacement: BOTH KNEES;;; Right knee -     Appendectomy    Tonsillectomy/Adenoidectomy    tracheostomy secondary to sleep apnea;    right achilles tendon repair 10/2018 ([facility]);     Procedures: heat cath 11-3-15, left ventriulogram, coronary angiogram     COLONOSCOPY: was last done 14 with normal results Dr Anderson         Family History:     Father: ;  Myocardial Infarction     Mother: Arrhythmia;  Cerebrovascular Accident ( age 73 )     Brother(s): 2 brother(s) total; 1 ;  Hypertension;  cerebral aneurysm  at 38;  Type 2 Diabetes     Positive for Coronary Artery Disease.      Positive for Type 2 Diabetes ( mother ).          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          Alcohol: Frequency: Socially         Substance Abuse History:     Last Reviewed on 2021 04:14 PM by Karley Amor    None         Mental Health History:     Last Reviewed on 2021 04:14 PM by Karley Amor    NEGATIVE         Communicable Diseases (eg STDs):     Last Reviewed on 2021 04:14 PM by Karley Amor    Reportable health conditions; NEGATIVE         Current Problems:     Last Reviewed on 2021 04:14 PM by Karley Amor    Other testicular dysfunction    Obstructive sleep apnea (adult)  (pediatric)    Tracheostomy status    Low back pain    Pain in right foot    Mixed hyperlipidemia    Essential (primary) hypertension    Pain in right shoulder    Claustrophobia    Encounter for screening for depression    Left upper quadrant pain    COVID-19    Encounter for general adult medical examination without abnormal findings    Encounter for immunization        Immunizations:     Fluzone (3 + years dose) 1/8/2013    Fluzone (3 + years dose) 1/18/2017    Fluzone pf (3+ years dose) 1/9/2018    Fluzone Quadrivalent (3+ years) 11/6/2018    Fluzone Quadrivalent (3+ years) 10/29/2019    Pneomovax 8/8/2016    Tdap (Tetanus, reduced diph, acellular pertussis) 3/26/2019        Allergies:     Last Reviewed on 1/20/2021 04:14 PM by Karley Amor    No Known Allergies.        Current Medications:     Last Reviewed on 1/20/2021 04:14 PM by Karley Amor    Omega-3 acid ethyl esters 1 gram oral capsule [Take 1 capsule(s) by mouth bid]    baclofen 10 mg oral tablet [TAKE 1/2 TO 1 TABLET BY MOUTH AT BEDTIME FOR LOWER BACK PAIN/ MUSCLE SPASMS]    atorvastatin 80 mg oral tablet [TAKE ONE TABLET BY MOUTH EVERY NIGHT AT BEDTIME]    Fluticasone Propionate 50 mcg/actuation Intranasal Spray, Suspension [1 or 2 sprays in each nostril qday ]    lisinopriL 10 mg oral tablet [TAKE ONE TABLET BY MOUTH EVERY MORNING]    cetirizine 5 mg oral tablet [Take 1 tablet(s) by mouth daily]    glimepiride 2 mg oral tablet [TAKE 1 TABLETS BY MOUTH DAILY]    Gabapentin 300 mg oral capsule [1 capsule po bid]    hydroCHLOROthiazide 12.5 mg oral tablet [1 po daily]    oxyCODONE 5 mg oral capsule [Take 1 capsule(s) by mouth q 6 hr prn for pain]    traZODone 50 mg oral tablet [TAKE ONE-HALF TO ONE TABLET BY MOUTH EVERY NIGHT AT BEDTIME]    Ozempic 1 mg/dose (2 mg/1.5 mL) subcutaneous Pen Injector [DIAL AND INJECT SUBCUTANEOUSLY 1 MG WEEKLY]    DICLOFENAC SODIUM DR 75 MG TBEC  [take one tablet PO BID]    pioglitazone-metformin  mg oral  tablet [TAKE TWO TABLETS BY MOUTH TWICE A DAY]    amoxicillin 500 mg oral tablet [take 1 tablet (500 mg) by oral route 2 times a day for 10 days]        Objective:        Vitals:         Current: 1/20/2021 4:02:25 PM    Ht:  5 ft, 6 in;  Wt: 265.8 lbs;  BMI: 42.9T: 98 F (temporal);  BP: 114/64 mm Hg (left arm, sitting);  P: 82 bpm (left arm (BP Cuff), sitting);  sCr: 0.81 mg/dL;  GFR: 114.04        Exams:     PHYSICAL EXAM:     GENERAL: Vitals recorded well developed, well nourished;  no apparent distress;     NECK: range of motion is normal;     RESPIRATORY: normal appearance and symmetric expansion of chest wall; normal respiratory rate and pattern with no distress; normal breath sounds with no rales, rhonchi, wheezes or rubs; trach in place with no s/s infection/drainage;     CARDIOVASCULAR: normal rate; rhythm is regular;     GASTROINTESTINAL: mild epigastric and LUQ tenderness;  normal bowel sounds; no organomegaly;     LYMPHATIC: no enlargement of cervical or facial nodes; no supraclavicular nodes;     MUSCULOSKELETAL: normal gait; normal range of motion of all major muscle groups; no limb or joint pain with range of motion;     NEUROLOGIC: mental status: alert and oriented x 3;     PSYCHIATRIC: appropriate affect and demeanor; normal speech pattern; normal thought and perception;         Procedures:     Encounter for immunization    1. Patient experienced no reaction.              Assessment:         Z13.31   Encounter for screening for depression       Z00.00   Encounter for general adult medical examination without abnormal findings       E11.40   Type 2 diabetes mellitus with diabetic neuropathy, unspecified       G47.33   Obstructive sleep apnea (adult) (pediatric)       Z93.0   Tracheostomy status       M54.5   Low back pain       E78.2   Mixed hyperlipidemia       I10   Essential (primary) hypertension       B96.81   Helicobacter pylori [H. pylori] as the cause of diseases classified elsewhere        U07.1   COVID-19       E29.8   Other testicular dysfunction       G44.221   Chronic tension-type headache, intractable       Z23   Encounter for immunization       M79.671   Pain in right foot           ORDERS:         Radiology/Test Orders:       3017F  Colorectal CA screen results documented and reviewed (PV)  (In-House)              Lab Orders:       [id number]  [facility], CBC, TSH, LIPID: [id number], [id number], [id number], [id number]  (Send-Out)            42868  A1CEG - [facility] Hemoglobin A1C  (Send-Out)            99274  HPUBT - [facility] H.pylori Breath test  (Send-Out)              Procedures Ordered:       [id number]  Immunization administration; one vaccine  (In-House)              Other Orders:       [id number]  Influenza virus vaccine, quadrivalent, split virus, preservative free 3 years of age & older  (In-House)              Depression screen negative  (In-House)                      Plan:         Encounter for screening for depression    MIPS Positive Depression Screen but after further evaluation the patient does not have a diagnosis of depression.            Orders:         Depression screen negative  (In-House)              Encounter for general adult medical examination without abnormal findings    LABORATORY:  Labs ordered to be performed today include PHYSICAL PANEL; CMP, CBC, TSH, LIPID.  MIPS Vaccines Flu and Pneumonia updated in Shot record Colorectal Cancer Screening is up to date and the results are in the chart           Orders:       [id number]  [facility] PHYSICAL: CMP, CBC, TSH, LIPID: [id number], [id number], [id number], [id number]  (Send-Out)            3017F  Colorectal CA screen results documented and reviewed (PV)  (In-House)              Type 2 diabetes mellitus with diabetic neuropathy, unspecifiedencouraged to get eye exam soon for his DM, we will look at his labs and determine change in medication.  If we make adjustments, will need to consider decreasing other mediation to avoid hypoglycemia    LABORATORY:  Labs  ordered to be performed today include HgbA1C.            Orders:       [id number]  A1CEG - [facility] Hemoglobin A1C  (Send-Out)              Obstructive sleep apnea (adult) (pediatric)follow up PRN continue to recommend CPAP         Tracheostomy statusFollow up should any problems or concerns  treatment per recommendations and keep opening covered at all time to prevent infections        Low back paincontinue recommendations of pain management and follow up per their recommendations         Mixed hyperlipidemiaContinue atorvastatin as recommended         Essential (primary) hypertensionWell controlled  continue current treatment         Helicobacter pylori [H. pylori] as the cause of diseases classified elsewhereWe will repeat levels to ensure eradication.  If not, alternative treatment.     LABORATORY:  Labs ordered to be performed today include H.pylori urea breath test ([facility]).            Orders:       [id number]  HPUBT - [facility] H.pylori Breath test  (Send-Out)              COVID-19Continue to monitor for long term effects         Other testicular dysfunctionfollow up PRN        Chronic tension-type headache, intractablefollow up PRN        Encounter for immunization          Immunizations:       82633  Immunization administration; one vaccine  (In-House)            55623  Influenza virus vaccine, quadrivalent, split virus, preservative free 3 years of age & older  (In-House)                Dose (ml): 0.5  Site: left deltoid  Route: intramuscular  Administered by: Shirley Walters          : [employer]  Lot #: [id number]  Exp: 06/30/2021          NDC: [address]-32        Pain in right footWill have him follow up with podiatry and reduce his hours to no more than 8 hours daily over the next 6 months as he has a long history with problems with his feet and does not need to risk further injury.  He does have neuropathy as well, which will impair his ability to assess limits            Charge Capture:         Primary  Diagnosis:     Z13.31  Encounter for screening for depression           Orders:      [id number]  Office/outpatient visit; established patient, level 4  (In-House)              Depression screen negative  (In-House)              Z00.00  Encounter for general adult medical examination without abnormal findings           Orders:      [id number]  Preventive medicine, established patient, age 40-64 years  (In-House)            3017F  Colorectal CA screen results documented and reviewed (PV)  (In-House)              E11.40  Type 2 diabetes mellitus with diabetic neuropathy, unspecified     G47.33  Obstructive sleep apnea (adult) (pediatric)     Z93.0  Tracheostomy status     M54.5  Low back pain     E78.2  Mixed hyperlipidemia     I10  Essential (primary) hypertension     B96.81  Helicobacter pylori [H. pylori] as the cause of diseases classified elsewhere     U07.1  COVID-19     E29.8  Other testicular dysfunction     G44.221  Chronic tension-type headache, intractable     Z23  Encounter for immunization           Orders:      45283  Immunization administration; one vaccine  (In-House)            22069  Influenza virus vaccine, quadrivalent, split virus, preservative free 3 years of age & older  (In-House)              M79.671  Pain in right foot

## 2021-05-28 VITALS
HEIGHT: 64 IN | DIASTOLIC BLOOD PRESSURE: 71 MMHG | HEART RATE: 98 BPM | OXYGEN SATURATION: 95 % | HEIGHT: 65 IN | WEIGHT: 288 LBS | RESPIRATION RATE: 12 BRPM | OXYGEN SATURATION: 95 % | TEMPERATURE: 98.1 F | BODY MASS INDEX: 49.17 KG/M2 | WEIGHT: 298.12 LBS | HEART RATE: 88 BPM | DIASTOLIC BLOOD PRESSURE: 71 MMHG | TEMPERATURE: 98.6 F | SYSTOLIC BLOOD PRESSURE: 120 MMHG | RESPIRATION RATE: 12 BRPM | BODY MASS INDEX: 49.67 KG/M2 | SYSTOLIC BLOOD PRESSURE: 134 MMHG

## 2021-05-28 NOTE — PROGRESS NOTES
Patient: TAYO FAUSTIN     Acct: [id number]     Report: #[id number]  UNIT #: [id number]     : 1965    Encounter Date:2018  PRIMARY CARE: PIERRE ZAVALA  ***Signed***  --------------------------------------------------------------------------------------------------------------------  Chief Complaint      Encounter Date      May 8, 2018            Primary Care Provider      VINCENT BERMAN            Referring Provider      PIERRE ZAVALA            Patient Complaint      Patient is complaining of      New pt here for lung nodule            VITALS      Height 5 ft 5 in / 165.1 cm      Weight 298 lbs 2 oz / 135.612752 kg      BSA 2.57 m2      BMI 49.6 kg/m2      Temperature 98.1 F / 36.72 C - Temporal      Pulse 88      Respirations 12      Blood Pressure 120/71 Sitting, Left Arm      Pulse Oximetry 95%, Room air            HPI      This is a very pleasant 53 year old white male with a history of severe     obstructive sleep apnea currently with tracheostomy in place here today to be     evaluated for an abnormal CT scan of the chest.  The patient had a CT scan of     the chest which was performed on 2017 for a right lower lobe pulmonary     nodule. Subsequently this nodule has now gone away.  On a repeat CT scan 2018, it showed resolution of the previously right lower lobe pulmonary nodule,     but now shows a new 0.8 mm right upper lobe pulmonary nodule. The patient is     doing well at this time. He is a lifelong never smoker and complains of no     shortness of breath. He does all of his ADLs without having difficulties.  He     has no unintentional weight loss.  The patient has severe obstructive sleep     apnea and currently has a tracheostomy in place, however the patient's trach is     capped and he does not uncap it to sleep at night.  He still has sleep apnea     type symptoms, does not wear a CPAP machine to sleep at night. He does all of     his ADLs without having  any difficulties.  He occasionally has some shortness     of breath when he is engaging in exertional activities.  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  He is able to work without having any     shortness of breath. Occasionally if he walks up more than 1-2 flights of     stairs he will develop some mild shortness of breath, but no associated chest     pains, heart palpitations or lower extremity edema.  The patient does have mild     episodic cough, but it is not productive of any sputum and he has no hemoptysis.            ROS      Constitutional:  Denies: Fatigue, Fever, Weight gain, Weight loss, Chills,     Insomnia, Other      Respiratory/Breathing:  Complains of: Cough, Denies: Shortness of air, Wheezing    , Hemoptysis, Pleuritic pain, Other      Endocrine:  Denies: Polydipsia, Polyuria, Heat/cold intolerance, Diabetes, Other      Eyes:  Denies: Blurred vision, Vision Changes, Other      Ears, nose, mouth, throat:  Denies: Mouth lesions, Thrush, Throat pain,     Hoarseness, Allergies/Hay Fever, Post Nasal Drip, Headaches, Recent Head Injury    , Nose Bleeding, Neck Stiffness, Thyroid Mass, Hearing Loss, Ear Fullness, Dry     Mouth, Nasal or Sinus Pain, Dry Lips, Nasal discharge, Nasal congestion, Other      Cardiovascular:  Denies: Palpitations, Syncope, Claudication, Chest Pain, Wake     up Gasping for air, Leg Swelling, Irregular Heart Rate, Cyanosis, Dyspnea on     Exertion, Other      Gastrointestinal:  Denies: Nausea, Constipation, Diarrhea, Abdominal pain,     Vomiting, Difficulty Swallowing, Reflux/Heartburn, Dysphagia, Jaundice, Bloating    , Melena, Bloody stools, Other      Genitourinary:  Denies: Urinary frequency, Incontinence, Hematuria, Urgency,     Nocturia, Dysuria, Testicular problems, Other      Musculoskeletal:  Denies: Joint Pain, Joint Stiffness, Joint Swelling, Myalgias    , Other      Hematologic/lymphatic:  DENIES: Lymphadenopathy, Bruising, Bleeding tendencies,   "   Other      Neurological:  Denies: Headache, Numbness, Weakness, Seizures, Other      Psychiatric:  Denies: Anxiety, Appropriate Effect, Depression, Other      Sleep:  No: Excessive daytime sleep, Morning Headache?, Snoring, Insomnia?,     Stop breathing at sleep?, Other      Integumentary:  Denies: Rash, Dry skin, Skin Warm to Touch, Other      Immunologic/Allergic:  Denies: Latex allergy, Seasonal allergies, Asthma,     Urticaria, Eczema, Other      Immunization status:  No: Up to date            FAMILY/SOCIAL/MEDICAL HX      Surgical History:  Yes: Appendectomy, CABG, Cholecystectomy (LAP MER   BIOPSY), Oral Surgery ( TRACH PLACEMENT-1996), Orthopedic Surgery (LEFT TKR)      Heart - Family Hx:  Mother, Father      Diabetes - Family Hx:  Mother      Is Father Still Living?:  No      Is Mother Still Living?:  Yes      Social History:  No Tobacco Use, No Alcohol Use, No Recreational Drug use      Smoking status:  Never smoker      Anticoagulation Therapy:  No      Antibiotic Prophylaxis:  No      Medical History:  Yes: Arthritis (FEET   IN BLOOD\"), Diabetes (type 2-on oral med's), Hemorrhoids/Rectal Prob (h/o     hepatitis as a child), Shortness Of Breath (sleep apnea-has metal trach, \"SPOT     LEFT LUNG\"), No: Chemotherapy/Cancer, Deafness or Ringing Ears, Seizures, High     Blood Pressure, Miscellaneous Medical/oth      Psychiatric History      none            PREVENTION      Hx Influenza Vaccination:  Yes      Date Influenza Vaccine Given:  Dec 1, 2017      Influenza Vaccine Declined:  No      2 or More Falls Past Year?:  No      Fall Past Year with Injury?:  No      Hx Pneumococcal Vaccination:  Yes      Encouraged to follow-up with:  PCP regarding preventative exams.      Chart initiated by      alba mckay ma            ALLERGIES/MEDICATIONS      Allergies:        Coded Allergies:             NO KNOWN ALLERGIES (Unverified , 5/7/18)      Medications    Last Reconciled on 5/8/18 13:29 by MICHAEL PRICE, " MD      Diclofenac Sodium (Voltaren 1%*) 100 Gm Gel..gram.      1 APL TOPICAL QID, #1 TUBE         Reported         5/8/18       Pioglitazone HCl (Actos*) 45 Mg Tab      45 MG PO QDAY, #30 TAB         Reported         5/8/18       Lisinopril* (Lisinopril*) 10 Mg Tablet      10 MG PO QDAY, #30 TAB 0 Refills         Reported         5/8/18       Ciprofloxacin HCl (Cipro) 500 Mg Tablet      500 MG PO BID, TAB 0 Refills         Reported         5/8/18       Cetirizine HCl (Cetirizine HCl*) 5 Mg Tab.chew      5 MG PO QDAY for 30 Days, #30 TAB         Reported         5/8/18       oxyCODONE HCl (oxyCODONE IR) 5 Mg Tablet      5 MG PO Q8 Y for PAIN, TAB 0 Refills         Reported         5/8/18       Glimepiride (Glimepiride*) 2 Mg Tablet      4 MG PO QDAY, TAB         Reported         5/8/18       Atorvastatin (Atorvastatin) 80 Mg Tablet      PO HS, #30 TAB 0 Refills         Reported         5/8/18       Metformin Hcl (Metformin Hcl*) 500 Mg Tablet      1000 MG PO BID, #120 TAB 0 Refills         Reported         5/8/18       Meloxicam (Meloxicam*) 15 Mg Tablet      15 MG PO QDAY, #30 TAB 0 Refills         Reported         3/15/16       Baclofen (Baclofen*) 10 Mg Tablet      10 MG PO HS, #30 TAB 0 Refills         Reported         3/15/16       Vitamin E (Vitamin E*) 400 Units Capsule      400 UNITS PO BID, CAP         Reported         7/28/14       Gabapentin (Gabapentin) 100 Mg Capsule      300 MG PO BID, #60 CAP 0 Refills         Reported         7/28/14       Omega 3 Polyunsat Fatty Acids (Lovaza) 1 Gm Capsule      1 GM PO BID, CAP         Reported         7/28/14      Current Medications      Current Medications Reviewed 5/7/18            EXAM      GEN-patient appears stated age resting comfortable in no acute distress      Eyes-PERRL,  conjunctiva are normal in appearance extraocular muscles are intact    , no scleral icterus      Nasal-both nares are patent turbinates appear normal no polyps seen no nasal      discharge or ulcerations      Ears-tympanic membranes are normal no erythema no bulging, normal to inspection      Lymphatic-no swollen or enlarged cervical nodes, or axillary node, or femoral     nodes, or supraclavicular nodes      Mouth normal dentition, no erythema no ulcerations oropharynx appears normal no     exudate no evidence of postnasal drip, MP(3)      Neck-the patient has tracheostomy in place.        Respiratory-patient exhibits normal work of breathing, speaking in full     sentences without difficulty, the chest is normal in appearance, clear to     auscultation with no wheezes rales or rhonchi, chest is normal to percussion on     both the right and left sides      Cardiovascular-the heart rate is normal and regular S1 and S2 present with no     murmur or extra heart sounds, there is no JVD or pedal edema present      GI-the abdomen is normal in appearance, bowel sounds present and normal in all     quadrants no hepatosplenomegaly or masses felt      Extremities-no clubbing is present, pulses present in all extremities,     capillary refill time is normal      Musculoskeletal-Normal strength in upper and lower extremities, inspection     shows no evidence of muscle atrophy      Skin-skin is normal in appearance it is warm and dry, no rashes present, no     evidence of cyanosis, palpation reveals no masses      Neurological-the patient is alert and oriented to time place and person, moves     all 4 extremities, normal gait, normal affect and mood, CN2-12 intact      Psych-normal judgment and insight is good, normal mood and affect, alert and     oriented to person, place, and time, and date      Vtials      Vitals:             Height 5 ft 5 in / 165.1 cm           Weight 298 lbs 2 oz / 135.275129 kg           BSA 2.57 m2           BMI 49.6 kg/m2           Temperature 98.1 F / 36.72 C - Temporal           Pulse 88           Respirations 12           Blood Pressure 120/71 Sitting, Left Arm            Pulse Oximetry 95%, Room air            REVIEW      Results Reviewed      PCCS Results Reviewed?:  Yes Prev Lab Results, Yes Prev Radiology Results, Yes     Previous [facility] Records            Assessment      Pulmonary nodule, right - R91.1            Notes      New Medications      * Metformin Hcl (Metformin Hcl*) 500 MG TABLET: 1,000 MG PO BID #120      * Atorvastatin 80 MG TABLET: PO HS #30      * Glimepiride (Glimepiride*) 2 MG TABLET: 4 MG PO QDAY      * oxyCODONE HCl (oxyCODONE IR) 5 MG TABLET: 5 MG PO Q8 PRN PAIN      * Cetirizine HCl (Cetirizine HCl*) 5 MG TAB.CHEW: 5 MG PO QDAY 30 Days #30      * Ciprofloxacin HCl (Cipro) 500 MG TABLET: 500 MG PO BID      * Lisinopril* 10 MG TABLET: 10 MG PO QDAY #30      * Pioglitazone HCl (Actos*) 45 MG TAB: 45 MG PO QDAY #30      * Diclofenac Sodium (Voltaren 1%*) 100 GM GEL..GRAM.: 1 APL TOPICAL QID #1       Instructions: Apply 2 grams to upper extremeties. Apply 4 grams to lower     extremeties. Use supplied dosing card to determine amount.      Changed Medications      * Gabapentin 100 MG CAPSULE: 300 MG PO BID #60      New Diagnostics      * Chest W/O Cont CT, 3 Months       Dx: Pulmonary nodule, right - R91.1      ASSESSMENT/PLAN:       1.  Right upper lobe pulmonary nodule.      2.  Repeat CT scan in 90 days.        3.  Patient low risk given his history of being a never smoker.      4.  Severe obstructive sleep apnea.      5.  I have talked to the patient about using his CPAP machine to sleep.      Recommend he start using his CPAP machine to sleep where he uncaps his trach to     sleep at night.      6.  I have personally reviewed laboratory data, imaging as well as previous     medical records.            Patient Education      Education resources provided:  Yes      Patient Education Provided:  Sleep Apnea                 Disclaimer: Converted document may not contain table formatting or lab diagrams. Please see [employer] [facility] for the  authenticated document.

## 2021-05-28 NOTE — PROGRESS NOTES
Patient: TAYO FAUSTIN     Acct: [id number]     Report: #[id number]  UNIT #: [id number]     : 1965    Encounter Date:2018  PRIMARY CARE: PIERRE ZAVALA  ***Signed***  --------------------------------------------------------------------------------------------------------------------  Chief Complaint      Encounter Date      Aug 28, 2018            Primary Care Provider      VINCENT BERMAN            Referring Provider      PIERRE ZAVALA            Patient Complaint      Patient is complaining of      Pt here for 3m f/u, Ct results            VITALS      Height 5 ft 4 in / 162.56 cm      Weight 288 lbs 0 oz / 130.254269 kg      BSA 2.51 m2      BMI 49.4 kg/m2      Temperature 98.6 F / 37 C - Temporal      Pulse 98      Respirations 12      Blood Pressure 134/71 Sitting, Left Arm      Pulse Oximetry 95%, Room air            HPI      The patient is a very pleasant 53 year old white male here today for follow up.            He had a repeat CT scan of the chest that showed complete resolution of the     right upper lobe pulmonary nodule and the right lower lobe nodule is  no longer     there. The patient is doing very well at this time and complains of no shortness    of breath and no cough.            ROS      Constitutional:  Denies: Fatigue, Fever, Weight gain, Weight loss, Chills,     Insomnia, Other      Respiratory/Breathing:  Complains of: Cough; Denies: Shortness of air, Wheezing,    Hemoptysis, Pleuritic pain, Other      Endocrine:  Denies: Polydipsia, Polyuria, Heat/cold intolerance, Diabetes, Other      Eyes:  Denies: Blurred vision, Vision Changes, Other      Ears, nose, mouth, throat:  Denies: Mouth lesions, Thrush, Throat pain,     Hoarseness, Allergies/Hay Fever, Post Nasal Drip, Headaches, Recent Head Injury,    Nose Bleeding, Neck Stiffness, Thyroid Mass, Hearing Loss, Ear Fullness, Dry     Mouth, Nasal or Sinus Pain, Dry Lips, Nasal discharge, Nasal congestion, Other     "  Cardiovascular:  Denies: Palpitations, Syncope, Claudication, Chest Pain, Wake     up Gasping for air, Leg Swelling, Irregular Heart Rate, Cyanosis, Dyspnea on     Exertion, Other      Gastrointestinal:  Denies: Nausea, Constipation, Diarrhea, Abdominal pain,     Vomiting, Difficulty Swallowing, Reflux/Heartburn, Dysphagia, Jaundice,     Bloating, Melena, Bloody stools, Other      Genitourinary:  Denies: Urinary frequency, Incontinence, Hematuria, Urgency,     Nocturia, Dysuria, Testicular problems, Other      Musculoskeletal:  Denies: Joint Pain, Joint Stiffness, Joint Swelling, Myalgias,    Other      Hematologic/lymphatic:  DENIES: Lymphadenopathy, Bruising, Bleeding tendencies,     Other      Neurological:  Denies: Headache, Numbness, Weakness, Seizures, Other      Psychiatric:  Denies: Anxiety, Appropriate Effect, Depression, Other      Sleep:  No: Excessive daytime sleep, Morning Headache?, Snoring, Insomnia?, Stop    breathing at sleep?, Other      Integumentary:  Denies: Rash, Dry skin, Skin Warm to Touch, Other      Immunologic/Allergic:  Denies: Latex allergy, Seasonal allergies, Asthma,     Urticaria, Eczema, Other      Immunization status:  No: Up to date            FAMILY/SOCIAL/MEDICAL HX      Surgical History:  Yes: Appendectomy, CABG, Cholecystectomy (LAP MER   BIOPSY), Oral Surgery ( TRACH PLACEMENT-1996), Orthopedic Surgery (LEFT TKR)      Heart - Family Hx:  Mother, Father      Diabetes - Family Hx:  Mother      Is Father Still Living?:  No      Is Mother Still Living?:  Yes      Social History:  No Tobacco Use, No Alcohol Use, No Recreational Drug use      Smoking status:  Never smoker      Anticoagulation Therapy:  No      Antibiotic Prophylaxis:  No      Medical History:  Yes: Arthritis (FEET   IN BLOOD\"), Diabetes (type 2-on oral med's), Hemorrhoids/Rectal Prob (h/o     hepatitis as a child), Shortness Of Breath (sleep apnea-has metal trach, \"SPOT     LEFT LUNG\"); No: Chemotherapy/Cancer, " Deafness or Ringing Ears, Seizures, High     Blood Pressure, Sinus Trouble, Miscellaneous Medical/oth      Psychiatric History      None            PREVENTION      Hx Influenza Vaccination:  Yes      Date Influenza Vaccine Given:  Dec 1, 2017      Influenza Vaccine Declined:  No      2 or More Falls Past Year?:  No      Fall Past Year with Injury?:  No      Hx Pneumococcal Vaccination:  Yes      Encouraged to follow-up with:  PCP regarding preventative exams.      Chart initiated by      Nica Machado MA            ALLERGIES/MEDICATIONS      Allergies:        Coded Allergies:             NO KNOWN ALLERGIES (Unverified , 8/27/18)      Medications    Last Reconciled on 8/28/18 14:00 by MICHAEL PRICE MD      Diclofenac Sodium (Voltaren 1%*) 100 Gm Gel..gram.      1 APL TOPICAL QID, #1 TUBE         Reported         5/8/18       Pioglitazone HCl (Actos*) 45 Mg Tab      45 MG PO QDAY, #30 TAB         Reported         5/8/18       Lisinopril* (Lisinopril*) 10 Mg Tablet      10 MG PO QDAY, #30 TAB 0 Refills         Reported         5/8/18       Ciprofloxacin HCl (Cipro) 500 Mg Tablet      500 MG PO BID, TAB 0 Refills         Reported         5/8/18       Cetirizine HCl (Cetirizine HCl*) 5 Mg Tab.chew      5 MG PO QDAY for 30 Days, #30 TAB         Reported         5/8/18       oxyCODONE HCl (oxyCODONE IR) 5 Mg Tablet      5 MG PO Q8 PRN for PAIN, TAB 0 Refills         Reported         5/8/18       Glimepiride (Glimepiride*) 2 Mg Tablet      4 MG PO QDAY, TAB         Reported         5/8/18       Atorvastatin (Atorvastatin) 80 Mg Tablet      PO HS, #30 TAB 0 Refills         Reported         5/8/18       Metformin Hcl* (Metformin Hcl*) 500 Mg Tablet      1000 MG PO BID, #120 TAB 0 Refills         Reported         5/8/18       Meloxicam (Meloxicam*) 15 Mg Tablet      15 MG PO QDAY, #30 TAB 0 Refills         Reported         3/15/16       Baclofen (Baclofen*) 10 Mg Tablet      10 MG PO HS, #30 TAB 0 Refills         Reported          3/15/16       Vitamin E (Vitamin E*) 400 Units Capsule      400 UNITS PO BID, CAP         Reported         7/28/14       Gabapentin (Gabapentin) 100 Mg Capsule      300 MG PO BID, #60 CAP 0 Refills         Reported         7/28/14       Omega 3 Polyunsat Fatty Acids (Lovaza) 1 Gm Capsule      1 GM PO BID, CAP         Reported         7/28/14      Current Medications      Current Medications Reviewed 8/27/18            EXAM      GEN-patient appears stated age resting comfortable in no acute distress      Eyes-PERRL,  conjunctiva are normal in appearance extraocular muscles are     intact, no scleral icterus      Nasal-both nares are patent turbinates appear normal no polyps seen no nasal     discharge or ulcerations      Mouth normal dentition, no erythema no ulcerations oropharynx appears normal no     exudate no evidence of postnasal drip, MP(3)      Neck-the patient has tracheostomy in place.        Respiratory-patient exhibits normal work of breathing, speaking in full     sentences without difficulty, the chest is normal in appearance, clear to     auscultation with no wheezes rales or rhonchi, chest is normal to percussion on     both the right and left sides      Cardiovascular-the heart rate is normal and regular S1 and S2 present with no     murmur or extra heart sounds, there is no JVD or pedal edema present      GI-the abdomen is normal in appearance, bowel sounds present and normal in all     quadrants no hepatosplenomegaly or masses felt      Extremities-no clubbing is present, pulses present in all extremities, capillary     refill time is normal      Skin-skin is normal in appearance it is warm and dry, no rashes present, no     evidence of cyanosis, palpation reveals no masses      Neurological-the patient is alert and oriented to time place and person, moves     all 4 extremities, normal gait, normal affect and mood, CN2-12 intact      Psych-normal judgment and insight is good, normal mood and  affect, alert and     oriented to person, place, and time, and date      Vtials      Vitals:             Height 5 ft 4 in / 162.56 cm           Weight 288 lbs 0 oz / 130.834164 kg           BSA 2.51 m2           BMI 49.4 kg/m2           Temperature 98.6 F / 37 C - Temporal           Pulse 98           Respirations 12           Blood Pressure 134/71 Sitting, Left Arm           Pulse Oximetry 95%, Room air            REVIEW      Results Reviewed      PCCS Results Reviewed?:  Yes Prev Lab Results, Yes Prev Radiology Results, Yes     Previous [facility] Records            Assessment      ASSESSMENT/PLAN:      1. Pulmonary nodule resolved. No further follow up needed. The patient is a     lifelong never smoker and does not meet criteria for low dose lung cancer     screening.       2. Sleep apnea.  Continue trach with cap removed to sleep at night.       3. We will see the patient back on an as needed basis.       4. I have personally reviewed laboratory data, imaging as well as previous     medical records.            Patient Education      Education resources provided:  Yes                 Disclaimer: Converted document may not contain table formatting or lab diagrams. Please see [employer] [facility] for the authenticated document.

## 2021-06-10 ENCOUNTER — APPOINTMENT (OUTPATIENT)
Dept: CT IMAGING | Facility: HOSPITAL | Age: 56
End: 2021-06-10

## 2021-06-10 ENCOUNTER — HOSPITAL ENCOUNTER (EMERGENCY)
Facility: HOSPITAL | Age: 56
Discharge: HOME | End: 2021-06-10
Admitting: EMERGENCY MEDICINE

## 2021-06-10 ENCOUNTER — TELEPHONE (OUTPATIENT)
Dept: FAMILY MEDICINE CLINIC | Age: 56
End: 2021-06-10

## 2021-06-10 VITALS
DIASTOLIC BLOOD PRESSURE: 80 MMHG | WEIGHT: 268.3 LBS | HEIGHT: 65 IN | BODY MASS INDEX: 44.7 KG/M2 | RESPIRATION RATE: 18 BRPM | OXYGEN SATURATION: 96 % | HEART RATE: 83 BPM | TEMPERATURE: 97.9 F | SYSTOLIC BLOOD PRESSURE: 124 MMHG

## 2021-06-10 DIAGNOSIS — J95.00 TRACHEOSTOMY COMPLICATION, UNSPECIFIED COMPLICATION TYPE (HCC): Primary | ICD-10-CM

## 2021-06-10 LAB
ALBUMIN SERPL-MCNC: 4 G/DL (ref 3.5–5.2)
ALBUMIN/GLOB SERPL: 1.5 G/DL
ALP SERPL-CCNC: 59 U/L (ref 39–117)
ALT SERPL W P-5'-P-CCNC: 22 U/L (ref 1–41)
ANION GAP SERPL CALCULATED.3IONS-SCNC: 6.6 MMOL/L (ref 5–15)
AST SERPL-CCNC: 22 U/L (ref 1–40)
BASOPHILS # BLD AUTO: 0.07 10*3/MM3 (ref 0–0.2)
BASOPHILS NFR BLD AUTO: 0.9 % (ref 0–1.5)
BILIRUB SERPL-MCNC: 1.5 MG/DL (ref 0–1.2)
BUN SERPL-MCNC: 15 MG/DL (ref 6–20)
BUN/CREAT SERPL: 19.7 (ref 7–25)
CALCIUM SPEC-SCNC: 9.2 MG/DL (ref 8.6–10.5)
CHLORIDE SERPL-SCNC: 102 MMOL/L (ref 98–107)
CO2 SERPL-SCNC: 30.4 MMOL/L (ref 22–29)
CREAT SERPL-MCNC: 0.76 MG/DL (ref 0.76–1.27)
DEPRECATED RDW RBC AUTO: 40.8 FL (ref 37–54)
EOSINOPHIL # BLD AUTO: 0.17 10*3/MM3 (ref 0–0.4)
EOSINOPHIL NFR BLD AUTO: 2.3 % (ref 0.3–6.2)
ERYTHROCYTE [DISTWIDTH] IN BLOOD BY AUTOMATED COUNT: 12.1 % (ref 12.3–15.4)
GFR SERPL CREATININE-BSD FRML MDRD: 106 ML/MIN/1.73
GLOBULIN UR ELPH-MCNC: 2.7 GM/DL
GLUCOSE SERPL-MCNC: 114 MG/DL (ref 65–99)
HCT VFR BLD AUTO: 45.9 % (ref 37.5–51)
HGB BLD-MCNC: 15.6 G/DL (ref 13–17.7)
IMM GRANULOCYTES # BLD AUTO: 0.03 10*3/MM3 (ref 0–0.05)
IMM GRANULOCYTES NFR BLD AUTO: 0.4 % (ref 0–0.5)
LYMPHOCYTES # BLD AUTO: 2.62 10*3/MM3 (ref 0.7–3.1)
LYMPHOCYTES NFR BLD AUTO: 34.8 % (ref 19.6–45.3)
MCH RBC QN AUTO: 31.3 PG (ref 26.6–33)
MCHC RBC AUTO-ENTMCNC: 34 G/DL (ref 31.5–35.7)
MCV RBC AUTO: 92 FL (ref 79–97)
MONOCYTES # BLD AUTO: 0.97 10*3/MM3 (ref 0.1–0.9)
MONOCYTES NFR BLD AUTO: 12.9 % (ref 5–12)
NEUTROPHILS NFR BLD AUTO: 3.66 10*3/MM3 (ref 1.7–7)
NEUTROPHILS NFR BLD AUTO: 48.7 % (ref 42.7–76)
NRBC BLD AUTO-RTO: 0 /100 WBC (ref 0–0.2)
PLATELET # BLD AUTO: 219 10*3/MM3 (ref 140–450)
PMV BLD AUTO: 8.9 FL (ref 6–12)
POTASSIUM SERPL-SCNC: 4.4 MMOL/L (ref 3.5–5.2)
PROT SERPL-MCNC: 6.7 G/DL (ref 6–8.5)
RBC # BLD AUTO: 4.99 10*6/MM3 (ref 4.14–5.8)
SODIUM SERPL-SCNC: 139 MMOL/L (ref 136–145)
WBC # BLD AUTO: 7.52 10*3/MM3 (ref 3.4–10.8)

## 2021-06-10 PROCEDURE — 87070 CULTURE OTHR SPECIMN AEROBIC: CPT | Performed by: NURSE PRACTITIONER

## 2021-06-10 PROCEDURE — 70491 CT SOFT TISSUE NECK W/DYE: CPT

## 2021-06-10 PROCEDURE — 87186 SC STD MICRODIL/AGAR DIL: CPT | Performed by: NURSE PRACTITIONER

## 2021-06-10 PROCEDURE — 85025 COMPLETE CBC W/AUTO DIFF WBC: CPT | Performed by: NURSE PRACTITIONER

## 2021-06-10 PROCEDURE — 80053 COMPREHEN METABOLIC PANEL: CPT | Performed by: NURSE PRACTITIONER

## 2021-06-10 PROCEDURE — 0 IOPAMIDOL PER 1 ML: Performed by: NURSE PRACTITIONER

## 2021-06-10 PROCEDURE — 87077 CULTURE AEROBIC IDENTIFY: CPT | Performed by: NURSE PRACTITIONER

## 2021-06-10 PROCEDURE — 87205 SMEAR GRAM STAIN: CPT | Performed by: NURSE PRACTITIONER

## 2021-06-10 PROCEDURE — 99283 EMERGENCY DEPT VISIT LOW MDM: CPT

## 2021-06-10 RX ADMIN — IOPAMIDOL 100 ML: 755 INJECTION, SOLUTION INTRAVENOUS at 14:39

## 2021-06-10 NOTE — TELEPHONE ENCOUNTER
Caller: Fabio Juárez    Relationship to patient: Self    Best call back number: [phone]    Chief complaint: PAIN WITH TRACHEOSTOMY    Type of visit: SAME DAY    Requested date: TODAY

## 2021-06-12 LAB
BACTERIA SPEC AEROBE CULT: ABNORMAL
BACTERIA SPEC AEROBE CULT: ABNORMAL
GRAM STN SPEC: ABNORMAL
GRAM STN SPEC: ABNORMAL

## 2021-06-14 ENCOUNTER — TELEPHONE (OUTPATIENT)
Dept: FAMILY MEDICINE CLINIC | Age: 56
End: 2021-06-14

## 2021-06-14 DIAGNOSIS — L03.221 CELLULITIS OF NECK: Primary | ICD-10-CM

## 2021-06-14 RX ORDER — SULFAMETHOXAZOLE AND TRIMETHOPRIM 800; 160 MG/1; MG/1
1 TABLET ORAL 2 TIMES DAILY
Qty: 20 TABLET | Refills: 0 | Status: SHIPPED | OUTPATIENT
Start: 2021-06-14 | End: 2021-06-24

## 2021-06-14 NOTE — TELEPHONE ENCOUNTER
Wound cx result is in the chart from er visit they did not place him on any antibiotics please advise .

## 2021-06-14 NOTE — TELEPHONE ENCOUNTER
Caller: Fabio Juárez    Relationship: Self    Best call back number: [phone]    Caller requesting test results: YES    What test was performed: MOUTH SWAB ON TRACHEOTOMY    When was the test performed: THURSDAY    Where was the test performed: [facility]    Additional notes: PATIENT WAS TOLD BY [facility] THAT HE COULD GET HIS TEST RESULTS FROM HIS PCP.

## 2021-06-16 ENCOUNTER — OFFICE VISIT (OUTPATIENT)
Dept: FAMILY MEDICINE CLINIC | Age: 56
End: 2021-06-16

## 2021-06-16 VITALS
WEIGHT: 265 LBS | DIASTOLIC BLOOD PRESSURE: 76 MMHG | HEART RATE: 97 BPM | SYSTOLIC BLOOD PRESSURE: 123 MMHG | TEMPERATURE: 98.7 F | HEIGHT: 65 IN | BODY MASS INDEX: 44.15 KG/M2

## 2021-06-16 DIAGNOSIS — G47.00 INSOMNIA, UNSPECIFIED TYPE: Chronic | ICD-10-CM

## 2021-06-16 DIAGNOSIS — K21.00 GASTROESOPHAGEAL REFLUX DISEASE WITH ESOPHAGITIS, UNSPECIFIED WHETHER HEMORRHAGE: ICD-10-CM

## 2021-06-16 DIAGNOSIS — M54.40 CHRONIC LOW BACK PAIN WITH SCIATICA, SCIATICA LATERALITY UNSPECIFIED, UNSPECIFIED BACK PAIN LATERALITY: ICD-10-CM

## 2021-06-16 DIAGNOSIS — G89.29 CHRONIC LOW BACK PAIN WITH SCIATICA, SCIATICA LATERALITY UNSPECIFIED, UNSPECIFIED BACK PAIN LATERALITY: ICD-10-CM

## 2021-06-16 DIAGNOSIS — Z93.0 TRACHEOSTOMY STATUS (HCC): Primary | Chronic | ICD-10-CM

## 2021-06-16 DIAGNOSIS — E78.00 HYPERCHOLESTEREMIA: ICD-10-CM

## 2021-06-16 PROBLEM — N20.0 RENAL STONE: Status: ACTIVE | Noted: 2017-03-01

## 2021-06-16 PROBLEM — R60.9 EDEMA: Status: ACTIVE | Noted: 2021-06-16

## 2021-06-16 PROBLEM — G44.209 TENSION TYPE HEADACHE: Status: ACTIVE | Noted: 2021-06-16

## 2021-06-16 PROBLEM — M72.2 PLANTAR FASCIITIS: Status: ACTIVE | Noted: 2021-06-16

## 2021-06-16 PROBLEM — R42 DIZZINESS: Status: ACTIVE | Noted: 2021-06-16

## 2021-06-16 PROBLEM — R79.89 DECREASED TESTOSTERONE LEVEL: Status: ACTIVE | Noted: 2021-06-16

## 2021-06-16 PROBLEM — M77.31 CALCANEAL SPUR OF RIGHT FOOT: Status: ACTIVE | Noted: 2018-03-15

## 2021-06-16 PROBLEM — J95.02 TRACHEOSTOMY INFECTION: Chronic | Status: ACTIVE | Noted: 2021-06-16

## 2021-06-16 PROBLEM — M76.61 ACHILLES TENDINITIS OF RIGHT LOWER EXTREMITY: Status: ACTIVE | Noted: 2018-03-15

## 2021-06-16 PROBLEM — J95.02 TRACHEOSTOMY INFECTION: Status: ACTIVE | Noted: 2021-06-16

## 2021-06-16 PROBLEM — K76.9 LIVER DISEASE: Status: ACTIVE | Noted: 2021-06-16

## 2021-06-16 PROBLEM — G62.9 PERIPHERAL NEUROPATHY: Status: ACTIVE | Noted: 2021-06-16

## 2021-06-16 PROBLEM — D72.829 LEUKOCYTOSIS: Status: ACTIVE | Noted: 2021-06-16

## 2021-06-16 PROCEDURE — 99214 OFFICE O/P EST MOD 30 MIN: CPT | Performed by: NURSE PRACTITIONER

## 2021-06-16 RX ORDER — GABAPENTIN 300 MG/1
CAPSULE ORAL 4 TIMES DAILY
COMMUNITY

## 2021-06-16 RX ORDER — LISINOPRIL 10 MG/1
TABLET ORAL
COMMUNITY
End: 2021-08-13

## 2021-06-16 RX ORDER — TRAZODONE HYDROCHLORIDE 50 MG/1
TABLET ORAL
COMMUNITY
End: 2021-06-16 | Stop reason: SDUPTHER

## 2021-06-16 RX ORDER — SEMAGLUTIDE 1.34 MG/ML
INJECTION, SOLUTION SUBCUTANEOUS
COMMUNITY
Start: 2021-05-28 | End: 2021-06-22

## 2021-06-16 RX ORDER — ATORVASTATIN CALCIUM 80 MG/1
TABLET, FILM COATED ORAL
COMMUNITY
Start: 2021-03-18 | End: 2021-06-16 | Stop reason: SDUPTHER

## 2021-06-16 RX ORDER — BACLOFEN 10 MG/1
10 TABLET ORAL DAILY
Qty: 90 TABLET | Refills: 0 | Status: SHIPPED | OUTPATIENT
Start: 2021-06-16 | End: 2021-09-14

## 2021-06-16 RX ORDER — HYDROCHLOROTHIAZIDE 12.5 MG/1
12.5 TABLET ORAL DAILY
COMMUNITY
End: 2022-12-13 | Stop reason: SDUPTHER

## 2021-06-16 RX ORDER — BACLOFEN 10 MG/1
TABLET ORAL
COMMUNITY
End: 2021-06-16 | Stop reason: SDUPTHER

## 2021-06-16 RX ORDER — ATORVASTATIN CALCIUM 80 MG/1
80 TABLET, FILM COATED ORAL NIGHTLY
Qty: 90 TABLET | Refills: 0 | Status: SHIPPED | OUTPATIENT
Start: 2021-06-16 | End: 2021-06-22

## 2021-06-16 RX ORDER — CETIRIZINE HYDROCHLORIDE 5 MG/1
5 TABLET ORAL DAILY
COMMUNITY
End: 2022-01-11

## 2021-06-16 RX ORDER — OMEPRAZOLE 40 MG/1
40 CAPSULE, DELAYED RELEASE ORAL DAILY
Qty: 14 CAPSULE | Refills: 0 | Status: SHIPPED | OUTPATIENT
Start: 2021-06-16 | End: 2021-06-29

## 2021-06-16 RX ORDER — TRAZODONE HYDROCHLORIDE 50 MG/1
50 TABLET ORAL NIGHTLY
Qty: 90 TABLET | Refills: 1 | Status: SHIPPED | OUTPATIENT
Start: 2021-06-16 | End: 2022-02-11

## 2021-06-16 RX ORDER — OXYCODONE HYDROCHLORIDE 5 MG/1
TABLET ORAL
COMMUNITY
Start: 2021-05-20 | End: 2022-02-28

## 2021-06-16 RX ORDER — GLIMEPIRIDE 2 MG/1
2 TABLET ORAL
COMMUNITY

## 2021-06-16 NOTE — PROGRESS NOTES
"Chief Complaint  Sore Throat (went to [facility] last thursday )    Subjective          Fabio Juárez presents to [facility]   Running here today for follow-up from ER visit for pain at his tracheostomy site.  He was seen at [facility] on 6/10/2021 in the ER.  CT soft tissue of the neck was unrevealing.  A culture was obtained and noted growth of Proteus mirabilis.  This was susceptible to Bactrim of which she has not started yet.  He reports today that the pain is still present but lessened.  Denies fever, difficulty swallowing, shortness of breath.  He does report history of GERD which has been worsened over the past month for which he reports he takes omeprazole occasionally.  He denies any trauma to the area.          Health Maintenance Due   Topic Date Due   • Pneumococcal Vaccine 0-64 (1 of 1 - PPSV23) 02/22/1971   • Hepatitis B (1 of 3 - Risk 3-dose series) Never done   • ZOSTER VACCINE (1 of 2) Never done   • HEPATITIS C SCREENING  Never done   • DIABETIC FOOT EXAM  02/24/2021   • DIABETIC EYE EXAM  Never done   • LIPID PANEL  06/16/2021        Objective     Vital Signs:   /76 (BP Location: Left arm, Patient Position: Sitting)   Pulse 97   Temp 98.7 °F (37.1 °C)   Ht 165.1 cm (65\")   Wt 120 kg (265 lb)   BMI 44.10 kg/m²       Physical Exam  Vitals reviewed.   Constitutional:       Appearance: Normal appearance.   HENT:      Head: Normocephalic.   Eyes:      Pupils: Pupils are equal, round, and reactive to light.   Neck:      Trachea: Tracheal tenderness and tracheostomy (no s/s infection, no acitve drainage - plugged per baseline) present.   Cardiovascular:      Rate and Rhythm: Normal rate and regular rhythm.      Heart sounds: No murmur heard.     Pulmonary:      Effort: Pulmonary effort is normal.      Breath sounds: Normal breath sounds.   Musculoskeletal:         General: Normal range of motion.   Lymphadenopathy:      Cervical: Cervical adenopathy " present.      Right cervical: Superficial cervical adenopathy (non tender) present.   Neurological:      Mental Status: He is alert.   Psychiatric:         Mood and Affect: Mood normal.         Behavior: Behavior normal.          Result Review :     The following data was reviewed by: ONEIL Fox on 06/16/2021:  CT Soft Tissue Neck With Contrast (06/10/2021 14:38)  Wound Culture - Swab, Trachea (06/10/2021 13:40)  Comprehensive Metabolic Panel (06/10/2021 13:36)                 Assessment and Plan    Diagnoses and all orders for this visit:    1. Tracheostomy status (CMS/[facility]) (Primary)  Comments:  We will try the antibiotics along with PPI to see if improves. Refer to ENT [facility] if persists    2. Gastroesophageal reflux disease with esophagitis, unspecified whether hemorrhage  Comments:  consider GERD as cause - will resume PPI for short time to see if improves symptoms  Orders:  -     omeprazole (priLOSEC) 40 MG capsule; Take 1 capsule by mouth Daily for 14 days.  Dispense: 14 capsule; Refill: 0    3. Chronic low back pain with sciatica, sciatica laterality unspecified, unspecified back pain laterality  Assessment & Plan:  Currently under care of pain mangement.  Baclofen reordered today  Stable with occasional flare ups      4. Hypercholesteremia  Assessment & Plan:  Lipid abnormalities are unchanged.  Pharmacotherapy as ordered.  Lipids will be reassessed in 6 months.      5. Insomnia, unspecified type  Assessment & Plan:  Currently stable on trazodone -Refills today and follow up in 6 months       Other orders  -     traZODone (DESYREL) 50 MG tablet; Take 1 tablet by mouth Every Night for 90 days.; Refill: 0  -     atorvastatin (LIPITOR) 80 MG tablet; Take 1 tablet by mouth Every Night for 90 days.  Dispense: 90 tablet; Refill: 0  -     baclofen (LIORESAL) 10 MG tablet; Take 1 tablet by mouth Daily for 90 days.  Dispense: 90 tablet; Refill: 0          Follow Up    Return in about 3 months (around  9/16/2021) for Recheck DM.      Patient was given instructions and counseling regarding his condition or for health maintenance advice. Please see specific information pulled into the AVS if appropriate.

## 2021-06-17 NOTE — ASSESSMENT & PLAN NOTE
Currently under care of pain mangement.  Baclofen reordered today  Stable with occasional flare ups

## 2021-06-20 DIAGNOSIS — E78.00 HYPERCHOLESTEREMIA: ICD-10-CM

## 2021-06-22 RX ORDER — ATORVASTATIN CALCIUM 80 MG/1
TABLET, FILM COATED ORAL
Qty: 90 TABLET | Refills: 0 | Status: SHIPPED | OUTPATIENT
Start: 2021-06-22 | End: 2021-12-26

## 2021-06-22 RX ORDER — SEMAGLUTIDE 1.34 MG/ML
1 INJECTION, SOLUTION SUBCUTANEOUS WEEKLY
Qty: 9 ML | Refills: 0 | Status: SHIPPED | OUTPATIENT
Start: 2021-06-22 | End: 2021-09-10

## 2021-06-29 DIAGNOSIS — K21.00 GASTROESOPHAGEAL REFLUX DISEASE WITH ESOPHAGITIS, UNSPECIFIED WHETHER HEMORRHAGE: ICD-10-CM

## 2021-06-29 RX ORDER — OMEPRAZOLE 40 MG/1
CAPSULE, DELAYED RELEASE ORAL
Qty: 14 CAPSULE | Refills: 0 | Status: SHIPPED | OUTPATIENT
Start: 2021-06-29 | End: 2021-07-12

## 2021-07-01 VITALS
HEART RATE: 94 BPM | SYSTOLIC BLOOD PRESSURE: 125 MMHG | HEIGHT: 66 IN | BODY MASS INDEX: 46.99 KG/M2 | DIASTOLIC BLOOD PRESSURE: 77 MMHG | TEMPERATURE: 97.5 F | WEIGHT: 292.4 LBS

## 2021-07-01 VITALS
BODY MASS INDEX: 47.02 KG/M2 | WEIGHT: 292.6 LBS | DIASTOLIC BLOOD PRESSURE: 69 MMHG | HEIGHT: 66 IN | HEART RATE: 94 BPM | TEMPERATURE: 96.5 F | SYSTOLIC BLOOD PRESSURE: 110 MMHG

## 2021-07-01 VITALS
HEIGHT: 66 IN | DIASTOLIC BLOOD PRESSURE: 69 MMHG | SYSTOLIC BLOOD PRESSURE: 115 MMHG | HEART RATE: 96 BPM | BODY MASS INDEX: 46 KG/M2 | WEIGHT: 286.2 LBS | TEMPERATURE: 97.9 F

## 2021-07-01 VITALS
DIASTOLIC BLOOD PRESSURE: 78 MMHG | HEIGHT: 66 IN | HEART RATE: 79 BPM | SYSTOLIC BLOOD PRESSURE: 124 MMHG | TEMPERATURE: 97.2 F | WEIGHT: 193.8 LBS | BODY MASS INDEX: 31.15 KG/M2

## 2021-07-01 VITALS
TEMPERATURE: 97.5 F | HEART RATE: 110 BPM | HEIGHT: 66 IN | BODY MASS INDEX: 45.74 KG/M2 | WEIGHT: 284.6 LBS | DIASTOLIC BLOOD PRESSURE: 80 MMHG | SYSTOLIC BLOOD PRESSURE: 117 MMHG

## 2021-07-01 VITALS
HEIGHT: 66 IN | BODY MASS INDEX: 47.38 KG/M2 | WEIGHT: 294.8 LBS | SYSTOLIC BLOOD PRESSURE: 117 MMHG | DIASTOLIC BLOOD PRESSURE: 73 MMHG | HEART RATE: 93 BPM | TEMPERATURE: 97.6 F

## 2021-07-01 VITALS
TEMPERATURE: 97.6 F | SYSTOLIC BLOOD PRESSURE: 113 MMHG | WEIGHT: 259.2 LBS | BODY MASS INDEX: 41.66 KG/M2 | HEART RATE: 73 BPM | HEIGHT: 66 IN | DIASTOLIC BLOOD PRESSURE: 66 MMHG

## 2021-07-01 VITALS
HEIGHT: 66 IN | WEIGHT: 252.8 LBS | TEMPERATURE: 97.8 F | HEART RATE: 107 BPM | SYSTOLIC BLOOD PRESSURE: 108 MMHG | BODY MASS INDEX: 40.63 KG/M2 | DIASTOLIC BLOOD PRESSURE: 79 MMHG

## 2021-07-01 VITALS
SYSTOLIC BLOOD PRESSURE: 107 MMHG | WEIGHT: 252 LBS | BODY MASS INDEX: 40.5 KG/M2 | DIASTOLIC BLOOD PRESSURE: 67 MMHG | HEIGHT: 66 IN | HEART RATE: 90 BPM | TEMPERATURE: 97.7 F

## 2021-07-01 VITALS
HEIGHT: 66 IN | TEMPERATURE: 97.7 F | BODY MASS INDEX: 46.32 KG/M2 | DIASTOLIC BLOOD PRESSURE: 75 MMHG | SYSTOLIC BLOOD PRESSURE: 130 MMHG | WEIGHT: 288.2 LBS | HEART RATE: 92 BPM

## 2021-07-01 VITALS
WEIGHT: 292.6 LBS | HEIGHT: 66 IN | TEMPERATURE: 97 F | DIASTOLIC BLOOD PRESSURE: 76 MMHG | BODY MASS INDEX: 47.02 KG/M2 | SYSTOLIC BLOOD PRESSURE: 116 MMHG | HEART RATE: 84 BPM

## 2021-07-01 VITALS
HEIGHT: 66 IN | BODY MASS INDEX: 43.13 KG/M2 | WEIGHT: 268.4 LBS | HEART RATE: 73 BPM | TEMPERATURE: 97.4 F | SYSTOLIC BLOOD PRESSURE: 117 MMHG | DIASTOLIC BLOOD PRESSURE: 73 MMHG

## 2021-07-01 VITALS
DIASTOLIC BLOOD PRESSURE: 72 MMHG | HEIGHT: 66 IN | HEART RATE: 92 BPM | BODY MASS INDEX: 45.79 KG/M2 | WEIGHT: 284.9 LBS | TEMPERATURE: 97.8 F | SYSTOLIC BLOOD PRESSURE: 127 MMHG

## 2021-07-01 VITALS
DIASTOLIC BLOOD PRESSURE: 77 MMHG | HEART RATE: 77 BPM | SYSTOLIC BLOOD PRESSURE: 118 MMHG | WEIGHT: 300.6 LBS | HEIGHT: 66 IN | BODY MASS INDEX: 48.31 KG/M2 | TEMPERATURE: 97.8 F

## 2021-07-01 VITALS
BODY MASS INDEX: 46.88 KG/M2 | HEART RATE: 91 BPM | HEIGHT: 66 IN | SYSTOLIC BLOOD PRESSURE: 127 MMHG | DIASTOLIC BLOOD PRESSURE: 79 MMHG | WEIGHT: 291.7 LBS

## 2021-07-01 VITALS
BODY MASS INDEX: 47.67 KG/M2 | WEIGHT: 296.6 LBS | OXYGEN SATURATION: 95 % | HEIGHT: 66 IN | SYSTOLIC BLOOD PRESSURE: 120 MMHG | HEART RATE: 92 BPM | DIASTOLIC BLOOD PRESSURE: 77 MMHG | TEMPERATURE: 98 F

## 2021-07-01 VITALS
SYSTOLIC BLOOD PRESSURE: 105 MMHG | WEIGHT: 260.4 LBS | DIASTOLIC BLOOD PRESSURE: 68 MMHG | BODY MASS INDEX: 41.85 KG/M2 | TEMPERATURE: 97.3 F | HEIGHT: 66 IN | HEART RATE: 67 BPM

## 2021-07-01 VITALS
HEIGHT: 66 IN | SYSTOLIC BLOOD PRESSURE: 110 MMHG | BODY MASS INDEX: 48.34 KG/M2 | WEIGHT: 300.8 LBS | TEMPERATURE: 97.2 F | HEART RATE: 91 BPM | DIASTOLIC BLOOD PRESSURE: 65 MMHG

## 2021-07-01 VITALS
HEIGHT: 66 IN | SYSTOLIC BLOOD PRESSURE: 113 MMHG | WEIGHT: 299.6 LBS | TEMPERATURE: 97 F | HEART RATE: 86 BPM | DIASTOLIC BLOOD PRESSURE: 73 MMHG | BODY MASS INDEX: 48.15 KG/M2

## 2021-07-01 VITALS
HEIGHT: 66 IN | BODY MASS INDEX: 43.81 KG/M2 | HEART RATE: 79 BPM | TEMPERATURE: 98.2 F | DIASTOLIC BLOOD PRESSURE: 66 MMHG | SYSTOLIC BLOOD PRESSURE: 107 MMHG | WEIGHT: 272.6 LBS

## 2021-07-01 VITALS
HEIGHT: 66 IN | SYSTOLIC BLOOD PRESSURE: 112 MMHG | WEIGHT: 295.4 LBS | HEART RATE: 79 BPM | BODY MASS INDEX: 47.47 KG/M2 | TEMPERATURE: 97 F | DIASTOLIC BLOOD PRESSURE: 71 MMHG

## 2021-07-02 VITALS
WEIGHT: 253 LBS | SYSTOLIC BLOOD PRESSURE: 105 MMHG | HEART RATE: 76 BPM | OXYGEN SATURATION: 97 % | BODY MASS INDEX: 40.66 KG/M2 | DIASTOLIC BLOOD PRESSURE: 81 MMHG | TEMPERATURE: 97 F | HEIGHT: 66 IN

## 2021-07-02 VITALS
TEMPERATURE: 98 F | BODY MASS INDEX: 42.72 KG/M2 | DIASTOLIC BLOOD PRESSURE: 64 MMHG | WEIGHT: 265.8 LBS | SYSTOLIC BLOOD PRESSURE: 114 MMHG | HEART RATE: 82 BPM | HEIGHT: 66 IN

## 2021-07-02 VITALS
HEART RATE: 75 BPM | SYSTOLIC BLOOD PRESSURE: 127 MMHG | TEMPERATURE: 97 F | OXYGEN SATURATION: 97 % | WEIGHT: 263.7 LBS | HEIGHT: 66 IN | DIASTOLIC BLOOD PRESSURE: 82 MMHG | BODY MASS INDEX: 42.38 KG/M2

## 2021-07-02 VITALS
HEIGHT: 66 IN | TEMPERATURE: 98.7 F | HEART RATE: 79 BPM | WEIGHT: 261.6 LBS | DIASTOLIC BLOOD PRESSURE: 70 MMHG | BODY MASS INDEX: 42.04 KG/M2 | SYSTOLIC BLOOD PRESSURE: 116 MMHG

## 2021-07-02 VITALS
DIASTOLIC BLOOD PRESSURE: 63 MMHG | WEIGHT: 258.8 LBS | TEMPERATURE: 97.1 F | BODY MASS INDEX: 41.59 KG/M2 | SYSTOLIC BLOOD PRESSURE: 103 MMHG | HEIGHT: 66 IN | HEART RATE: 81 BPM

## 2021-07-10 DIAGNOSIS — K21.00 GASTROESOPHAGEAL REFLUX DISEASE WITH ESOPHAGITIS, UNSPECIFIED WHETHER HEMORRHAGE: ICD-10-CM

## 2021-07-12 RX ORDER — OMEPRAZOLE 40 MG/1
40 CAPSULE, DELAYED RELEASE ORAL DAILY
Qty: 30 CAPSULE | Refills: 2 | Status: SHIPPED | OUTPATIENT
Start: 2021-07-12 | End: 2021-10-17

## 2021-07-17 NOTE — ED PROVIDER NOTES
Subjective   History of Present Illness    Review of Systems    Past Medical History:   Diagnosis Date   • Chronic pain    • Claustrophobia    • Essential (primary) hypertension    • GERD (gastroesophageal reflux disease)    • Hemochromatosis    • History of COVID-19    • Left upper quadrant pain    • Low back pain    • Lung nodule    • Mixed hyperlipidemia    • Obstructive sleep apnea (adult) (pediatric)    • Other testicular dysfunction    • Pain in right foot    • Pain in right shoulder    • Renal stone 03/2017   • Tracheostomy status (CMS/[facility])    • Type 2 diabetes mellitus (CMS/[facility])        No Known Allergies    Past Surgical History:   Procedure Laterality Date   • ACHILLES TENDON REPAIR Right 10/2018   • APPENDECTOMY     • CARDIAC CATHETERIZATION  11/03/2015    LEFT VENTRIULOGRAM, CORONARY ANGIOGRAM    • CHOLECYSTECTOMY  07/2013   • COLONOSCOPY  07/28/2014    NORMAL RESULT    • JOINT REPLACEMENT Bilateral     RIGHT KNEE 01/2016   • TONSILLECTOMY AND ADENOIDECTOMY     • TRACHEOSTOMY      SECONDARY TO SLEEP APNEA       Family History   Problem Relation Age of Onset   • Arrhythmia Mother    • Stroke Mother 73   • Diabetes type II Mother    • Heart attack Father    • Hypertension Brother    • Cerebral aneurysm Brother 38   • Diabetes type II Brother    • Coronary artery disease Other        Social History     Socioeconomic History   • Marital status:      Spouse name: Not on file   • Number of children: 2   • Years of education: Not on file   • Highest education level: Not on file   Tobacco Use   • Smoking status: Never Smoker   • Smokeless tobacco: Never Used   Substance and Sexual Activity   • Alcohol use: Yes     Comment: SOCIALLY    • Drug use: Never           Objective   Physical Exam    Procedures           ED Course                                           MDM    Final diagnoses:   Tracheostomy complication, unspecified complication type (CMS/[facility])       ED Disposition  ED Disposition     ED  Disposition Condition Comment    Discharge Stable Patient discharged home in NAD and ambulatory.           Karley Amor, APRN  [address] [place] [id number] [phone]      As needed         Medication List      No changes were made to your prescriptions during this visit.

## 2021-07-24 NOTE — ED PROVIDER NOTES
Subjective   Pt presents with concern for trach infection.     No fevers.     Nothing makes worse or better.      No other symptoms.    No difficulty swallowing.           Review of Systems   Constitutional: Negative for chills and fever.   HENT: Negative for congestion, ear pain and sore throat.    Eyes: Negative for pain.   Respiratory: Negative for cough, chest tightness and shortness of breath.    Cardiovascular: Negative for chest pain.   Gastrointestinal: Negative for abdominal pain, diarrhea, nausea and vomiting.   Genitourinary: Negative for flank pain and hematuria.   Musculoskeletal: Negative for joint swelling.   Skin: Negative for pallor.   Neurological: Negative for seizures and headaches.   All other systems reviewed and are negative.      Past Medical History:   Diagnosis Date   • Chronic pain    • Claustrophobia    • Essential (primary) hypertension    • GERD (gastroesophageal reflux disease)    • Hemochromatosis    • History of COVID-19    • Left upper quadrant pain    • Low back pain    • Lung nodule    • Mixed hyperlipidemia    • Obstructive sleep apnea (adult) (pediatric)    • Other testicular dysfunction    • Pain in right foot    • Pain in right shoulder    • Renal stone 03/2017   • Tracheostomy status (CMS/[facility])    • Type 2 diabetes mellitus (CMS/[facility])        No Known Allergies    Past Surgical History:   Procedure Laterality Date   • ACHILLES TENDON REPAIR Right 10/2018   • APPENDECTOMY     • CARDIAC CATHETERIZATION  11/03/2015    LEFT VENTRIULOGRAM, CORONARY ANGIOGRAM    • CHOLECYSTECTOMY  07/2013   • COLONOSCOPY  07/28/2014    NORMAL RESULT    • JOINT REPLACEMENT Bilateral     RIGHT KNEE 01/2016   • TONSILLECTOMY AND ADENOIDECTOMY     • TRACHEOSTOMY      SECONDARY TO SLEEP APNEA       Family History   Problem Relation Age of Onset   • Arrhythmia Mother    • Stroke Mother 73   • Diabetes type II Mother    • Heart attack Father    • Hypertension Brother    • Cerebral aneurysm Brother 38   •  Diabetes type II Brother    • Coronary artery disease Other        Social History     Socioeconomic History   • Marital status:      Spouse name: Not on file   • Number of children: 2   • Years of education: Not on file   • Highest education level: Not on file   Tobacco Use   • Smoking status: Never Smoker   • Smokeless tobacco: Never Used   Substance and Sexual Activity   • Alcohol use: Yes     Comment: SOCIALLY    • Drug use: Never           Objective   Physical Exam  Vitals and nursing note reviewed.   Constitutional:       General: He is not in acute distress.     Appearance: Normal appearance. He is not toxic-appearing.   HENT:      Head: Normocephalic and atraumatic.      Mouth/Throat:      Mouth: Mucous membranes are moist.   Eyes:      Extraocular Movements: Extraocular movements intact.      Pupils: Pupils are equal, round, and reactive to light.   Cardiovascular:      Rate and Rhythm: Normal rate and regular rhythm.      Pulses: Normal pulses.      Heart sounds: Normal heart sounds.   Pulmonary:      Effort: Pulmonary effort is normal. No respiratory distress.      Breath sounds: Normal breath sounds.   Abdominal:      General: Abdomen is flat.      Palpations: Abdomen is soft.      Tenderness: There is no abdominal tenderness.   Musculoskeletal:         General: Normal range of motion.      Cervical back: Normal range of motion and neck supple.   Skin:     General: Skin is warm and dry.   Neurological:      Mental Status: He is alert and oriented to person, place, and time. Mental status is at baseline.         Procedures           ED Course                                           MDM  Number of Diagnoses or Management Options  Tracheostomy complication, unspecified complication type (CMS/[facility])  Diagnosis management comments: Patient is in NAD and non toxic/. No signs of infection. He has had his trach for many years and has complained of soreness before. He also has had sinus symptoms. I think he  has a lot of drainage which is worsening his symptoms but I did culture. This is be f/u and treated after cultures return.        Amount and/or Complexity of Data Reviewed  Clinical lab tests: reviewed  Tests in the radiology section of CPT®: reviewed        Final diagnoses:   Tracheostomy complication, unspecified complication type (CMS/[facility])       ED Disposition  ED Disposition     ED Disposition Condition Comment    Discharge Stable Patient discharged home in NAD and ambulatory.           Karley Amor, APRN  [address] [place] [address]  [phone]      As needed         Medication List      No changes were made to your prescriptions during this visit.          Roma Bernstein, APRN  07/24/21 0790

## 2021-08-13 RX ORDER — LISINOPRIL 10 MG/1
TABLET ORAL
Qty: 90 TABLET | Refills: 0 | Status: SHIPPED | OUTPATIENT
Start: 2021-08-13 | End: 2022-06-14

## 2021-09-09 ENCOUNTER — OFFICE VISIT (OUTPATIENT)
Dept: FAMILY MEDICINE CLINIC | Age: 56
End: 2021-09-09

## 2021-09-09 VITALS
WEIGHT: 262.2 LBS | TEMPERATURE: 97.7 F | HEIGHT: 65 IN | DIASTOLIC BLOOD PRESSURE: 85 MMHG | BODY MASS INDEX: 43.68 KG/M2 | HEART RATE: 92 BPM | OXYGEN SATURATION: 97 % | SYSTOLIC BLOOD PRESSURE: 122 MMHG

## 2021-09-09 DIAGNOSIS — R19.7 DIARRHEA OF PRESUMED INFECTIOUS ORIGIN: ICD-10-CM

## 2021-09-09 DIAGNOSIS — R06.00 DYSPNEA, UNSPECIFIED TYPE: Primary | ICD-10-CM

## 2021-09-09 PROBLEM — R42 DIZZINESS: Status: RESOLVED | Noted: 2021-06-16 | Resolved: 2021-09-09

## 2021-09-09 PROBLEM — M77.31 CALCANEAL SPUR OF RIGHT FOOT: Status: RESOLVED | Noted: 2018-03-15 | Resolved: 2021-09-09

## 2021-09-09 PROBLEM — D72.829 LEUKOCYTOSIS: Status: RESOLVED | Noted: 2021-06-16 | Resolved: 2021-09-09

## 2021-09-09 PROBLEM — Z93.0 TRACHEOSTOMY DEPENDENCE: Status: RESOLVED | Noted: 2021-06-16 | Resolved: 2021-09-09

## 2021-09-09 PROBLEM — M76.61 ACHILLES TENDINITIS OF RIGHT LOWER EXTREMITY: Status: RESOLVED | Noted: 2018-03-15 | Resolved: 2021-09-09

## 2021-09-09 PROBLEM — J95.02 TRACHEOSTOMY INFECTION: Chronic | Status: RESOLVED | Noted: 2021-06-16 | Resolved: 2021-09-09

## 2021-09-09 PROBLEM — N20.0 RENAL STONE: Status: RESOLVED | Noted: 2017-03-01 | Resolved: 2021-09-09

## 2021-09-09 PROBLEM — G44.209 TENSION TYPE HEADACHE: Status: RESOLVED | Noted: 2021-06-16 | Resolved: 2021-09-09

## 2021-09-09 PROBLEM — M72.2 PLANTAR FASCIITIS: Status: RESOLVED | Noted: 2021-06-16 | Resolved: 2021-09-09

## 2021-09-09 PROBLEM — K76.9 LIVER DISEASE: Status: RESOLVED | Noted: 2021-06-16 | Resolved: 2021-09-09

## 2021-09-09 PROBLEM — R60.9 EDEMA: Status: RESOLVED | Noted: 2021-06-16 | Resolved: 2021-09-09

## 2021-09-09 LAB
EXPIRATION DATE: NORMAL
FLUAV AG UPPER RESP QL IA.RAPID: NOT DETECTED
FLUBV AG UPPER RESP QL IA.RAPID: NOT DETECTED
INTERNAL CONTROL: NORMAL
Lab: NORMAL
SARS-COV-2 AG UPPER RESP QL IA.RAPID: NOT DETECTED

## 2021-09-09 PROCEDURE — 99213 OFFICE O/P EST LOW 20 MIN: CPT | Performed by: FAMILY MEDICINE

## 2021-09-09 PROCEDURE — C9803 HOPD COVID-19 SPEC COLLECT: HCPCS | Performed by: FAMILY MEDICINE

## 2021-09-09 PROCEDURE — U0004 COV-19 TEST NON-CDC HGH THRU: HCPCS | Performed by: FAMILY MEDICINE

## 2021-09-09 PROCEDURE — 87428 SARSCOV & INF VIR A&B AG IA: CPT | Performed by: FAMILY MEDICINE

## 2021-09-09 NOTE — ASSESSMENT & PLAN NOTE
POSSIBLY VIRAL, POSSIBLY RELATED TO HIS PRIOR GALLBLADDER REMOVAL.  OTC MEDS AND FLUIDS FOR NOW BUT ALSO WILL CHECK A PCR DUE TO HIS BODY ACHES AND RECENT COVID EXPOSURE.  MAY NEED FURTHER WORKUP OR A GASTRO EVAL IF THE DIARRHEA PERSISTS OR WORSENS.

## 2021-09-10 RX ORDER — SEMAGLUTIDE 1.34 MG/ML
1 INJECTION, SOLUTION SUBCUTANEOUS WEEKLY
Qty: 9 ML | Refills: 0 | Status: SHIPPED | OUTPATIENT
Start: 2021-09-10 | End: 2021-12-17

## 2021-09-11 LAB — SARS-COV-2 RNA NOSE QL NAA+PROBE: NOT DETECTED

## 2021-09-21 ENCOUNTER — HOSPITAL ENCOUNTER (EMERGENCY)
Dept: HOSPITAL 49 - FER | Age: 56
Discharge: HOME | End: 2021-09-21
Payer: COMMERCIAL

## 2021-09-21 DIAGNOSIS — E11.9: ICD-10-CM
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
## 2021-09-21 DIAGNOSIS — S46.812A: Primary | ICD-10-CM
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
## 2021-09-21 LAB
BASOPHIL: 0.8 % (ref 0–2)
BUN SERPL-MCNC: 14 MG/DL (ref 7–18)
BUN/CREAT RATIO (CALC): 18.9 RATIO
C-REACTIVE PROTEIN: 0.6 MG/DL (ref ?–0.9)
CHLORIDE: 106 MMOL/L (ref 98–107)
CO2 (BICARBONATE): 28 MMOL/L (ref 21–32)
CREATININE: 0.74 MG/DL (ref 0.67–1.17)
EOSINOPHIL: 2.4 % (ref 0–5)
GLUCOSE SERPL-MCNC: 148 MG/DL (ref 74–106)
HCT: 44.9 % (ref 42–52)
HGB BLD-MCNC: 15.2 G/DL (ref 13.2–18)
LYMPHOCYTE: 33.5 % (ref 15–48)
MCH RBC QN AUTO: 31 PG (ref 25–31)
MCHC RBC AUTO-ENTMCNC: 33.9 G/DL (ref 32–36)
MCV: 91.4 FL (ref 78–100)
MONOCYTE: 14.5 % (ref 0–12)
MPV: 9.1 FL (ref 6–9.5)
NEUTROPHIL: 48.4 % (ref 41–80)
NRBC: 0
PLT: 227 K/UL (ref 150–400)
POTASSIUM: 4 MMOL/L (ref 3.5–5.1)
RBC MORPHOLOGY: NORMAL
RBC: 4.91 M/UL (ref 4.7–6)
RDW: 12 % (ref 11.5–14)
WBC: 8.3 K/UL (ref 4–10.5)

## 2021-10-15 DIAGNOSIS — K21.00 GASTROESOPHAGEAL REFLUX DISEASE WITH ESOPHAGITIS, UNSPECIFIED WHETHER HEMORRHAGE: ICD-10-CM

## 2021-10-17 RX ORDER — OMEPRAZOLE 40 MG/1
CAPSULE, DELAYED RELEASE ORAL
Qty: 90 CAPSULE | Refills: 1 | Status: SHIPPED | OUTPATIENT
Start: 2021-10-17 | End: 2022-02-14

## 2021-11-29 NOTE — PROGRESS NOTES
Body Location Override (Optional - Billing Will Still Be Based On Selected Body Map Location If Applicable): left inferior anterior neck Fabio Juárez 1965     Office/Outpatient Visit    Visit Date: Tue, Jan 23, 2018 06:19 pm    Provider: Karley Amor N.P. (Assistant: Nancy Alvarez MA)    Location: [facility]        Electronically signed by Karley Amor N.P. on  01/25/2018 08:41:25 AM                             SUBJECTIVE:        CC:     Mauri is a 52 year old White male.  Patient is here for cough and sinus congestion.          HPI:         Upper respiratory illness noted.  These have been present for the past 2 weeks.  The symptoms include cough and right ear pain.  He denies fever or nasal congestion.  He denies exposure to ill contacts.  He has already tried to relieve the symptoms with antibiotics.  Medical history is significant for allergies and trach in place.      Regarding DM, blood sugars have been running high.  Has run out of some of his medications.  Will schedule follow up in the next 1-2 months     ROS:     CONSTITUTIONAL:  Positive for fatigue.   Negative for chills or fever.      E/N/T:  Positive for ear pain ( right ).      CARDIOVASCULAR:  Negative for chest pain and pedal edema.      RESPIRATORY:  Positive for recent cough.   Negative for dyspnea.      NEUROLOGICAL:  Negative for dizziness, fainting, headaches and paresthesias.          PMH/FMH/SH:     Last Reviewed on 6/20/2016 02:30 PM by Karley Amor    Past Medical History:             PAST MEDICAL HISTORY         Sleep Apnea     hemochromatosis     Chronic Pain: affecting the low back;     Type 2 Diabetes         CURRENT MEDICAL PROVIDERS:    Cardiologist: Pancho chavira -Last visit - 10/2016 - (recommend 1 year follow up)    Dermatologist    Pulmonologist: Dr. Davis    pain mgmt    liver specialist Dr Lewis         PREVENTIVE HEALTH MAINTENANCE             COLORECTAL CANCER SCREENING: Up to date (colonoscopy q10y; sigmoidoscopy q5y; Cologuard q3y) was last done 3/2017; colonoscopy with normal results     DENTAL CLEANING: does not go      EYE EXAM: was last done  Jeff     INFLUENZA VACCINE: was last done          PAST MEDICAL HISTORY         Positive for    Sleep Apnea,    tracheostomy and    lung nodule;     Positive for    Gastroesophageal Reflux Disease;     Positive for    Chronic Pain;     Positive for    Type 2 Diabetes: uncontrolled; ;          hemachromatosis     Positive for    Allergies;         CURRENT MEDICAL PROVIDERS:    E/N/T: Pardeep         Surgical History:         Cholecystectomy: ;     Joint Replacement: BOTH KNEES;;; Right knee       Appendectomy    Tonsillectomy/Adenoidectomy    tracheostomy secondary to sleep apnea;     Procedures: heat cath 11-3-15, left ventriulogram, coronary angiogram     COLONOSCOPY: was last done 14 with normal results Dr Anderson         Family History:     Father: ;  Myocardial Infarction     Mother: Arrhythmia     Brother(s): 2 brother(s) total; 1 ;  Hypertension;  cerebral aneurysm  at 38;  Type 2 Diabetes     Positive for Coronary Artery Disease.      Positive for Type 2 Diabetes ( mother ).          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          Alcohol: Frequency: Socially         Substance Abuse History:     Last Reviewed on 2016 02:30 PM by Karley Amor        Mental Health History:     Last Reviewed on 2016 02:30 PM by Karley Amor        Communicable Diseases (eg STDs):     Last Reviewed on 2016 02:30 PM by Karley Amor            Current Problems:     Last Reviewed on 5/10/2017 06:37 AM by Karley Amor    Leukocytosis, unspecified     Lung nodule     Other hemochromatosis     Low back pain     Peripheral neuropathy     Chronic tension type headache     Obstructive  Surgeon (Optional): Morales Garland sleep apnea     Tracheostomy status     Testosterone deficiency     History of GERD     Hypercholesterolemia     Type 2 diabetes     Otalgia         Immunizations:     Fluzone (3 + years dose) 1/8/2013     Fluzone (3 + years dose) 1/18/2017     Fluzone pf (3+ years dose) 1/9/2018         Allergies:     Last Reviewed on 1/23/2018 06:20 PM by Nancy Alvarez      No Known Drug Allergies.         Current Medications:     Last Reviewed on 1/23/2018 06:22 PM by Nancy Alvarez    Pioglitazone HCl 45mg Tablet Take 1 tablet(s) by mouth daily     Lipitor 80mg Tablet One PO Q HS     Metformin HCl 500mg Tablet Take 2 tablet(s) by mouth bid     Baclofen 10mg Tablet 1/2 to 1 tablet at bedtime for low back pain/muscle spasms     Mobic 15mg Tablet 1 tab daily     Cetirizine HCl 5mg Tablet Take 1 tablet(s) by mouth daily     Jardiance 10mg Tablet Take 1 tablet(s) by mouth qam     Neurontin 100mg Capsules Take 2 capsule(s) by mouth tid     Lovaza 1gm Capsules Take 1 capsule(s) by mouth bid     Augmentin 875mg/125mg Tablet 1 tab bid X 10 days     Vitamin E 400IU Capsules 2 tab q day     Oxycodone/Acetaminophen  5mg/300mg Tablet Take 1 tablet(s) by mouth q6h prn for pain         OBJECTIVE:        Vitals:         Current: 1/23/2018 6:21:15 PM    Ht:  5 ft, 6 in;  Wt: 288.2 lbs;  BMI: 46.5    T: 97.7 F (oral);  BP: 130/75 mm Hg (left arm, sitting);  P: 92 bpm (left arm (BP Cuff), sitting);  sCr: 0.55 mg/dL;  GFR: 179.72        Exams:     PHYSICAL EXAM:     GENERAL: appears moderately ill;     E/N/T: EARS: bilateral TMs are normal;     RESPIRATORY: normal respiratory rate and pattern with no distress; coarse breath sounds throughout;     CARDIOVASCULAR: normal rate; rhythm is regular;     LYMPHATIC: no enlargement of cervical or facial nodes; no supraclavicular nodes;     MUSCULOSKELETAL: normal gait; normal range of motion of all major muscle groups; no limb or joint pain with range of motion;     NEUROLOGIC: mental status: alert;  Surgeon Performing The Repair (Optional): Morales Garland oriented to person, place, and time;     PSYCHIATRIC:  appropriate affect and demeanor; normal speech pattern; grossly normal memory;         ASSESSMENT           466.0   J20.9  Acute bronchitis              DDx:     250.00   E11.40  Type 2 diabetes              DDx:         ORDERS:         Meds Prescribed:       Levaquin (Levofloxacin ) 500mg Tablet Take 1 tablet(s) by mouth daily for 10 days  #10 (Ten) tablet(s) Refills: 0       Fexofenadine HCl 180mg Tablet 1 tab daily  #30 (Thirty) tablet(s) Refills: 0       Robitussin-DM (Dextromethorphan/Guaifenesin) Oral Liquid 1 to 2 tsp q4-6 hours prn  #6 (Six) oz Refills: 0       Benzonatate 200mg Capsules 1 capsule tid  #30 (Thirty) capsule(s) Refills: 0       Refill of: Pioglitazone HCl 45mg Tablet Take 1 tablet(s) by mouth daily  #30 (Thirty) tablet(s) Refills: 1       Refill of: Metformin HCl 500mg Tablet Take 2 tablet(s) by mouth bid  #60 (Sixty) tablet(s) Refills: 1       Refill of: Jardiance (Empagliflozin) 10mg Tablet Take 1 tablet(s) by mouth qam  #30 (Thirty) tablet(s) Refills: 1                 PLAN:          Acute bronchitis           Prescriptions:       Levaquin (Levofloxacin ) 500mg Tablet Take 1 tablet(s) by mouth daily for 10 days  #10 (Ten) tablet(s) Refills: 0       Fexofenadine HCl 180mg Tablet 1 tab daily  #30 (Thirty) tablet(s) Refills: 0       Robitussin-DM (Dextromethorphan/Guaifenesin) Oral Liquid 1 to 2 tsp q4-6 hours prn  #6 (Six) oz Refills: 0       Benzonatate 200mg Capsules 1 capsule tid  #30 (Thirty) capsule(s) Refills: 0          Type 2 diabetes         RECOMMENDATIONS given include: Need to come in for follow up DM in next 1-2 months.            Prescriptions:       Refill of: Pioglitazone HCl 45mg Tablet Take 1 tablet(s) by mouth daily  #30 (Thirty) tablet(s) Refills: 1       Refill of: Metformin HCl 500mg Tablet Take 2 tablet(s) by mouth bid  #60 (Sixty) tablet(s) Refills: 1       Refill of: Jardiance (Empagliflozin) 10mg Tablet Take 1  Size Of Lesion In Cm: 1.6 tablet(s) by mouth qam  #30 (Thirty) tablet(s) Refills: 1             Patient Recommendations:        For  Type 2 diabetes:     I also recommend Need to come in for follow up DM in next 1-2 months.              CHARGE CAPTURE           **Please note: ICD descriptions below are intended for billing purposes only and may not represent clinical diagnoses**        Primary Diagnosis:         466.0 Acute bronchitis            J20.9    Acute bronchitis, unspecified              Orders:          [id number]   Office/outpatient visit; established patient, level 4  (In-House)           250.00 Type 2 diabetes            E11.40    Type 2 diabetes mellitus with diabetic neuropathy, unspecified            X Size Of Lesion In Cm (Optional): 1.1 Size Of Margin In Cm: 0.2 Anesthesia Volume In Cc: 6 Was An Eye Clamp Used?: No Eye Clamp Note Details: An eye clamp was used during the procedure. Excision Method: Fusiform Saucerization Depth: dermis and superficial adipose tissue Repair Type: Complex Suturegard Retention Suture: 2-0 Nylon Retention Suture Bite Size: 3 mm Length To Time In Minutes Device Was In Place: 10 Number Of Hemigard Strips Per Side: 1 Intermediate / Complex Repair - Final Wound Length In Cm: 4.5 Complex Requirements: Extensive Undermining Performed?: Yes Width Of Defect Perpendicular To Closure In Cm (Required): 1.5 Undermining Type: Entire Wound Debridement Text: The wound edges were debrided prior to proceeding with the closure to facilitate wound healing. Helical Rim Text: The closure involved the helical rim. Vermilion Border Text: The closure involved the vermilion border. Nostril Rim Text: The closure involved the nostril rim. Retention Suture Text: Retention sutures were placed to support the closure and prevent dehiscence. Primary Defect Length (In Cm): 0 Suture Removal: 14 days Graft Donor Site Bandage (Optional-Leave Blank If You Don't Want In Note): Steri-strips and a pressure bandage were applied to the donor site. Epidermal Closure Graft Donor Site (Optional): simple interrupted Billing Type: Third-Party Bill Excision Depth: adipose tissue Scalpel Size: 15 blade Anesthesia Type: 1% lidocaine with epinephrine Hemostasis: Electrocautery Estimated Blood Loss (Cc): minimal Detail Level: Detailed Deep Sutures: 5-0 Monocryl Epidermal Sutures: 5-0 Prolene Epidermal Closure: running Wound Care: Petrolatum Dressing: dry sterile dressing Suturegard Intro: Intraoperative tissue expansion was performed, utilizing the SUTUREGARD device, in order to reduce wound tension. Suturegard Body: The suture ends were repeatedly re-tightened and re-clamped to achieve the desired tissue expansion. Hemigard Intro: Due to skin fragility and wound tension, it was decided to use HEMIGARD adhesive retention suture devices to permit a linear closure. The skin was cleaned and dried for a 6cm distance away from the wound. Excessive hair, if present, was removed to allow for adhesion. Hemigard Postcare Instructions: The HEMIGARD strips are to remain completely dry for at least 5-7 days. Positioning (Leave Blank If You Do Not Want): The patient was placed in a comfortable position exposing the surgical site. Pre-Excision Curettage Text (Leave Blank If You Do Not Want): Prior to drawing the surgical margin the visible lesion was removed with electrodesiccation and curettage to clearly define the lesion size. Complex Repair Preamble Text (Leave Blank If You Do Not Want): Extensive wide undermining was performed. Intermediate Repair Preamble Text (Leave Blank If You Do Not Want): Undermining was performed with blunt dissection. Curvilinear Excision Additional Text (Leave Blank If You Do Not Want): The margin was drawn around the clinically apparent lesion.  A curvilinear shape was then drawn on the skin incorporating the lesion and margins.  Incisions were then made along these lines to the appropriate tissue plane and the lesion was extirpated. Fusiform Excision Additional Text (Leave Blank If You Do Not Want): The margin was drawn around the clinically apparent lesion.  A fusiform shape was then drawn on the skin incorporating the lesion and margins.  Incisions were then made along these lines to the appropriate tissue plane and the lesion was extirpated. Elliptical Excision Additional Text (Leave Blank If You Do Not Want): The margin was drawn around the clinically apparent lesion.  An elliptical shape was then drawn on the skin incorporating the lesion and margins.  Incisions were then made along these lines to the appropriate tissue plane and the lesion was extirpated. Saucerization Excision Additional Text (Leave Blank If You Do Not Want): The margin was drawn around the clinically apparent lesion.  Incisions were then made along these lines, in a tangential fashion, to the appropriate tissue plane and the lesion was extirpated. Slit Excision Additional Text (Leave Blank If You Do Not Want): A linear line was drawn on the skin overlying the lesion. An incision was made slowly until the lesion was visualized.  Once visualized, the lesion was removed with blunt dissection. Excisional Biopsy Additional Text (Leave Blank If You Do Not Want): The margin was drawn around the clinically apparent lesion. An elliptical shape was then drawn on the skin incorporating the lesion and margins.  Incisions were then made along these lines to the appropriate tissue plane and the lesion was extirpated. Perilesional Excision Additional Text (Leave Blank If You Do Not Want): The margin was drawn around the clinically apparent lesion. Incisions were then made along these lines to the appropriate tissue plane and the lesion was extirpated. Repair Performed By Another Provider Text (Leave Blank If You Do Not Want): After the tissue was excised the defect was repaired by another provider. No Repair - Repaired With Adjacent Surgical Defect Text (Leave Blank If You Do Not Want): After the excision the defect was repaired concurrently with another surgical defect which was in close approximation. Adjacent Tissue Transfer Text: The defect edges were debeveled with a #15 scalpel blade.  Given the location of the defect and the proximity to free margins an adjacent tissue transfer was deemed most appropriate.  Using a sterile surgical marker, an appropriate flap was drawn incorporating the defect and placing the expected incisions within the relaxed skin tension lines where possible.    The area thus outlined was incised deep to adipose tissue with a #15 scalpel blade.  The skin margins were undermined to an appropriate distance in all directions utilizing iris scissors. Advancement Flap (Single) Text: The defect edges were debeveled with a #15 scalpel blade.  Given the location of the defect and the proximity to free margins a single advancement flap was deemed most appropriate.  Using a sterile surgical marker, an appropriate advancement flap was drawn incorporating the defect and placing the expected incisions within the relaxed skin tension lines where possible.    The area thus outlined was incised deep to adipose tissue with a #15 scalpel blade.  The skin margins were undermined to an appropriate distance in all directions utilizing iris scissors. Advancement Flap (Double) Text: The defect edges were debeveled with a #15 scalpel blade.  Given the location of the defect and the proximity to free margins a double advancement flap was deemed most appropriate.  Using a sterile surgical marker, the appropriate advancement flaps were drawn incorporating the defect and placing the expected incisions within the relaxed skin tension lines where possible.    The area thus outlined was incised deep to adipose tissue with a #15 scalpel blade.  The skin margins were undermined to an appropriate distance in all directions utilizing iris scissors. Burow's Advancement Flap Text: The defect edges were debeveled with a #15 scalpel blade.  Given the location of the defect and the proximity to free margins a Burow's advancement flap was deemed most appropriate.  Using a sterile surgical marker, the appropriate advancement flap was drawn incorporating the defect and placing the expected incisions within the relaxed skin tension lines where possible.    The area thus outlined was incised deep to adipose tissue with a #15 scalpel blade.  The skin margins were undermined to an appropriate distance in all directions utilizing iris scissors. Chonodrocutaneous Helical Advancement Flap Text: The defect edges were debeveled with a #15 scalpel blade.  Given the location of the defect and the proximity to free margins a chondrocutaneous helical advancement flap was deemed most appropriate.  Using a sterile surgical marker, the appropriate advancement flap was drawn incorporating the defect and placing the expected incisions within the relaxed skin tension lines where possible.    The area thus outlined was incised deep to adipose tissue with a #15 scalpel blade.  The skin margins were undermined to an appropriate distance in all directions utilizing iris scissors. Crescentic Advancement Flap Text: The defect edges were debeveled with a #15 scalpel blade.  Given the location of the defect and the proximity to free margins a crescentic advancement flap was deemed most appropriate.  Using a sterile surgical marker, the appropriate advancement flap was drawn incorporating the defect and placing the expected incisions within the relaxed skin tension lines where possible.    The area thus outlined was incised deep to adipose tissue with a #15 scalpel blade.  The skin margins were undermined to an appropriate distance in all directions utilizing iris scissors. A-T Advancement Flap Text: The defect edges were debeveled with a #15 scalpel blade.  Given the location of the defect, shape of the defect and the proximity to free margins an A-T advancement flap was deemed most appropriate.  Using a sterile surgical marker, an appropriate advancement flap was drawn incorporating the defect and placing the expected incisions within the relaxed skin tension lines where possible.    The area thus outlined was incised deep to adipose tissue with a #15 scalpel blade.  The skin margins were undermined to an appropriate distance in all directions utilizing iris scissors. O-T Advancement Flap Text: The defect edges were debeveled with a #15 scalpel blade.  Given the location of the defect, shape of the defect and the proximity to free margins an O-T advancement flap was deemed most appropriate.  Using a sterile surgical marker, an appropriate advancement flap was drawn incorporating the defect and placing the expected incisions within the relaxed skin tension lines where possible.    The area thus outlined was incised deep to adipose tissue with a #15 scalpel blade.  The skin margins were undermined to an appropriate distance in all directions utilizing iris scissors. O-L Flap Text: The defect edges were debeveled with a #15 scalpel blade.  Given the location of the defect, shape of the defect and the proximity to free margins an O-L flap was deemed most appropriate.  Using a sterile surgical marker, an appropriate advancement flap was drawn incorporating the defect and placing the expected incisions within the relaxed skin tension lines where possible.    The area thus outlined was incised deep to adipose tissue with a #15 scalpel blade.  The skin margins were undermined to an appropriate distance in all directions utilizing iris scissors. O-Z Flap Text: The defect edges were debeveled with a #15 scalpel blade.  Given the location of the defect, shape of the defect and the proximity to free margins an O-Z flap was deemed most appropriate.  Using a sterile surgical marker, an appropriate transposition flap was drawn incorporating the defect and placing the expected incisions within the relaxed skin tension lines where possible. The area thus outlined was incised deep to adipose tissue with a #15 scalpel blade.  The skin margins were undermined to an appropriate distance in all directions utilizing iris scissors. Double O-Z Flap Text: The defect edges were debeveled with a #15 scalpel blade.  Given the location of the defect, shape of the defect and the proximity to free margins a Double O-Z flap was deemed most appropriate.  Using a sterile surgical marker, an appropriate transposition flap was drawn incorporating the defect and placing the expected incisions within the relaxed skin tension lines where possible. The area thus outlined was incised deep to adipose tissue with a #15 scalpel blade.  The skin margins were undermined to an appropriate distance in all directions utilizing iris scissors. V-Y Flap Text: The defect edges were debeveled with a #15 scalpel blade.  Given the location of the defect, shape of the defect and the proximity to free margins a V-Y flap was deemed most appropriate.  Using a sterile surgical marker, an appropriate advancement flap was drawn incorporating the defect and placing the expected incisions within the relaxed skin tension lines where possible.    The area thus outlined was incised deep to adipose tissue with a #15 scalpel blade.  The skin margins were undermined to an appropriate distance in all directions utilizing iris scissors. Advancement-Rotation Flap Text: The defect edges were debeveled with a #15 scalpel blade.  Given the location of the defect, shape of the defect and the proximity to free margins an advancement-rotation flap was deemed most appropriate.  Using a sterile surgical marker, an appropriate flap was drawn incorporating the defect and placing the expected incisions within the relaxed skin tension lines where possible. The area thus outlined was incised deep to adipose tissue with a #15 scalpel blade.  The skin margins were undermined to an appropriate distance in all directions utilizing iris scissors. Mercedes Flap Text: The defect edges were debeveled with a #15 scalpel blade.  Given the location of the defect, shape of the defect and the proximity to free margins a Mercedes flap was deemed most appropriate.  Using a sterile surgical marker, an appropriate advancement flap was drawn incorporating the defect and placing the expected incisions within the relaxed skin tension lines where possible. The area thus outlined was incised deep to adipose tissue with a #15 scalpel blade.  The skin margins were undermined to an appropriate distance in all directions utilizing iris scissors. Modified Advancement Flap Text: The defect edges were debeveled with a #15 scalpel blade.  Given the location of the defect, shape of the defect and the proximity to free margins a modified advancement flap was deemed most appropriate.  Using a sterile surgical marker, an appropriate advancement flap was drawn incorporating the defect and placing the expected incisions within the relaxed skin tension lines where possible.    The area thus outlined was incised deep to adipose tissue with a #15 scalpel blade.  The skin margins were undermined to an appropriate distance in all directions utilizing iris scissors. Mucosal Advancement Flap Text: Given the location of the defect, shape of the defect and the proximity to free margins a mucosal advancement flap was deemed most appropriate. Incisions were made with a 15 blade scalpel in the appropriate fashion along the cutaneous vermilion border and the mucosal lip. The remaining actinically damaged mucosal tissue was excised.  The mucosal advancement flap was then elevated to the gingival sulcus with care taken to preserve the neurovascular structures and advanced into the primary defect. Care was taken to ensure that precise realignment of the vermilion border was achieved. Peng Advancement Flap Text: The defect edges were debeveled with a #15 scalpel blade.  Given the location of the defect, shape of the defect and the proximity to free margins a Peng advancement flap was deemed most appropriate.  Using a sterile surgical marker, an appropriate advancement flap was drawn incorporating the defect and placing the expected incisions within the relaxed skin tension lines where possible. The area thus outlined was incised deep to adipose tissue with a #15 scalpel blade.  The skin margins were undermined to an appropriate distance in all directions utilizing iris scissors. Hatchet Flap Text: The defect edges were debeveled with a #15 scalpel blade.  Given the location of the defect, shape of the defect and the proximity to free margins a hatchet flap was deemed most appropriate.  Using a sterile surgical marker, an appropriate hatchet flap was drawn incorporating the defect and placing the expected incisions within the relaxed skin tension lines where possible.    The area thus outlined was incised deep to adipose tissue with a #15 scalpel blade.  The skin margins were undermined to an appropriate distance in all directions utilizing iris scissors. Rotation Flap Text: The defect edges were debeveled with a #15 scalpel blade.  Given the location of the defect, shape of the defect and the proximity to free margins a rotation flap was deemed most appropriate.  Using a sterile surgical marker, an appropriate rotation flap was drawn incorporating the defect and placing the expected incisions within the relaxed skin tension lines where possible.    The area thus outlined was incised deep to adipose tissue with a #15 scalpel blade.  The skin margins were undermined to an appropriate distance in all directions utilizing iris scissors. Spiral Flap Text: The defect edges were debeveled with a #15 scalpel blade.  Given the location of the defect, shape of the defect and the proximity to free margins a spiral flap was deemed most appropriate.  Using a sterile surgical marker, an appropriate rotation flap was drawn incorporating the defect and placing the expected incisions within the relaxed skin tension lines where possible. The area thus outlined was incised deep to adipose tissue with a #15 scalpel blade.  The skin margins were undermined to an appropriate distance in all directions utilizing iris scissors. Staged Advancement Flap Text: The defect edges were debeveled with a #15 scalpel blade.  Given the location of the defect, shape of the defect and the proximity to free margins a staged advancement flap was deemed most appropriate.  Using a sterile surgical marker, an appropriate advancement flap was drawn incorporating the defect and placing the expected incisions within the relaxed skin tension lines where possible. The area thus outlined was incised deep to adipose tissue with a #15 scalpel blade.  The skin margins were undermined to an appropriate distance in all directions utilizing iris scissors. Star Wedge Flap Text: The defect edges were debeveled with a #15 scalpel blade.  Given the location of the defect, shape of the defect and the proximity to free margins a star wedge flap was deemed most appropriate.  Using a sterile surgical marker, an appropriate rotation flap was drawn incorporating the defect and placing the expected incisions within the relaxed skin tension lines where possible. The area thus outlined was incised deep to adipose tissue with a #15 scalpel blade.  The skin margins were undermined to an appropriate distance in all directions utilizing iris scissors. Transposition Flap Text: The defect edges were debeveled with a #15 scalpel blade.  Given the location of the defect and the proximity to free margins a transposition flap was deemed most appropriate.  Using a sterile surgical marker, an appropriate transposition flap was drawn incorporating the defect.    The area thus outlined was incised deep to adipose tissue with a #15 scalpel blade.  The skin margins were undermined to an appropriate distance in all directions utilizing iris scissors. Muscle Hinge Flap Text: The defect edges were debeveled with a #15 scalpel blade.  Given the size, depth and location of the defect and the proximity to free margins a muscle hinge flap was deemed most appropriate.  Using a sterile surgical marker, an appropriate hinge flap was drawn incorporating the defect. The area thus outlined was incised with a #15 scalpel blade.  The skin margins were undermined to an appropriate distance in all directions utilizing iris scissors. Mustarde Flap Text: The defect edges were debeveled with a #15 scalpel blade.  Given the size, depth and location of the defect and the proximity to free margins a Mustarde flap was deemed most appropriate.  Using a sterile surgical marker, an appropriate flap was drawn incorporating the defect. The area thus outlined was incised with a #15 scalpel blade.  The skin margins were undermined to an appropriate distance in all directions utilizing iris scissors. Nasal Turnover Hinge Flap Text: The defect edges were debeveled with a #15 scalpel blade.  Given the size, depth, location of the defect and the defect being full thickness a nasal turnover hinge flap was deemed most appropriate.  Using a sterile surgical marker, an appropriate hinge flap was drawn incorporating the defect. The area thus outlined was incised with a #15 scalpel blade. The flap was designed to recreate the nasal mucosal lining and the alar rim. The skin margins were undermined to an appropriate distance in all directions utilizing iris scissors. Nasalis-Muscle-Based Myocutaneous Island Pedicle Flap Text: Using a #15 blade, an incision was made around the donor flap to the level of the nasalis muscle. Wide lateral undermining was then performed in both the subcutaneous plane above the nasalis muscle, and in a submuscular plane just above periosteum. This allowed the formation of a free nasalis muscle axial pedicle (based on the angular artery) which was still attached to the actual cutaneous flap, increasing its mobility and vascular viability. Hemostasis was obtained with pinpoint electrocoagulation. The flap was mobilized into position and the pivotal anchor points positioned and stabilized with buried interrupted sutures. Subcutaneous and dermal tissues were closed in a multilayered fashion with sutures. Tissue redundancies were excised, and the epidermal edges were apposed without significant tension and sutured with sutures. Orbicularis Oris Muscle Flap Text: The defect edges were debeveled with a #15 scalpel blade.  Given that the defect affected the competency of the oral sphincter an obicularis oris muscle flap was deemed most appropriate to restore this competency and normal muscle function.  Using a sterile surgical marker, an appropriate flap was drawn incorporating the defect. The area thus outlined was incised with a #15 scalpel blade. Melolabial Transposition Flap Text: The defect edges were debeveled with a #15 scalpel blade.  Given the location of the defect and the proximity to free margins a melolabial flap was deemed most appropriate.  Using a sterile surgical marker, an appropriate melolabial transposition flap was drawn incorporating the defect.    The area thus outlined was incised deep to adipose tissue with a #15 scalpel blade.  The skin margins were undermined to an appropriate distance in all directions utilizing iris scissors. Rhombic Flap Text: The defect edges were debeveled with a #15 scalpel blade.  Given the location of the defect and the proximity to free margins a rhombic flap was deemed most appropriate.  Using a sterile surgical marker, an appropriate rhombic flap was drawn incorporating the defect.    The area thus outlined was incised deep to adipose tissue with a #15 scalpel blade.  The skin margins were undermined to an appropriate distance in all directions utilizing iris scissors. Rhomboid Transposition Flap Text: The defect edges were debeveled with a #15 scalpel blade.  Given the location of the defect and the proximity to free margins a rhomboid transposition flap was deemed most appropriate.  Using a sterile surgical marker, an appropriate rhomboid flap was drawn incorporating the defect.    The area thus outlined was incised deep to adipose tissue with a #15 scalpel blade.  The skin margins were undermined to an appropriate distance in all directions utilizing iris scissors. Bi-Rhombic Flap Text: The defect edges were debeveled with a #15 scalpel blade.  Given the location of the defect and the proximity to free margins a bi-rhombic flap was deemed most appropriate.  Using a sterile surgical marker, an appropriate rhombic flap was drawn incorporating the defect. The area thus outlined was incised deep to adipose tissue with a #15 scalpel blade.  The skin margins were undermined to an appropriate distance in all directions utilizing iris scissors. Helical Rim Advancement Flap Text: The defect edges were debeveled with a #15 blade scalpel.  Given the location of the defect and the proximity to free margins (helical rim) a double helical rim advancement flap was deemed most appropriate.  Using a sterile surgical marker, the appropriate advancement flaps were drawn incorporating the defect and placing the expected incisions between the helical rim and antihelix where possible.  The area thus outlined was incised through and through with a #15 scalpel blade.  With a skin hook and iris scissors, the flaps were gently and sharply undermined and freed up. Bilateral Helical Rim Advancement Flap Text: The defect edges were debeveled with a #15 blade scalpel.  Given the location of the defect and the proximity to free margins (helical rim) a bilateral helical rim advancement flap was deemed most appropriate.  Using a sterile surgical marker, the appropriate advancement flaps were drawn incorporating the defect and placing the expected incisions between the helical rim and antihelix where possible.  The area thus outlined was incised through and through with a #15 scalpel blade.  With a skin hook and iris scissors, the flaps were gently and sharply undermined and freed up. Ear Star Wedge Flap Text: The defect edges were debeveled with a #15 blade scalpel.  Given the location of the defect and the proximity to free margins (helical rim) an ear star wedge flap was deemed most appropriate.  Using a sterile surgical marker, the appropriate flap was drawn incorporating the defect and placing the expected incisions between the helical rim and antihelix where possible.  The area thus outlined was incised through and through with a #15 scalpel blade. Banner Transposition Flap Text: The defect edges were debeveled with a #15 scalpel blade.  Given the location of the defect and the proximity to free margins a Banner transposition flap was deemed most appropriate.  Using a sterile surgical marker, an appropriate flap drawn around the defect. The area thus outlined was incised deep to adipose tissue with a #15 scalpel blade.  The skin margins were undermined to an appropriate distance in all directions utilizing iris scissors. Bilobed Flap Text: The defect edges were debeveled with a #15 scalpel blade.  Given the location of the defect and the proximity to free margins a bilobe flap was deemed most appropriate.  Using a sterile surgical marker, an appropriate bilobe flap drawn around the defect.    The area thus outlined was incised deep to adipose tissue with a #15 scalpel blade.  The skin margins were undermined to an appropriate distance in all directions utilizing iris scissors. Bilobed Transposition Flap Text: The defect edges were debeveled with a #15 scalpel blade.  Given the location of the defect and the proximity to free margins a bilobed transposition flap was deemed most appropriate.  Using a sterile surgical marker, an appropriate bilobe flap drawn around the defect.    The area thus outlined was incised deep to adipose tissue with a #15 scalpel blade.  The skin margins were undermined to an appropriate distance in all directions utilizing iris scissors. Trilobed Flap Text: The defect edges were debeveled with a #15 scalpel blade.  Given the location of the defect and the proximity to free margins a trilobed flap was deemed most appropriate.  Using a sterile surgical marker, an appropriate trilobed flap drawn around the defect.    The area thus outlined was incised deep to adipose tissue with a #15 scalpel blade.  The skin margins were undermined to an appropriate distance in all directions utilizing iris scissors. Dorsal Nasal Flap Text: The defect edges were debeveled with a #15 scalpel blade.  Given the location of the defect and the proximity to free margins a dorsal nasal flap was deemed most appropriate.  Using a sterile surgical marker, an appropriate dorsal nasal flap was drawn around the defect.    The area thus outlined was incised deep to adipose tissue with a #15 scalpel blade.  The skin margins were undermined to an appropriate distance in all directions utilizing iris scissors. Island Pedicle Flap Text: The defect edges were debeveled with a #15 scalpel blade.  Given the location of the defect, shape of the defect and the proximity to free margins an island pedicle advancement flap was deemed most appropriate.  Using a sterile surgical marker, an appropriate advancement flap was drawn incorporating the defect, outlining the appropriate donor tissue and placing the expected incisions within the relaxed skin tension lines where possible.    The area thus outlined was incised deep to adipose tissue with a #15 scalpel blade.  The skin margins were undermined to an appropriate distance in all directions around the primary defect and laterally outward around the island pedicle utilizing iris scissors.  There was minimal undermining beneath the pedicle flap. Island Pedicle Flap With Canthal Suspension Text: The defect edges were debeveled with a #15 scalpel blade.  Given the location of the defect, shape of the defect and the proximity to free margins an island pedicle advancement flap was deemed most appropriate.  Using a sterile surgical marker, an appropriate advancement flap was drawn incorporating the defect, outlining the appropriate donor tissue and placing the expected incisions within the relaxed skin tension lines where possible. The area thus outlined was incised deep to adipose tissue with a #15 scalpel blade.  The skin margins were undermined to an appropriate distance in all directions around the primary defect and laterally outward around the island pedicle utilizing iris scissors.  There was minimal undermining beneath the pedicle flap. A suspension suture was placed in the canthal tendon to prevent tension and prevent ectropion. Alar Island Pedicle Flap Text: The defect edges were debeveled with a #15 scalpel blade.  Given the location of the defect, shape of the defect and the proximity to the alar rim an island pedicle advancement flap was deemed most appropriate.  Using a sterile surgical marker, an appropriate advancement flap was drawn incorporating the defect, outlining the appropriate donor tissue and placing the expected incisions within the nasal ala running parallel to the alar rim. The area thus outlined was incised with a #15 scalpel blade.  The skin margins were undermined minimally to an appropriate distance in all directions around the primary defect and laterally outward around the island pedicle utilizing iris scissors.  There was minimal undermining beneath the pedicle flap. Double Island Pedicle Flap Text: The defect edges were debeveled with a #15 scalpel blade.  Given the location of the defect, shape of the defect and the proximity to free margins a double island pedicle advancement flap was deemed most appropriate.  Using a sterile surgical marker, an appropriate advancement flap was drawn incorporating the defect, outlining the appropriate donor tissue and placing the expected incisions within the relaxed skin tension lines where possible.    The area thus outlined was incised deep to adipose tissue with a #15 scalpel blade.  The skin margins were undermined to an appropriate distance in all directions around the primary defect and laterally outward around the island pedicle utilizing iris scissors.  There was minimal undermining beneath the pedicle flap. Island Pedicle Flap-Requiring Vessel Identification Text: The defect edges were debeveled with a #15 scalpel blade.  Given the location of the defect, shape of the defect and the proximity to free margins an island pedicle advancement flap was deemed most appropriate.  Using a sterile surgical marker, an appropriate advancement flap was drawn, based on the axial vessel mentioned above, incorporating the defect, outlining the appropriate donor tissue and placing the expected incisions within the relaxed skin tension lines where possible.    The area thus outlined was incised deep to adipose tissue with a #15 scalpel blade.  The skin margins were undermined to an appropriate distance in all directions around the primary defect and laterally outward around the island pedicle utilizing iris scissors.  There was minimal undermining beneath the pedicle flap. Keystone Flap Text: The defect edges were debeveled with a #15 scalpel blade.  Given the location of the defect, shape of the defect a keystone flap was deemed most appropriate.  Using a sterile surgical marker, an appropriate keystone flap was drawn incorporating the defect, outlining the appropriate donor tissue and placing the expected incisions within the relaxed skin tension lines where possible. The area thus outlined was incised deep to adipose tissue with a #15 scalpel blade.  The skin margins were undermined to an appropriate distance in all directions around the primary defect and laterally outward around the flap utilizing iris scissors. O-T Plasty Text: The defect edges were debeveled with a #15 scalpel blade.  Given the location of the defect, shape of the defect and the proximity to free margins an O-T plasty was deemed most appropriate.  Using a sterile surgical marker, an appropriate O-T plasty was drawn incorporating the defect and placing the expected incisions within the relaxed skin tension lines where possible.    The area thus outlined was incised deep to adipose tissue with a #15 scalpel blade.  The skin margins were undermined to an appropriate distance in all directions utilizing iris scissors. O-Z Plasty Text: The defect edges were debeveled with a #15 scalpel blade.  Given the location of the defect, shape of the defect and the proximity to free margins an O-Z plasty (double transposition flap) was deemed most appropriate.  Using a sterile surgical marker, the appropriate transposition flaps were drawn incorporating the defect and placing the expected incisions within the relaxed skin tension lines where possible.    The area thus outlined was incised deep to adipose tissue with a #15 scalpel blade.  The skin margins were undermined to an appropriate distance in all directions utilizing iris scissors.  Hemostasis was achieved with electrocautery.  The flaps were then transposed into place, one clockwise and the other counterclockwise, and anchored with interrupted buried subcutaneous sutures. Double O-Z Plasty Text: The defect edges were debeveled with a #15 scalpel blade.  Given the location of the defect, shape of the defect and the proximity to free margins a Double O-Z plasty (double transposition flap) was deemed most appropriate.  Using a sterile surgical marker, the appropriate transposition flaps were drawn incorporating the defect and placing the expected incisions within the relaxed skin tension lines where possible. The area thus outlined was incised deep to adipose tissue with a #15 scalpel blade.  The skin margins were undermined to an appropriate distance in all directions utilizing iris scissors.  Hemostasis was achieved with electrocautery.  The flaps were then transposed into place, one clockwise and the other counterclockwise, and anchored with interrupted buried subcutaneous sutures. V-Y Plasty Text: The defect edges were debeveled with a #15 scalpel blade.  Given the location of the defect, shape of the defect and the proximity to free margins an V-Y advancement flap was deemed most appropriate.  Using a sterile surgical marker, an appropriate advancement flap was drawn incorporating the defect and placing the expected incisions within the relaxed skin tension lines where possible.    The area thus outlined was incised deep to adipose tissue with a #15 scalpel blade.  The skin margins were undermined to an appropriate distance in all directions utilizing iris scissors. H Plasty Text: Given the location of the defect, shape of the defect and the proximity to free margins a H-plasty was deemed most appropriate for repair.  Using a sterile surgical marker, the appropriate advancement arms of the H-plasty were drawn incorporating the defect and placing the expected incisions within the relaxed skin tension lines where possible. The area thus outlined was incised deep to adipose tissue with a #15 scalpel blade. The skin margins were undermined to an appropriate distance in all directions utilizing iris scissors.  The opposing advancement arms were then advanced into place in opposite direction and anchored with interrupted buried subcutaneous sutures. W Plasty Text: The lesion was extirpated to the level of the fat with a #15 scalpel blade.  Given the location of the defect, shape of the defect and the proximity to free margins a W-plasty was deemed most appropriate for repair.  Using a sterile surgical marker, the appropriate transposition arms of the W-plasty were drawn incorporating the defect and placing the expected incisions within the relaxed skin tension lines where possible.    The area thus outlined was incised deep to adipose tissue with a #15 scalpel blade.  The skin margins were undermined to an appropriate distance in all directions utilizing iris scissors.  The opposing transposition arms were then transposed into place in opposite direction and anchored with interrupted buried subcutaneous sutures. Z Plasty Text: The lesion was extirpated to the level of the fat with a #15 scalpel blade.  Given the location of the defect, shape of the defect and the proximity to free margins a Z-plasty was deemed most appropriate for repair.  Using a sterile surgical marker, the appropriate transposition arms of the Z-plasty were drawn incorporating the defect and placing the expected incisions within the relaxed skin tension lines where possible.    The area thus outlined was incised deep to adipose tissue with a #15 scalpel blade.  The skin margins were undermined to an appropriate distance in all directions utilizing iris scissors.  The opposing transposition arms were then transposed into place in opposite direction and anchored with interrupted buried subcutaneous sutures. Zygomaticofacial Flap Text: Given the location of the defect, shape of the defect and the proximity to free margins a zygomaticofacial flap was deemed most appropriate for repair.  Using a sterile surgical marker, the appropriate flap was drawn incorporating the defect and placing the expected incisions within the relaxed skin tension lines where possible. The area thus outlined was incised deep to adipose tissue with a #15 scalpel blade with preservation of a vascular pedicle.  The skin margins were undermined to an appropriate distance in all directions utilizing iris scissors.  The flap was then placed into the defect and anchored with interrupted buried subcutaneous sutures. Cheek Interpolation Flap Text: A decision was made to reconstruct the defect utilizing an interpolation axial flap and a staged reconstruction.  A telfa template was made of the defect.  This telfa template was then used to outline the Cheek Interpolation flap.  The donor area for the pedicle flap was then injected with anesthesia.  The flap was excised through the skin and subcutaneous tissue down to the layer of the underlying musculature.  The interpolation flap was carefully excised within this deep plane to maintain its blood supply.  The edges of the donor site were undermined.   The donor site was closed in a primary fashion.  The pedicle was then rotated into position and sutured.  Once the tube was sutured into place, adequate blood supply was confirmed with blanching and refill.  The pedicle was then wrapped with xeroform gauze and dressed appropriately with a telfa and gauze bandage to ensure continued blood supply and protect the attached pedicle. Cheek-To-Nose Interpolation Flap Text: A decision was made to reconstruct the defect utilizing an interpolation axial flap and a staged reconstruction.  A telfa template was made of the defect.  This telfa template was then used to outline the Cheek-To-Nose Interpolation flap.  The donor area for the pedicle flap was then injected with anesthesia.  The flap was excised through the skin and subcutaneous tissue down to the layer of the underlying musculature.  The interpolation flap was carefully excised within this deep plane to maintain its blood supply.  The edges of the donor site were undermined.   The donor site was closed in a primary fashion.  The pedicle was then rotated into position and sutured.  Once the tube was sutured into place, adequate blood supply was confirmed with blanching and refill.  The pedicle was then wrapped with xeroform gauze and dressed appropriately with a telfa and gauze bandage to ensure continued blood supply and protect the attached pedicle. Interpolation Flap Text: A decision was made to reconstruct the defect utilizing an interpolation axial flap and a staged reconstruction.  A telfa template was made of the defect.  This telfa template was then used to outline the interpolation flap.  The donor area for the pedicle flap was then injected with anesthesia.  The flap was excised through the skin and subcutaneous tissue down to the layer of the underlying musculature.  The interpolation flap was carefully excised within this deep plane to maintain its blood supply.  The edges of the donor site were undermined.   The donor site was closed in a primary fashion.  The pedicle was then rotated into position and sutured.  Once the tube was sutured into place, adequate blood supply was confirmed with blanching and refill.  The pedicle was then wrapped with xeroform gauze and dressed appropriately with a telfa and gauze bandage to ensure continued blood supply and protect the attached pedicle. Melolabial Interpolation Flap Text: A decision was made to reconstruct the defect utilizing an interpolation axial flap and a staged reconstruction.  A telfa template was made of the defect.  This telfa template was then used to outline the melolabial interpolation flap.  The donor area for the pedicle flap was then injected with anesthesia.  The flap was excised through the skin and subcutaneous tissue down to the layer of the underlying musculature.  The pedicle flap was carefully excised within this deep plane to maintain its blood supply.  The edges of the donor site were undermined.   The donor site was closed in a primary fashion.  The pedicle was then rotated into position and sutured.  Once the tube was sutured into place, adequate blood supply was confirmed with blanching and refill.  The pedicle was then wrapped with xeroform gauze and dressed appropriately with a telfa and gauze bandage to ensure continued blood supply and protect the attached pedicle. Mastoid Interpolation Flap Text: A decision was made to reconstruct the defect utilizing an interpolation axial flap and a staged reconstruction.  A telfa template was made of the defect.  This telfa template was then used to outline the mastoid interpolation flap.  The donor area for the pedicle flap was then injected with anesthesia.  The flap was excised through the skin and subcutaneous tissue down to the layer of the underlying musculature.  The pedicle flap was carefully excised within this deep plane to maintain its blood supply.  The edges of the donor site were undermined.   The donor site was closed in a primary fashion.  The pedicle was then rotated into position and sutured.  Once the tube was sutured into place, adequate blood supply was confirmed with blanching and refill.  The pedicle was then wrapped with xeroform gauze and dressed appropriately with a telfa and gauze bandage to ensure continued blood supply and protect the attached pedicle. Posterior Auricular Interpolation Flap Text: A decision was made to reconstruct the defect utilizing an interpolation axial flap and a staged reconstruction.  A telfa template was made of the defect.  This telfa template was then used to outline the posterior auricular interpolation flap.  The donor area for the pedicle flap was then injected with anesthesia.  The flap was excised through the skin and subcutaneous tissue down to the layer of the underlying musculature.  The pedicle flap was carefully excised within this deep plane to maintain its blood supply.  The edges of the donor site were undermined.   The donor site was closed in a primary fashion.  The pedicle was then rotated into position and sutured.  Once the tube was sutured into place, adequate blood supply was confirmed with blanching and refill.  The pedicle was then wrapped with xeroform gauze and dressed appropriately with a telfa and gauze bandage to ensure continued blood supply and protect the attached pedicle. Paramedian Forehead Flap Text: A decision was made to reconstruct the defect utilizing an interpolation axial flap and a staged reconstruction.  A telfa template was made of the defect.  This telfa template was then used to outline the paramedian forehead pedicle flap.  The donor area for the pedicle flap was then injected with anesthesia.  The flap was excised through the skin and subcutaneous tissue down to the layer of the underlying musculature.  The pedicle flap was carefully excised within this deep plane to maintain its blood supply.  The edges of the donor site were undermined.   The donor site was closed in a primary fashion.  The pedicle was then rotated into position and sutured.  Once the tube was sutured into place, adequate blood supply was confirmed with blanching and refill.  The pedicle was then wrapped with xeroform gauze and dressed appropriately with a telfa and gauze bandage to ensure continued blood supply and protect the attached pedicle. Lip Wedge Excision Repair Text: Given the location of the defect and the proximity to free margins a full thickness wedge repair was deemed most appropriate.  Using a sterile surgical marker, the appropriate repair was drawn incorporating the defect and placing the expected incisions perpendicular to the vermilion border.  The vermilion border was also meticulously outlined to ensure appropriate reapproximation during the repair.  The area thus outlined was incised through and through with a #15 scalpel blade.  The muscularis and dermis were reaproximated with deep sutures following hemostasis. Care was taken to realign the vermilion border before proceeding with the superficial closure.  Once the vermilion was realigned the superfical and mucosal closure was finished. Ftsg Text: The defect edges were debeveled with a #15 scalpel blade.  Given the location of the defect, shape of the defect and the proximity to free margins a full thickness skin graft was deemed most appropriate.  Using a sterile surgical marker, the primary defect shape was transferred to the donor site. The area thus outlined was incised deep to adipose tissue with a #15 scalpel blade.  The harvested graft was then trimmed of adipose tissue until only dermis and epidermis was left.  The skin margins of the secondary defect were undermined to an appropriate distance in all directions utilizing iris scissors.  The secondary defect was closed with interrupted buried subcutaneous sutures.  The skin edges were then re-apposed with running  sutures.  The skin graft was then placed in the primary defect and oriented appropriately. Split-Thickness Skin Graft Text: The defect edges were debeveled with a #15 scalpel blade.  Given the location of the defect, shape of the defect and the proximity to free margins a split thickness skin graft was deemed most appropriate.  Using a sterile surgical marker, the primary defect shape was transferred to the donor site. The split thickness graft was then harvested.  The skin graft was then placed in the primary defect and oriented appropriately. Burow's Graft Text: The defect edges were debeveled with a #15 scalpel blade.  Given the location of the defect, shape of the defect, the proximity to free margins and the presence of a standing cone deformity a Burow's skin graft was deemed most appropriate. The standing cone was removed and this tissue was then trimmed to the shape of the primary defect. The adipose tissue was also removed until only dermis and epidermis were left.  The skin margins of the secondary defect were undermined to an appropriate distance in all directions utilizing iris scissors.  The secondary defect was closed with interrupted buried subcutaneous sutures.  The skin edges were then re-apposed with running  sutures.  The skin graft was then placed in the primary defect and oriented appropriately. Cartilage Graft Text: The defect edges were debeveled with a #15 scalpel blade.  Given the location of the defect, shape of the defect, the fact the defect involved a full thickness cartilage defect a cartilage graft was deemed most appropriate.  An appropriate donor site was identified, cleansed, and anesthetized. The cartilage graft was then harvested and transferred to the recipient site, oriented appropriately and then sutured into place.  The secondary defect was then repaired using a primary closure. Composite Graft Text: The defect edges were debeveled with a #15 scalpel blade.  Given the location of the defect, shape of the defect, the proximity to free margins and the fact the defect was full thickness a composite graft was deemed most appropriate.  The defect was outline and then transferred to the donor site.  A full thickness graft was then excised from the donor site. The graft was then placed in the primary defect, oriented appropriately and then sutured into place.  The secondary defect was then repaired using a primary closure. Epidermal Autograft Text: The defect edges were debeveled with a #15 scalpel blade.  Given the location of the defect, shape of the defect and the proximity to free margins an epidermal autograft was deemed most appropriate.  Using a sterile surgical marker, the primary defect shape was transferred to the donor site. The epidermal graft was then harvested.  The skin graft was then placed in the primary defect and oriented appropriately. Dermal Autograft Text: The defect edges were debeveled with a #15 scalpel blade.  Given the location of the defect, shape of the defect and the proximity to free margins a dermal autograft was deemed most appropriate.  Using a sterile surgical marker, the primary defect shape was transferred to the donor site. The area thus outlined was incised deep to adipose tissue with a #15 scalpel blade.  The harvested graft was then trimmed of adipose and epidermal tissue until only dermis was left.  The skin graft was then placed in the primary defect and oriented appropriately. Skin Substitute Text: The defect edges were debeveled with a #15 scalpel blade.  Given the location of the defect, shape of the defect and the proximity to free margins a skin substitute graft was deemed most appropriate.  The graft material was trimmed to fit the size of the defect. The graft was then placed in the primary defect and oriented appropriately. Tissue Cultured Epidermal Autograft Text: The defect edges were debeveled with a #15 scalpel blade.  Given the location of the defect, shape of the defect and the proximity to free margins a tissue cultured epidermal autograft was deemed most appropriate.  The graft was then trimmed to fit the size of the defect.  The graft was then placed in the primary defect and oriented appropriately. Xenograft Text: The defect edges were debeveled with a #15 scalpel blade.  Given the location of the defect, shape of the defect and the proximity to free margins a xenograft was deemed most appropriate.  The graft was then trimmed to fit the size of the defect.  The graft was then placed in the primary defect and oriented appropriately. Purse String (Intermediate) Text: Given the location of the defect and the characteristics of the surrounding skin a purse string intermediate closure was deemed most appropriate.  Undermining was performed circumfirentially around the surgical defect.  A purse string suture was then placed and tightened. Purse String (Simple) Text: Given the location of the defect and the characteristics of the surrounding skin a purse string simple closure was deemed most appropriate.  Undermining was performed circumferentially around the surgical defect.  A purse string suture was then placed and tightened. Partial Purse String (Intermediate) Text: Given the location of the defect and the characteristics of the surrounding skin an intermediate purse string closure was deemed most appropriate.  Undermining was performed circumferentially around the surgical defect.  A purse string suture was then placed and tightened. Wound tension of the circular defect prevented complete closure of the wound. Partial Purse String (Simple) Text: Given the location of the defect and the characteristics of the surrounding skin a simple purse string closure was deemed most appropriate.  Undermining was performed circumferentially around the surgical defect.  A purse string suture was then placed and tightened. Wound tension of the circular defect prevented complete closure of the wound. Complex Repair And Single Advancement Flap Text: The defect edges were debeveled with a #15 scalpel blade.  The primary defect was closed partially with a complex linear closure.  Given the location of the remaining defect, shape of the defect and the proximity to free margins a single advancement flap was deemed most appropriate for complete closure of the defect.  Using a sterile surgical marker, an appropriate advancement flap was drawn incorporating the defect and placing the expected incisions within the relaxed skin tension lines where possible.    The area thus outlined was incised deep to adipose tissue with a #15 scalpel blade.  The skin margins were undermined to an appropriate distance in all directions utilizing iris scissors. Complex Repair And Double Advancement Flap Text: The defect edges were debeveled with a #15 scalpel blade.  The primary defect was closed partially with a complex linear closure.  Given the location of the remaining defect, shape of the defect and the proximity to free margins a double advancement flap was deemed most appropriate for complete closure of the defect.  Using a sterile surgical marker, an appropriate advancement flap was drawn incorporating the defect and placing the expected incisions within the relaxed skin tension lines where possible.    The area thus outlined was incised deep to adipose tissue with a #15 scalpel blade.  The skin margins were undermined to an appropriate distance in all directions utilizing iris scissors. Complex Repair And Modified Advancement Flap Text: The defect edges were debeveled with a #15 scalpel blade.  The primary defect was closed partially with a complex linear closure.  Given the location of the remaining defect, shape of the defect and the proximity to free margins a modified advancement flap was deemed most appropriate for complete closure of the defect.  Using a sterile surgical marker, an appropriate advancement flap was drawn incorporating the defect and placing the expected incisions within the relaxed skin tension lines where possible.    The area thus outlined was incised deep to adipose tissue with a #15 scalpel blade.  The skin margins were undermined to an appropriate distance in all directions utilizing iris scissors. Complex Repair And A-T Advancement Flap Text: The defect edges were debeveled with a #15 scalpel blade.  The primary defect was closed partially with a complex linear closure.  Given the location of the remaining defect, shape of the defect and the proximity to free margins an A-T advancement flap was deemed most appropriate for complete closure of the defect.  Using a sterile surgical marker, an appropriate advancement flap was drawn incorporating the defect and placing the expected incisions within the relaxed skin tension lines where possible.    The area thus outlined was incised deep to adipose tissue with a #15 scalpel blade.  The skin margins were undermined to an appropriate distance in all directions utilizing iris scissors. Complex Repair And O-T Advancement Flap Text: The defect edges were debeveled with a #15 scalpel blade.  The primary defect was closed partially with a complex linear closure.  Given the location of the remaining defect, shape of the defect and the proximity to free margins an O-T advancement flap was deemed most appropriate for complete closure of the defect.  Using a sterile surgical marker, an appropriate advancement flap was drawn incorporating the defect and placing the expected incisions within the relaxed skin tension lines where possible.    The area thus outlined was incised deep to adipose tissue with a #15 scalpel blade.  The skin margins were undermined to an appropriate distance in all directions utilizing iris scissors. Complex Repair And O-L Flap Text: The defect edges were debeveled with a #15 scalpel blade.  The primary defect was closed partially with a complex linear closure.  Given the location of the remaining defect, shape of the defect and the proximity to free margins an O-L flap was deemed most appropriate for complete closure of the defect.  Using a sterile surgical marker, an appropriate flap was drawn incorporating the defect and placing the expected incisions within the relaxed skin tension lines where possible.    The area thus outlined was incised deep to adipose tissue with a #15 scalpel blade.  The skin margins were undermined to an appropriate distance in all directions utilizing iris scissors. Complex Repair And Bilobe Flap Text: The defect edges were debeveled with a #15 scalpel blade.  The primary defect was closed partially with a complex linear closure.  Given the location of the remaining defect, shape of the defect and the proximity to free margins a bilobe flap was deemed most appropriate for complete closure of the defect.  Using a sterile surgical marker, an appropriate advancement flap was drawn incorporating the defect and placing the expected incisions within the relaxed skin tension lines where possible.    The area thus outlined was incised deep to adipose tissue with a #15 scalpel blade.  The skin margins were undermined to an appropriate distance in all directions utilizing iris scissors. Complex Repair And Melolabial Flap Text: The defect edges were debeveled with a #15 scalpel blade.  The primary defect was closed partially with a complex linear closure.  Given the location of the remaining defect, shape of the defect and the proximity to free margins a melolabial flap was deemed most appropriate for complete closure of the defect.  Using a sterile surgical marker, an appropriate advancement flap was drawn incorporating the defect and placing the expected incisions within the relaxed skin tension lines where possible.    The area thus outlined was incised deep to adipose tissue with a #15 scalpel blade.  The skin margins were undermined to an appropriate distance in all directions utilizing iris scissors. Complex Repair And Rotation Flap Text: The defect edges were debeveled with a #15 scalpel blade.  The primary defect was closed partially with a complex linear closure.  Given the location of the remaining defect, shape of the defect and the proximity to free margins a rotation flap was deemed most appropriate for complete closure of the defect.  Using a sterile surgical marker, an appropriate advancement flap was drawn incorporating the defect and placing the expected incisions within the relaxed skin tension lines where possible.    The area thus outlined was incised deep to adipose tissue with a #15 scalpel blade.  The skin margins were undermined to an appropriate distance in all directions utilizing iris scissors. Complex Repair And Rhombic Flap Text: The defect edges were debeveled with a #15 scalpel blade.  The primary defect was closed partially with a complex linear closure.  Given the location of the remaining defect, shape of the defect and the proximity to free margins a rhombic flap was deemed most appropriate for complete closure of the defect.  Using a sterile surgical marker, an appropriate advancement flap was drawn incorporating the defect and placing the expected incisions within the relaxed skin tension lines where possible.    The area thus outlined was incised deep to adipose tissue with a #15 scalpel blade.  The skin margins were undermined to an appropriate distance in all directions utilizing iris scissors. Complex Repair And Transposition Flap Text: The defect edges were debeveled with a #15 scalpel blade.  The primary defect was closed partially with a complex linear closure.  Given the location of the remaining defect, shape of the defect and the proximity to free margins a transposition flap was deemed most appropriate for complete closure of the defect.  Using a sterile surgical marker, an appropriate advancement flap was drawn incorporating the defect and placing the expected incisions within the relaxed skin tension lines where possible.    The area thus outlined was incised deep to adipose tissue with a #15 scalpel blade.  The skin margins were undermined to an appropriate distance in all directions utilizing iris scissors. Complex Repair And V-Y Plasty Text: The defect edges were debeveled with a #15 scalpel blade.  The primary defect was closed partially with a complex linear closure.  Given the location of the remaining defect, shape of the defect and the proximity to free margins a V-Y plasty was deemed most appropriate for complete closure of the defect.  Using a sterile surgical marker, an appropriate advancement flap was drawn incorporating the defect and placing the expected incisions within the relaxed skin tension lines where possible.    The area thus outlined was incised deep to adipose tissue with a #15 scalpel blade.  The skin margins were undermined to an appropriate distance in all directions utilizing iris scissors. Complex Repair And M Plasty Text: The defect edges were debeveled with a #15 scalpel blade.  The primary defect was closed partially with a complex linear closure.  Given the location of the remaining defect, shape of the defect and the proximity to free margins an M plasty was deemed most appropriate for complete closure of the defect.  Using a sterile surgical marker, an appropriate advancement flap was drawn incorporating the defect and placing the expected incisions within the relaxed skin tension lines where possible.    The area thus outlined was incised deep to adipose tissue with a #15 scalpel blade.  The skin margins were undermined to an appropriate distance in all directions utilizing iris scissors. Complex Repair And Double M Plasty Text: The defect edges were debeveled with a #15 scalpel blade.  The primary defect was closed partially with a complex linear closure.  Given the location of the remaining defect, shape of the defect and the proximity to free margins a double M plasty was deemed most appropriate for complete closure of the defect.  Using a sterile surgical marker, an appropriate advancement flap was drawn incorporating the defect and placing the expected incisions within the relaxed skin tension lines where possible.    The area thus outlined was incised deep to adipose tissue with a #15 scalpel blade.  The skin margins were undermined to an appropriate distance in all directions utilizing iris scissors. Complex Repair And W Plasty Text: The defect edges were debeveled with a #15 scalpel blade.  The primary defect was closed partially with a complex linear closure.  Given the location of the remaining defect, shape of the defect and the proximity to free margins a W plasty was deemed most appropriate for complete closure of the defect.  Using a sterile surgical marker, an appropriate advancement flap was drawn incorporating the defect and placing the expected incisions within the relaxed skin tension lines where possible.    The area thus outlined was incised deep to adipose tissue with a #15 scalpel blade.  The skin margins were undermined to an appropriate distance in all directions utilizing iris scissors. Complex Repair And Z Plasty Text: The defect edges were debeveled with a #15 scalpel blade.  The primary defect was closed partially with a complex linear closure.  Given the location of the remaining defect, shape of the defect and the proximity to free margins a Z plasty was deemed most appropriate for complete closure of the defect.  Using a sterile surgical marker, an appropriate advancement flap was drawn incorporating the defect and placing the expected incisions within the relaxed skin tension lines where possible.    The area thus outlined was incised deep to adipose tissue with a #15 scalpel blade.  The skin margins were undermined to an appropriate distance in all directions utilizing iris scissors. Complex Repair And Dorsal Nasal Flap Text: The defect edges were debeveled with a #15 scalpel blade.  The primary defect was closed partially with a complex linear closure.  Given the location of the remaining defect, shape of the defect and the proximity to free margins a dorsal nasal flap was deemed most appropriate for complete closure of the defect.  Using a sterile surgical marker, an appropriate flap was drawn incorporating the defect and placing the expected incisions within the relaxed skin tension lines where possible.    The area thus outlined was incised deep to adipose tissue with a #15 scalpel blade.  The skin margins were undermined to an appropriate distance in all directions utilizing iris scissors. Complex Repair And Ftsg Text: The defect edges were debeveled with a #15 scalpel blade.  The primary defect was closed partially with a complex linear closure.  Given the location of the defect, shape of the defect and the proximity to free margins a full thickness skin graft was deemed most appropriate to repair the remaining defect.  The graft was trimmed to fit the size of the remaining defect.  The graft was then placed in the primary defect, oriented appropriately, and sutured into place. Complex Repair And Burow's Graft Text: The defect edges were debeveled with a #15 scalpel blade.  The primary defect was closed partially with a complex linear closure.  Given the location of the defect, shape of the defect, the proximity to free margins and the presence of a standing cone deformity a Burow's graft was deemed most appropriate to repair the remaining defect.  The graft was trimmed to fit the size of the remaining defect.  The graft was then placed in the primary defect, oriented appropriately, and sutured into place. Complex Repair And Split-Thickness Skin Graft Text: The defect edges were debeveled with a #15 scalpel blade.  The primary defect was closed partially with a complex linear closure.  Given the location of the defect, shape of the defect and the proximity to free margins a split thickness skin graft was deemed most appropriate to repair the remaining defect.  The graft was trimmed to fit the size of the remaining defect.  The graft was then placed in the primary defect, oriented appropriately, and sutured into place. Complex Repair And Epidermal Autograft Text: The defect edges were debeveled with a #15 scalpel blade.  The primary defect was closed partially with a complex linear closure.  Given the location of the defect, shape of the defect and the proximity to free margins an epidermal autograft was deemed most appropriate to repair the remaining defect.  The graft was trimmed to fit the size of the remaining defect.  The graft was then placed in the primary defect, oriented appropriately, and sutured into place. Complex Repair And Dermal Autograft Text: The defect edges were debeveled with a #15 scalpel blade.  The primary defect was closed partially with a complex linear closure.  Given the location of the defect, shape of the defect and the proximity to free margins an dermal autograft was deemed most appropriate to repair the remaining defect.  The graft was trimmed to fit the size of the remaining defect.  The graft was then placed in the primary defect, oriented appropriately, and sutured into place. Complex Repair And Tissue Cultured Epidermal Autograft Text: The defect edges were debeveled with a #15 scalpel blade.  The primary defect was closed partially with a complex linear closure.  Given the location of the defect, shape of the defect and the proximity to free margins an tissue cultured epidermal autograft was deemed most appropriate to repair the remaining defect.  The graft was trimmed to fit the size of the remaining defect.  The graft was then placed in the primary defect, oriented appropriately, and sutured into place. Complex Repair And Xenograft Text: The defect edges were debeveled with a #15 scalpel blade.  The primary defect was closed partially with a complex linear closure.  Given the location of the defect, shape of the defect and the proximity to free margins a xenograft was deemed most appropriate to repair the remaining defect.  The graft was trimmed to fit the size of the remaining defect.  The graft was then placed in the primary defect, oriented appropriately, and sutured into place. Complex Repair And Skin Substitute Graft Text: The defect edges were debeveled with a #15 scalpel blade.  The primary defect was closed partially with a complex linear closure.  Given the location of the remaining defect, shape of the defect and the proximity to free margins a skin substitute graft was deemed most appropriate to repair the remaining defect.  The graft was trimmed to fit the size of the remaining defect.  The graft was then placed in the primary defect, oriented appropriately, and sutured into place. Path Notes (To The Dermatopathologist): Please check margins.notched at 12’ clock Consent was obtained from the patient. The risks and benefits to therapy were discussed in detail. Specifically, the risks of infection, scarring, bleeding, prolonged wound healing, incomplete removal, allergy to anesthesia, nerve injury and recurrence were addressed. Prior to the procedure, the treatment site was clearly identified and confirmed by the patient. All components of Universal Protocol/PAUSE Rule completed. Post-Care Instructions: I reviewed with the patient in detail post-care instructions. Patient is not to engage in any heavy lifting, exercise, or swimming for the next 14 days. Should the patient develop any fevers, chills, bleeding, severe pain patient will contact the office immediately. Home Suture Removal Text: Patient was provided a home suture removal kit and will remove their sutures at home.  If they have any questions or difficulties they will call the office. Where Do You Want The Question To Include Opioid Counseling Located?: Case Summary Tab Information: Selecting Yes will display possible errors in your note based on the variables you have selected. This validation is only offered as a suggestion for you. PLEASE NOTE THAT THE VALIDATION TEXT WILL BE REMOVED WHEN YOU FINALIZE YOUR NOTE. IF YOU WANT TO FAX A PRELIMINARY NOTE YOU WILL NEED TO TOGGLE THIS TO 'NO' IF YOU DO NOT WANT IT IN YOUR FAXED NOTE.

## 2021-12-09 ENCOUNTER — OFFICE VISIT (OUTPATIENT)
Dept: FAMILY MEDICINE CLINIC | Age: 56
End: 2021-12-09

## 2021-12-09 ENCOUNTER — LAB (OUTPATIENT)
Dept: LAB | Facility: HOSPITAL | Age: 56
End: 2021-12-09

## 2021-12-09 VITALS
BODY MASS INDEX: 45.65 KG/M2 | HEIGHT: 65 IN | TEMPERATURE: 97 F | DIASTOLIC BLOOD PRESSURE: 78 MMHG | SYSTOLIC BLOOD PRESSURE: 115 MMHG | HEART RATE: 78 BPM | WEIGHT: 274 LBS

## 2021-12-09 DIAGNOSIS — R19.7 DIARRHEA, UNSPECIFIED TYPE: ICD-10-CM

## 2021-12-09 DIAGNOSIS — Z23 IMMUNIZATION DUE: Primary | ICD-10-CM

## 2021-12-09 DIAGNOSIS — K42.9 UMBILICAL HERNIA WITHOUT OBSTRUCTION AND WITHOUT GANGRENE: ICD-10-CM

## 2021-12-09 DIAGNOSIS — R10.84 GENERALIZED ABDOMINAL PAIN: ICD-10-CM

## 2021-12-09 LAB
027 TOXIN: NORMAL
ALBUMIN SERPL-MCNC: 3.7 G/DL (ref 3.5–5.2)
ALBUMIN/GLOB SERPL: 1.3 G/DL
ALP SERPL-CCNC: 60 U/L (ref 39–117)
ALT SERPL W P-5'-P-CCNC: 28 U/L (ref 1–41)
AMYLASE SERPL-CCNC: 37 U/L (ref 28–100)
ANION GAP SERPL CALCULATED.3IONS-SCNC: 8.5 MMOL/L (ref 5–15)
AST SERPL-CCNC: 18 U/L (ref 1–40)
BASOPHILS # BLD AUTO: 0.08 10*3/MM3 (ref 0–0.2)
BASOPHILS NFR BLD AUTO: 1 % (ref 0–1.5)
BILIRUB SERPL-MCNC: 0.8 MG/DL (ref 0–1.2)
BUN SERPL-MCNC: 16 MG/DL (ref 6–20)
BUN/CREAT SERPL: 20.5 (ref 7–25)
C DIFF TOX GENS STL QL NAA+PROBE: NEGATIVE
CALCIUM SPEC-SCNC: 9.1 MG/DL (ref 8.6–10.5)
CHLORIDE SERPL-SCNC: 103 MMOL/L (ref 98–107)
CO2 SERPL-SCNC: 28.5 MMOL/L (ref 22–29)
CREAT SERPL-MCNC: 0.78 MG/DL (ref 0.76–1.27)
DEPRECATED RDW RBC AUTO: 41.1 FL (ref 37–54)
EOSINOPHIL # BLD AUTO: 0.17 10*3/MM3 (ref 0–0.4)
EOSINOPHIL NFR BLD AUTO: 2.1 % (ref 0.3–6.2)
ERYTHROCYTE [DISTWIDTH] IN BLOOD BY AUTOMATED COUNT: 12 % (ref 12.3–15.4)
GFR SERPL CREATININE-BSD FRML MDRD: 103 ML/MIN/1.73
GLOBULIN UR ELPH-MCNC: 2.9 GM/DL
GLUCOSE SERPL-MCNC: 141 MG/DL (ref 65–99)
HCT VFR BLD AUTO: 46.5 % (ref 37.5–51)
HGB BLD-MCNC: 15.9 G/DL (ref 13–17.7)
IMM GRANULOCYTES # BLD AUTO: 0.03 10*3/MM3 (ref 0–0.05)
IMM GRANULOCYTES NFR BLD AUTO: 0.4 % (ref 0–0.5)
LIPASE SERPL-CCNC: 49 U/L (ref 13–60)
LYMPHOCYTES # BLD AUTO: 2.64 10*3/MM3 (ref 0.7–3.1)
LYMPHOCYTES NFR BLD AUTO: 33.4 % (ref 19.6–45.3)
MCH RBC QN AUTO: 31.4 PG (ref 26.6–33)
MCHC RBC AUTO-ENTMCNC: 34.2 G/DL (ref 31.5–35.7)
MCV RBC AUTO: 91.7 FL (ref 79–97)
MONOCYTES # BLD AUTO: 1.05 10*3/MM3 (ref 0.1–0.9)
MONOCYTES NFR BLD AUTO: 13.3 % (ref 5–12)
NEUTROPHILS NFR BLD AUTO: 3.94 10*3/MM3 (ref 1.7–7)
NEUTROPHILS NFR BLD AUTO: 49.8 % (ref 42.7–76)
PLATELET # BLD AUTO: 236 10*3/MM3 (ref 140–450)
PMV BLD AUTO: 8.8 FL (ref 6–12)
POTASSIUM SERPL-SCNC: 3.9 MMOL/L (ref 3.5–5.2)
PROT SERPL-MCNC: 6.6 G/DL (ref 6–8.5)
RBC # BLD AUTO: 5.07 10*6/MM3 (ref 4.14–5.8)
SODIUM SERPL-SCNC: 140 MMOL/L (ref 136–145)
UREA BREATH TEST QL: NEGATIVE
WBC NRBC COR # BLD: 7.91 10*3/MM3 (ref 3.4–10.8)

## 2021-12-09 PROCEDURE — 99214 OFFICE O/P EST MOD 30 MIN: CPT | Performed by: NURSE PRACTITIONER

## 2021-12-09 PROCEDURE — 80053 COMPREHEN METABOLIC PANEL: CPT

## 2021-12-09 PROCEDURE — 83690 ASSAY OF LIPASE: CPT

## 2021-12-09 PROCEDURE — 87493 C DIFF AMPLIFIED PROBE: CPT

## 2021-12-09 PROCEDURE — 36415 COLL VENOUS BLD VENIPUNCTURE: CPT

## 2021-12-09 PROCEDURE — 83013 H PYLORI (C-13) BREATH: CPT

## 2021-12-09 PROCEDURE — 85025 COMPLETE CBC W/AUTO DIFF WBC: CPT

## 2021-12-09 PROCEDURE — 90686 IIV4 VACC NO PRSV 0.5 ML IM: CPT | Performed by: NURSE PRACTITIONER

## 2021-12-09 PROCEDURE — 91300 COVID-19 (PFIZER): CPT | Performed by: NURSE PRACTITIONER

## 2021-12-09 PROCEDURE — 87506 IADNA-DNA/RNA PROBE TQ 6-11: CPT

## 2021-12-09 PROCEDURE — 90471 IMMUNIZATION ADMIN: CPT | Performed by: NURSE PRACTITIONER

## 2021-12-09 PROCEDURE — 0003A COVID-19 (PFIZER): CPT | Performed by: NURSE PRACTITIONER

## 2021-12-09 PROCEDURE — 82150 ASSAY OF AMYLASE: CPT

## 2021-12-09 NOTE — PROGRESS NOTES
"Chief Complaint  Diarrhea (x 3 months, abdomen bloating) and Fatigue (x 3 months)    Subjective          Fabio Juárez presents to [facility] for an acute issue of persistent diarrhea x3 months.  Usually 5-6 times daily.  Usually with in 20 or 30 minutes after eating.  Patient had cholecystectomy 6 years ago.  He also feels as if he has had abdominal bloating, mild discomfort and fatigue in the last 3 months.  Patient denies fever, chills, nausea, vomiting, shortness of air or chest pain.  States he does notice some mucus in stool.  Denies noticing any blood in stool.  Last colonoscopy was 5 or 6 years ago and was normal.          Objective   Vital Signs:   /78   Pulse 78   Temp 97 °F (36.1 °C) (Oral)   Ht 165.1 cm (65\")   Wt 124 kg (274 lb)   BMI 45.60 kg/m²     Physical Exam  Vitals reviewed.   Constitutional:       General: He is not in acute distress.     Appearance: Normal appearance. He is well-developed. He is obese. He is not ill-appearing.   HENT:      Head: Normocephalic and atraumatic.   Eyes:      Conjunctiva/sclera: Conjunctivae normal.      Pupils: Pupils are equal, round, and reactive to light.   Cardiovascular:      Rate and Rhythm: Normal rate and regular rhythm.      Heart sounds: Normal heart sounds. No murmur heard.      Pulmonary:      Effort: Pulmonary effort is normal. No respiratory distress.      Breath sounds: Normal breath sounds. No wheezing, rhonchi or rales.   Abdominal:      General: Bowel sounds are normal. There is no distension.      Palpations: Abdomen is soft. There is no mass.      Tenderness: There is no abdominal tenderness.      Hernia: A hernia is present.      Comments: Umbilical hernia suspected upon exam. LUQ and mild epigastric tenderness noted.    Musculoskeletal:      Cervical back: Full passive range of motion without pain.      Right lower leg: No edema.      Left lower leg: No edema.   Skin:     General: Skin is warm " and dry.   Neurological:      Mental Status: He is alert and oriented to person, place, and time.   Psychiatric:         Mood and Affect: Mood and affect normal.         Behavior: Behavior normal.         Thought Content: Thought content normal.         Judgment: Judgment normal.          Result Review :              Assessment and Plan    Diagnoses and all orders for this visit:    1. Immunization due (Primary)  -     FluLaval/Fluarix/Fluzone >6 Months ([id number])    2. Diarrhea, unspecified type  Comments:  Will notify patient of laboratory and imaging results.  May have to go for repeat colonoscopy.  Orders:  -     Enteric Bacterial Panel - Stool, Per Rectum; Future  -     Enteric Parasite Panel - Stool, Per Rectum; Future  -     Ova & Parasite Examination - Stool, Per Rectum; Future  -     Clostridium Difficile Toxin, PCR - Stool, Per Rectum; Future  -     CT Abdomen Pelvis With & Without Contrast; Future    3. Generalized abdominal pain  Comments:  Will notify patient of results.  B[place]ckley diet encouraged.  Go to ED with severe pain.  Orders:  -     CBC & Differential; Future  -     Comprehensive Metabolic Panel; Future  -     Amylase; Future  -     Lipase; Future  -     CT Abdomen Pelvis With & Without Contrast; Future  -     H. Pylori Breath Test - Breath, Lung; Future    4. Umbilical hernia without obstruction and without gangrene  Comments:  Repeat hernia strongly suspected.  Will send back to general surgeon if confirmed.  Orders:  -     CT Abdomen Pelvis With & Without Contrast; Future    Patient to notify office with any acute concerns or issues.  Patient verbalizes understanding, agrees with plan of care and has no further questions upon discharge.    Please note that portions of this note were completed with a voice recognition program.    Follow Up    Return if symptoms worsen or fail to improve.  Patient was given instructions and counseling regarding his condition or for health maintenance advice.  Please see specific information pulled into the AVS if appropriate.

## 2021-12-10 ENCOUNTER — TELEPHONE (OUTPATIENT)
Dept: FAMILY MEDICINE CLINIC | Age: 56
End: 2021-12-10

## 2021-12-10 LAB
C COLI+JEJ+UPSA DNA STL QL NAA+NON-PROBE: NOT DETECTED
CRYPTOSP DNA STL QL NAA+NON-PROBE: NOT DETECTED
E HISTOLYT DNA STL QL NAA+NON-PROBE: NOT DETECTED
EC STX1+STX2 GENES STL QL NAA+NON-PROBE: NOT DETECTED
G LAMBLIA DNA STL QL NAA+NON-PROBE: NOT DETECTED
S ENT+BONG DNA STL QL NAA+NON-PROBE: NOT DETECTED
SHIGELLA SP+EIEC IPAH ST NAA+NON-PROBE: NOT DETECTED

## 2021-12-10 NOTE — TELEPHONE ENCOUNTER
PT CAME INTO OFFICE WANTING TO GO OVER RECENT LAB RESULTS, ADVISED THAT THE PROVIDER HAS NOT REVIEWED THEM YET. HE ASKED TO BE CALLED TO GO OVER THESE RESULTS ONCE PROVIDER HAS VIEWED.

## 2021-12-13 NOTE — TELEPHONE ENCOUNTER
Pt is calling for results I informed him there is nothing to send to on call at this time but I would let pcp know pt is calling she will be back in the office on wed pt inf of that also

## 2021-12-17 ENCOUNTER — HOSPITAL ENCOUNTER (OUTPATIENT)
Dept: CT IMAGING | Facility: HOSPITAL | Age: 56
Discharge: HOME | End: 2021-12-17
Admitting: NURSE PRACTITIONER

## 2021-12-17 DIAGNOSIS — K42.9 UMBILICAL HERNIA WITHOUT OBSTRUCTION AND WITHOUT GANGRENE: ICD-10-CM

## 2021-12-17 DIAGNOSIS — R19.7 DIARRHEA, UNSPECIFIED TYPE: ICD-10-CM

## 2021-12-17 DIAGNOSIS — R10.84 GENERALIZED ABDOMINAL PAIN: ICD-10-CM

## 2021-12-17 PROCEDURE — 74177 CT ABD & PELVIS W/CONTRAST: CPT

## 2021-12-17 PROCEDURE — 0 IOPAMIDOL PER 1 ML: Performed by: NURSE PRACTITIONER

## 2021-12-17 RX ORDER — SEMAGLUTIDE 1.34 MG/ML
1 INJECTION, SOLUTION SUBCUTANEOUS WEEKLY
Qty: 9 ML | Refills: 0 | Status: SHIPPED | OUTPATIENT
Start: 2021-12-17 | End: 2022-03-16

## 2021-12-17 RX ADMIN — IOPAMIDOL 100 ML: 755 INJECTION, SOLUTION INTRAVENOUS at 12:34

## 2021-12-22 DIAGNOSIS — E78.00 HYPERCHOLESTEREMIA: ICD-10-CM

## 2021-12-26 RX ORDER — BACLOFEN 10 MG/1
TABLET ORAL
Qty: 90 TABLET | Refills: 1 | Status: SHIPPED | OUTPATIENT
Start: 2021-12-26 | End: 2022-07-05

## 2021-12-26 RX ORDER — ATORVASTATIN CALCIUM 80 MG/1
TABLET, FILM COATED ORAL
Qty: 90 TABLET | Refills: 0 | Status: SHIPPED | OUTPATIENT
Start: 2021-12-26 | End: 2022-03-31

## 2022-01-11 ENCOUNTER — PREP FOR SURGERY (OUTPATIENT)
Dept: OTHER | Facility: HOSPITAL | Age: 57
End: 2022-01-11

## 2022-01-11 ENCOUNTER — OFFICE VISIT (OUTPATIENT)
Dept: SURGERY | Facility: CLINIC | Age: 57
End: 2022-01-11

## 2022-01-11 VITALS — BODY MASS INDEX: 46.95 KG/M2 | RESPIRATION RATE: 16 BRPM | HEIGHT: 64 IN | WEIGHT: 275 LBS

## 2022-01-11 DIAGNOSIS — K42.9 RECURRENT UMBILICAL HERNIA: Primary | ICD-10-CM

## 2022-01-11 PROCEDURE — 99203 OFFICE O/P NEW LOW 30 MIN: CPT | Performed by: SURGERY

## 2022-01-11 RX ORDER — SODIUM CHLORIDE 0.9 % (FLUSH) 0.9 %
10 SYRINGE (ML) INJECTION AS NEEDED
Status: CANCELLED | OUTPATIENT
Start: 2022-01-11

## 2022-01-11 RX ORDER — ONDANSETRON 2 MG/ML
4 INJECTION INTRAMUSCULAR; INTRAVENOUS EVERY 6 HOURS PRN
Status: CANCELLED | OUTPATIENT
Start: 2022-01-11

## 2022-01-11 RX ORDER — SODIUM CHLORIDE, SODIUM LACTATE, POTASSIUM CHLORIDE, CALCIUM CHLORIDE 600; 310; 30; 20 MG/100ML; MG/100ML; MG/100ML; MG/100ML
70 INJECTION, SOLUTION INTRAVENOUS CONTINUOUS
Status: CANCELLED | OUTPATIENT
Start: 2022-01-11

## 2022-01-11 RX ORDER — SODIUM CHLORIDE 0.9 % (FLUSH) 0.9 %
10 SYRINGE (ML) INJECTION EVERY 12 HOURS SCHEDULED
Status: CANCELLED | OUTPATIENT
Start: 2022-01-11

## 2022-01-11 RX ORDER — CETIRIZINE HYDROCHLORIDE 5 MG/1
TABLET ORAL
Qty: 90 TABLET | Refills: 0 | Status: SHIPPED | OUTPATIENT
Start: 2022-01-11 | End: 2022-07-13

## 2022-01-11 RX ORDER — CEFAZOLIN SODIUM IN 0.9 % NACL 3 G/100 ML
3 INTRAVENOUS SOLUTION, PIGGYBACK (ML) INTRAVENOUS ONCE
Status: CANCELLED | OUTPATIENT
Start: 2022-01-11 | End: 2022-01-11

## 2022-01-11 NOTE — PROGRESS NOTES
Inpatient History and Physical Surgical Orders    Preadmission Location:   Preadmission Time:  Facility:  Surgery Date:  Surgery Time:  Preadmission Test date:     Chief Complaint  Outpatient History and Physical / Surgical Orders    Primary Care Provider: Karley Amor APRN    Referring Provider: Madelin Sepulveda APRN    Subjective      Patient Name: Fabio Juárez : 1965    HPI  The patient is a 56-year-old gentleman who is referred for a recurrent umbilical hernia.  He has had a prior hernia repair there done by Dr. Funk and I believe that was done laparoscopically.  He has developed a recurrent hernia and at times it is more uncomfortable if he is up and especially if he lifts anything.    Past History:  Medical History: has a past medical history of Chronic pain, Claustrophobia, Essential (primary) hypertension, GERD (gastroesophageal reflux disease), Hemochromatosis, History of COVID-19, Left upper quadrant pain, Low back pain, Lung nodule, Mixed hyperlipidemia, Obstructive sleep apnea (adult) (pediatric), Other testicular dysfunction, Pain in right foot, Pain in right shoulder, Renal stone (2017), Tracheostomy status ([facility]), and Type 2 diabetes mellitus ([facility]).   Surgical History: has a past surgical history that includes Cholecystectomy (2013); Joint replacement (Bilateral); Appendectomy; Tonsillectomy and adenoidectomy; Tracheostomy tube placement; Achilles tendon repair (Right, 10/2018); Cardiac catheterization (2015); Colonoscopy (2014); and Hernia repair.   Family History: family history includes Arrhythmia in his mother; Cerebral aneurysm (age of onset: 38) in his brother; Coronary artery disease in an other family member; Diabetes type II in his brother and mother; Heart attack in his father; Hypertension in his brother; Stroke (age of onset: 73) in his mother.   Social History: reports that he has never smoked. He has never used smokeless tobacco. He reports current  "alcohol use. He reports that he does not use drugs.  Allergies: Patient has no known allergies.       Current Outpatient Medications:   •  atorvastatin (LIPITOR) 80 MG tablet, TAKE ONE TABLET BY MOUTH EVERY NIGHT AT BEDTIME, Disp: 90 tablet, Rfl: 0  •  baclofen (LIORESAL) 10 MG tablet, TAKE 1/2 TO 1 TABLET BY MOUTH AT BEDTIME FOR LOWER BACK PAIN/ MUSCLE SPASMS, Disp: 90 tablet, Rfl: 1  •  cetirizine (zyrTEC) 5 MG tablet, Take 5 mg by mouth Daily., Disp: , Rfl:   •  gabapentin (NEURONTIN) 300 MG capsule, gabapentin 300 mg oral capsule take 1 capsule (300 mg) by oral route 3 times per day   Suspended, Disp: , Rfl:   •  glimepiride (AMARYL) 2 MG tablet, glimepiride 2 mg oral tablet take 1 tablet by oral route daily for 2 weeks then increase to 2 tablets daily   Active, Disp: , Rfl:   •  hydroCHLOROthiazide (HYDRODIURIL) 12.5 MG tablet, Take 12.5 mg by mouth Daily., Disp: , Rfl:   •  lisinopril (PRINIVIL,ZESTRIL) 10 MG tablet, TAKE ONE TABLET BY MOUTH EVERY MORNING, Disp: 90 tablet, Rfl: 0  •  omeprazole (priLOSEC) 40 MG capsule, TAKE ONE CAPSULE BY MOUTH DAILY, Disp: 90 capsule, Rfl: 1  •  oxyCODONE (ROXICODONE) 5 MG immediate release tablet, , Disp: , Rfl:   •  Semaglutide, 1 MG/DOSE, (Ozempic, 1 MG/DOSE,) 4 MG/3ML solution pen-injector, Inject 1 mg under the skin into the appropriate area as directed 1 (One) Time Per Week. Please schedule a follow up appt before next refill, Disp: 9 mL, Rfl: 0  •  traZODone (DESYREL) 50 MG tablet, Take 1 tablet by mouth Every Night for 90 days., Disp: 90 tablet, Rfl: 1       Objective   Vital Signs:   Resp 16   Ht 162.6 cm (64\")   Wt 125 kg (275 lb)   BMI 47.20 kg/m²       Physical Exam  Vitals and nursing note reviewed.   Constitutional:       Appearance: Normal appearance. The patient is well-developed.   Cardiovascular:      Rate and Rhythm: Normal rate and regular rhythm.   Pulmonary:      Effort: Pulmonary effort is normal.      Breath sounds: Normal air entry.   Abdominal: "      General: Bowel sounds are normal.      Palpations: Abdomen is soft.  Reducible periumbilical hernia noted     Skin:     General: Skin is warm and dry.   Neurological:      Mental Status: The patient is alert and oriented to person, place, and time.      Motor: Motor function is intact.   Psychiatric:         Mood and Affect: Mood normal.       Result Review :               Assessment and Plan   Diagnoses and all orders for this visit:    1. Recurrent umbilical hernia (Primary)    We will schedule him for a robotic umbilical hernia repair.  I have described the procedure to him as well as the risk and benefits and he is agreeable to proceeding.    I  Garrett Anderson MD  01/11/2022

## 2022-02-09 DIAGNOSIS — G47.00 INSOMNIA, UNSPECIFIED TYPE: Chronic | ICD-10-CM

## 2022-02-11 RX ORDER — TRAZODONE HYDROCHLORIDE 50 MG/1
TABLET ORAL
Qty: 90 TABLET | Refills: 0 | Status: SHIPPED | OUTPATIENT
Start: 2022-02-11 | End: 2022-05-23

## 2022-02-11 NOTE — TELEPHONE ENCOUNTER
He needs to be seen  I have not seen him since June last year.  Telehealth is ok - if he is due a wellness, make it a wellness

## 2022-02-14 ENCOUNTER — TELEPHONE (OUTPATIENT)
Dept: SURGERY | Facility: CLINIC | Age: 57
End: 2022-02-14

## 2022-02-14 NOTE — PRE-PROCEDURE INSTRUCTIONS
PATIENT INSTRUCTED TO BE:    - NPO AFTER MIDNIGHT EXCEPT CAN HAVE CLEAR LIQUIDS 2 HOURS PRIOR TO SURGERY ARRIVAL TIME     - TO HOLD ALL VITAMINS, SUPPLEMENTS, NSAIDS FOR ONE WEEK PRIOR TO THEIR SURGICAL PROCEDURE    - INSTRUCTED PT TO USE SURGICAL SOAP 1 TIME THE NIGHT PRIOR TO SURGERY OR THE AM OF SURGERY.   USE SOAP FROM NECK TO TOES AVOID THEIR FACE, HAIR, AND PRIVATE PARTS. INSTRUCTED NO LOTIONS, JEWELRY, PIERCINGS, OR DEODORANT DAY OF SURGERY    - IF DIABETIC, CHECK BLOOD GLUCOSE IF LESS THAN 70 CALL PREOP AREA -AM OF SURGERY     - INSTRUCTED TO TAKE THE FOLLOWING MEDICATIONS THE DAY OF SURGERY:     ZYRTEC, GABAPENTIN, OXYCODONE    PATIENT VERBALIZED UNDERSTANDING

## 2022-02-14 NOTE — TELEPHONE ENCOUNTER
PT WANTING TO KNOW HOW LONG HE IS GOING TO BE OFF WORK  FROM HIS SURGERY, THE TIME, WHERE THE SURGERY IS, AND OTHER INFO.    BEST CALL BACK #: [phone]

## 2022-02-16 ENCOUNTER — ANESTHESIA EVENT (OUTPATIENT)
Dept: PERIOP | Facility: HOSPITAL | Age: 57
End: 2022-02-16

## 2022-02-23 ENCOUNTER — HOSPITAL ENCOUNTER (OUTPATIENT)
Facility: HOSPITAL | Age: 57
Setting detail: HOSPITAL OUTPATIENT SURGERY
Discharge: HOME | End: 2022-02-23
Attending: SURGERY | Admitting: SURGERY

## 2022-02-23 ENCOUNTER — ANESTHESIA (OUTPATIENT)
Dept: PERIOP | Facility: HOSPITAL | Age: 57
End: 2022-02-23

## 2022-02-23 VITALS
HEIGHT: 66 IN | SYSTOLIC BLOOD PRESSURE: 120 MMHG | RESPIRATION RATE: 18 BRPM | BODY MASS INDEX: 43.65 KG/M2 | DIASTOLIC BLOOD PRESSURE: 78 MMHG | OXYGEN SATURATION: 92 % | WEIGHT: 271.61 LBS | HEART RATE: 93 BPM | TEMPERATURE: 97.9 F

## 2022-02-23 DIAGNOSIS — K42.9 RECURRENT UMBILICAL HERNIA: ICD-10-CM

## 2022-02-23 LAB
ANION GAP SERPL CALCULATED.3IONS-SCNC: 9.6 MMOL/L (ref 5–15)
BUN SERPL-MCNC: 23 MG/DL (ref 6–20)
BUN/CREAT SERPL: 28 (ref 7–25)
CALCIUM SPEC-SCNC: 9.6 MG/DL (ref 8.6–10.5)
CHLORIDE SERPL-SCNC: 101 MMOL/L (ref 98–107)
CO2 SERPL-SCNC: 27.4 MMOL/L (ref 22–29)
CREAT SERPL-MCNC: 0.82 MG/DL (ref 0.76–1.27)
GFR SERPL CREATININE-BSD FRML MDRD: 97 ML/MIN/1.73
GLUCOSE BLDC GLUCOMTR-MCNC: 202 MG/DL (ref 70–99)
GLUCOSE SERPL-MCNC: 130 MG/DL (ref 65–99)
POTASSIUM SERPL-SCNC: 4 MMOL/L (ref 3.5–5.2)
QT INTERVAL: 336 MS
SODIUM SERPL-SCNC: 138 MMOL/L (ref 136–145)

## 2022-02-23 PROCEDURE — C1889 IMPLANT/INSERT DEVICE, NOC: HCPCS | Performed by: SURGERY

## 2022-02-23 PROCEDURE — 25010000002 PROPOFOL 10 MG/ML EMULSION: Performed by: NURSE ANESTHETIST, CERTIFIED REGISTERED

## 2022-02-23 PROCEDURE — 49652 PR LAP, VENTRAL HERNIA REPAIR,REDUCIBLE: CPT | Performed by: SPECIALIST/TECHNOLOGIST, OTHER

## 2022-02-23 PROCEDURE — 25010000002 HYDROMORPHONE 1 MG/ML SOLUTION: Performed by: NURSE ANESTHETIST, CERTIFIED REGISTERED

## 2022-02-23 PROCEDURE — 49652 PR LAP, VENTRAL HERNIA REPAIR,REDUCIBLE: CPT | Performed by: SURGERY

## 2022-02-23 PROCEDURE — C1781 MESH (IMPLANTABLE): HCPCS | Performed by: SURGERY

## 2022-02-23 PROCEDURE — 25010000002 MIDAZOLAM PER 1 MG: Performed by: ANESTHESIOLOGY

## 2022-02-23 PROCEDURE — 25010000002 CEFAZOLIN PER 500 MG: Performed by: SURGERY

## 2022-02-23 PROCEDURE — 25010000002 ONDANSETRON PER 1 MG: Performed by: NURSE ANESTHETIST, CERTIFIED REGISTERED

## 2022-02-23 PROCEDURE — 25010000002 FENTANYL CITRATE (PF) 50 MCG/ML SOLUTION: Performed by: NURSE ANESTHETIST, CERTIFIED REGISTERED

## 2022-02-23 PROCEDURE — 93010 ELECTROCARDIOGRAM REPORT: CPT | Performed by: SPECIALIST

## 2022-02-23 PROCEDURE — 80048 BASIC METABOLIC PNL TOTAL CA: CPT | Performed by: ANESTHESIOLOGY

## 2022-02-23 PROCEDURE — 93005 ELECTROCARDIOGRAM TRACING: CPT | Performed by: ANESTHESIOLOGY

## 2022-02-23 PROCEDURE — 82962 GLUCOSE BLOOD TEST: CPT

## 2022-02-23 PROCEDURE — 0 MEPERIDINE PER 100 MG: Performed by: NURSE ANESTHETIST, CERTIFIED REGISTERED

## 2022-02-23 PROCEDURE — 25010000002 DEXAMETHASONE PER 1 MG: Performed by: NURSE ANESTHETIST, CERTIFIED REGISTERED

## 2022-02-23 DEVICE — ABSORBABLE WOUND CLOSURE DEVICE
Type: IMPLANTABLE DEVICE | Site: UMBILICAL | Status: FUNCTIONAL
Brand: V-LOC 180

## 2022-02-23 DEVICE — ABSORBABLE WOUND CLOSURE DEVICE
Type: IMPLANTABLE DEVICE | Site: UMBILICAL | Status: FUNCTIONAL
Brand: SYNETURE

## 2022-02-23 DEVICE — VENTRALIGHT ST MESH WITH ECHO PS POSITIONING SYSTEM
Type: IMPLANTABLE DEVICE | Site: UMBILICAL | Status: FUNCTIONAL
Brand: VENTRALIGHT ST MESH WITH ECHO PS POSITIONING SYSTEM

## 2022-02-23 RX ORDER — PROPOFOL 10 MG/ML
VIAL (ML) INTRAVENOUS AS NEEDED
Status: DISCONTINUED | OUTPATIENT
Start: 2022-02-23 | End: 2022-02-23 | Stop reason: SURG

## 2022-02-23 RX ORDER — ONDANSETRON 2 MG/ML
INJECTION INTRAMUSCULAR; INTRAVENOUS AS NEEDED
Status: DISCONTINUED | OUTPATIENT
Start: 2022-02-23 | End: 2022-02-23 | Stop reason: SURG

## 2022-02-23 RX ORDER — ONDANSETRON 2 MG/ML
4 INJECTION INTRAMUSCULAR; INTRAVENOUS ONCE AS NEEDED
Status: DISCONTINUED | OUTPATIENT
Start: 2022-02-23 | End: 2022-02-23 | Stop reason: HOSPADM

## 2022-02-23 RX ORDER — LIDOCAINE HYDROCHLORIDE 20 MG/ML
INJECTION, SOLUTION INFILTRATION; PERINEURAL AS NEEDED
Status: DISCONTINUED | OUTPATIENT
Start: 2022-02-23 | End: 2022-02-23 | Stop reason: SURG

## 2022-02-23 RX ORDER — PROMETHAZINE HYDROCHLORIDE 25 MG/1
25 SUPPOSITORY RECTAL ONCE AS NEEDED
Status: DISCONTINUED | OUTPATIENT
Start: 2022-02-23 | End: 2022-02-23 | Stop reason: HOSPADM

## 2022-02-23 RX ORDER — SODIUM CHLORIDE 0.9 % (FLUSH) 0.9 %
10 SYRINGE (ML) INJECTION EVERY 12 HOURS SCHEDULED
Status: DISCONTINUED | OUTPATIENT
Start: 2022-02-23 | End: 2022-02-23 | Stop reason: HOSPADM

## 2022-02-23 RX ORDER — DEXAMETHASONE SODIUM PHOSPHATE 4 MG/ML
INJECTION, SOLUTION INTRA-ARTICULAR; INTRALESIONAL; INTRAMUSCULAR; INTRAVENOUS; SOFT TISSUE AS NEEDED
Status: DISCONTINUED | OUTPATIENT
Start: 2022-02-23 | End: 2022-02-23 | Stop reason: SURG

## 2022-02-23 RX ORDER — MAGNESIUM HYDROXIDE 1200 MG/15ML
LIQUID ORAL AS NEEDED
Status: DISCONTINUED | OUTPATIENT
Start: 2022-02-23 | End: 2022-02-23 | Stop reason: HOSPADM

## 2022-02-23 RX ORDER — OXYCODONE HYDROCHLORIDE 5 MG/1
5 TABLET ORAL
Status: COMPLETED | OUTPATIENT
Start: 2022-02-23 | End: 2022-02-23

## 2022-02-23 RX ORDER — SODIUM CHLORIDE, SODIUM LACTATE, POTASSIUM CHLORIDE, CALCIUM CHLORIDE 600; 310; 30; 20 MG/100ML; MG/100ML; MG/100ML; MG/100ML
9 INJECTION, SOLUTION INTRAVENOUS CONTINUOUS PRN
Status: DISCONTINUED | OUTPATIENT
Start: 2022-02-23 | End: 2022-02-23 | Stop reason: HOSPADM

## 2022-02-23 RX ORDER — ACETAMINOPHEN 500 MG
1000 TABLET ORAL ONCE
Status: COMPLETED | OUTPATIENT
Start: 2022-02-23 | End: 2022-02-23

## 2022-02-23 RX ORDER — ONDANSETRON 2 MG/ML
4 INJECTION INTRAMUSCULAR; INTRAVENOUS EVERY 6 HOURS PRN
Status: DISCONTINUED | OUTPATIENT
Start: 2022-02-23 | End: 2022-02-23 | Stop reason: HOSPADM

## 2022-02-23 RX ORDER — OXYCODONE HYDROCHLORIDE AND ACETAMINOPHEN 5; 325 MG/1; MG/1
1-2 TABLET ORAL EVERY 4 HOURS PRN
Qty: 12 TABLET | Refills: 0 | Status: SHIPPED | OUTPATIENT
Start: 2022-02-23 | End: 2022-03-06 | Stop reason: DRUGHIGH

## 2022-02-23 RX ORDER — ONDANSETRON 4 MG/1
4 TABLET, FILM COATED ORAL ONCE AS NEEDED
Status: DISCONTINUED | OUTPATIENT
Start: 2022-02-23 | End: 2022-02-23 | Stop reason: HOSPADM

## 2022-02-23 RX ORDER — MIDAZOLAM HYDROCHLORIDE 1 MG/ML
2 INJECTION INTRAMUSCULAR; INTRAVENOUS ONCE
Status: COMPLETED | OUTPATIENT
Start: 2022-02-23 | End: 2022-02-23

## 2022-02-23 RX ORDER — OXYCODONE HYDROCHLORIDE AND ACETAMINOPHEN 5; 325 MG/1; MG/1
1 TABLET ORAL ONCE AS NEEDED
Status: DISCONTINUED | OUTPATIENT
Start: 2022-02-23 | End: 2022-02-23 | Stop reason: HOSPADM

## 2022-02-23 RX ORDER — IBUPROFEN 600 MG/1
600 TABLET ORAL EVERY 6 HOURS PRN
Status: DISCONTINUED | OUTPATIENT
Start: 2022-02-23 | End: 2022-02-23 | Stop reason: HOSPADM

## 2022-02-23 RX ORDER — SODIUM CHLORIDE, SODIUM LACTATE, POTASSIUM CHLORIDE, CALCIUM CHLORIDE 600; 310; 30; 20 MG/100ML; MG/100ML; MG/100ML; MG/100ML
70 INJECTION, SOLUTION INTRAVENOUS CONTINUOUS
Status: DISCONTINUED | OUTPATIENT
Start: 2022-02-23 | End: 2022-02-23 | Stop reason: HOSPADM

## 2022-02-23 RX ORDER — BUPIVACAINE HYDROCHLORIDE AND EPINEPHRINE 2.5; 5 MG/ML; UG/ML
INJECTION, SOLUTION EPIDURAL; INFILTRATION; INTRACAUDAL; PERINEURAL AS NEEDED
Status: DISCONTINUED | OUTPATIENT
Start: 2022-02-23 | End: 2022-02-23 | Stop reason: HOSPADM

## 2022-02-23 RX ORDER — SODIUM CHLORIDE 0.9 % (FLUSH) 0.9 %
10 SYRINGE (ML) INJECTION AS NEEDED
Status: DISCONTINUED | OUTPATIENT
Start: 2022-02-23 | End: 2022-02-23 | Stop reason: HOSPADM

## 2022-02-23 RX ORDER — MEPERIDINE HYDROCHLORIDE 25 MG/ML
12.5 INJECTION INTRAMUSCULAR; INTRAVENOUS; SUBCUTANEOUS
Status: DISCONTINUED | OUTPATIENT
Start: 2022-02-23 | End: 2022-02-23 | Stop reason: HOSPADM

## 2022-02-23 RX ORDER — ROCURONIUM BROMIDE 10 MG/ML
INJECTION, SOLUTION INTRAVENOUS AS NEEDED
Status: DISCONTINUED | OUTPATIENT
Start: 2022-02-23 | End: 2022-02-23 | Stop reason: SURG

## 2022-02-23 RX ORDER — CEFAZOLIN SODIUM IN 0.9 % NACL 3 G/100 ML
3 INTRAVENOUS SOLUTION, PIGGYBACK (ML) INTRAVENOUS ONCE
Status: COMPLETED | OUTPATIENT
Start: 2022-02-23 | End: 2022-02-23

## 2022-02-23 RX ORDER — FENTANYL CITRATE 50 UG/ML
INJECTION, SOLUTION INTRAMUSCULAR; INTRAVENOUS AS NEEDED
Status: DISCONTINUED | OUTPATIENT
Start: 2022-02-23 | End: 2022-02-23 | Stop reason: SURG

## 2022-02-23 RX ORDER — PROMETHAZINE HYDROCHLORIDE 12.5 MG/1
25 TABLET ORAL ONCE AS NEEDED
Status: DISCONTINUED | OUTPATIENT
Start: 2022-02-23 | End: 2022-02-23 | Stop reason: HOSPADM

## 2022-02-23 RX ADMIN — ONDANSETRON 4 MG: 2 INJECTION INTRAMUSCULAR; INTRAVENOUS at 11:07

## 2022-02-23 RX ADMIN — FENTANYL CITRATE 50 MCG: 50 INJECTION, SOLUTION INTRAMUSCULAR; INTRAVENOUS at 10:03

## 2022-02-23 RX ADMIN — SODIUM CHLORIDE, POTASSIUM CHLORIDE, SODIUM LACTATE AND CALCIUM CHLORIDE 9 ML/HR: 600; 310; 30; 20 INJECTION, SOLUTION INTRAVENOUS at 09:01

## 2022-02-23 RX ADMIN — MIDAZOLAM 2 MG: 1 INJECTION INTRAMUSCULAR; INTRAVENOUS at 09:34

## 2022-02-23 RX ADMIN — PROPOFOL 200 MG: 10 INJECTION, EMULSION INTRAVENOUS at 10:03

## 2022-02-23 RX ADMIN — SODIUM CHLORIDE, POTASSIUM CHLORIDE, SODIUM LACTATE AND CALCIUM CHLORIDE: 600; 310; 30; 20 INJECTION, SOLUTION INTRAVENOUS at 11:23

## 2022-02-23 RX ADMIN — HYDROMORPHONE HYDROCHLORIDE 0.5 MG: 1 INJECTION, SOLUTION INTRAMUSCULAR; INTRAVENOUS; SUBCUTANEOUS at 11:54

## 2022-02-23 RX ADMIN — FENTANYL CITRATE 50 MCG: 50 INJECTION, SOLUTION INTRAMUSCULAR; INTRAVENOUS at 10:31

## 2022-02-23 RX ADMIN — FENTANYL CITRATE 50 MCG: 50 INJECTION, SOLUTION INTRAMUSCULAR; INTRAVENOUS at 10:39

## 2022-02-23 RX ADMIN — SUGAMMADEX 200 MG: 100 INJECTION, SOLUTION INTRAVENOUS at 11:12

## 2022-02-23 RX ADMIN — Medication 3 G: at 09:57

## 2022-02-23 RX ADMIN — LIDOCAINE HYDROCHLORIDE 80 MG: 20 INJECTION, SOLUTION INFILTRATION; PERINEURAL at 10:03

## 2022-02-23 RX ADMIN — DEXAMETHASONE SODIUM PHOSPHATE 4 MG: 4 INJECTION INTRA-ARTICULAR; INTRALESIONAL; INTRAMUSCULAR; INTRAVENOUS; SOFT TISSUE at 10:16

## 2022-02-23 RX ADMIN — ROCURONIUM BROMIDE 40 MG: 10 INJECTION INTRAVENOUS at 10:13

## 2022-02-23 RX ADMIN — ACETAMINOPHEN 1000 MG: 500 TABLET ORAL at 09:16

## 2022-02-23 RX ADMIN — OXYCODONE HYDROCHLORIDE 5 MG: 5 TABLET ORAL at 12:15

## 2022-02-23 RX ADMIN — ROCURONIUM BROMIDE 10 MG: 10 INJECTION INTRAVENOUS at 10:03

## 2022-02-23 RX ADMIN — OXYCODONE HYDROCHLORIDE 5 MG: 5 TABLET ORAL at 11:54

## 2022-02-23 RX ADMIN — HYDROMORPHONE HYDROCHLORIDE 0.5 MG: 1 INJECTION, SOLUTION INTRAMUSCULAR; INTRAVENOUS; SUBCUTANEOUS at 11:44

## 2022-02-23 RX ADMIN — MEPERIDINE HYDROCHLORIDE 12.5 MG: 25 INJECTION INTRAMUSCULAR; INTRAVENOUS; SUBCUTANEOUS at 12:05

## 2022-02-23 RX ADMIN — FENTANYL CITRATE 50 MCG: 50 INJECTION, SOLUTION INTRAMUSCULAR; INTRAVENOUS at 11:25

## 2022-02-23 NOTE — H&P
[facility]   HISTORY AND PHYSICAL    Patient Name: Fabio Juárez  : 1965  MRN: [id number]  Primary Care Physician:  Karley Amor APRN  Date of admission: 2022    Subjective   Subjective     Chief Complaint: Umbilical hernia    HPI:    Fabio Juárez is a 57 y.o. male who presents with a recurrent hernia at the umbilicus.  He had a laparoscopic repair of an umbilical hernia several years ago and is developed a periumbilical recurrence.  It is uncomfortable at times.    Review of Systems   Respiratory: Negative for shortness of breath.    Cardiovascular: Negative for chest pain.       Personal History     Past Medical History:   Diagnosis Date   • Chronic pain    • Claustrophobia    • Coronary artery disease     BLOCKAGE , SEE'S DR DIEGO EVERY 2 YEARS, DENIED CP/SOB    • Essential (primary) hypertension    • GERD (gastroesophageal reflux disease)    • Hemochromatosis     ASYMPTOMATIC    • History of COVID-19    • Left upper quadrant pain    • Low back pain    • Lung nodule    • Mixed hyperlipidemia    • Obstructive sleep apnea (adult) (pediatric)    • Other testicular dysfunction    • Pain in right foot    • Pain in right shoulder    • Recurrent umbilical hernia    • Renal stone 2017   • Tracheostomy status (HCC)     R/T SLEEP APNEA    • Type 2 diabetes mellitus (HCC)     BG RUNS 120-125 IN AM        Past Surgical History:   Procedure Laterality Date   • ACHILLES TENDON REPAIR Right 10/2018   • APPENDECTOMY     • CARDIAC CATHETERIZATION  2015    LEFT VENTRIULOGRAM, CORONARY ANGIOGRAM    • CHOLECYSTECTOMY  2013   • COLONOSCOPY  2014    NORMAL RESULT    • HERNIA REPAIR     • JOINT REPLACEMENT Bilateral     RIGHT KNEE 2016   • TONSILLECTOMY AND ADENOIDECTOMY     • TRACHEOSTOMY      SECONDARY TO SLEEP APNEA       Family History: family history includes Arrhythmia in his mother; Cerebral aneurysm (age of onset: 38) in his brother; Coronary artery disease in an other  family member; Diabetes type II in his brother and mother; Heart attack in his father; Hypertension in his brother; Stroke (age of onset: 73) in his mother. Otherwise pertinent FHx was reviewed and not pertinent to current issue.    Social History:  reports that he has never smoked. He has never used smokeless tobacco. He reports current alcohol use. He reports that he does not use drugs.    Home Medications:  Semaglutide (1 MG/DOSE), atorvastatin, baclofen, cetirizine, gabapentin, glimepiride, hydroCHLOROthiazide, lisinopril, oxyCODONE, and traZODone    Allergies:  No Known Allergies    Objective    Objective     Vitals:        Physical Exam  HENT:      Head: Normocephalic.   Cardiovascular:      Rate and Rhythm: Normal rate.   Pulmonary:      Effort: Pulmonary effort is normal.   Abdominal:      Hernia: A hernia is present. Hernia is present in the umbilical area.   Musculoskeletal:         General: Normal range of motion.      Cervical back: Normal range of motion.   Skin:     General: Skin is warm.   Neurological:      General: No focal deficit present.      Mental Status: He is alert.   Psychiatric:         Mood and Affect: Mood normal.         Result Review    Result Review:  I have personally reviewed the results from the time of this admission to 2/23/2022 08:33 EST and agree with these findings:  []  Laboratory  []  Microbiology  []  Radiology  []  EKG/Telemetry   []  Cardiology/Vascular   []  Pathology  []  Old records  []  Other:  Most notable findings include:     Assessment/Plan   Assessment / Plan     Brief Patient Summary:  Fabio Juárez is a 57 y.o. male who presents with a recurrent umbilical hernia    Active Hospital Problems:  Active Hospital Problems    Diagnosis    • **Recurrent umbilical hernia        Plan:   We will proceed with a robotic umbilical hernia repair.  I have described the procedure to him as well as the risk and benefits and he is agreeable to proceeding.    DVT  prophylaxis:  No DVT prophylaxis order currently exists.    CODE STATUS:         Admission Status:  I believe this patient meets outpatient status.    Electronically signed by Garrett Anderson MD, 02/23/22, 8:33 AM EST.

## 2022-02-23 NOTE — OP NOTE
VENTRAL HERNIA REPAIR LAPAROSCOPIC WITH DAVINCI ROBOT  Procedure Report    Patient Name:  Fabio Juárez  YOB: 1965    Date of Surgery:  2/23/2022     Indications: The patient is a 57-year-old gentleman who presented with a recurrent umbilical hernia.  The decision was made to proceed with a robotic umbilical hernia repair.    Pre-op Diagnosis: Recurrent umbilical hernia    Post-Op Diagnosis: Same    Procedure/CPT® Codes:    Robotic repair of recurrent umbilical hernia    Staff:  Surgeon(s):  Garrett Anderson MD    Assistant: Monica Hunter CSA    Anesthesia: General    Estimated Blood Loss: minimal    Implants:    Implant Name Type Inv. Item Serial No.  Lot No. LRB No. Used Action   DEV CLS WND VLOC/PBT DARIELA NONABS 1/2CIR SZ0 37MM 23CM PATRIZIA - [id number] Implant DEV CLS WND VLOC/PBT DARIELA NONABS 1/2CIR SZ0 37MM 23CM PATRIZIA  COVIDIEN [id number] N/A 1 Implanted   DEV CLS WND VLOC/180 DARIELA ABS 1/2CIR SZ2/0 15CM 27MM GRN - [id number] Implant DEV CLS WND VLOC/180 DARIELA ABS 1/2CIR SZ2/0 15CM 27MM GRN  COVIDIEN [id number] N/A 4 Implanted   MESH VENTRALIGHT ST ECHO PS POSTN 6X8 - [id number] Implant MESH VENTRALIGHT ST ECHO PS POSTN 6X8  DAVOL  (DIV [employer]) [id number] N/A 1 Implanted       Specimen:          None        Findings: 3 x 2 cm umbilical hernia    Complications: Same    Description of Procedure: The patient was taken to the operating room and placed on the table in supine position.  After induction of general anesthesia, the abdomen was prepped and draped sterilely.  The abdomen was insufflated with a Veress needle placed in the left upper quadrant and a 12 mm left upper quadrant port was placed into the peritoneal cavity using a Visiport.  Three 8 mm da Zhane robotic trochars were then placed along the left side of the abdomen under direct vision.  The camera and instruments were then placed into the abdomen under direct vision.  There were omental adhesions up to the anterior  abdominal wall below the umbilicus and these were carefully taken down using cautery.  We identified omentum going out into a hernia defect just above the umbilicus and this was carefully dissected out and completely reduced.  The hernia was about 3 x 2 cm in dimension.  I saw no other anterior wall hernia defects.  The defect was then closed primarily with a running 0 permanent V-Loc suture.  A 15 x 20 cm piece of ventral light hernia mesh was then placed into the peritoneal cavity and pulled up to the anterior abdominal wall below the hernia defect with a suture passer.  The mesh was then sewn in place with running absorbable 2-0 V-Loc sutures.  Once all of her sutures were tied we appear to have good fixation of the mesh to the anterior abdominal wall and had good coverage from the margins of the hernia defect of at least 5 to 6 cm in all directions.  We had good hemostasis.  At this point the instruments were removed from the abdomen and the 12 mm port site was closed with a Maurilio Haley closure device and a 0 Mersilene tie.  The abdomen was desufflated and the other ports were removed.  All skin incisions were closed with 4-0 Vicryl subcuticular sutures and sterile dressings were applied.  He was taken the postanesthesia recovery room in stable condition.    Assistant: Monica Hunter CSA  was responsible for performing the following activities: Retraction, Suturing, Placing Dressing and Held/Positioned Camera and their skilled assistance was necessary for the success of this case.    Garrett Anderson MD     Date: 2/23/2022  Time: 11:11 EST

## 2022-02-23 NOTE — ANESTHESIA POSTPROCEDURE EVALUATION
Patient: Fabio Juárez    Procedure Summary     Date: 02/23/22 Room / Location: [facility] / [facility] MAIN OR    Anesthesia Start: 0957 Anesthesia Stop: 1134    Procedure: ROBOTIC UMBILICAL HERNIA REPAIR WITH MESH PLACEMENT (N/A Abdomen) Diagnosis:       Recurrent umbilical hernia      (Recurrent umbilical hernia [K42.9])    Surgeons: Garrett Anderson MD Provider: Adrian Magana MD    Anesthesia Type: general ASA Status: 4          Anesthesia Type: general    Vitals  Vitals Value Taken Time   /85 02/23/22 1145   Temp     Pulse 82 02/23/22 1146   Resp     SpO2 98 % 02/23/22 1146   Vitals shown include unvalidated device data.        Post Anesthesia Care and Evaluation    Patient location during evaluation: bedside  Patient participation: complete - patient participated  Level of consciousness: responsive to light touch, responsive to noxious stimuli, responsive to painful stimuli, responsive to physical stimuli and responsive to verbal stimuli  Pain score: 0  Pain management: adequate  Airway patency: patent  Anesthetic complications: No anesthetic complications  PONV Status: none  Cardiovascular status: acceptable and stable  Respiratory status: acceptable and room air  Hydration status: acceptable    Comments: An Anesthesiologist personally participated in the most demanding procedures (including induction and emergence if applicable) in the anesthesia plan, monitored the course of anesthesia administration at frequent intervals and remained physically present and available for immediate diagnosis and treatment of emergencies.

## 2022-02-23 NOTE — ANESTHESIA PREPROCEDURE EVALUATION
Anesthesia Evaluation     Patient summary reviewed and Nursing notes reviewed   no history of anesthetic complications:  NPO Solid Status: > 8 hours  NPO Liquid Status: > 2 hours           Airway   Mallampati: III  TM distance: >3 FB  Neck ROM: full  No difficulty expected  Dental      Pulmonary - normal exam    breath sounds clear to auscultation  (+) sleep apnea,   Cardiovascular - normal exam  Exercise tolerance: poor (<4 METS)    Rhythm: regular  Rate: normal    (+) hypertension, CAD, hyperlipidemia,       Neuro/Psych  (+) numbness, psychiatric history,    GI/Hepatic/Renal/Endo    (+)  GERD,  renal disease, diabetes mellitus type 2,     Musculoskeletal     (+) neck pain,   Abdominal    Substance History - negative use     OB/GYN negative ob/gyn ROS         Other - negative ROS           Phys Exam Other: Pt with metal trach in tracheostomy opening     NORMAL ECG -  Sinus rhythm         Anesthesia Plan    ASA 4     general   (Patient understands anesthesia not responsible for dental damage.    Remove metal trach cannula and place ett via tracheostomy opening )  intravenous induction     Anesthetic plan, all risks, benefits, and alternatives have been provided, discussed and informed consent has been obtained with: patient.  Use of blood products discussed with patient .   Plan discussed with CRNA.        CODE STATUS:

## 2022-02-28 ENCOUNTER — LAB (OUTPATIENT)
Dept: LAB | Facility: HOSPITAL | Age: 57
End: 2022-02-28

## 2022-02-28 ENCOUNTER — OFFICE VISIT (OUTPATIENT)
Dept: FAMILY MEDICINE CLINIC | Age: 57
End: 2022-02-28

## 2022-02-28 VITALS
HEART RATE: 78 BPM | HEIGHT: 66 IN | OXYGEN SATURATION: 92 % | DIASTOLIC BLOOD PRESSURE: 77 MMHG | TEMPERATURE: 98.5 F | BODY MASS INDEX: 43.39 KG/M2 | WEIGHT: 270 LBS | SYSTOLIC BLOOD PRESSURE: 110 MMHG

## 2022-02-28 DIAGNOSIS — Z98.890 S/P HERNIA REPAIR: ICD-10-CM

## 2022-02-28 DIAGNOSIS — Z13.6 SCREENING FOR CARDIOVASCULAR CONDITION: ICD-10-CM

## 2022-02-28 DIAGNOSIS — R10.84 GENERALIZED ABDOMINAL PAIN: ICD-10-CM

## 2022-02-28 DIAGNOSIS — Z87.19 S/P HERNIA REPAIR: ICD-10-CM

## 2022-02-28 DIAGNOSIS — I10 ESSENTIAL (PRIMARY) HYPERTENSION: ICD-10-CM

## 2022-02-28 DIAGNOSIS — E11.9 TYPE 2 DIABETES MELLITUS WITHOUT COMPLICATION, WITHOUT LONG-TERM CURRENT USE OF INSULIN: ICD-10-CM

## 2022-02-28 DIAGNOSIS — E11.9 TYPE 2 DIABETES MELLITUS WITHOUT COMPLICATION, WITHOUT LONG-TERM CURRENT USE OF INSULIN: Primary | ICD-10-CM

## 2022-02-28 LAB
ALBUMIN SERPL-MCNC: 3.6 G/DL (ref 3.5–5.2)
ALBUMIN/GLOB SERPL: 1.3 G/DL
ALP SERPL-CCNC: 60 U/L (ref 39–117)
ALT SERPL W P-5'-P-CCNC: 18 U/L (ref 1–41)
ANION GAP SERPL CALCULATED.3IONS-SCNC: 8.6 MMOL/L (ref 5–15)
AST SERPL-CCNC: 12 U/L (ref 1–40)
BACTERIA UR QL AUTO: ABNORMAL /HPF
BASOPHILS # BLD AUTO: 0.07 10*3/MM3 (ref 0–0.2)
BASOPHILS NFR BLD AUTO: 0.8 % (ref 0–1.5)
BILIRUB SERPL-MCNC: 1.1 MG/DL (ref 0–1.2)
BILIRUB UR QL STRIP: NEGATIVE
BUN SERPL-MCNC: 11 MG/DL (ref 6–20)
BUN/CREAT SERPL: 13.8 (ref 7–25)
CALCIUM SPEC-SCNC: 9 MG/DL (ref 8.6–10.5)
CHLORIDE SERPL-SCNC: 99 MMOL/L (ref 98–107)
CLARITY UR: CLEAR
CO2 SERPL-SCNC: 30.4 MMOL/L (ref 22–29)
COLOR UR: YELLOW
CREAT SERPL-MCNC: 0.8 MG/DL (ref 0.76–1.27)
DEPRECATED RDW RBC AUTO: 40.5 FL (ref 37–54)
EGFRCR SERPLBLD CKD-EPI 2021: 103.9 ML/MIN/1.73
EOSINOPHIL # BLD AUTO: 0.22 10*3/MM3 (ref 0–0.4)
EOSINOPHIL NFR BLD AUTO: 2.7 % (ref 0.3–6.2)
ERYTHROCYTE [DISTWIDTH] IN BLOOD BY AUTOMATED COUNT: 11.7 % (ref 12.3–15.4)
GLOBULIN UR ELPH-MCNC: 2.8 GM/DL
GLUCOSE SERPL-MCNC: 157 MG/DL (ref 65–99)
GLUCOSE UR STRIP-MCNC: NEGATIVE MG/DL
HBA1C MFR BLD: 7.7 % (ref 4.8–5.6)
HCT VFR BLD AUTO: 46.6 % (ref 37.5–51)
HGB BLD-MCNC: 15.1 G/DL (ref 13–17.7)
HGB UR QL STRIP.AUTO: ABNORMAL
IMM GRANULOCYTES # BLD AUTO: 0.04 10*3/MM3 (ref 0–0.05)
IMM GRANULOCYTES NFR BLD AUTO: 0.5 % (ref 0–0.5)
KETONES UR QL STRIP: NEGATIVE
LEUKOCYTE ESTERASE UR QL STRIP.AUTO: NEGATIVE
LYMPHOCYTES # BLD AUTO: 2.39 10*3/MM3 (ref 0.7–3.1)
LYMPHOCYTES NFR BLD AUTO: 29 % (ref 19.6–45.3)
MCH RBC QN AUTO: 30.5 PG (ref 26.6–33)
MCHC RBC AUTO-ENTMCNC: 32.4 G/DL (ref 31.5–35.7)
MCV RBC AUTO: 94.1 FL (ref 79–97)
MONOCYTES # BLD AUTO: 1.22 10*3/MM3 (ref 0.1–0.9)
MONOCYTES NFR BLD AUTO: 14.8 % (ref 5–12)
MUCOUS THREADS URNS QL MICRO: ABNORMAL /HPF
NEUTROPHILS NFR BLD AUTO: 4.3 10*3/MM3 (ref 1.7–7)
NEUTROPHILS NFR BLD AUTO: 52.2 % (ref 42.7–76)
NITRITE UR QL STRIP: NEGATIVE
PH UR STRIP.AUTO: 6 [PH] (ref 5–8)
PLATELET # BLD AUTO: 243 10*3/MM3 (ref 140–450)
PMV BLD AUTO: 8.6 FL (ref 6–12)
POTASSIUM SERPL-SCNC: 4.1 MMOL/L (ref 3.5–5.2)
PROT SERPL-MCNC: 6.4 G/DL (ref 6–8.5)
PROT UR QL STRIP: ABNORMAL
RBC # BLD AUTO: 4.95 10*6/MM3 (ref 4.14–5.8)
RBC # UR STRIP: ABNORMAL /HPF
REF LAB TEST METHOD: ABNORMAL
SODIUM SERPL-SCNC: 138 MMOL/L (ref 136–145)
SP GR UR STRIP: >=1.03 (ref 1–1.03)
SQUAMOUS #/AREA URNS HPF: ABNORMAL /HPF
UROBILINOGEN UR QL STRIP: ABNORMAL
WBC # UR STRIP: ABNORMAL /HPF
WBC NRBC COR # BLD: 8.24 10*3/MM3 (ref 3.4–10.8)

## 2022-02-28 PROCEDURE — 36415 COLL VENOUS BLD VENIPUNCTURE: CPT

## 2022-02-28 PROCEDURE — 85025 COMPLETE CBC W/AUTO DIFF WBC: CPT

## 2022-02-28 PROCEDURE — 80053 COMPREHEN METABOLIC PANEL: CPT

## 2022-02-28 PROCEDURE — 99214 OFFICE O/P EST MOD 30 MIN: CPT | Performed by: NURSE PRACTITIONER

## 2022-02-28 PROCEDURE — 81001 URINALYSIS AUTO W/SCOPE: CPT

## 2022-02-28 PROCEDURE — 83036 HEMOGLOBIN GLYCOSYLATED A1C: CPT

## 2022-02-28 RX ORDER — OXYCODONE HYDROCHLORIDE 5 MG/1
5 TABLET ORAL AS NEEDED
COMMUNITY

## 2022-03-02 ENCOUNTER — TELEPHONE (OUTPATIENT)
Dept: FAMILY MEDICINE CLINIC | Age: 57
End: 2022-03-02

## 2022-03-02 ENCOUNTER — TELEPHONE (OUTPATIENT)
Dept: SURGERY | Facility: CLINIC | Age: 57
End: 2022-03-02

## 2022-03-02 NOTE — TELEPHONE ENCOUNTER
Robotic umbilical hernia repair with mesh 02/23 by Dr. Anderson.      Patient complains of bad pain.  Incision looks good.  No redness, swelling, fever or drainage.  Is the pain normal?

## 2022-03-03 ENCOUNTER — OFFICE VISIT (OUTPATIENT)
Dept: SURGERY | Facility: CLINIC | Age: 57
End: 2022-03-03

## 2022-03-03 ENCOUNTER — TELEPHONE (OUTPATIENT)
Dept: FAMILY MEDICINE CLINIC | Age: 57
End: 2022-03-03

## 2022-03-03 VITALS — BODY MASS INDEX: 44.98 KG/M2 | RESPIRATION RATE: 18 BRPM | WEIGHT: 270 LBS | HEIGHT: 65 IN | HEART RATE: 86 BPM

## 2022-03-03 DIAGNOSIS — Z09 STATUS POST UMBILICAL HERNIA REPAIR, FOLLOW-UP EXAM: Primary | ICD-10-CM

## 2022-03-03 PROCEDURE — 99024 POSTOP FOLLOW-UP VISIT: CPT | Performed by: NURSE PRACTITIONER

## 2022-03-03 RX ORDER — SULFAMETHOXAZOLE AND TRIMETHOPRIM 800; 160 MG/1; MG/1
1 TABLET ORAL 2 TIMES DAILY
Qty: 20 TABLET | Refills: 0 | Status: SHIPPED | OUTPATIENT
Start: 2022-03-03 | End: 2022-03-13

## 2022-03-03 NOTE — TELEPHONE ENCOUNTER
He said the reason for his pain is normal per Justin due to mesh and pins  by the robot not by hand

## 2022-03-03 NOTE — TELEPHONE ENCOUNTER
Caller: Fabio Juárez    Relationship: Self    Best call back number: [phone]    What is the best time to reach you: ANYTIME     Who are you requesting to speak with (clinical staff, provider,  specific staff member): NURSE     What was the call regarding: PATIENT IS CALLING WANTING TO REPORT WHAT THE SURGEON TOLD HIM REGARDING WHY HE IS STILL HAVING ALL THIS PAIN.     Do you require a callback: YES

## 2022-03-03 NOTE — TELEPHONE ENCOUNTER
Hub staff attempted to follow warm transfer process and was unsuccessful     Caller: Fabio Juárez    Relationship to patient: Self    Best call back number: [phone]    Patient is needing: PT SAID HE HAD A MESSAGE FROM MALLORIE TO CALL HIM BACK. IT LOOKS LIKE SHE MAY HAVE CALLED HIM YESTERDAY. HE WAS SEEN IN THE OFFICE TODAY AND I TOLD HIM THAT IF MALLORIE STILL NEEDED HIM SHE WOULD REACH OUT TO HIM AGAIN.

## 2022-03-04 NOTE — PROGRESS NOTES
Chief Complaint: Follow-up and Abdominal Pain    Subjective      Postop concern         History of Present Illness  Fabio Juárez is a 57 y.o. male presents to [facility] GENERAL SUR[facility]ERY for postop concern.    Patient presents today with complaints of redness on his lower abdomen.    Patient underwent a robotic repair of a recurrent umbilical hernia on 2/23/2022 performed by Dr. Garrett Anderson.    Patient reports that his pain has improved today, the last 2 days has been extreme pain that Percocet has not covered.    Patient denies any drainage from surgical incisions.    Patient admits to tolerating his diet well with no nausea.  Having bowel movements without difficulty.    Was seen by Karley Bar she is his PCP and she requested he be seen today.    2/22: wbc: 8.24; hgb: 15.1; hct: 46.6  Objective     Past Medical History:   Diagnosis Date   • Chronic pain    • Claustrophobia    • Coronary artery disease     BLOCKAGE 2015, SEE'S DR DIEGO EVERY 2 YEARS, DENIED CP/SOB    • Essential (primary) hypertension    • GERD (gastroesophageal reflux disease)    • Hemochromatosis     ASYMPTOMATIC    • History of COVID-19    • Left upper quadrant pain    • Low back pain    • Lung nodule    • Mixed hyperlipidemia    • Obstructive sleep apnea (adult) (pediatric)    • Other testicular dysfunction    • Pain in right foot    • Pain in right shoulder    • Recurrent umbilical hernia    • Renal stone 03/2017   • Tracheostomy status (HCC)     R/T SLEEP APNEA    • Type 2 diabetes mellitus (HCC)     BG RUNS 120-125 IN AM        Past Surgical History:   Procedure Laterality Date   • ACHILLES TENDON REPAIR Right 10/2018   • APPENDECTOMY     • CARDIAC CATHETERIZATION  11/03/2015    LEFT VENTRIULOGRAM, CORONARY ANGIOGRAM    • CHOLECYSTECTOMY  07/2013   • COLONOSCOPY  07/28/2014    NORMAL RESULT    • HERNIA REPAIR     • JOINT REPLACEMENT Bilateral     RIGHT KNEE 01/2016   • TONSILLECTOMY AND ADENOIDECTOMY     •  TRACHEOSTOMY      SECONDARY TO SLEEP APNEA   • VENTRAL HERNIA REPAIR N/A 2/23/2022    Procedure: ROBOTIC UMBILICAL HERNIA REPAIR WITH MESH PLACEMENT;  Surgeon: Garrett Anderson MD;  Location: [facility] MAIN OR;  Service: Robotics - DaVinci;  Laterality: N/A;       Outpatient Medications Marked as Taking for the 3/3/22 encounter (Office Visit) with Payton Marquez APRN   Medication Sig Dispense Refill   • atorvastatin (LIPITOR) 80 MG tablet TAKE ONE TABLET BY MOUTH EVERY NIGHT AT BEDTIME 90 tablet 0   • baclofen (LIORESAL) 10 MG tablet TAKE 1/2 TO 1 TABLET BY MOUTH AT BEDTIME FOR LOWER BACK PAIN/ MUSCLE SPASMS 90 tablet 1   • cetirizine (zyrTEC) 5 MG tablet TAKE ONE TABLET BY MOUTH DAILY 90 tablet 0   • gabapentin (NEURONTIN) 300 MG capsule gabapentin 300 mg oral capsule take 1 capsule (300 mg) by oral route 3 times per day   Suspended     • glimepiride (AMARYL) 2 MG tablet Take 2 mg by mouth Every Morning Before Breakfast. INSTRUCTED PER ANESTHESIA STANDING ORDERS     • hydroCHLOROthiazide (HYDRODIURIL) 12.5 MG tablet Take 12.5 mg by mouth Daily.     • lisinopril (PRINIVIL,ZESTRIL) 10 MG tablet TAKE ONE TABLET BY MOUTH EVERY MORNING 90 tablet 0   • oxyCODONE (ROXICODONE) 15 MG immediate release tablet Take 15 mg by mouth As Needed for Moderate Pain .     • oxyCODONE-acetaminophen (PERCOCET) 5-325 MG per tablet Take 1-2 tablets by mouth Every 4 (Four) Hours As Needed for Moderate Pain  (Pain). 12 tablet 0   • Semaglutide, 1 MG/DOSE, (Ozempic, 1 MG/DOSE,) 4 MG/3ML solution pen-injector Inject 1 mg under the skin into the appropriate area as directed 1 (One) Time Per Week. Please schedule a follow up appt before next refill (Patient taking differently: Inject 1 mg under the skin into the appropriate area as directed 1 (One) Time Per Week. Please schedule a follow up appt before next refill  INSTRUCTED PER ANESTHESIA STANDING ORDERS) 9 mL 0   • traZODone (DESYREL) 50 MG tablet TAKE ONE TABLET BY MOUTH ONCE NIGHTLY 90 tablet  "0       No Known Allergies     Family History   Problem Relation Age of Onset   • Arrhythmia Mother    • Stroke Mother 73   • Diabetes type II Mother    • Heart attack Father    • Hypertension Brother    • Cerebral aneurysm Brother 38   • Diabetes type II Brother    • Coronary artery disease Other        Social History     Socioeconomic History   • Marital status:    • Number of children: 2   Tobacco Use   • Smoking status: Never Smoker   • Smokeless tobacco: Never Used   Vaping Use   • Vaping Use: Never used   Substance and Sexual Activity   • Alcohol use: Yes     Comment: SOCIALLY    • Drug use: Never   • Sexual activity: Defer       Review of Systems   Constitutional: Negative for chills and fever.   Gastrointestinal: Positive for abdominal pain. Negative for abdominal distention, anal bleeding, blood in stool, constipation, diarrhea and rectal pain.        Vital Signs:   Pulse 86   Resp 18   Ht 165.1 cm (65\")   Wt 122 kg (270 lb)   BMI 44.93 kg/m²      Physical Exam  Constitutional:       Appearance: He is obese.   HENT:      Head: Normocephalic.   Cardiovascular:      Rate and Rhythm: Normal rate.   Pulmonary:      Effort: Pulmonary effort is normal.   Abdominal:      Palpations: Abdomen is soft.      Tenderness: There is abdominal tenderness.      Comments: Abdomen: All surgical incisions are clean dry and intact without erythema.    Mild erythema noted on the lower abdomen.   Skin:     General: Skin is warm and dry.   Neurological:      General: No focal deficit present.      Mental Status: He is alert and oriented to person, place, and time.   Psychiatric:         Mood and Affect: Mood normal.         Thought Content: Thought content normal.          Result Review :          []  Laboratory  []  Radiology  [x]  Pathology  []  Microbiology  []  EKG/Telemetry   []  Cardiology/Vascular   [x]  Old records  Today I have reviewed Dr. Anderson's operative and pathology report.     Assessment and Plan  "   Diagnoses and all orders for this visit:    1. Status post umbilical hernia repair, follow-up exam (Primary)    Other orders  -     sulfamethoxazole-trimethoprim (Bactrim DS) 800-160 MG per tablet; Take 1 tablet by mouth 2 (Two) Times a Day for 10 days.  Dispense: 20 tablet; Refill: 0        Follow Up   Return for Scheduled follow-up with Dr. Anderson.     I will go ahead and give the patient some Bactrim for the erythema at the bottom of his abdomen.  He will keep his follow-up appointment with Dr. Anderson    I have educated the patient on when to notify the office such as a fever greater than 101 associated with chills or drainage from surgical incisions.    Patient verbalizes understanding is willing to proceed with the above plan.    Patient was given instructions and counseling regarding his condition or for health maintenance advice. Please see specific information pulled into the AVS if appropriate.

## 2022-03-08 ENCOUNTER — OFFICE VISIT (OUTPATIENT)
Dept: SURGERY | Facility: CLINIC | Age: 57
End: 2022-03-08

## 2022-03-08 VITALS — RESPIRATION RATE: 16 BRPM | BODY MASS INDEX: 46.26 KG/M2 | HEIGHT: 64 IN | WEIGHT: 271 LBS

## 2022-03-08 DIAGNOSIS — K42.9 RECURRENT UMBILICAL HERNIA: Primary | ICD-10-CM

## 2022-03-08 PROCEDURE — 99024 POSTOP FOLLOW-UP VISIT: CPT | Performed by: SURGERY

## 2022-03-12 ENCOUNTER — HOSPITAL ENCOUNTER (EMERGENCY)
Dept: HOSPITAL 49 - FER | Age: 57
Discharge: HOME | End: 2022-03-12
Payer: COMMERCIAL

## 2022-03-12 DIAGNOSIS — J20.9: Primary | ICD-10-CM

## 2022-03-12 DIAGNOSIS — Z20.822: ICD-10-CM

## 2022-03-12 DIAGNOSIS — R10.12: ICD-10-CM

## 2022-03-12 DIAGNOSIS — E11.40: ICD-10-CM

## 2022-03-12 LAB
BASOPHIL: 1.3 % (ref 0–2)
BILIRUBIN: NEGATIVE MG/DL
BLOOD: NEGATIVE ERY/UL
BUN SERPL-MCNC: 17 MG/DL (ref 7–18)
BUN/CREAT RATIO (CALC): 15.9 RATIO
CHLORIDE: 100 MMOL/L (ref 98–107)
CLARITY UR: CLEAR
CO2 (BICARBONATE): 31 MMOL/L (ref 21–32)
COLOR: YELLOW
CORONAVIRUS 2019 SARS-COV-2: NEGATIVE
CREATININE: 1.07 MG/DL (ref 0.67–1.17)
EOSINOPHIL: 3.5 % (ref 0–5)
GLUCOSE (U): (no result) MG/DL
GLUCOSE SERPL-MCNC: 169 MG/DL (ref 74–106)
HCT: 46.6 % (ref 42–52)
HGB BLD-MCNC: 15.6 G/DL (ref 13.2–18)
INFLUENZA A NAA: NEGATIVE
LEUKOCYTES: NEGATIVE LEU/UL
LYMPHOCYTE: 39.2 % (ref 15–48)
MCH RBC QN AUTO: 30.8 PG (ref 25–31)
MCHC RBC AUTO-ENTMCNC: 33.5 G/DL (ref 32–36)
MCV: 91.9 FL (ref 78–100)
MONOCYTE: 12.7 % (ref 0–12)
MPV: 8.6 FL (ref 6–9.5)
NEUTROPHIL: 42.7 % (ref 41–80)
NITRITE: NEGATIVE MG/DL
NRBC: 0
PLT: 365 K/UL (ref 150–400)
POTASSIUM: 4 MMOL/L (ref 3.5–5.1)
PROTEIN: NEGATIVE MG/DL
RBC MORPHOLOGY: NORMAL
RBC: 5.07 M/UL (ref 4.7–6)
RDW: 11.9 % (ref 11.5–14)
SPECIFIC GRAVITY: >=1.03 (ref 1–1.03)
UROBILINOGEN: 0.2 MG/DL (ref 0.2–1)
WBC: 8.6 K/UL (ref 4–10.5)

## 2022-03-12 PROCEDURE — U0002 COVID-19 LAB TEST NON-CDC: HCPCS

## 2022-03-13 DIAGNOSIS — E11.9 TYPE 2 DIABETES MELLITUS WITHOUT COMPLICATION, WITHOUT LONG-TERM CURRENT USE OF INSULIN: Primary | ICD-10-CM

## 2022-03-13 RX ORDER — METFORMIN HYDROCHLORIDE 500 MG/1
1000 TABLET, EXTENDED RELEASE ORAL 2 TIMES DAILY WITH MEALS
Qty: 360 TABLET | Refills: 0 | Status: SHIPPED | OUTPATIENT
Start: 2022-03-13 | End: 2022-06-09

## 2022-03-16 RX ORDER — SEMAGLUTIDE 1.34 MG/ML
1 INJECTION, SOLUTION SUBCUTANEOUS WEEKLY
Qty: 3 PEN | Refills: 0 | Status: SHIPPED | OUTPATIENT
Start: 2022-03-16 | End: 2022-05-06

## 2022-03-16 NOTE — TELEPHONE ENCOUNTER
Pharm requesting refill on ozempic. Check list has noted that pt hasn't had a Lipid panel in the last 12 months. Please advise- clr

## 2022-03-18 ENCOUNTER — APPOINTMENT (OUTPATIENT)
Dept: GENERAL RADIOLOGY | Facility: HOSPITAL | Age: 57
End: 2022-03-18

## 2022-03-18 ENCOUNTER — APPOINTMENT (OUTPATIENT)
Dept: CT IMAGING | Facility: HOSPITAL | Age: 57
End: 2022-03-18

## 2022-03-18 ENCOUNTER — TELEPHONE (OUTPATIENT)
Dept: FAMILY MEDICINE CLINIC | Age: 57
End: 2022-03-18

## 2022-03-18 ENCOUNTER — HOSPITAL ENCOUNTER (INPATIENT)
Facility: HOSPITAL | Age: 57
LOS: 6 days | Discharge: HOME | End: 2022-03-24
Attending: EMERGENCY MEDICINE | Admitting: INTERNAL MEDICINE

## 2022-03-18 DIAGNOSIS — R91.1 PULMONARY NODULE: ICD-10-CM

## 2022-03-18 DIAGNOSIS — D72.829 LEUKOCYTOSIS, UNSPECIFIED TYPE: ICD-10-CM

## 2022-03-18 DIAGNOSIS — R09.02 HYPOXIA: Primary | ICD-10-CM

## 2022-03-18 DIAGNOSIS — R06.00 DYSPNEA, UNSPECIFIED TYPE: ICD-10-CM

## 2022-03-18 DIAGNOSIS — M11.261 PSEUDOGOUT OF RIGHT KNEE: ICD-10-CM

## 2022-03-18 PROBLEM — R07.81 PLEURITIC CHEST PAIN: Status: ACTIVE | Noted: 2022-03-18

## 2022-03-18 LAB
ALBUMIN SERPL-MCNC: 4 G/DL (ref 3.5–5.2)
ALBUMIN/GLOB SERPL: 1.5 G/DL
ALP SERPL-CCNC: 69 U/L (ref 39–117)
ALT SERPL W P-5'-P-CCNC: 19 U/L (ref 1–41)
ANION GAP SERPL CALCULATED.3IONS-SCNC: 9 MMOL/L (ref 5–15)
APTT PPP: 24.4 SECONDS (ref 22.7–35.4)
ARTERIAL PATENCY WRIST A: POSITIVE
AST SERPL-CCNC: 16 U/L (ref 1–40)
ATMOSPHERIC PRESS: 746.6 MMHG
B PARAPERT DNA SPEC QL NAA+PROBE: NOT DETECTED
B PERT DNA SPEC QL NAA+PROBE: NOT DETECTED
BASE EXCESS BLDA CALC-SCNC: 2.5 MMOL/L (ref 0–2)
BASOPHILS # BLD AUTO: 0.08 10*3/MM3 (ref 0–0.2)
BASOPHILS NFR BLD AUTO: 0.7 % (ref 0–1.5)
BDY SITE: ABNORMAL
BILIRUB SERPL-MCNC: 1.1 MG/DL (ref 0–1.2)
BUN SERPL-MCNC: 18 MG/DL (ref 6–20)
BUN/CREAT SERPL: 20 (ref 7–25)
C PNEUM DNA NPH QL NAA+NON-PROBE: NOT DETECTED
CALCIUM SPEC-SCNC: 9.3 MG/DL (ref 8.6–10.5)
CHLORIDE SERPL-SCNC: 99 MMOL/L (ref 98–107)
CO2 SERPL-SCNC: 27 MMOL/L (ref 22–29)
CREAT SERPL-MCNC: 0.9 MG/DL (ref 0.76–1.27)
DEPRECATED RDW RBC AUTO: 39.9 FL (ref 37–54)
EGFRCR SERPLBLD CKD-EPI 2021: 100.2 ML/MIN/1.73
EOSINOPHIL # BLD AUTO: 0.22 10*3/MM3 (ref 0–0.4)
EOSINOPHIL NFR BLD AUTO: 1.8 % (ref 0.3–6.2)
ERYTHROCYTE [DISTWIDTH] IN BLOOD BY AUTOMATED COUNT: 12.3 % (ref 12.3–15.4)
FLUAV SUBTYP SPEC NAA+PROBE: NOT DETECTED
FLUBV RNA ISLT QL NAA+PROBE: NOT DETECTED
GLOBULIN UR ELPH-MCNC: 2.6 GM/DL
GLUCOSE SERPL-MCNC: 134 MG/DL (ref 65–99)
HADV DNA SPEC NAA+PROBE: NOT DETECTED
HCO3 BLDA-SCNC: 27.5 MMOL/L (ref 22–28)
HCOV 229E RNA SPEC QL NAA+PROBE: NOT DETECTED
HCOV HKU1 RNA SPEC QL NAA+PROBE: NOT DETECTED
HCOV NL63 RNA SPEC QL NAA+PROBE: NOT DETECTED
HCOV OC43 RNA SPEC QL NAA+PROBE: NOT DETECTED
HCT VFR BLD AUTO: 44.7 % (ref 37.5–51)
HGB BLD-MCNC: 15.2 G/DL (ref 13–17.7)
HMPV RNA NPH QL NAA+NON-PROBE: NOT DETECTED
HPIV1 RNA ISLT QL NAA+PROBE: NOT DETECTED
HPIV2 RNA SPEC QL NAA+PROBE: NOT DETECTED
HPIV3 RNA NPH QL NAA+PROBE: NOT DETECTED
HPIV4 P GENE NPH QL NAA+PROBE: NOT DETECTED
IMM GRANULOCYTES # BLD AUTO: 0.1 10*3/MM3 (ref 0–0.05)
IMM GRANULOCYTES NFR BLD AUTO: 0.8 % (ref 0–0.5)
INR PPP: 0.97 (ref 0.9–1.1)
LYMPHOCYTES # BLD AUTO: 2.91 10*3/MM3 (ref 0.7–3.1)
LYMPHOCYTES NFR BLD AUTO: 23.7 % (ref 19.6–45.3)
M PNEUMO IGG SER IA-ACNC: NOT DETECTED
MAGNESIUM SERPL-MCNC: 1.7 MG/DL (ref 1.6–2.6)
MCH RBC QN AUTO: 30.8 PG (ref 26.6–33)
MCHC RBC AUTO-ENTMCNC: 34 G/DL (ref 31.5–35.7)
MCV RBC AUTO: 90.7 FL (ref 79–97)
MODALITY: ABNORMAL
MONOCYTES # BLD AUTO: 1.59 10*3/MM3 (ref 0.1–0.9)
MONOCYTES NFR BLD AUTO: 12.9 % (ref 5–12)
NEUTROPHILS NFR BLD AUTO: 60.1 % (ref 42.7–76)
NEUTROPHILS NFR BLD AUTO: 7.39 10*3/MM3 (ref 1.7–7)
NRBC BLD AUTO-RTO: 0 /100 WBC (ref 0–0.2)
NT-PROBNP SERPL-MCNC: 40.2 PG/ML (ref 0–900)
PCO2 BLDA: 42.5 MM HG (ref 35–45)
PH BLDA: 7.42 PH UNITS (ref 7.35–7.45)
PLATELET # BLD AUTO: 306 10*3/MM3 (ref 140–450)
PMV BLD AUTO: 8.5 FL (ref 6–12)
PO2 BLDA: 73.4 MM HG (ref 80–100)
POTASSIUM SERPL-SCNC: 3.9 MMOL/L (ref 3.5–5.2)
PROCALCITONIN SERPL-MCNC: 0.05 NG/ML (ref 0–0.25)
PROT SERPL-MCNC: 6.6 G/DL (ref 6–8.5)
PROTHROMBIN TIME: 12.8 SECONDS (ref 11.7–14.2)
QT INTERVAL: 343 MS
RBC # BLD AUTO: 4.93 10*6/MM3 (ref 4.14–5.8)
RHINOVIRUS RNA SPEC NAA+PROBE: NOT DETECTED
RSV RNA NPH QL NAA+NON-PROBE: NOT DETECTED
SAO2 % BLDCOA: 94.8 % (ref 92–99)
SARS-COV-2 RNA NPH QL NAA+NON-PROBE: NOT DETECTED
SODIUM SERPL-SCNC: 135 MMOL/L (ref 136–145)
TOTAL RATE: 18 BREATHS/MINUTE
TROPONIN T SERPL-MCNC: <0.01 NG/ML (ref 0–0.03)
WBC NRBC COR # BLD: 12.29 10*3/MM3 (ref 3.4–10.8)

## 2022-03-18 PROCEDURE — 85025 COMPLETE CBC W/AUTO DIFF WBC: CPT | Performed by: EMERGENCY MEDICINE

## 2022-03-18 PROCEDURE — 80053 COMPREHEN METABOLIC PANEL: CPT | Performed by: EMERGENCY MEDICINE

## 2022-03-18 PROCEDURE — 71045 X-RAY EXAM CHEST 1 VIEW: CPT

## 2022-03-18 PROCEDURE — 25010000002 MORPHINE PER 10 MG: Performed by: INTERNAL MEDICINE

## 2022-03-18 PROCEDURE — 85730 THROMBOPLASTIN TIME PARTIAL: CPT | Performed by: EMERGENCY MEDICINE

## 2022-03-18 PROCEDURE — G0378 HOSPITAL OBSERVATION PER HR: HCPCS

## 2022-03-18 PROCEDURE — 83880 ASSAY OF NATRIURETIC PEPTIDE: CPT | Performed by: EMERGENCY MEDICINE

## 2022-03-18 PROCEDURE — 0202U NFCT DS 22 TRGT SARS-COV-2: CPT | Performed by: EMERGENCY MEDICINE

## 2022-03-18 PROCEDURE — 83735 ASSAY OF MAGNESIUM: CPT | Performed by: EMERGENCY MEDICINE

## 2022-03-18 PROCEDURE — 36600 WITHDRAWAL OF ARTERIAL BLOOD: CPT

## 2022-03-18 PROCEDURE — 71275 CT ANGIOGRAPHY CHEST: CPT

## 2022-03-18 PROCEDURE — 82803 BLOOD GASES ANY COMBINATION: CPT

## 2022-03-18 PROCEDURE — 84484 ASSAY OF TROPONIN QUANT: CPT | Performed by: EMERGENCY MEDICINE

## 2022-03-18 PROCEDURE — 85610 PROTHROMBIN TIME: CPT | Performed by: EMERGENCY MEDICINE

## 2022-03-18 PROCEDURE — 93010 ELECTROCARDIOGRAM REPORT: CPT | Performed by: INTERNAL MEDICINE

## 2022-03-18 PROCEDURE — 0 IOPAMIDOL PER 1 ML: Performed by: EMERGENCY MEDICINE

## 2022-03-18 PROCEDURE — 84145 PROCALCITONIN (PCT): CPT | Performed by: EMERGENCY MEDICINE

## 2022-03-18 PROCEDURE — 93005 ELECTROCARDIOGRAM TRACING: CPT | Performed by: EMERGENCY MEDICINE

## 2022-03-18 PROCEDURE — 99285 EMERGENCY DEPT VISIT HI MDM: CPT

## 2022-03-18 RX ORDER — OXYCODONE HYDROCHLORIDE 5 MG/1
15 TABLET ORAL AS NEEDED
Status: DISCONTINUED | OUTPATIENT
Start: 2022-03-18 | End: 2022-03-18

## 2022-03-18 RX ORDER — LISINOPRIL 10 MG/1
10 TABLET ORAL DAILY
Status: DISCONTINUED | OUTPATIENT
Start: 2022-03-18 | End: 2022-03-24 | Stop reason: HOSPADM

## 2022-03-18 RX ORDER — BACLOFEN 10 MG/1
5 TABLET ORAL NIGHTLY
Status: DISCONTINUED | OUTPATIENT
Start: 2022-03-18 | End: 2022-03-24 | Stop reason: HOSPADM

## 2022-03-18 RX ORDER — OMEPRAZOLE 40 MG/1
40 CAPSULE, DELAYED RELEASE ORAL DAILY
COMMUNITY
End: 2022-04-12

## 2022-03-18 RX ORDER — ONDANSETRON 2 MG/ML
4 INJECTION INTRAMUSCULAR; INTRAVENOUS EVERY 6 HOURS PRN
Status: DISCONTINUED | OUTPATIENT
Start: 2022-03-18 | End: 2022-03-24 | Stop reason: HOSPADM

## 2022-03-18 RX ORDER — OXYCODONE HYDROCHLORIDE 5 MG/1
15 TABLET ORAL EVERY 6 HOURS PRN
Status: DISCONTINUED | OUTPATIENT
Start: 2022-03-18 | End: 2022-03-24 | Stop reason: HOSPADM

## 2022-03-18 RX ORDER — ACETAMINOPHEN 650 MG/1
650 SUPPOSITORY RECTAL EVERY 4 HOURS PRN
Status: DISCONTINUED | OUTPATIENT
Start: 2022-03-18 | End: 2022-03-24 | Stop reason: HOSPADM

## 2022-03-18 RX ORDER — ACETAMINOPHEN 160 MG/5ML
650 SOLUTION ORAL EVERY 4 HOURS PRN
Status: DISCONTINUED | OUTPATIENT
Start: 2022-03-18 | End: 2022-03-24 | Stop reason: HOSPADM

## 2022-03-18 RX ORDER — CETIRIZINE HYDROCHLORIDE 10 MG/1
5 TABLET ORAL DAILY
Status: DISCONTINUED | OUTPATIENT
Start: 2022-03-19 | End: 2022-03-24 | Stop reason: HOSPADM

## 2022-03-18 RX ORDER — NALOXONE HCL 0.4 MG/ML
0.4 VIAL (ML) INJECTION
Status: DISCONTINUED | OUTPATIENT
Start: 2022-03-18 | End: 2022-03-24 | Stop reason: HOSPADM

## 2022-03-18 RX ORDER — HYDROCHLOROTHIAZIDE 12.5 MG/1
12.5 TABLET ORAL DAILY
Status: DISCONTINUED | OUTPATIENT
Start: 2022-03-19 | End: 2022-03-24 | Stop reason: HOSPADM

## 2022-03-18 RX ORDER — SODIUM CHLORIDE 0.9 % (FLUSH) 0.9 %
10 SYRINGE (ML) INJECTION EVERY 12 HOURS SCHEDULED
Status: DISCONTINUED | OUTPATIENT
Start: 2022-03-18 | End: 2022-03-24 | Stop reason: HOSPADM

## 2022-03-18 RX ORDER — NITROGLYCERIN 0.4 MG/1
0.4 TABLET SUBLINGUAL
Status: DISCONTINUED | OUTPATIENT
Start: 2022-03-18 | End: 2022-03-24 | Stop reason: HOSPADM

## 2022-03-18 RX ORDER — GABAPENTIN 300 MG/1
300 CAPSULE ORAL EVERY 8 HOURS SCHEDULED
Status: DISCONTINUED | OUTPATIENT
Start: 2022-03-18 | End: 2022-03-24 | Stop reason: HOSPADM

## 2022-03-18 RX ORDER — MORPHINE SULFATE 2 MG/ML
4 INJECTION, SOLUTION INTRAMUSCULAR; INTRAVENOUS EVERY 4 HOURS PRN
Status: DISCONTINUED | OUTPATIENT
Start: 2022-03-18 | End: 2022-03-23

## 2022-03-18 RX ORDER — ATORVASTATIN CALCIUM 80 MG/1
80 TABLET, FILM COATED ORAL NIGHTLY
Status: DISCONTINUED | OUTPATIENT
Start: 2022-03-18 | End: 2022-03-24 | Stop reason: HOSPADM

## 2022-03-18 RX ORDER — ACETAMINOPHEN 325 MG/1
650 TABLET ORAL EVERY 4 HOURS PRN
Status: DISCONTINUED | OUTPATIENT
Start: 2022-03-18 | End: 2022-03-24 | Stop reason: HOSPADM

## 2022-03-18 RX ORDER — SODIUM CHLORIDE 0.9 % (FLUSH) 0.9 %
10 SYRINGE (ML) INJECTION AS NEEDED
Status: DISCONTINUED | OUTPATIENT
Start: 2022-03-18 | End: 2022-03-24 | Stop reason: HOSPADM

## 2022-03-18 RX ORDER — TRAZODONE HYDROCHLORIDE 50 MG/1
50 TABLET ORAL NIGHTLY
Status: DISCONTINUED | OUTPATIENT
Start: 2022-03-18 | End: 2022-03-24 | Stop reason: HOSPADM

## 2022-03-18 RX ORDER — GLIPIZIDE 5 MG/1
5 TABLET ORAL
Status: DISCONTINUED | OUTPATIENT
Start: 2022-03-19 | End: 2022-03-24 | Stop reason: HOSPADM

## 2022-03-18 RX ADMIN — LISINOPRIL 10 MG: 10 TABLET ORAL at 23:25

## 2022-03-18 RX ADMIN — Medication 10 ML: at 23:27

## 2022-03-18 RX ADMIN — GABAPENTIN 300 MG: 300 CAPSULE ORAL at 21:23

## 2022-03-18 RX ADMIN — OXYCODONE 15 MG: 5 TABLET ORAL at 23:25

## 2022-03-18 RX ADMIN — BACLOFEN 5 MG: 10 TABLET ORAL at 23:25

## 2022-03-18 RX ADMIN — MORPHINE SULFATE 4 MG: 2 INJECTION, SOLUTION INTRAMUSCULAR; INTRAVENOUS at 21:23

## 2022-03-18 RX ADMIN — ATORVASTATIN CALCIUM 80 MG: 80 TABLET, FILM COATED ORAL at 21:23

## 2022-03-18 RX ADMIN — TRAZODONE HYDROCHLORIDE 50 MG: 50 TABLET ORAL at 23:25

## 2022-03-18 RX ADMIN — IOPAMIDOL 100 ML: 755 INJECTION, SOLUTION INTRAVENOUS at 13:59

## 2022-03-18 NOTE — TELEPHONE ENCOUNTER
Pt called and he is having chest pain,back pain and soa I advised him to call ems and go to the er he said he was just there sat and they just said it was bronchitis . I advised him to go back. MM inf

## 2022-03-18 NOTE — H&P
Internal medicine history and physical  INTERNAL MEDICINE   [facility]       Patient Identification:  Name: Fabio Juárez  Age: 57 y.o.  Sex: male  :  1965  MRN: [id number]                   Primary Care Physician: Karley Amor APRN                               Date of admission:3/18/2022    Chief Complaint: Progressive shortness of breath and bilateral rib pain for 1 week in the background of laparoscopic repair of recurrent umbilical hernia on 2022 at [facility].    History of Present Illness:   Patient is a 57-year-old morbidly obese male who underwent robotic repair of recurrent umbilical hernia on 2022 and had subsequent follow-up with his primary surgeon on 3/8/2020 during which he was noted with doing well except for significant abdominal discomfort.  No specific changes in treatment plan was made at that visit and plan was to see him back in couple weeks at that time.  It was also noted that she would be allowed to go back to work after his visit in 2 weeks from 3/8/2022.  According to the patient about a week or so ago i.e. few days after his visit with his surgeon he started having discomfort in his upper abdomen and bilateral lower chest that gets worse upon coughing and taking deep breath.  His shortness of breath is persistent and discomfort feels like statin pain in the ribs which gets worse with taking deep breaths.  Work-up in the emergency room including CT scan of the chest PE protocol did not show any acute pulmonary embolism but did show bibasilar atelectasis and/or pleural parenchymal scarring.  Subcentimeter pulmonary nodule in the right upper lobe for which repeat CT scan in 12 months was recommended.  Patient has history of chronic hypoxic respiratory failure and sleep apnea for which he had tracheostomy placed in  which has been present since.  There is no recent changes or manipulation of his tracheostomy device.  He was noted  to be slightly hypoxic with oxygen saturation down to 89% improved with 2 L nasal cannula oxygen placement.  Because of transient hypoxia mild leukocytosis bilateral chest discomfort after recent robotic umbilical hernia repair patient is being admitted for further care.    Past Medical History:  Past Medical History:   Diagnosis Date   • Chronic pain    • Claustrophobia    • Coronary artery disease     BLOCKAGE 2015, SEE'S DR DIEGO EVERY 2 YEARS, DENIED CP/SOB    • Essential (primary) hypertension    • GERD (gastroesophageal reflux disease)    • Hemochromatosis     ASYMPTOMATIC    • History of COVID-19    • Left upper quadrant pain    • Low back pain    • Lung nodule    • Mixed hyperlipidemia    • Obstructive sleep apnea (adult) (pediatric)    • Other testicular dysfunction    • Pain in right foot    • Pain in right shoulder    • Recurrent umbilical hernia    • Renal stone 03/2017   • Tracheostomy status (HCC)     R/T SLEEP APNEA    • Type 2 diabetes mellitus (HCC)     BG RUNS 120-125 IN AM      Past Surgical History:  Past Surgical History:   Procedure Laterality Date   • ACHILLES TENDON REPAIR Right 10/2018   • APPENDECTOMY     • CARDIAC CATHETERIZATION  11/03/2015    LEFT VENTRIULOGRAM, CORONARY ANGIOGRAM    • CHOLECYSTECTOMY  07/2013   • COLONOSCOPY  07/28/2014    NORMAL RESULT    • HERNIA REPAIR     • JOINT REPLACEMENT Bilateral     RIGHT KNEE 01/2016   • TONSILLECTOMY AND ADENOIDECTOMY     • TRACHEOSTOMY      SECONDARY TO SLEEP APNEA   • VENTRAL HERNIA REPAIR N/A 2/23/2022    Procedure: ROBOTIC UMBILICAL HERNIA REPAIR WITH MESH PLACEMENT;  Surgeon: Garrett Anderson MD;  Location: [facility];  Service: Robotics - [facility];  Laterality: N/A;      Home Meds:  Medications Prior to Admission   Medication Sig Dispense Refill Last Dose   • atorvastatin (LIPITOR) 80 MG tablet TAKE ONE TABLET BY MOUTH EVERY NIGHT AT BEDTIME 90 tablet 0    • baclofen (LIORESAL) 10 MG tablet TAKE 1/2 TO 1 TABLET BY MOUTH AT  BEDTIME FOR LOWER BACK PAIN/ MUSCLE SPASMS 90 tablet 1    • cetirizine (zyrTEC) 5 MG tablet TAKE ONE TABLET BY MOUTH DAILY 90 tablet 0    • gabapentin (NEURONTIN) 300 MG capsule gabapentin 300 mg oral capsule take 1 capsule (300 mg) by oral route 3 times per day   Suspended      • glimepiride (AMARYL) 2 MG tablet Take 2 mg by mouth Every Morning Before Breakfast. INSTRUCTED PER ANESTHESIA STANDING ORDERS      • hydroCHLOROthiazide (HYDRODIURIL) 12.5 MG tablet Take 12.5 mg by mouth Daily.      • lisinopril (PRINIVIL,ZESTRIL) 10 MG tablet TAKE ONE TABLET BY MOUTH EVERY MORNING 90 tablet 0    • metFORMIN ER (GLUCOPHAGE-XR) 500 MG 24 hr tablet Take 2 tablets by mouth 2 (Two) Times a Day With Meals. 360 tablet 0    • oxyCODONE (ROXICODONE) 15 MG immediate release tablet Take 15 mg by mouth As Needed for Moderate Pain .      • Semaglutide, 1 MG/DOSE, (Ozempic, 1 MG/DOSE,) 4 MG/3ML solution pen-injector Inject 1 mg under the skin into the appropriate area as directed 1 (One) Time Per Week for 90 days. 3 pen 0    • traZODone (DESYREL) 50 MG tablet TAKE ONE TABLET BY MOUTH ONCE NIGHTLY 90 tablet 0      Current Meds:   No current facility-administered medications for this encounter.  Allergies:  No Known Allergies  Social History:   Social History     Tobacco Use   • Smoking status: Never Smoker   • Smokeless tobacco: Never Used   Substance Use Topics   • Alcohol use: Yes     Comment: SOCIALLY       Family History:  Family History   Problem Relation Age of Onset   • Arrhythmia Mother    • Stroke Mother 73   • Diabetes type II Mother    • Heart attack Father    • Hypertension Brother    • Cerebral aneurysm Brother 38   • Diabetes type II Brother    • Coronary artery disease Other           Review of Systems  See history of present illness and past medical history.    As described in history of present illness.  Remainder of ROS is negative.      Vitals:   /75   Pulse 79   Temp 98.4 °F (36.9 °C) (Tympanic)   Resp 20    "Ht 165.1 cm (65\")   Wt 123 kg (271 lb)   SpO2 95%   BMI 45.10 kg/m²   I/O: No intake or output data in the 24 hours ending 03/18/22 1943  Exam:  Patient is examined using the personal protective equipment as per guidelines from infection control for this particular patient as enacted.  Hand washing was performed before and after patient interaction.  General Appearance:   Alert cooperative morbidly obese male does not appear to be in any acute distress   Head:    Normocephalic, without obvious abnormality, atraumatic   Eyes:    PERRL, conjunctiva/corneas clear, EOM's intact, both eyes   Ears:    Normal external ear canals, both ears   Nose:   Nares normal, septum midline, mucosa normal, no drainage    or sinus tenderness   Throat:   Lips, tongue, gums normal; oral mucosa pink and moist   Neck:  Neck: Tracheostomy device in place with no neck abnormalities swelling or cellulitic changes   Back:     Symmetric, no curvature, ROM normal, no CVA tenderness   Lungs:    Decreased breath sounds at the bases   Chest Wall:    No tenderness or deformity    Heart:    Regular rate and rhythm, S1 and S2 normal, no murmur, rub   or gallop   Abdomen:    Obese soft mild generalized distention and tenderness robotic port site of surgery appears to be well approximated with no inflammation or drainage.   Extremities:   Extremities normal, atraumatic, no cyanosis or edema   Pulses:   Pulses palpable in all extremities; symmetric all extremities   Skin:   Skin color normal, Skin is warm and dry,  no rashes or palpable lesions   Neurologic:  Grossly nonfocal examination.       Data Review:      I reviewed the patient's new clinical results.  Results from last 7 days   Lab Units 03/18/22  1249   WBC 10*3/mm3 12.29*   HEMOGLOBIN g/dL 15.2   PLATELETS 10*3/mm3 306     Results from last 7 days   Lab Units 03/18/22  1249   SODIUM mmol/L 135*   POTASSIUM mmol/L 3.9   CHLORIDE mmol/L 99   CO2 mmol/L 27.0   BUN mg/dL 18   CREATININE mg/dL " 0.90   CALCIUM mg/dL 9.3   GLUCOSE mg/dL 134*     CT Angiogram Chest    Result Date: 3/18/2022  1.  No findings of pulmonary embolism. Bibasilar atelectasis and or pleural parenchymal scarring. 2.  Subcentimeter pulmonary nodule in the right upper lobe. Follow-up chest CT in 12 months should be considered to ensure stability per Fleischner criteria. 3.  Other findings as above.  This report was finalized on 3/18/2022 2:49 PM by Dr. Efren Shepard M.D.      ECG 12 Lead   Final Result   HEART RATE= 83  bpm   RR Interval= 723  ms   CT Interval= 174  ms   P Horizontal Axis= 11  deg   P Front Axis= 37  deg   QRSD Interval= 93  ms   QT Interval= 343  ms   QRS Axis= 20  deg   T Wave Axis= -4  deg   - BORDERLINE ECG -   Sinus rhythm   Borderline T abnormalities, inferior leads   No change from previous tracing   Electronically Signed By: Erin Green ([facility]) 18-Mar-2022 15:20:57   Date and Time of Study: 2022-03-18 13:11:01        Microbiology Results (last 10 days)     Procedure Component Value - Date/Time    Respiratory Panel PCR w/COVID-19(SARS-CoV-2) [facility]NUG/WARNER/VU/PAD/COR/MAD/FREDRICK In-House, NP Swab in UTM/VTM, 3-4 HR TAT - Swab, Nasopharynx [[id number]]  (Normal) Collected: 03/18/22 1408    Lab Status: Final result Specimen: Swab from Nasopharynx Updated: 03/18/22 1515     ADENOVIRUS, PCR Not Detected     Coronavirus 229E Not Detected     Coronavirus HKU1 Not Detected     Coronavirus NL63 Not Detected     Coronavirus OC43 Not Detected     COVID19 Not Detected     Human Metapneumovirus Not Detected     Human Rhinovirus/Enterovirus Not Detected     Influenza A PCR Not Detected     Influenza B PCR Not Detected     Parainfluenza Virus 1 Not Detected     Parainfluenza Virus 2 Not Detected     Parainfluenza Virus 3 Not Detected     Parainfluenza Virus 4 Not Detected     RSV, PCR Not Detected     Bordetella pertussis pcr Not Detected     Bordetella parapertussis PCR Not Detected     Chlamydophila pneumoniae PCR Not Detected      Mycoplasma pneumo by PCR Not Detected    Narrative:      In the setting of a positive respiratory panel with a viral infection PLUS a negative procalcitonin without other underlying concern for bacterial infection, consider observing off antibiotics or discontinuation of antibiotics and continue supportive care. If the respiratory panel is positive for atypical bacterial infection (Bordetella pertussis, Chlamydophila pneumoniae, or Mycoplasma pneumoniae), consider antibiotic de-escalation to target atypical bacterial infection.          Assessment:  Active Hospital Problems    Diagnosis  POA   • Hypoxia [R09.02]  Yes   • Pleuritic chest pain [R07.81]  Unknown   • Dyspnea [R06.00]  Unknown   • Essential (primary) hypertension [I10]  Yes   • Tracheostomy status (HCC) [Z93.0]  Not Applicable   • Hemochromatosis [E83.119]  Yes   • DM2 (diabetes mellitus, type 2) (HCC) [E11.9]  Yes   • Chronic low back pain [M54.50, G89.29]  Yes   • Chronic neck pain [M54.2, G89.29]  Yes       Plan: See admitting orders  Provided with continuous pulse ox monitoring and oxygen supplementation as needed including incentive spirometry.  Continue with Accu-Cheks and sliding scale coverage and provide with hypoglycemia protocol and check hemoglobin A1c.      Petra Goodman MD   3/18/2022  19:43 EDT  Much of this encounter note is an electronic transcription/translation of spoken language to printed text. The electronic translation of spoken language may permit erroneous, or at times, nonsensical words or phrases to be inadvertently transcribed; Although I have reviewed the note for such errors, some may still exist

## 2022-03-18 NOTE — ED TRIAGE NOTES
All triage performed with this RN wearing appropriate PPE.  Pt placed in mask upon arrival to ED.  Patient c/o SOA for 1 week. He also c/o bilateral rib pain. He reports it began after a hernia repair.

## 2022-03-18 NOTE — ED PROVIDER NOTES
EMERGENCY DEPARTMENT ENCOUNTER  I wore full protective equipment throughout this patient encounter including a N95 mask, eye shield, gown and gloves. Hand hygiene was performed before donning protective equipment and after removal when leaving the room.    Room Number:  29/29  Date of encounter:  3/18/2022  PCP: Karley Amor APRN    HPI:  Context: Fabio Juárez is a 57 y.o. male who presents to the ED c/o chief complaint of shortness of breath.  Patient reports that he had umbilical hernia repair at the end of February outside [facility].  Patient reports afterwards had abdominal pain that made it difficulty to ambulate, abdominal pain has resolved but approximately a week ago, patient began having shortness of breath.  Patient reports shortness of breath is constant, reports occasionally he will get bilateral sharp stabbing rib pain in the bottom of his ribs on the lateral aspect of both sides, when this occurs the shortness of breath is worse.  Patient denies any inciting events to the chest pain, reports pain last for several seconds and then resolves, it is not exertional or pleuritic in nature.  Pain does not radiate.  Patient denies any nausea vomiting, no diaphoresis.  Patient has had runny nose as well as productive cough, denies any loss of sense of smell or taste, no vomiting or diarrhea, no fever shakes chills or night sweats.  Patient denies any recent sick contacts, has been vaccinated against COVID-19, reports that he was just checked for Covid and would not like to be checked today.  Patient denies any history of blood clots, not on anticoagulation, no recent trauma, not being treated for cancer, was immobilized after surgery.    MEDICAL HISTORY REVIEW  Reviewed in EPIC    PAST MEDICAL HISTORY  Active Ambulatory Problems     Diagnosis Date Noted   • DM2 (diabetes mellitus, type 2) ([facility]) 06/03/2015   • Tracheostomy status ([facility])    • Sleep apnea 06/03/2015   • Hypercholesteremia    • Lung  nodule    • Chronic low back pain 06/03/2015   • Left upper quadrant pain    • Hemochromatosis    • GERD (gastroesophageal reflux disease)    • Essential (primary) hypertension    • Chronic neck pain 06/03/2015   • Claustrophobia    • Decreased testosterone level 06/16/2021   • Peripheral neuropathy 06/16/2021   • Insomnia 06/16/2021   • Diarrhea 09/09/2021   • Recurrent umbilical hernia 01/11/2022     Resolved Ambulatory Problems     Diagnosis Date Noted   • Renal stone 03/2017   • Pain in right shoulder    • Pain in right foot    • Other testicular dysfunction    • History of COVID-19    • Achilles tendinitis of right lower extremity 03/15/2018   • Calcaneal spur of right foot 03/15/2018   • Dizziness 06/16/2021   • Edema 06/16/2021   • Liver disease 06/16/2021   • Plantar fasciitis 06/16/2021   • Leukocytosis 06/16/2021   • Tension type headache 06/16/2021   • Tracheostomy dependence (HCC) 06/16/2021   • Tracheostomy infection (HCC) 06/16/2021     Past Medical History:   Diagnosis Date   • Chronic pain    • Coronary artery disease    • Low back pain    • Mixed hyperlipidemia    • Obstructive sleep apnea (adult) (pediatric)    • Type 2 diabetes mellitus (HCC)        PAST SURGICAL HISTORY  Past Surgical History:   Procedure Laterality Date   • ACHILLES TENDON REPAIR Right 10/2018   • APPENDECTOMY     • CARDIAC CATHETERIZATION  11/03/2015    LEFT VENTRIULOGRAM, CORONARY ANGIOGRAM    • CHOLECYSTECTOMY  07/2013   • COLONOSCOPY  07/28/2014    NORMAL RESULT    • HERNIA REPAIR     • JOINT REPLACEMENT Bilateral     RIGHT KNEE 01/2016   • TONSILLECTOMY AND ADENOIDECTOMY     • TRACHEOSTOMY      SECONDARY TO SLEEP APNEA   • VENTRAL HERNIA REPAIR N/A 2/23/2022    Procedure: ROBOTIC UMBILICAL HERNIA REPAIR WITH MESH PLACEMENT;  Surgeon: Garrett Anderson MD;  Location: [facility] OR;  Service: Robotics - [facility];  Laterality: N/A;       FAMILY HISTORY  Family History   Problem Relation Age of Onset   • Arrhythmia Mother    •  Stroke Mother 73   • Diabetes type II Mother    • Heart attack Father    • Hypertension Brother    • Cerebral aneurysm Brother 38   • Diabetes type II Brother    • Coronary artery disease Other        SOCIAL HISTORY  Social History     Socioeconomic History   • Marital status:    • Number of children: 2   Tobacco Use   • Smoking status: Never Smoker   • Smokeless tobacco: Never Used   Vaping Use   • Vaping Use: Never used   Substance and Sexual Activity   • Alcohol use: Yes     Comment: SOCIALLY    • Drug use: Never   • Sexual activity: Defer       ALLERGIES  Patient has no known allergies.    The patient's allergies have been reviewed    REVIEW OF SYSTEMS  All systems reviewed and negative except for those discussed in HPI.     PHYSICAL EXAM  I have reviewed the triage vital signs and nursing notes.  ED Triage Vitals [03/18/22 1230]   Temp Heart Rate Resp BP SpO2   98.4 °F (36.9 °C) 89 20 -- 93 %      Temp src Heart Rate Source Patient Position BP Location FiO2 (%)   Tympanic Monitor -- -- --       General: No acute distress.  HENT: NCAT, PERRL, Nares patent.  Eyes: no scleral icterus.  Neck: trachea midline, no ROM limitations.  Trach collar in place.  CV: regular rhythm, regular rate.  Unable to assess JVD secondary to habitus, no peripheral pitting edema.  Respiratory: normal effort, diminished at bilateral lung bases with crackles.  Abdomen: soft, nondistended, NTTP, no rebound tenderness, no guarding or rigidity.  Musculoskeletal: no deformity.  Neuro: alert, moves all extremities, follows commands.  Skin: warm, dry.Bilateral lower extremities: No edema, no redness warmth or skin changes, no palpable cords, negative Homans.    LAB RESULTS  Recent Results (from the past 24 hour(s))   Comprehensive Metabolic Panel    Collection Time: 03/18/22 12:49 PM    Specimen: Blood   Result Value Ref Range    Glucose 134 (H) 65 - 99 mg/dL    BUN 18 6 - 20 mg/dL    Creatinine 0.90 0.76 - 1.27 mg/dL    Sodium 135 (L)  136 - 145 mmol/L    Potassium 3.9 3.5 - 5.2 mmol/L    Chloride 99 98 - 107 mmol/L    CO2 27.0 22.0 - 29.0 mmol/L    Calcium 9.3 8.6 - 10.5 mg/dL    Total Protein 6.6 6.0 - 8.5 g/dL    Albumin 4.00 3.50 - 5.20 g/dL    ALT (SGPT) 19 1 - 41 U/L    AST (SGOT) 16 1 - 40 U/L    Alkaline Phosphatase 69 39 - 117 U/L    Total Bilirubin 1.1 0.0 - 1.2 mg/dL    Globulin 2.6 gm/dL    A/G Ratio 1.5 g/dL    BUN/Creatinine Ratio 20.0 7.0 - 25.0    Anion Gap 9.0 5.0 - 15.0 mmol/L    eGFR 100.2 >60.0 mL/min/1.73   Troponin    Collection Time: 03/18/22 12:49 PM    Specimen: Blood   Result Value Ref Range    Troponin T <0.010 0.000 - 0.030 ng/mL   Magnesium    Collection Time: 03/18/22 12:49 PM    Specimen: Blood   Result Value Ref Range    Magnesium 1.7 1.6 - 2.6 mg/dL   Procalcitonin    Collection Time: 03/18/22 12:49 PM    Specimen: Blood   Result Value Ref Range    Procalcitonin 0.05 0.00 - 0.25 ng/mL   Protime-INR    Collection Time: 03/18/22 12:49 PM    Specimen: Blood   Result Value Ref Range    Protime 12.8 11.7 - 14.2 Seconds    INR 0.97 0.90 - 1.10   aPTT    Collection Time: 03/18/22 12:49 PM    Specimen: Blood   Result Value Ref Range    PTT 24.4 22.7 - 35.4 seconds   CBC Auto Differential    Collection Time: 03/18/22 12:49 PM    Specimen: Blood   Result Value Ref Range    WBC 12.29 (H) 3.40 - 10.80 10*3/mm3    RBC 4.93 4.14 - 5.80 10*6/mm3    Hemoglobin 15.2 13.0 - 17.7 g/dL    Hematocrit 44.7 37.5 - 51.0 %    MCV 90.7 79.0 - 97.0 fL    MCH 30.8 26.6 - 33.0 pg    MCHC 34.0 31.5 - 35.7 g/dL    RDW 12.3 12.3 - 15.4 %    RDW-SD 39.9 37.0 - 54.0 fl    MPV 8.5 6.0 - 12.0 fL    Platelets 306 140 - 450 10*3/mm3    Neutrophil % 60.1 42.7 - 76.0 %    Lymphocyte % 23.7 19.6 - 45.3 %    Monocyte % 12.9 (H) 5.0 - 12.0 %    Eosinophil % 1.8 0.3 - 6.2 %    Basophil % 0.7 0.0 - 1.5 %    Immature Grans % 0.8 (H) 0.0 - 0.5 %    Neutrophils, Absolute 7.39 (H) 1.70 - 7.00 10*3/mm3    Lymphocytes, Absolute 2.91 0.70 - 3.10 10*3/mm3     Monocytes, Absolute 1.59 (H) 0.10 - 0.90 10*3/mm3    Eosinophils, Absolute 0.22 0.00 - 0.40 10*3/mm3    Basophils, Absolute 0.08 0.00 - 0.20 10*3/mm3    Immature Grans, Absolute 0.10 (H) 0.00 - 0.05 10*3/mm3    nRBC 0.0 0.0 - 0.2 /100 WBC   BNP    Collection Time: 03/18/22 12:49 PM    Specimen: Blood   Result Value Ref Range    proBNP 40.2 0.0 - 900.0 pg/mL   ECG 12 Lead    Collection Time: 03/18/22  1:11 PM   Result Value Ref Range    QT Interval 343 ms   Respiratory Panel PCR w/COVID-19(SARS-CoV-2) [facility]UNG/WARNER/VU/PAD/COR/MAD/FREDRICK In-House, NP Swab in [facility], 3-4 HR TAT - Swab, Nasopharynx    Collection Time: 03/18/22  2:08 PM    Specimen: Nasopharynx; Swab   Result Value Ref Range    ADENOVIRUS, PCR Not Detected Not Detected    Coronavirus 229E Not Detected Not Detected    Coronavirus HKU1 Not Detected Not Detected    Coronavirus NL63 Not Detected Not Detected    Coronavirus OC43 Not Detected Not Detected    COVID19 Not Detected Not Detected - Ref. Range    Human Metapneumovirus Not Detected Not Detected    Human Rhinovirus/Enterovirus Not Detected Not Detected    Influenza A PCR Not Detected Not Detected    Influenza B PCR Not Detected Not Detected    Parainfluenza Virus 1 Not Detected Not Detected    Parainfluenza Virus 2 Not Detected Not Detected    Parainfluenza Virus 3 Not Detected Not Detected    Parainfluenza Virus 4 Not Detected Not Detected    RSV, PCR Not Detected Not Detected    Bordetella pertussis pcr Not Detected Not Detected    Bordetella parapertussis PCR Not Detected Not Detected    Chlamydophila pneumoniae PCR Not Detected Not Detected    Mycoplasma pneumo by PCR Not Detected Not Detected   Blood Gas, Arterial -    Collection Time: 03/18/22  3:19 PM    Specimen: Arterial Blood   Result Value Ref Range    Site Arterial: right radial     Emiliano's Test Positive     pH, Arterial 7.419 7.350 - 7.450 pH units    pCO2, Arterial 42.5 35.0 - 45.0 mm Hg    pO2, Arterial 73.4 (L) 80.0 - 100.0 mm Hg    HCO3,  Arterial 27.5 22.0 - 28.0 mmol/L    Base Excess, Arterial 2.5 (H) 0.0 - 2.0 mmol/L    O2 Saturation Calculated 94.8 92.0 - 99.0 %    Barometric Pressure for Blood Gas 746.6 mmHg    Modality Room Air     Rate 18 Breaths/minute       I ordered the above labs and reviewed the results.    RADIOLOGY  XR Chest 1 View    Result Date: 3/18/2022  ONE VIEW PORTABLE CHEST  HISTORY: Shortness of breath.  FINDINGS: The lungs are moderately expanded and clear except for some minimal vague atelectasis at the right base. Allowing for the lung expansion, the heart size is normal. A tracheostomy tube is in place.  This report was finalized on 3/18/2022 2:18 PM by Dr. Femi Joseph M.D.      CT Angiogram Chest    Result Date: 3/18/2022  CT ANGIOGRAM OF THE CHEST. MULTIPLE CORONAL, SAGITTAL, AND 3-D RECONSTRUCTIONS.  HISTORY: Short of air, recent abdominal surgery; evaluate for pulmonary embolism  TECHNIQUE: Radiation dose reduction techniques were utilized, including automated exposure control and exposure modulation based on body size. CT angiogram of the chest was performed. Multiple coronal, sagittal, and 3-D reconstruction images were obtained.  COMPARISON:None  FINDINGS:  Tracheostomy is present. The gallbladder is surgically absent. No hilar, mediastinal or axillary adenopathy is present by size criteria. There is a trace pericardial effusion.  Bibasilar atelectasis and or pleural parenchymal scarring is present. No findings of pulmonary embolism are present. There is no pleural effusion or pneumothorax. Subcentimeter pulmonary nodule within the right upper lobe is present.  No suspicious lytic blastic osseous lesion is present.      1.  No findings of pulmonary embolism. Bibasilar atelectasis and or pleural parenchymal scarring. 2.  Subcentimeter pulmonary nodule in the right upper lobe. Follow-up chest CT in 12 months should be considered to ensure stability per Fleischner criteria. 3.  Other findings as above.  This report  was finalized on 3/18/2022 2:49 PM by Dr. Efren Shepard M.D.        I ordered the above noted radiological studies. I reviewed the images and results. I agree with the radiologist interpretation.    PROCEDURES  Procedures    MEDICATIONS GIVEN IN ER  Medications   iopamidol (ISOVUE-370) 76 % injection 100 mL (100 mL Intravenous Given 3/18/22 1359)       PROGRESS, DATA ANALYSIS, CONSULTS, AND MEDICAL DECISION MAKING  A complete history and physical exam have been performed.  All available laboratory and imaging results have been reviewed by myself prior to disposition.    MDM  After the initial H&P, I discussed pertinent information from history and physical exam with patient/family.  Discussed differential diagnosis.  Discussed plan for ED evaluation/workup/treatment.  All questions answered.  Patient/family is agreeable with plan.  ED Course as of 03/18/22 1624   Fri Mar 18, 2022   1235 My differential diagnosis for dyspnea includes but is not limited to:  Asthma, COPD, pneumonia, pulmonary embolus, acute respiratory distress syndrome, pneumothorax, pleural effusion, pulmonary fibrosis, congestive heart failure, myocardial infarction, DKA, uremia, acidosis, sepsis, anemia, drug related, hyperventilation, CNS disease     [JG]   1308 Patient was saturating approximately 92% during interview, informed by nursing staff that patient desaturated to 88% on room air with good Plath, patient placed on 2 L, now oxygenating at 94%.  Patient denies any history of lung disease, no history of CHF, patient is a non-smoker.  Discussed need to obtain Covid test as patient will likely require admission for hypoxia, after discussion, patient is now agreeable. [JG]   1313 EKG independently viewed and contemporaneously interpreted by ED physician. Time: 13 11 PM.  Rate 83.  Interpretation: Normal sinus rhythm, normal axis, normal QRS, no acute ST changes. [JG]   1313 When compared with prior EKG on 2/23/2022, no acute changes are  present. [JG]   1340 Patient reports that he had his trach placed and 96 secondary to sleep apnea, previously used it with CPAP, has not used it for extended period of time, keeps plug in place but otherwise does not use, did discuss having it removed at one point but never was. [JG]   1536 No clear etiology for patient's hypoxia on work-up to present, patient still currently pending Covid testing although CT chest unremarkable other than atelectasis.  Concern for possible false hypoxia secondary to poor sensor although good Plath with finger sensor.  New forehead probe attached, patient taken off of oxygen, patient desaturated to 89%, placed back on 2 L oxygen, oxygenation returned to 94%.   [JG]   1605 Patient reassessed.  Discussed ED findings, differential diagnosis, and the need for admission for evaluation/treatment.  They are agreeable to admission and all questions were answered.     [JG]   1622 Phone call with KEVIN Islas.  Discussed the patient, relevant history, exam, diagnostics, ED findings/progress, and concerns. They agree to admit the patient to telemetry. Care assumed by the admitting physician at this time.     [JG]      ED Course User Index  [JG] Jeremie Kessler MD       AS OF 16:24 EDT VITALS:    BP - 108/69  HR - 78  TEMP - 98.4 °F (36.9 °C) (Tympanic)  O2 SATS - 95%    DIAGNOSIS  Final diagnoses:   Hypoxia   Dyspnea, unspecified type   Leukocytosis, unspecified type   Pulmonary nodule         DISPOSITION  ADMISSION    Discussed treatment plan and reason for admission with pt/family and admitting physician.  Pt/family voiced understanding of the plan for admission for further testing/treatment as needed.          Jeremie Kessler MD  03/18/22 1403

## 2022-03-19 LAB
ALBUMIN SERPL-MCNC: 3.7 G/DL (ref 3.5–5.2)
ALBUMIN/GLOB SERPL: 1.3 G/DL
ALP SERPL-CCNC: 71 U/L (ref 39–117)
ALT SERPL W P-5'-P-CCNC: 21 U/L (ref 1–41)
ANION GAP SERPL CALCULATED.3IONS-SCNC: 9 MMOL/L (ref 5–15)
AST SERPL-CCNC: 12 U/L (ref 1–40)
BASOPHILS # BLD AUTO: 0.1 10*3/MM3 (ref 0–0.2)
BASOPHILS NFR BLD AUTO: 1 % (ref 0–1.5)
BILIRUB SERPL-MCNC: 1.8 MG/DL (ref 0–1.2)
BUN SERPL-MCNC: 17 MG/DL (ref 6–20)
BUN/CREAT SERPL: 22.4 (ref 7–25)
CALCIUM SPEC-SCNC: 8.9 MG/DL (ref 8.6–10.5)
CHLORIDE SERPL-SCNC: 97 MMOL/L (ref 98–107)
CO2 SERPL-SCNC: 27 MMOL/L (ref 22–29)
CREAT SERPL-MCNC: 0.76 MG/DL (ref 0.76–1.27)
DEPRECATED RDW RBC AUTO: 42.8 FL (ref 37–54)
EGFRCR SERPLBLD CKD-EPI 2021: 105.5 ML/MIN/1.73
EOSINOPHIL # BLD AUTO: 0.26 10*3/MM3 (ref 0–0.4)
EOSINOPHIL NFR BLD AUTO: 2.5 % (ref 0.3–6.2)
ERYTHROCYTE [DISTWIDTH] IN BLOOD BY AUTOMATED COUNT: 12.3 % (ref 12.3–15.4)
GLOBULIN UR ELPH-MCNC: 2.9 GM/DL
GLUCOSE BLDC GLUCOMTR-MCNC: 120 MG/DL (ref 70–130)
GLUCOSE BLDC GLUCOMTR-MCNC: 130 MG/DL (ref 70–130)
GLUCOSE BLDC GLUCOMTR-MCNC: 148 MG/DL (ref 70–130)
GLUCOSE SERPL-MCNC: 138 MG/DL (ref 65–99)
HBA1C MFR BLD: 8.4 % (ref 4.8–5.6)
HCT VFR BLD AUTO: 46 % (ref 37.5–51)
HGB BLD-MCNC: 15 G/DL (ref 13–17.7)
IMM GRANULOCYTES # BLD AUTO: 0.11 10*3/MM3 (ref 0–0.05)
IMM GRANULOCYTES NFR BLD AUTO: 1.1 % (ref 0–0.5)
LYMPHOCYTES # BLD AUTO: 2.57 10*3/MM3 (ref 0.7–3.1)
LYMPHOCYTES NFR BLD AUTO: 25.1 % (ref 19.6–45.3)
MCH RBC QN AUTO: 30.8 PG (ref 26.6–33)
MCHC RBC AUTO-ENTMCNC: 32.6 G/DL (ref 31.5–35.7)
MCV RBC AUTO: 94.5 FL (ref 79–97)
MONOCYTES # BLD AUTO: 1.49 10*3/MM3 (ref 0.1–0.9)
MONOCYTES NFR BLD AUTO: 14.5 % (ref 5–12)
NEUTROPHILS NFR BLD AUTO: 5.72 10*3/MM3 (ref 1.7–7)
NEUTROPHILS NFR BLD AUTO: 55.8 % (ref 42.7–76)
NRBC BLD AUTO-RTO: 0.1 /100 WBC (ref 0–0.2)
PLATELET # BLD AUTO: 294 10*3/MM3 (ref 140–450)
PMV BLD AUTO: 8.8 FL (ref 6–12)
POTASSIUM SERPL-SCNC: 3.6 MMOL/L (ref 3.5–5.2)
PROT SERPL-MCNC: 6.6 G/DL (ref 6–8.5)
RBC # BLD AUTO: 4.87 10*6/MM3 (ref 4.14–5.8)
SODIUM SERPL-SCNC: 133 MMOL/L (ref 136–145)
WBC NRBC COR # BLD: 10.25 10*3/MM3 (ref 3.4–10.8)

## 2022-03-19 PROCEDURE — G0378 HOSPITAL OBSERVATION PER HR: HCPCS

## 2022-03-19 PROCEDURE — 97110 THERAPEUTIC EXERCISES: CPT

## 2022-03-19 PROCEDURE — 83036 HEMOGLOBIN GLYCOSYLATED A1C: CPT | Performed by: INTERNAL MEDICINE

## 2022-03-19 PROCEDURE — 80053 COMPREHEN METABOLIC PANEL: CPT | Performed by: INTERNAL MEDICINE

## 2022-03-19 PROCEDURE — 97162 PT EVAL MOD COMPLEX 30 MIN: CPT

## 2022-03-19 PROCEDURE — 85025 COMPLETE CBC W/AUTO DIFF WBC: CPT | Performed by: INTERNAL MEDICINE

## 2022-03-19 PROCEDURE — 25010000002 MORPHINE PER 10 MG: Performed by: INTERNAL MEDICINE

## 2022-03-19 PROCEDURE — 82962 GLUCOSE BLOOD TEST: CPT

## 2022-03-19 RX ORDER — DEXTROSE MONOHYDRATE 25 G/50ML
25 INJECTION, SOLUTION INTRAVENOUS
Status: DISCONTINUED | OUTPATIENT
Start: 2022-03-19 | End: 2022-03-24 | Stop reason: HOSPADM

## 2022-03-19 RX ORDER — INSULIN LISPRO 100 [IU]/ML
0-7 INJECTION, SOLUTION INTRAVENOUS; SUBCUTANEOUS
Status: DISCONTINUED | OUTPATIENT
Start: 2022-03-19 | End: 2022-03-24 | Stop reason: HOSPADM

## 2022-03-19 RX ORDER — NICOTINE POLACRILEX 4 MG
15 LOZENGE BUCCAL
Status: DISCONTINUED | OUTPATIENT
Start: 2022-03-19 | End: 2022-03-24 | Stop reason: HOSPADM

## 2022-03-19 RX ADMIN — GABAPENTIN 300 MG: 300 CAPSULE ORAL at 06:29

## 2022-03-19 RX ADMIN — GABAPENTIN 300 MG: 300 CAPSULE ORAL at 21:13

## 2022-03-19 RX ADMIN — GLIPIZIDE 5 MG: 5 TABLET ORAL at 09:03

## 2022-03-19 RX ADMIN — ATORVASTATIN CALCIUM 80 MG: 80 TABLET, FILM COATED ORAL at 21:12

## 2022-03-19 RX ADMIN — Medication 10 ML: at 13:37

## 2022-03-19 RX ADMIN — MORPHINE SULFATE 4 MG: 2 INJECTION, SOLUTION INTRAMUSCULAR; INTRAVENOUS at 09:13

## 2022-03-19 RX ADMIN — OXYCODONE 15 MG: 5 TABLET ORAL at 13:36

## 2022-03-19 RX ADMIN — CETIRIZINE HYDROCHLORIDE 5 MG: 10 TABLET ORAL at 09:03

## 2022-03-19 RX ADMIN — OXYCODONE 15 MG: 5 TABLET ORAL at 21:12

## 2022-03-19 RX ADMIN — BACLOFEN 5 MG: 10 TABLET ORAL at 21:12

## 2022-03-19 RX ADMIN — GABAPENTIN 300 MG: 300 CAPSULE ORAL at 13:36

## 2022-03-19 RX ADMIN — OXYCODONE 15 MG: 5 TABLET ORAL at 06:29

## 2022-03-19 RX ADMIN — MORPHINE SULFATE 4 MG: 2 INJECTION, SOLUTION INTRAMUSCULAR; INTRAVENOUS at 02:54

## 2022-03-19 RX ADMIN — TRAZODONE HYDROCHLORIDE 50 MG: 50 TABLET ORAL at 21:12

## 2022-03-19 RX ADMIN — Medication 10 ML: at 21:18

## 2022-03-19 NOTE — PLAN OF CARE
Goal Outcome Evaluation:  Plan of Care Reviewed With: patient        Progress: no change  Outcome Evaluation: Patient admitted to unit from ER with severe back pain reported at 10/10 w/o relief from morphine and roxicodone. Per Dr. Goodman, monitoring O2 saturations during treatment with pain medications.  Currently on 2 liters O2, vitals otherwise stable.  WCTM

## 2022-03-19 NOTE — CONSULTS
Pulmonary Consultation     Patient Name: Fabio Juárez  Age/Sex: 57 y.o. male  : 1965  MRN: [id number]    Date of Admission: 3/18/2022  Date of Encounter Visit: 22  Encounter Provider: Scot Heredia MD  Referring Provider: No ref. provider found  Place of Service: [facility]  Patient Care Team:  Karley Amor APRN as PCP - General (Nurse Practitioner)  Payton Marquez APRN as Nurse Practitioner (Nurse Practitioner)      Subjective:     Consulted for: Hypoxemia    Chief Complaint: Shortness of breath and abdominal discomfort    History of Present Illness:  Fabio Juárez is a 57 y.o. male with history of severe sleep apnea with AHI above 80 who could not tolerate any treatment and ended up having a tracheostomy.  He had abdominal wall hernia status post repair on 2022.  Patient came in because of worsening abdominal discomfort and increased shortness of breath, was requiring oxygen to correct the hypoxemia.  There was some concern about venous thromboembolism which was ruled out with negative CT of the chest with IV contrast.  The films were reviewed he does not have any pneumonia but he does have some linear atelectatic changes over the bases.  Patient has abdominal obesity, he is denying any fever or chills, no nausea or vomiting  Is currently requiring 2 L/min nasal cannula oxygen to maintain sats in the normal range.    Pulmonary Functions Testing Results:    No results found for: FEV1, FVC, ONI7LIV, TLC, DLCO    Review of Systems:   Review of Systems   As per above on 12 point system review otherwise negative past Medical History:  Past Medical History:   Diagnosis Date   • Chronic pain    • Claustrophobia    • Coronary artery disease     BLOCKAGE , SEE'S DR DIEGO EVERY 2 YEARS, DENIED CP/SOB    • Essential (primary) hypertension    • GERD (gastroesophageal reflux disease)    • Hemochromatosis     ASYMPTOMATIC    • History of COVID-19    • Left upper quadrant pain    • Low  back pain    • Lung nodule    • Mixed hyperlipidemia    • Obstructive sleep apnea (adult) (pediatric)    • Other testicular dysfunction    • Pain in right foot    • Pain in right shoulder    • Recurrent umbilical hernia    • Renal stone 03/2017   • Tracheostomy status (HCC)     R/T SLEEP APNEA    • Type 2 diabetes mellitus (HCC)     BG RUNS 120-125 IN AM        Past Surgical History:   Procedure Laterality Date   • ACHILLES TENDON REPAIR Right 10/2018   • APPENDECTOMY     • CARDIAC CATHETERIZATION  11/03/2015    LEFT VENTRIULOGRAM, CORONARY ANGIOGRAM    • CHOLECYSTECTOMY  07/2013   • COLONOSCOPY  07/28/2014    NORMAL RESULT    • HERNIA REPAIR     • JOINT REPLACEMENT Bilateral     RIGHT KNEE 01/2016   • TONSILLECTOMY AND ADENOIDECTOMY     • TRACHEOSTOMY      SECONDARY TO SLEEP APNEA   • VENTRAL HERNIA REPAIR N/A 2/23/2022    Procedure: ROBOTIC UMBILICAL HERNIA REPAIR WITH MESH PLACEMENT;  Surgeon: Garrett Anderson MD;  Location: [facility] OR;  Service: Robotics - [facility];  Laterality: N/A;       Home Medications:   Medications Prior to Admission   Medication Sig Dispense Refill Last Dose   • atorvastatin (LIPITOR) 80 MG tablet TAKE ONE TABLET BY MOUTH EVERY NIGHT AT BEDTIME 90 tablet 0 3/17/2022 at Unknown time   • baclofen (LIORESAL) 10 MG tablet TAKE 1/2 TO 1 TABLET BY MOUTH AT BEDTIME FOR LOWER BACK PAIN/ MUSCLE SPASMS 90 tablet 1 3/17/2022 at Unknown time   • cetirizine (zyrTEC) 5 MG tablet TAKE ONE TABLET BY MOUTH DAILY 90 tablet 0 3/17/2022 at Unknown time   • gabapentin (NEURONTIN) 300 MG capsule 4 (Four) Times a Day.   3/17/2022 at Unknown time   • glimepiride (AMARYL) 2 MG tablet Take 2 mg by mouth Every Morning Before Breakfast. INSTRUCTED PER ANESTHESIA STANDING ORDERS   3/17/2022 at Unknown time   • hydroCHLOROthiazide (HYDRODIURIL) 12.5 MG tablet Take 12.5 mg by mouth Daily.   3/17/2022 at Unknown time   • lisinopril (PRINIVIL,ZESTRIL) 10 MG tablet TAKE ONE TABLET BY MOUTH EVERY MORNING 90 tablet 0  3/17/2022 at Unknown time   • metFORMIN ER (GLUCOPHAGE-XR) 500 MG 24 hr tablet Take 2 tablets by mouth 2 (Two) Times a Day With Meals. 360 tablet 0 3/17/2022 at Unknown time   • oxyCODONE (ROXICODONE) 15 MG immediate release tablet Take 15 mg by mouth As Needed for Moderate Pain .   3/17/2022 at Unknown time   • Semaglutide, 1 MG/DOSE, (Ozempic, 1 MG/DOSE,) 4 MG/3ML solution pen-injector Inject 1 mg under the skin into the appropriate area as directed 1 (One) Time Per Week for 90 days. 3 pen 0 Past Week at Unknown time   • traZODone (DESYREL) 50 MG tablet TAKE ONE TABLET BY MOUTH ONCE NIGHTLY 90 tablet 0 3/17/2022 at Unknown time   • omeprazole (priLOSEC) 40 MG capsule Take 40 mg by mouth Daily.          Inpatient Medications:  Scheduled Meds:atorvastatin, 80 mg, Oral, Nightly  baclofen, 5 mg, Oral, Nightly  cetirizine, 5 mg, Oral, Daily  gabapentin, 300 mg, Oral, Q8H  glipizide, 5 mg, Oral, QAM AC  hydroCHLOROthiazide, 12.5 mg, Oral, Daily  insulin lispro, 0-7 Units, Subcutaneous, TID AC  lisinopril, 10 mg, Oral, Daily  sodium chloride, 10 mL, Intravenous, Q12H  traZODone, 50 mg, Oral, Nightly      Continuous Infusions:   PRN Meds:.•  acetaminophen **OR** acetaminophen **OR** acetaminophen  •  dextrose  •  dextrose  •  glucagon (human recombinant)  •  Morphine **AND** naloxone  •  nitroglycerin  •  ondansetron  •  oxyCODONE  •  sodium chloride    Allergies:  No Known Allergies    Past Social History:  Social History     Socioeconomic History   • Marital status:    • Number of children: 2   Tobacco Use   • Smoking status: Never Smoker   • Smokeless tobacco: Never Used   Vaping Use   • Vaping Use: Never used   Substance and Sexual Activity   • Alcohol use: Yes     Comment: SOCIALLY    • Drug use: Never   • Sexual activity: Defer       Past Family History:  Family History   Problem Relation Age of Onset   • Arrhythmia Mother    • Stroke Mother 73   • Diabetes type II Mother    • Heart attack Father    •  Hypertension Brother    • Cerebral aneurysm Brother 38   • Diabetes type II Brother    • Coronary artery disease Other            Objective:   Temp:  [97.6 °F (36.4 °C)-98.4 °F (36.9 °C)] 98.4 °F (36.9 °C)  Heart Rate:  [79-86] 80  Resp:  [18-20] 18  BP: (103-116)/(68-80) 103/73  SpO2:  [91 %-95 %] 93 %  on  Flow (L/min):  [2] 2 Device (Oxygen Therapy): nasal cannula     Intake/Output Summary (Last 24 hours) at 3/19/2022 1621  Last data filed at 3/19/2022 1406  Gross per 24 hour   Intake 240 ml   Output --   Net 240 ml     Body mass index is 45.1 kg/m².      03/18/22  1249   Weight: 123 kg (271 lb)     Weight change:     Physical Exam:   Physical Exam   General:    No acute distress, alert and oriented x4, pleasant, able to talk freely with the tracheostomy open                   Head:    Normocephalic, atraumatic. External ears and nose are normal   Eyes:          Conjunctivae and sclerae normal, no icterus, PERRLA, no  discharge   Throat:   No oral lesions, no thrush, oral mucosa moist.  Mallampati 4 airway   Neck:   Supple, trachea midline. No JVD, no cervical or supraclavicular lymphadenopathy.  Trach site is clean patient has Gallito metal trach   Lungs:     Normal chest on inspection, C patient has decreased breath sounds posteriorly he does have positive crackles over the bases posteriorly, no wheezes    Heart:    Regular rhythm and normal rate.  No murmurs, gallops, or rubs noted.   Abdomen:      truncal obesity, the abdomen is soft, tender to palpation,   Extremities:   No clubbing, cyanosis, very minimal trace edema.     Pulses:   Pulses palpable and equal bilaterally.    Skin:   No bleeding or rash. No bumps, good turgor pressure    Neuro:   Nonfocal.  Moves all extremities well. Strength 5/5 and symmetrical, no sensory deficit    Psychiatric:   Normal mood and affect.     Lab Review:   Results from last 7 days   Lab Units 03/19/22  0419 03/18/22  1249   SODIUM mmol/L 133* 135*   POTASSIUM mmol/L 3.6 3.9    CHLORIDE mmol/L 97* 99   CO2 mmol/L 27.0 27.0   BUN mg/dL 17 18   CREATININE mg/dL 0.76 0.90   GLUCOSE mg/dL 138* 134*   CALCIUM mg/dL 8.9 9.3   AST (SGOT) U/L 12 16   ALT (SGPT) U/L 21 19   ALBUMIN g/dL 3.70 4.00     Results from last 7 days   Lab Units 03/18/22  1249   TROPONIN T ng/mL <0.010     Results from last 7 days   Lab Units 03/19/22  0419 03/18/22  1249   WBC 10*3/mm3 10.25 12.29*   HEMOGLOBIN g/dL 15.0 15.2   HEMATOCRIT % 46.0 44.7   PLATELETS 10*3/mm3 294 306   MCV fL 94.5 90.7   MCH pg 30.8 30.8   MCHC g/dL 32.6 34.0   RDW % 12.3 12.3   RDW-SD fl 42.8 39.9   MPV fL 8.8 8.5   NEUTROPHIL % % 55.8 60.1   LYMPHOCYTE % % 25.1 23.7   MONOCYTES % % 14.5* 12.9*   EOSINOPHIL % % 2.5 1.8   BASOPHIL % % 1.0 0.7   IMM GRAN % % 1.1* 0.8*   NEUTROS ABS 10*3/mm3 5.72 7.39*   LYMPHS ABS 10*3/mm3 2.57 2.91   MONOS ABS 10*3/mm3 1.49* 1.59*   EOS ABS 10*3/mm3 0.26 0.22   BASOS ABS 10*3/mm3 0.10 0.08   IMMATURE GRANS (ABS) 10*3/mm3 0.11* 0.10*   NRBC /100 WBC 0.1 0.0     Results from last 7 days   Lab Units 03/18/22  1249   INR  0.97   APTT seconds 24.4     Results from last 7 days   Lab Units 03/18/22  1249   MAGNESIUM mg/dL 1.7           Invalid input(s): LDLCALC  Results from last 7 days   Lab Units 03/18/22  1249   PROBNP pg/mL 40.2         Results from last 7 days   Lab Units 03/18/22  1519   PH, ARTERIAL pH units 7.419   PCO2, ARTERIAL mm Hg 42.5   PO2 ART mm Hg 73.4*   HCO3 ART mmol/L 27.5     Results from last 7 days   Lab Units 03/18/22  1249   PROCALCITONIN ng/mL 0.05                     Results from last 7 days   Lab Units 03/18/22  1408   COVID19  Not Detected   ADENOVIRUS DETECTION BY PCR  Not Detected   CORONAVIRUS 229E  Not Detected   CORONAVIRUS HKU1  Not Detected   CORONAVIRUS NL63  Not Detected   CORONAVIRUS OC43  Not Detected   HUMAN METAPNEUMOVIRUS  Not Detected   HUMAN RHINOVIRUS/ENTEROVIRUS  Not Detected   INFLUENZA B PCR  Not Detected   PARAINFLUENZA 1  Not Detected   PARAINFLUENZA VIRUS 2  Not  Detected   PARAINFLUENZA VIRUS 3  Not Detected   PARAINFLUENZA VIRUS 4  Not Detected   BORDETELLA PERTUSSIS PCR  Not Detected   BORDETELLA PARAPERTUSSIS PCR  Not Detected   CHLAMYDOPHILA PNEUMONIAE PCR  Not Detected   MYCOPLAMA PNEUMO PCR  Not Detected   RSV, PCR  Not Detected             Imaging:  Imaging Results (Most Recent)     Procedure Component Value Units Date/Time    CT Angiogram Chest [[id number]] Collected: 03/18/22 1432     Updated: 03/18/22 1452    Narrative:      CT ANGIOGRAM OF THE CHEST. MULTIPLE CORONAL, SAGITTAL, AND 3-D  RECONSTRUCTIONS.     HISTORY: Short of air, recent abdominal surgery; evaluate for pulmonary  embolism     TECHNIQUE: Radiation dose reduction techniques were utilized, including  automated exposure control and exposure modulation based on body size.   CT angiogram of the chest was performed. Multiple coronal, sagittal, and  3-D reconstruction images were obtained.      COMPARISON:None     FINDINGS:      Tracheostomy is present. The gallbladder is surgically absent. No hilar,  mediastinal or axillary adenopathy is present by size criteria. There is  a trace pericardial effusion.     Bibasilar atelectasis and or pleural parenchymal scarring is present. No  findings of pulmonary embolism are present. There is no pleural effusion  or pneumothorax. Subcentimeter pulmonary nodule within the right upper  lobe is present.     No suspicious lytic blastic osseous lesion is present.       Impression:      1.  No findings of pulmonary embolism. Bibasilar atelectasis and or  pleural parenchymal scarring.  2.  Subcentimeter pulmonary nodule in the right upper lobe. Follow-up  chest CT in 12 months should be considered to ensure stability per  Fleischner criteria.  3.  Other findings as above.     This report was finalized on 3/18/2022 2:49 PM by Dr. Efren Shepard M.D.       XR Chest 1 View [[id number]] Collected: 03/18/22 1414     Updated: 03/18/22 1421    Narrative:      ONE VIEW PORTABLE  CHEST     HISTORY: Shortness of breath.     FINDINGS: The lungs are moderately expanded and clear except for some  minimal vague atelectasis at the right base. Allowing for the lung  expansion, the heart size is normal. A tracheostomy tube is in place.     This report was finalized on 3/18/2022 2:18 PM by Dr. Femi Joseph M.D.             I personally viewed and interpreted the patient's imaging studies.    Assessment:   Postoperative hypoxemia  Atelectasis  Severe sleep apnea status post tracheostomy  Chronic trach care  Hemochromatosis  Abdominal hernia status post repair    Plan:     Patient has no obvious finding to suggest pneumonia whether by imaging study or by lab criteria or by history  He does have abdominal pain and he does have splinting of the breath and he has some crackles on exam all consistent with atelectasis.  The other risk factor would be venous thromboembolism after surgery but he was ruled out for that already  Treatment recommendation is to use incentive spirometer and try to control the pain to allow the patient to deep breathe without splinting at the same time without overmedicating and causing excessive sedation  We will follow up on the oxygen requirement with the above measures and consider further recommendations accordingly  No steroids, no antibiotics indicated    Discussed with the patient, is agreeable with the above plan    Thank you for allowing me to participate in the care of Fabio Juárez. Feel free to contact me directly with any further questions or concerns.    Scot Heredia MD  [facility]   03/19/22  16:21 EDT    Dictated utilizing Dragon dictation

## 2022-03-19 NOTE — PLAN OF CARE
Problem: Adult Inpatient Plan of Care  Goal: Plan of Care Review  Outcome: Ongoing, Progressing  Flowsheets  Taken 3/19/2022 1738 by Ginger Alvarado, RN  Progress: no change  Outcome Evaluation: Still c/o pain lower back/right, pain meds PRN w/some relief. Up in chair for comfort. Can ambulate to BR using walker. O2 at 2L NC. Taught how to use IS for improved oxygenation. Will closely monitor.  Taken 3/19/2022 0558 by Nica Salmon, RN  Plan of Care Reviewed With: patient   Goal Outcome Evaluation:           Progress: no change  Outcome Evaluation: Still c/o pain lower back/right, pain meds PRN w/some relief. Up in chair for comfort. Can ambulate to BR using walker. O2 at 2L NC. Taught how to use IS for improved oxygenation. Will closely monitor.

## 2022-03-19 NOTE — PLAN OF CARE
Goal Outcome Evaluation:    Patient is a pleasant 57 y.o. male admitted to [facility] for Progressive shortness of breath and bilateral rib pain for 1 week on 3/18/2022. PMHx includes laparoscopic repair of recurrent umbilical hernia on 2/23/2022 at [facility]. Patient is independent at baseline and lives at home with family-no prior use of AD but access to RW if needed. Today, patient UIC upon PT arrival, required CGA for transfers, and ambulated 70' CGA with a RW. Pain limiting primarily. Patient may benefit from skilled PT services acutely to address functional deficits as well as improve level of independence prior to discharge. Anticipate returning home with  PT upon DC.

## 2022-03-19 NOTE — THERAPY EVALUATION
Patient Name: Fabio Juárez  : 1965    MRN: [id number]                              Today's Date: 3/19/2022       Admit Date: 3/18/2022    Visit Dx:     ICD-10-CM ICD-9-CM   1. Hypoxia  R09.02 799.02   2. Dyspnea, unspecified type  R06.00 786.09   3. Leukocytosis, unspecified type  D72.829 288.60   4. Pulmonary nodule  R91.1 793.11     Patient Active Problem List   Diagnosis   • DM2 (diabetes mellitus, type 2) ([facility])   • Tracheostomy status ([facility])   • Sleep apnea   • Hypercholesteremia   • Lung nodule   • Chronic low back pain   • Left upper quadrant pain   • Hemochromatosis   • GERD (gastroesophageal reflux disease)   • Essential (primary) hypertension   • Chronic neck pain   • Claustrophobia   • Decreased testosterone level   • Peripheral neuropathy   • Insomnia   • Diarrhea   • Recurrent umbilical hernia   • Hypoxia   • Pleuritic chest pain   • Dyspnea     Past Medical History:   Diagnosis Date   • Chronic pain    • Claustrophobia    • Coronary artery disease     BLOCKAGE , SEE'S DR DIEGO EVERY 2 YEARS, DENIED CP/SOB    • Essential (primary) hypertension    • GERD (gastroesophageal reflux disease)    • Hemochromatosis     ASYMPTOMATIC    • History of COVID-19    • Left upper quadrant pain    • Low back pain    • Lung nodule    • Mixed hyperlipidemia    • Obstructive sleep apnea (adult) (pediatric)    • Other testicular dysfunction    • Pain in right foot    • Pain in right shoulder    • Recurrent umbilical hernia    • Renal stone 2017   • Tracheostomy status ([facility])     R/T SLEEP APNEA    • Type 2 diabetes mellitus ([facility])     BG RUNS 120-125 IN AM      Past Surgical History:   Procedure Laterality Date   • ACHILLES TENDON REPAIR Right 10/2018   • APPENDECTOMY     • CARDIAC CATHETERIZATION  2015    LEFT VENTRIULOGRAM, CORONARY ANGIOGRAM    • CHOLECYSTECTOMY  2013   • COLONOSCOPY  2014    NORMAL RESULT    • HERNIA REPAIR     • JOINT REPLACEMENT Bilateral     RIGHT KNEE 2016   •  TONSILLECTOMY AND ADENOIDECTOMY     • TRACHEOSTOMY      SECONDARY TO SLEEP APNEA   • VENTRAL HERNIA REPAIR N/A 2/23/2022    Procedure: ROBOTIC UMBILICAL HERNIA REPAIR WITH MESH PLACEMENT;  Surgeon: Garrett Anderson MD;  Location: [facility] MAIN OR;  Service: Robotics - [facility];  Laterality: N/A;      General Information     Row Name 03/19/22 1318          Physical Therapy Time and Intention    Document Type evaluation  -MS     Mode of Treatment physical therapy  -MS     Row Name 03/19/22 1318          General Information    Patient Profile Reviewed yes  -MS     Prior Level of Function independent:;all household mobility  access to walker, caregiver for parent  -MS     Existing Precautions/Restrictions fall  -MS     Barriers to Rehab medically complex;previous functional deficit  -MS     Row Name 03/19/22 1318          Home Main Entrance    Number of Stairs, Main Entrance two  -MS     Stair Railings, Main Entrance railings safe and in good condition  -MS     Row Name 03/19/22 1318          Cognition    Orientation Status (Cognition) oriented x 4  -MS     Row Name 03/19/22 1318          Safety Issues, Functional Mobility    Safety Issues Affecting Function (Mobility) judgment;positioning of assistive device;sequencing abilities;awareness of need for assistance;safety precaution awareness;at risk behavior observed  -MS     Impairments Affecting Function (Mobility) balance;endurance/activity tolerance;strength;postural/trunk control;range of motion (ROM);pain;cognition  -MS     Comment, Safety Issues/Impairments (Mobility) Non skid socks and gait belt donned.  -MS           User Key  (r) = Recorded By, (t) = Taken By, (c) = Cosigned By    Initials Name Provider Type    MS Jovana Matos, PT Physical Therapist               Mobility     Row Name 03/19/22 1320          Bed Mobility    Comment, (Bed Mobility) NT- UIC upon PT arrival.  -MS     Row Name 03/19/22 1320          Transfers    Comment, (Transfers) Slow to  stand, painful with transitions, SBA for balance  -MS     Row Name 03/19/22 1320          Sit-Stand Transfer    Sit-Stand [place] (Transfers) contact guard;verbal cues;nonverbal cues (demo/gesture)  -MS     Assistive Device (Sit-Stand Transfers) walker, front-wheeled  -MS     Row Name 03/19/22 1320          Gait/Stairs (Locomotion)    [place] Level (Gait) contact guard;verbal cues;nonverbal cues (demo/gesture)  -MS     Assistive Device (Gait) walker, front-wheeled  -MS     Distance in Feet (Gait) 70'  -MS     Deviations/Abnormal Patterns (Gait) jenna decreased;gait speed decreased  -MS     Bilateral Gait Deviations forward flexed posture  -MS     Comment, (Gait/Stairs) Pain limiting,steady with walker.  -MS           User Key  (r) = Recorded By, (t) = Taken By, (c) = Cosigned By    Initials Name Provider Type    Jovana Javier, PT Physical Therapist               Obj/Interventions     Row Name 03/19/22 1321          Range of Motion Comprehensive    General Range of Motion no range of motion deficits identified  -MS     Row Name 03/19/22 1321          Strength Comprehensive (MMT)    Comment, General Manual Muscle Testing (MMT) Assessment B LEs grossly at least 3/5  -MS     Row Name 03/19/22 1321          Balance    Balance Assessment sitting static balance;standing static balance  -MS     Static Sitting Balance independent  -MS     Position, Sitting Balance sitting edge of bed  -MS     Static Standing Balance supervision  -MS     Position/Device Used, Standing Balance walker, front-wheeled  -MS     Row Name 03/19/22 1321          Sensory Assessment (Somatosensory)    Sensory Assessment (Somatosensory) sensation intact  -MS           User Key  (r) = Recorded By, (t) = Taken By, (c) = Cosigned By    Initials Name Provider Type    Jovana Javier, PT Physical Therapist               Goals/Plan     Row Name 03/19/22 1323          Bed Mobility Goal 1 (PT)    Activity/Assistive Device (Bed Mobility  Goal 1, PT) bed mobility activities, all  -MS     [place] Level/Cues Needed (Bed Mobility Goal 1, PT) supervision required  -MS     Time Frame (Bed Mobility Goal 1, PT) 1 week  -MS     Row Name 03/19/22 1323          Transfer Goal 1 (PT)    Activity/Assistive Device (Transfer Goal 1, PT) transfers, all  -MS     [place] Level/Cues Needed (Transfer Goal 1, PT) supervision required  -MS     Time Frame (Transfer Goal 1, PT) 1 week  -MS     Row Name 03/19/22 1323          Gait Training Goal 1 (PT)    Activity/Assistive Device (Gait Training Goal 1, PT) gait (walking locomotion);walker, rolling  -MS     [place] Level (Gait Training Goal 1, PT) supervision required  -MS     Distance (Gait Training Goal 1, PT) 100'  -MS     Time Frame (Gait Training Goal 1, PT) 1 week  -MS           User Key  (r) = Recorded By, (t) = Taken By, (c) = Cosigned By    Initials Name Provider Type    MS Jovana Matos, PT Physical Therapist               Clinical Impression     Row Name 03/19/22 1321          Pain    Pretreatment Pain Rating 7/10  -MS     Posttreatment Pain Rating 7/10  -MS     Pain Location - Side/Orientation Bilateral  -MS     Pain Location - flank  -MS     Row Name 03/19/22 1321          Therapy Assessment/Plan (PT)    Rehab Potential (PT) good, to achieve stated therapy goals  -MS     Criteria for Skilled Interventions Met (PT) yes;meets criteria  -MS     Row Name 03/19/22 1321          Vital Signs    Pre SpO2 (%) 94  -MS     O2 Delivery Pre Treatment supplemental O2  -MS     Intra SpO2 (%) 89  -MS     O2 Delivery Intra Treatment room air  -MS     Post SpO2 (%) 93  -MS     O2 Delivery Post Treatment supplemental O2  -MS     Row Name 03/19/22 1321          Positioning and Restraints    Pre-Treatment Position in bed  -MS     Post Treatment Position bed  -MS     In Bed notified nsg;fowlers;call light within reach;encouraged to call for assist  -MS           User Key  (r) = Recorded By, (t) = Taken By, (c) =  Cosigned By    Initials Name Provider Type    MS MatosJovana, PT Physical Therapist               Outcome Measures     Row Name 03/19/22 1324          How much help from another person do you currently need...    Turning from your back to your side while in flat bed without using bedrails? 2  -MS     Moving from lying on back to sitting on the side of a flat bed without bedrails? 2  -MS     Moving to and from a bed to a chair (including a wheelchair)? 3  -MS     Standing up from a chair using your arms (e.g., wheelchair, bedside chair)? 3  -MS     Climbing 3-5 steps with a railing? 2  -MS     To walk in hospital room? 3  -MS     AM-PAC 6 Clicks Score (PT) 15  -MS           User Key  (r) = Recorded By, (t) = Taken By, (c) = Cosigned By    Initials Name Provider Type    MS MatosJovana, PT Physical Therapist                             Physical Therapy Education                 Title: PT OT SLP Therapies (Done)     Topic: Physical Therapy (Done)     Point: Mobility training (Done)     Learning Progress Summary           Patient Acceptance, E,TB, VU,NR by MS at 3/19/2022 1324                   Point: Home exercise program (Done)     Learning Progress Summary           Patient Acceptance, E,TB, VU,NR by MS at 3/19/2022 1324                   Point: Body mechanics (Done)     Learning Progress Summary           Patient Acceptance, E,TB, VU,NR by MS at 3/19/2022 1324                   Point: Precautions (Done)     Learning Progress Summary           Patient Acceptance, E,TB, VU,NR by MS at 3/19/2022 1324                               User Key     Initials Effective Dates Name Provider Type Discipline    MS 06/16/21 -  Jovana Matos PT Physical Therapist PT              PT Recommendation and Plan  Planned Therapy Interventions (PT): balance training, bed mobility training, gait training, home exercise program, patient/family education, postural re-education, ROM (range of motion), stair training,  strengthening, transfer training        Time Calculation:    PT Charges     Row Name 03/19/22 1318             Time Calculation    Start Time 0938  -MS      Stop Time 0951  -MS      Time Calculation (min) 13 min  -MS      PT Received On 03/19/22  -MS      PT - Next Appointment 03/20/22  -MS      PT Goal Re-Cert Due Date 03/26/22  -MS              Time Calculation- PT    Total Timed Code Minutes- PT 10 minute(s)  -MS            User Key  (r) = Recorded By, (t) = Taken By, (c) = Cosigned By    Initials Name Provider Type    Jovana Javier, PT Physical Therapist              Therapy Charges for Today     Code Description Service Date Service Provider Modifiers Qty    33449543315 HC PT EVAL MOD COMPLEXITY 2 3/19/2022 Jovana Matos, PT GP 1    66676577096 HC PT THER PROC EA 15 MIN 3/19/2022 Jovana Matos, PT GP 1          PT G-Codes  AM-PAC 6 Clicks Score (PT): 15    Jovana Matos PT  3/19/2022

## 2022-03-19 NOTE — PROGRESS NOTES
Name: Fabio Juárez ADMIT: 3/18/2022   : 1965  PCP: Karley Amor APRN    MRN: [id number] LOS: 1 days   AGE/SEX: 57 y.o. male  ROOM: [facility]     Subjective   Subjective   Patient seen at bedside.       Objective   Objective   Vital Signs  Temp:  [97.6 °F (36.4 °C)-98.4 °F (36.9 °C)] 98.4 °F (36.9 °C)  Heart Rate:  [79-86] 80  Resp:  [18-20] 18  BP: (103-116)/(68-80) 103/73  SpO2:  [91 %-95 %] 93 %  on  Flow (L/min):  [2] 2;   Device (Oxygen Therapy): nasal cannula  Body mass index is 45.1 kg/m².  Physical Exam     General Appearance:   Alert cooperative morbidly obese male does not appear to be in any acute distress   Head:    Normocephalic, without obvious abnormality, atraumatic   Eyes:    PERRL, conjunctiva/corneas clear, EOM's intact, both eyes   Ears:    Normal external ear canals, both ears   Nose:   Nares normal, septum midline, mucosa normal, no drainage    or sinus tenderness   Throat:   Lips, tongue, gums normal; oral mucosa pink and moist   Neck:  Neck: Tracheostomy device in place with no neck abnormalities swelling or cellulitic changes   Back:     Symmetric, no curvature, ROM normal, no CVA tenderness   Lungs:    Decreased breath sounds at the bases   Chest Wall:    No tenderness or deformity    Heart:    Regular rate and rhythm, S1 and S2 normal, no murmur, rub   or gallop   Abdomen:    Obese soft mild generalized distention and tenderness robotic port site of surgery appears to be well approximated with no inflammation or drainage.   Extremities:   Extremities normal, atraumatic, no cyanosis or edema   Pulses:   Pulses palpable in all extremities; symmetric all extremities   Skin:   Skin color normal, Skin is warm and dry,  no rashes or palpable lesions   Neurologic:  Grossly nonfocal examination.         Results Review     I reviewed the patient's new clinical results.  Results from last 7 days   Lab Units 22  0419 22  1249   WBC 10*3/mm3 10.25 12.29*   HEMOGLOBIN g/dL  15.0 15.2   PLATELETS 10*3/mm3 294 306     Results from last 7 days   Lab Units 03/19/22  0419 03/18/22  1249   SODIUM mmol/L 133* 135*   POTASSIUM mmol/L 3.6 3.9   CHLORIDE mmol/L 97* 99   CO2 mmol/L 27.0 27.0   BUN mg/dL 17 18   CREATININE mg/dL 0.76 0.90   GLUCOSE mg/dL 138* 134*   EGFR mL/min/1.73 105.5 100.2     Results from last 7 days   Lab Units 03/19/22  0419 03/18/22  1249   ALBUMIN g/dL 3.70 4.00   BILIRUBIN mg/dL 1.8* 1.1   ALK PHOS U/L 71 69   AST (SGOT) U/L 12 16   ALT (SGPT) U/L 21 19     Results from last 7 days   Lab Units 03/19/22  0419 03/18/22  1249   CALCIUM mg/dL 8.9 9.3   ALBUMIN g/dL 3.70 4.00   MAGNESIUM mg/dL  --  1.7     Results from last 7 days   Lab Units 03/18/22  1249   PROCALCITONIN ng/mL 0.05     Hemoglobin A1C   Date/Time Value Ref Range Status   03/19/2022 0419 8.40 (H) 4.80 - 5.60 % Final     Glucose   Date/Time Value Ref Range Status   03/19/2022 1613 148 (H) 70 - 130 mg/dL Final     Comment:     Meter: [id number] : [id number] Jose SOUZA   03/19/2022 1119 130 70 - 130 mg/dL Final     Comment:     Meter: [id number] : [id number] SOUZA       CT Angiogram Chest  Narrative: CT ANGIOGRAM OF THE CHEST. MULTIPLE CORONAL, SAGITTAL, AND 3-D  RECONSTRUCTIONS.     HISTORY: Short of air, recent abdominal surgery; evaluate for pulmonary  embolism     TECHNIQUE: Radiation dose reduction techniques were utilized, including  automated exposure control and exposure modulation based on body size.   CT angiogram of the chest was performed. Multiple coronal, sagittal, and  3-D reconstruction images were obtained.      COMPARISON:None     FINDINGS:      Tracheostomy is present. The gallbladder is surgically absent. No hilar,  mediastinal or axillary adenopathy is present by size criteria. There is  a trace pericardial effusion.     Bibasilar atelectasis and or pleural parenchymal scarring is present. No  findings of pulmonary embolism are present. There is no pleural  effusion  or pneumothorax. Subcentimeter pulmonary nodule within the right upper  lobe is present.     No suspicious lytic blastic osseous lesion is present.     Impression: 1.  No findings of pulmonary embolism. Bibasilar atelectasis and or  pleural parenchymal scarring.  2.  Subcentimeter pulmonary nodule in the right upper lobe. Follow-up  chest CT in 12 months should be considered to ensure stability per  Fleischner criteria.  3.  Other findings as above.     This report was finalized on 3/18/2022 2:49 PM by Dr. Efren Shepadr M.D.     XR Chest 1 View  ONE VIEW PORTABLE CHEST     HISTORY: Shortness of breath.     FINDINGS: The lungs are moderately expanded and clear except for some  minimal vague atelectasis at the right base. Allowing for the lung  expansion, the heart size is normal. A tracheostomy tube is in place.     This report was finalized on 3/18/2022 2:18 PM by Dr. Femi Joseph M.D.       Scheduled Medications  atorvastatin, 80 mg, Oral, Nightly  baclofen, 5 mg, Oral, Nightly  cetirizine, 5 mg, Oral, Daily  gabapentin, 300 mg, Oral, Q8H  glipizide, 5 mg, Oral, QAM AC  hydroCHLOROthiazide, 12.5 mg, Oral, Daily  insulin lispro, 0-7 Units, Subcutaneous, TID AC  lisinopril, 10 mg, Oral, Daily  sodium chloride, 10 mL, Intravenous, Q12H  traZODone, 50 mg, Oral, Nightly    Infusions   Diet  Diet Regular; Cardiac, Consistent Carbohydrate, Renal       Assessment/Plan     Active Hospital Problems    Diagnosis  POA   • Hypoxia [R09.02]  Yes   • Pleuritic chest pain [R07.81]  Unknown   • Dyspnea [R06.00]  Unknown   • Essential (primary) hypertension [I10]  Yes   • Tracheostomy status (HCC) [Z93.0]  Not Applicable   • Hemochromatosis [E83.119]  Yes   • DM2 (diabetes mellitus, type 2) (HCC) [E11.9]  Yes   • Chronic low back pain [M54.50, G89.29]  Yes   • Chronic neck pain [M54.2, G89.29]  Yes      Resolved Hospital Problems   No resolved problems to display.     Assessment and plan  1.  Pleuritic chest pain with  hypoxia, CT scan of the chest, does not show any evidence of pneumonia, pulmonary consultation will be requested.    2.  Severe sleep apnea, status post tracheostomy.  Continue trach care.    3.  Diabetes mellitus, continue Accu-Cheks and sliding scale insulin coverage.    4.  Hyperlipidemia, continue statin therapy.    5.  CODE STATUS is full code.  Further plans based on hospital course.        Tico Sanches MD  [facility]  03/19/22  18:47 EDT

## 2022-03-20 LAB
ALBUMIN SERPL-MCNC: 3.8 G/DL (ref 3.5–5.2)
ALBUMIN/GLOB SERPL: 1.3 G/DL
ALP SERPL-CCNC: 73 U/L (ref 39–117)
ALT SERPL W P-5'-P-CCNC: 17 U/L (ref 1–41)
ANION GAP SERPL CALCULATED.3IONS-SCNC: 9 MMOL/L (ref 5–15)
AST SERPL-CCNC: 15 U/L (ref 1–40)
BASOPHILS # BLD AUTO: 0.08 10*3/MM3 (ref 0–0.2)
BASOPHILS NFR BLD AUTO: 0.9 % (ref 0–1.5)
BILIRUB SERPL-MCNC: 1.4 MG/DL (ref 0–1.2)
BUN SERPL-MCNC: 17 MG/DL (ref 6–20)
BUN/CREAT SERPL: 22.1 (ref 7–25)
CALCIUM SPEC-SCNC: 9.1 MG/DL (ref 8.6–10.5)
CHLORIDE SERPL-SCNC: 95 MMOL/L (ref 98–107)
CO2 SERPL-SCNC: 27 MMOL/L (ref 22–29)
CREAT SERPL-MCNC: 0.77 MG/DL (ref 0.76–1.27)
DEPRECATED RDW RBC AUTO: 39.5 FL (ref 37–54)
EGFRCR SERPLBLD CKD-EPI 2021: 105.1 ML/MIN/1.73
EOSINOPHIL # BLD AUTO: 0.41 10*3/MM3 (ref 0–0.4)
EOSINOPHIL NFR BLD AUTO: 4.5 % (ref 0.3–6.2)
ERYTHROCYTE [DISTWIDTH] IN BLOOD BY AUTOMATED COUNT: 11.7 % (ref 12.3–15.4)
GLOBULIN UR ELPH-MCNC: 3 GM/DL
GLUCOSE BLDC GLUCOMTR-MCNC: 111 MG/DL (ref 70–130)
GLUCOSE BLDC GLUCOMTR-MCNC: 139 MG/DL (ref 70–130)
GLUCOSE BLDC GLUCOMTR-MCNC: 155 MG/DL (ref 70–130)
GLUCOSE BLDC GLUCOMTR-MCNC: 178 MG/DL (ref 70–130)
GLUCOSE SERPL-MCNC: 175 MG/DL (ref 65–99)
HCT VFR BLD AUTO: 43.7 % (ref 37.5–51)
HGB BLD-MCNC: 14.6 G/DL (ref 13–17.7)
IMM GRANULOCYTES # BLD AUTO: 0.07 10*3/MM3 (ref 0–0.05)
IMM GRANULOCYTES NFR BLD AUTO: 0.8 % (ref 0–0.5)
LYMPHOCYTES # BLD AUTO: 2.21 10*3/MM3 (ref 0.7–3.1)
LYMPHOCYTES NFR BLD AUTO: 24.3 % (ref 19.6–45.3)
MCH RBC QN AUTO: 30.6 PG (ref 26.6–33)
MCHC RBC AUTO-ENTMCNC: 33.4 G/DL (ref 31.5–35.7)
MCV RBC AUTO: 91.6 FL (ref 79–97)
MONOCYTES # BLD AUTO: 1.28 10*3/MM3 (ref 0.1–0.9)
MONOCYTES NFR BLD AUTO: 14.1 % (ref 5–12)
NEUTROPHILS NFR BLD AUTO: 5.03 10*3/MM3 (ref 1.7–7)
NEUTROPHILS NFR BLD AUTO: 55.4 % (ref 42.7–76)
NRBC BLD AUTO-RTO: 0 /100 WBC (ref 0–0.2)
PLATELET # BLD AUTO: 279 10*3/MM3 (ref 140–450)
PMV BLD AUTO: 8.9 FL (ref 6–12)
POTASSIUM SERPL-SCNC: 3.5 MMOL/L (ref 3.5–5.2)
PROT SERPL-MCNC: 6.8 G/DL (ref 6–8.5)
RBC # BLD AUTO: 4.77 10*6/MM3 (ref 4.14–5.8)
SODIUM SERPL-SCNC: 131 MMOL/L (ref 136–145)
WBC NRBC COR # BLD: 9.08 10*3/MM3 (ref 3.4–10.8)

## 2022-03-20 PROCEDURE — 25010000002 ONDANSETRON PER 1 MG: Performed by: INTERNAL MEDICINE

## 2022-03-20 PROCEDURE — 85025 COMPLETE CBC W/AUTO DIFF WBC: CPT | Performed by: INTERNAL MEDICINE

## 2022-03-20 PROCEDURE — 80053 COMPREHEN METABOLIC PANEL: CPT | Performed by: INTERNAL MEDICINE

## 2022-03-20 PROCEDURE — 82962 GLUCOSE BLOOD TEST: CPT

## 2022-03-20 PROCEDURE — 63710000001 INSULIN LISPRO (HUMAN) PER 5 UNITS: Performed by: INTERNAL MEDICINE

## 2022-03-20 PROCEDURE — G0378 HOSPITAL OBSERVATION PER HR: HCPCS

## 2022-03-20 PROCEDURE — 25010000002 MORPHINE PER 10 MG: Performed by: INTERNAL MEDICINE

## 2022-03-20 RX ORDER — LACTULOSE 10 G/15ML
20 SOLUTION ORAL 3 TIMES DAILY
Status: DISCONTINUED | OUTPATIENT
Start: 2022-03-20 | End: 2022-03-24 | Stop reason: HOSPADM

## 2022-03-20 RX ORDER — BISACODYL 5 MG/1
5 TABLET, DELAYED RELEASE ORAL DAILY PRN
Status: DISCONTINUED | OUTPATIENT
Start: 2022-03-20 | End: 2022-03-24 | Stop reason: HOSPADM

## 2022-03-20 RX ORDER — POLYETHYLENE GLYCOL 3350 17 G/17G
17 POWDER, FOR SOLUTION ORAL DAILY PRN
Status: DISCONTINUED | OUTPATIENT
Start: 2022-03-20 | End: 2022-03-24 | Stop reason: HOSPADM

## 2022-03-20 RX ORDER — BISACODYL 10 MG
10 SUPPOSITORY, RECTAL RECTAL DAILY PRN
Status: DISCONTINUED | OUTPATIENT
Start: 2022-03-20 | End: 2022-03-24 | Stop reason: HOSPADM

## 2022-03-20 RX ORDER — AMOXICILLIN 250 MG
2 CAPSULE ORAL 2 TIMES DAILY
Status: DISCONTINUED | OUTPATIENT
Start: 2022-03-20 | End: 2022-03-24 | Stop reason: HOSPADM

## 2022-03-20 RX ADMIN — TRAZODONE HYDROCHLORIDE 50 MG: 50 TABLET ORAL at 22:46

## 2022-03-20 RX ADMIN — CETIRIZINE HYDROCHLORIDE 5 MG: 10 TABLET ORAL at 08:36

## 2022-03-20 RX ADMIN — GABAPENTIN 300 MG: 300 CAPSULE ORAL at 22:46

## 2022-03-20 RX ADMIN — BACLOFEN 5 MG: 10 TABLET ORAL at 22:45

## 2022-03-20 RX ADMIN — MORPHINE SULFATE 4 MG: 2 INJECTION, SOLUTION INTRAMUSCULAR; INTRAVENOUS at 08:42

## 2022-03-20 RX ADMIN — MORPHINE SULFATE 4 MG: 2 INJECTION, SOLUTION INTRAMUSCULAR; INTRAVENOUS at 18:46

## 2022-03-20 RX ADMIN — Medication 10 ML: at 08:36

## 2022-03-20 RX ADMIN — OXYCODONE 15 MG: 5 TABLET ORAL at 05:44

## 2022-03-20 RX ADMIN — INSULIN LISPRO 2 UNITS: 100 INJECTION, SOLUTION INTRAVENOUS; SUBCUTANEOUS at 11:43

## 2022-03-20 RX ADMIN — GABAPENTIN 300 MG: 300 CAPSULE ORAL at 05:44

## 2022-03-20 RX ADMIN — MORPHINE SULFATE 4 MG: 2 INJECTION, SOLUTION INTRAMUSCULAR; INTRAVENOUS at 00:28

## 2022-03-20 RX ADMIN — Medication 10 ML: at 22:45

## 2022-03-20 RX ADMIN — INSULIN LISPRO 2 UNITS: 100 INJECTION, SOLUTION INTRAVENOUS; SUBCUTANEOUS at 08:35

## 2022-03-20 RX ADMIN — DOCUSATE SODIUM 50MG AND SENNOSIDES 8.6MG 2 TABLET: 8.6; 5 TABLET, FILM COATED ORAL at 22:46

## 2022-03-20 RX ADMIN — ATORVASTATIN CALCIUM 80 MG: 80 TABLET, FILM COATED ORAL at 22:45

## 2022-03-20 RX ADMIN — OXYCODONE 15 MG: 5 TABLET ORAL at 15:16

## 2022-03-20 RX ADMIN — GLIPIZIDE 5 MG: 5 TABLET ORAL at 08:36

## 2022-03-20 RX ADMIN — GABAPENTIN 300 MG: 300 CAPSULE ORAL at 15:13

## 2022-03-20 RX ADMIN — ONDANSETRON 4 MG: 2 INJECTION INTRAMUSCULAR; INTRAVENOUS at 18:46

## 2022-03-20 RX ADMIN — ONDANSETRON 4 MG: 2 INJECTION INTRAMUSCULAR; INTRAVENOUS at 08:47

## 2022-03-20 NOTE — PROGRESS NOTES
"  PROGRESS NOTE  Patient Name: Fabio Juárez  Age/Sex: 57 y.o. male  : 1965  MRN: [id number]    Date of Admission: 3/18/2022  Date of Encounter Visit: 22   LOS: 2 days   Patient Care Team:  Karley Amor APRN as PCP - General (Nurse Practitioner)  Payton Marquez APRN as Nurse Practitioner (Nurse Practitioner)    Chief Complaint: Hypoxemia    Hospital course: Patient is still on oxygen at 2 L/min, complaining of abdominal distention, still working with incentive spirometer, pulling around 1750 cc.  He is afebrile, the belly is distended and it hurts to take a deep breath.  He has not had any bowel movement for the last 2 days despite adequate p.o. intake    *    REVIEW OF SYSTEMS:   CONSTITUTIONAL: no fever or chills  CARDIOVASCULAR: No chest pain, chest pressure or chest discomfort. No palpitations or edema.   RESPIRATORY: No shortness of breath, cough or sputum.   GASTROINTESTINAL: No anorexia, nausea, vomiting or diarrhea, positive for constipation.  Abdominal pain and distention.   HEMATOLOGIC: No bleeding or bruising.     Ventilator/Non-Invasive Ventilation Settings (From admission, onward)            2 L/min nasal cannula oxygen            Vital Signs  Temp:  [97.2 °F (36.2 °C)-98.6 °F (37 °C)] 98 °F (36.7 °C)  Heart Rate:  [70-86] 70  Resp:  [18] 18  BP: (100-123)/(73-83) 121/76  SpO2:  [93 %-96 %] 96 %  on  Flow (L/min):  [2] 2 Device (Oxygen Therapy): nasal cannula    Intake/Output Summary (Last 24 hours) at 3/20/2022 1254  Last data filed at 3/20/2022 0842  Gross per 24 hour   Intake 360 ml   Output --   Net 360 ml     Flowsheet Rows    Flowsheet Row First Filed Value   Admission Height 165.1 cm (65\") Documented at 2022 1249   Admission Weight 123 kg (271 lb) Documented at 2022 1249        Body mass index is 45.1 kg/m².      22  1249   Weight: 123 kg (271 lb)       Physical Exam:  GEN:  No acute distress, alert, cooperative, well developed on 2 L/min nasal cannula " oxygen  EYES:   Sclerae clear. No icterus. PERRL. Normal EOM  ENT:   External ears/nose normal, no oral lesions, no thrush, mucous membranes moist  NECK:  Supple, midline trachea, no JVD  LUNGS: Normal chest on inspection,  positive crackles bilaterally with decreased breath sounds over the bases bilaterally..   CV:  Regular rhythm and rate. Normal S1/S2. No murmurs, gallops, or rubs noted.  ABD: Tense and tender to palpation, distended, positive bowel sounds  EXT:  Moves all extremities well. No cyanosis. No redness.  Trace edema  Skin: Dry, intact, no bleeding    Results Review:        Results from last 7 days   Lab Units 03/20/22  0658 03/19/22  0419 03/18/22  1249   SODIUM mmol/L 131* 133* 135*   POTASSIUM mmol/L 3.5 3.6 3.9   CHLORIDE mmol/L 95* 97* 99   CO2 mmol/L 27.0 27.0 27.0   BUN mg/dL 17 17 18   CREATININE mg/dL 0.77 0.76 0.90   CALCIUM mg/dL 9.1 8.9 9.3   AST (SGOT) U/L 15 12 16   ALT (SGPT) U/L 17 21 19   ANION GAP mmol/L 9.0 9.0 9.0   ALBUMIN g/dL 3.80 3.70 4.00     Results from last 7 days   Lab Units 03/18/22  1249   TROPONIN T ng/mL <0.010         Results from last 7 days   Lab Units 03/18/22  1249   PROBNP pg/mL 40.2     Results from last 7 days   Lab Units 03/20/22  0658 03/19/22  0419 03/18/22  1249   WBC 10*3/mm3 9.08 10.25 12.29*   HEMOGLOBIN g/dL 14.6 15.0 15.2   HEMATOCRIT % 43.7 46.0 44.7   PLATELETS 10*3/mm3 279 294 306   MCV fL 91.6 94.5 90.7   NEUTROPHIL % % 55.4 55.8 60.1   LYMPHOCYTE % % 24.3 25.1 23.7   MONOCYTES % % 14.1* 14.5* 12.9*   EOSINOPHIL % % 4.5 2.5 1.8   BASOPHIL % % 0.9 1.0 0.7   IMM GRAN % % 0.8* 1.1* 0.8*     Results from last 7 days   Lab Units 03/18/22  1249   INR  0.97   APTT seconds 24.4     Results from last 7 days   Lab Units 03/18/22  1249   MAGNESIUM mg/dL 1.7           Invalid input(s): LDLCALC  Results from last 7 days   Lab Units 03/18/22  1519   PH, ARTERIAL pH units 7.419   PCO2, ARTERIAL mm Hg 42.5   PO2 ART mm Hg 73.4*   HCO3 ART mmol/L 27.5     Results  from last 7 days   Lab Units 03/19/22  0419   HEMOGLOBIN A1C % 8.40*     Glucose   Date/Time Value Ref Range Status   03/20/2022 1046 178 (H) 70 - 130 mg/dL Final     Comment:     Meter: [id number] : [id number] Jose Moreira NA   03/20/2022 0634 155 (H) 70 - 130 mg/dL Final     Comment:     Meter: [id number] : [id number] Uriel Wadsworth NA   03/19/2022 2227 120 70 - 130 mg/dL Final     Comment:     Meter: [id number] : [id number] Uriel Cadenaa NA   03/19/2022 1613 148 (H) 70 - 130 mg/dL Final     Comment:     Meter: [id number] : [id number] Jose Moreira NA   03/19/2022 1119 130 70 - 130 mg/dL Final     Comment:     Meter: [id number] : [id number] Jose SOUZA     Results from last 7 days   Lab Units 03/18/22  1249   PROCALCITONIN ng/mL 0.05             Results from last 7 days   Lab Units 03/18/22  1408   COVID19  Not Detected   ADENOVIRUS DETECTION BY PCR  Not Detected   CORONAVIRUS 229E  Not Detected   CORONAVIRUS HKU1  Not Detected   CORONAVIRUS NL63  Not Detected   CORONAVIRUS OC43  Not Detected   HUMAN METAPNEUMOVIRUS  Not Detected   HUMAN RHINOVIRUS/ENTEROVIRUS  Not Detected   INFLUENZA B PCR  Not Detected   PARAINFLUENZA 1  Not Detected   PARAINFLUENZA VIRUS 2  Not Detected   PARAINFLUENZA VIRUS 3  Not Detected   PARAINFLUENZA VIRUS 4  Not Detected   BORDETELLA PERTUSSIS PCR  Not Detected   BORDETELLA PARAPERTUSSIS PCR  Not Detected   CHLAMYDOPHILA PNEUMONIAE PCR  Not Detected   MYCOPLAMA PNEUMO PCR  Not Detected   RSV, PCR  Not Detected               Imaging:   Imaging Results (All)      I reviewed the patient's new clinical results.  I personally viewed and interpreted the patient's imaging results:        Medication Review:   atorvastatin, 80 mg, Oral, Nightly  baclofen, 5 mg, Oral, Nightly  cetirizine, 5 mg, Oral, Daily  gabapentin, 300 mg, Oral, Q8H  glipizide, 5 mg, Oral, QAM AC  hydroCHLOROthiazide, 12.5 mg, Oral, Daily  insulin lispro, 0-7 Units, Subcutaneous, TID AC  lisinopril, 10 mg,  Oral, Daily  sodium chloride, 10 mL, Intravenous, Q12H  traZODone, 50 mg, Oral, Nightly             ASSESSMENT:   1. Postoperative hypoxemia  2. Atelectasis  3. Severe sleep apnea status post tracheostomy  4. Chronic trach care  5. Hemochromatosis  6. Abdominal hernia status post repair    PLAN:  Patient has abdominal distention, with constipation  No bowel movement for few days  For the time being recommend to continue with incentive spirometry and the pulmonary hygiene measures, the patient has tense abdomen and has not had a bowel movement for several days and the constipation is detrimental in his case causing upward pressure on the diaphragm and further worsening his atelectasis.  Discussed with the primary team patient will be on aggressive bowel management system, anticipate improvement in his respiratory status and oxygen requirement if he is able to decompress his abdomen  Discussed with patient and primary team    Disposition: Per primary team    Scot Heredia MD  03/20/22  12:54 EDT             Dictated utilizing Dragon dictation

## 2022-03-21 ENCOUNTER — APPOINTMENT (OUTPATIENT)
Dept: GENERAL RADIOLOGY | Facility: HOSPITAL | Age: 57
End: 2022-03-21

## 2022-03-21 ENCOUNTER — TELEPHONE (OUTPATIENT)
Dept: SURGERY | Facility: CLINIC | Age: 57
End: 2022-03-21

## 2022-03-21 PROBLEM — F11.20 OPIATE DEPENDENCE (HCC): Status: ACTIVE | Noted: 2022-03-21

## 2022-03-21 PROBLEM — E66.813 OBESITY, CLASS III, BMI 40-49.9 (MORBID OBESITY): Status: ACTIVE | Noted: 2022-03-21

## 2022-03-21 PROBLEM — E66.01 OBESITY, CLASS III, BMI 40-49.9 (MORBID OBESITY) (HCC): Status: ACTIVE | Noted: 2022-03-21

## 2022-03-21 LAB
ALBUMIN SERPL-MCNC: 3 G/DL (ref 3.5–5.2)
ALBUMIN/GLOB SERPL: 0.9 G/DL
ALP SERPL-CCNC: 81 U/L (ref 39–117)
ALT SERPL W P-5'-P-CCNC: 20 U/L (ref 1–41)
ANION GAP SERPL CALCULATED.3IONS-SCNC: 10 MMOL/L (ref 5–15)
AST SERPL-CCNC: 16 U/L (ref 1–40)
BASOPHILS # BLD AUTO: 0.08 10*3/MM3 (ref 0–0.2)
BASOPHILS NFR BLD AUTO: 1 % (ref 0–1.5)
BILIRUB SERPL-MCNC: 0.9 MG/DL (ref 0–1.2)
BUN SERPL-MCNC: 15 MG/DL (ref 6–20)
BUN/CREAT SERPL: 20.3 (ref 7–25)
CALCIUM SPEC-SCNC: 8.8 MG/DL (ref 8.6–10.5)
CHLORIDE SERPL-SCNC: 97 MMOL/L (ref 98–107)
CO2 SERPL-SCNC: 28 MMOL/L (ref 22–29)
CREAT SERPL-MCNC: 0.74 MG/DL (ref 0.76–1.27)
DEPRECATED RDW RBC AUTO: 40.2 FL (ref 37–54)
EGFRCR SERPLBLD CKD-EPI 2021: 106.3 ML/MIN/1.73
EOSINOPHIL # BLD AUTO: 0.28 10*3/MM3 (ref 0–0.4)
EOSINOPHIL NFR BLD AUTO: 3.6 % (ref 0.3–6.2)
ERYTHROCYTE [DISTWIDTH] IN BLOOD BY AUTOMATED COUNT: 11.9 % (ref 12.3–15.4)
GLOBULIN UR ELPH-MCNC: 3.2 GM/DL
GLUCOSE BLDC GLUCOMTR-MCNC: 106 MG/DL (ref 70–130)
GLUCOSE BLDC GLUCOMTR-MCNC: 118 MG/DL (ref 70–130)
GLUCOSE BLDC GLUCOMTR-MCNC: 189 MG/DL (ref 70–130)
GLUCOSE BLDC GLUCOMTR-MCNC: 189 MG/DL (ref 70–130)
GLUCOSE SERPL-MCNC: 152 MG/DL (ref 65–99)
HCT VFR BLD AUTO: 43.4 % (ref 37.5–51)
HGB BLD-MCNC: 14.4 G/DL (ref 13–17.7)
IMM GRANULOCYTES # BLD AUTO: 0.07 10*3/MM3 (ref 0–0.05)
IMM GRANULOCYTES NFR BLD AUTO: 0.9 % (ref 0–0.5)
LYMPHOCYTES # BLD AUTO: 1.93 10*3/MM3 (ref 0.7–3.1)
LYMPHOCYTES NFR BLD AUTO: 25.1 % (ref 19.6–45.3)
MCH RBC QN AUTO: 30.7 PG (ref 26.6–33)
MCHC RBC AUTO-ENTMCNC: 33.2 G/DL (ref 31.5–35.7)
MCV RBC AUTO: 92.5 FL (ref 79–97)
MONOCYTES # BLD AUTO: 1.22 10*3/MM3 (ref 0.1–0.9)
MONOCYTES NFR BLD AUTO: 15.9 % (ref 5–12)
NEUTROPHILS NFR BLD AUTO: 4.11 10*3/MM3 (ref 1.7–7)
NEUTROPHILS NFR BLD AUTO: 53.5 % (ref 42.7–76)
NRBC BLD AUTO-RTO: 0 /100 WBC (ref 0–0.2)
PLATELET # BLD AUTO: 247 10*3/MM3 (ref 140–450)
PMV BLD AUTO: 9.1 FL (ref 6–12)
POTASSIUM SERPL-SCNC: 3.7 MMOL/L (ref 3.5–5.2)
PROT SERPL-MCNC: 6.2 G/DL (ref 6–8.5)
RBC # BLD AUTO: 4.69 10*6/MM3 (ref 4.14–5.8)
SODIUM SERPL-SCNC: 135 MMOL/L (ref 136–145)
WBC NRBC COR # BLD: 7.69 10*3/MM3 (ref 3.4–10.8)

## 2022-03-21 PROCEDURE — 25010000002 KETOROLAC TROMETHAMINE PER 15 MG: Performed by: STUDENT IN AN ORGANIZED HEALTH CARE EDUCATION/TRAINING PROGRAM

## 2022-03-21 PROCEDURE — 63710000001 INSULIN LISPRO (HUMAN) PER 5 UNITS: Performed by: INTERNAL MEDICINE

## 2022-03-21 PROCEDURE — 25010000002 ENOXAPARIN PER 10 MG: Performed by: STUDENT IN AN ORGANIZED HEALTH CARE EDUCATION/TRAINING PROGRAM

## 2022-03-21 PROCEDURE — 25010000002 ONDANSETRON PER 1 MG: Performed by: INTERNAL MEDICINE

## 2022-03-21 PROCEDURE — 74018 RADEX ABDOMEN 1 VIEW: CPT

## 2022-03-21 PROCEDURE — 82962 GLUCOSE BLOOD TEST: CPT

## 2022-03-21 PROCEDURE — 73560 X-RAY EXAM OF KNEE 1 OR 2: CPT

## 2022-03-21 PROCEDURE — G0378 HOSPITAL OBSERVATION PER HR: HCPCS

## 2022-03-21 PROCEDURE — 25010000002 MORPHINE PER 10 MG: Performed by: INTERNAL MEDICINE

## 2022-03-21 PROCEDURE — 85025 COMPLETE CBC W/AUTO DIFF WBC: CPT | Performed by: INTERNAL MEDICINE

## 2022-03-21 PROCEDURE — 80053 COMPREHEN METABOLIC PANEL: CPT | Performed by: INTERNAL MEDICINE

## 2022-03-21 RX ORDER — KETOROLAC TROMETHAMINE 30 MG/ML
30 INJECTION, SOLUTION INTRAMUSCULAR; INTRAVENOUS ONCE
Status: COMPLETED | OUTPATIENT
Start: 2022-03-21 | End: 2022-03-21

## 2022-03-21 RX ADMIN — INSULIN LISPRO 2 UNITS: 100 INJECTION, SOLUTION INTRAVENOUS; SUBCUTANEOUS at 09:25

## 2022-03-21 RX ADMIN — LISINOPRIL 10 MG: 10 TABLET ORAL at 09:24

## 2022-03-21 RX ADMIN — BACLOFEN 5 MG: 10 TABLET ORAL at 20:25

## 2022-03-21 RX ADMIN — ONDANSETRON 4 MG: 2 INJECTION INTRAMUSCULAR; INTRAVENOUS at 00:37

## 2022-03-21 RX ADMIN — MORPHINE SULFATE 4 MG: 2 INJECTION, SOLUTION INTRAMUSCULAR; INTRAVENOUS at 13:13

## 2022-03-21 RX ADMIN — CETIRIZINE HYDROCHLORIDE 5 MG: 10 TABLET ORAL at 09:24

## 2022-03-21 RX ADMIN — MORPHINE SULFATE 4 MG: 2 INJECTION, SOLUTION INTRAMUSCULAR; INTRAVENOUS at 05:46

## 2022-03-21 RX ADMIN — ATORVASTATIN CALCIUM 80 MG: 80 TABLET, FILM COATED ORAL at 20:26

## 2022-03-21 RX ADMIN — LACTULOSE 20 G: 20 SOLUTION ORAL at 09:25

## 2022-03-21 RX ADMIN — KETOROLAC TROMETHAMINE 30 MG: 30 INJECTION, SOLUTION INTRAMUSCULAR; INTRAVENOUS at 15:37

## 2022-03-21 RX ADMIN — ENOXAPARIN SODIUM 40 MG: 100 INJECTION SUBCUTANEOUS at 15:28

## 2022-03-21 RX ADMIN — HYDROCHLOROTHIAZIDE 12.5 MG: 12.5 CAPSULE ORAL at 09:24

## 2022-03-21 RX ADMIN — GLIPIZIDE 5 MG: 5 TABLET ORAL at 09:24

## 2022-03-21 RX ADMIN — ONDANSETRON 4 MG: 2 INJECTION INTRAMUSCULAR; INTRAVENOUS at 13:26

## 2022-03-21 RX ADMIN — GABAPENTIN 300 MG: 300 CAPSULE ORAL at 13:13

## 2022-03-21 RX ADMIN — Medication 10 ML: at 09:25

## 2022-03-21 RX ADMIN — TRAZODONE HYDROCHLORIDE 50 MG: 50 TABLET ORAL at 20:26

## 2022-03-21 RX ADMIN — GABAPENTIN 300 MG: 300 CAPSULE ORAL at 06:22

## 2022-03-21 RX ADMIN — DOCUSATE SODIUM 50MG AND SENNOSIDES 8.6MG 2 TABLET: 8.6; 5 TABLET, FILM COATED ORAL at 20:25

## 2022-03-21 RX ADMIN — INSULIN LISPRO 2 UNITS: 100 INJECTION, SOLUTION INTRAVENOUS; SUBCUTANEOUS at 12:11

## 2022-03-21 RX ADMIN — LACTULOSE 20 G: 20 SOLUTION ORAL at 00:37

## 2022-03-21 RX ADMIN — ONDANSETRON 4 MG: 2 INJECTION INTRAMUSCULAR; INTRAVENOUS at 06:22

## 2022-03-21 RX ADMIN — LACTULOSE 20 G: 20 SOLUTION ORAL at 15:28

## 2022-03-21 RX ADMIN — OXYCODONE 15 MG: 5 TABLET ORAL at 09:36

## 2022-03-21 RX ADMIN — MORPHINE SULFATE 4 MG: 2 INJECTION, SOLUTION INTRAMUSCULAR; INTRAVENOUS at 00:38

## 2022-03-21 RX ADMIN — DOCUSATE SODIUM 50MG AND SENNOSIDES 8.6MG 2 TABLET: 8.6; 5 TABLET, FILM COATED ORAL at 09:24

## 2022-03-21 RX ADMIN — LACTULOSE 20 G: 20 SOLUTION ORAL at 20:25

## 2022-03-21 NOTE — PROGRESS NOTES
Name: Fabio Juárez ADMIT: 3/18/2022   : 1965  PCP: Karley Amor APRN    MRN: [id number] LOS: 2 days   AGE/SEX: 57 y.o. male  ROOM: [facility]     Subjective   Subjective    Patient seen at bedside.       Objective   Objective   Vital Signs  Temp:  [97.2 °F (36.2 °C)-98.6 °F (37 °C)] 98 °F (36.7 °C)  Heart Rate:  [70-86] 80  Resp:  [18] 18  BP: (100-123)/(73-83) 120/81  SpO2:  [93 %-96 %] 94 %  on  Flow (L/min):  [2] 2;   Device (Oxygen Therapy): nasal cannula  Body mass index is 45.1 kg/m².  Physical Exam      General Appearance:   Alert cooperative morbidly obese male does not appear to be in any acute distress   Head:    Normocephalic, without obvious abnormality, atraumatic   Eyes:    PERRL, conjunctiva/corneas clear, EOM's intact, both eyes   Ears:    Normal external ear canals, both ears   Nose:   Nares normal, septum midline, mucosa normal, no drainage    or sinus tenderness   Throat:   Lips, tongue, gums normal; oral mucosa pink and moist   Neck:  Neck: Tracheostomy device in place with no neck abnormalities swelling or cellulitic changes   Back:     Symmetric, no curvature, ROM normal, no CVA tenderness   Lungs:    Decreased breath sounds at the bases   Chest Wall:    No tenderness or deformity    Heart:    Regular rate and rhythm, S1 and S2 normal, no murmur, rub   or gallop   Abdomen:     mild generalized distention.   Extremities:   Extremities normal, atraumatic, no cyanosis or edema   Pulses:   Pulses palpable in all extremities; symmetric all extremities   Skin:   Skin color normal, Skin is warm and dry,  no rashes or palpable lesions   Neurologic:  Grossly nonfocal examination.           Results Review     I reviewed the patient's new clinical results.  Results from last 7 days   Lab Units 22  0658 22  0419 22  1249   WBC 10*3/mm3 9.08 10.25 12.29*   HEMOGLOBIN g/dL 14.6 15.0 15.2   PLATELETS 10*3/mm3 279 294 306     Results from last 7 days   Lab Units 22  0658  03/19/22  0419 03/18/22  1249   SODIUM mmol/L 131* 133* 135*   POTASSIUM mmol/L 3.5 3.6 3.9   CHLORIDE mmol/L 95* 97* 99   CO2 mmol/L 27.0 27.0 27.0   BUN mg/dL 17 17 18   CREATININE mg/dL 0.77 0.76 0.90   GLUCOSE mg/dL 175* 138* 134*   EGFR mL/min/1.73 105.1 105.5 100.2     Results from last 7 days   Lab Units 03/20/22  0658 03/19/22  0419 03/18/22  1249   ALBUMIN g/dL 3.80 3.70 4.00   BILIRUBIN mg/dL 1.4* 1.8* 1.1   ALK PHOS U/L 73 71 69   AST (SGOT) U/L 15 12 16   ALT (SGPT) U/L 17 21 19     Results from last 7 days   Lab Units 03/20/22  0658 03/19/22  0419 03/18/22  1249   CALCIUM mg/dL 9.1 8.9 9.3   ALBUMIN g/dL 3.80 3.70 4.00   MAGNESIUM mg/dL  --   --  1.7     Results from last 7 days   Lab Units 03/18/22  1249   PROCALCITONIN ng/mL 0.05     Hemoglobin A1C   Date/Time Value Ref Range Status   03/19/2022 0419 8.40 (H) 4.80 - 5.60 % Final     Glucose   Date/Time Value Ref Range Status   03/20/2022 2118 139 (H) 70 - 130 mg/dL Final     Comment:     Meter: [id number] : [id number] Ayanna Blanc RN   03/20/2022 1559 111 70 - 130 mg/dL Final     Comment:     Meter: [id number] : [id number] Jose SOUZA   03/20/2022 1046 178 (H) 70 - 130 mg/dL Final     Comment:     Meter: [id number] : [id number] Jose SOUZA   03/20/2022 0634 155 (H) 70 - 130 mg/dL Final     Comment:     Meter: [id number] : [id number] Uriel SOUZA   03/19/2022 2227 120 70 - 130 mg/dL Final     Comment:     Meter: [id number] : [id number] Uriel Wadsworth HARLEY   03/19/2022 1613 148 (H) 70 - 130 mg/dL Final     Comment:     Meter: [id number] : [id number] Jose SOUZA   03/19/2022 1119 130 70 - 130 mg/dL Final     Comment:     Meter: [id number] : [id number] Jose SOUZA       CT Angiogram Chest  Narrative: CT ANGIOGRAM OF THE CHEST. MULTIPLE CORONAL, SAGITTAL, AND 3-D  RECONSTRUCTIONS.     HISTORY: Short of air, recent abdominal surgery; evaluate for pulmonary  embolism     TECHNIQUE: Radiation dose reduction  techniques were utilized, including  automated exposure control and exposure modulation based on body size.   CT angiogram of the chest was performed. Multiple coronal, sagittal, and  3-D reconstruction images were obtained.      COMPARISON:None     FINDINGS:      Tracheostomy is present. The gallbladder is surgically absent. No hilar,  mediastinal or axillary adenopathy is present by size criteria. There is  a trace pericardial effusion.     Bibasilar atelectasis and or pleural parenchymal scarring is present. No  findings of pulmonary embolism are present. There is no pleural effusion  or pneumothorax. Subcentimeter pulmonary nodule within the right upper  lobe is present.     No suspicious lytic blastic osseous lesion is present.     Impression: 1.  No findings of pulmonary embolism. Bibasilar atelectasis and or  pleural parenchymal scarring.  2.  Subcentimeter pulmonary nodule in the right upper lobe. Follow-up  chest CT in 12 months should be considered to ensure stability per  Fleischner criteria.  3.  Other findings as above.     This report was finalized on 3/18/2022 2:49 PM by Dr. Efren Shepard M.D.     XR Chest 1 View  ONE VIEW PORTABLE CHEST     HISTORY: Shortness of breath.     FINDINGS: The lungs are moderately expanded and clear except for some  minimal vague atelectasis at the right base. Allowing for the lung  expansion, the heart size is normal. A tracheostomy tube is in place.     This report was finalized on 3/18/2022 2:18 PM by Dr. Femi Joseph M.D.       Scheduled Medications  atorvastatin, 80 mg, Oral, Nightly  baclofen, 5 mg, Oral, Nightly  cetirizine, 5 mg, Oral, Daily  gabapentin, 300 mg, Oral, Q8H  glipizide, 5 mg, Oral, QAM AC  hydroCHLOROthiazide, 12.5 mg, Oral, Daily  insulin lispro, 0-7 Units, Subcutaneous, TID AC  lactulose, 20 g, Oral, TID  lisinopril, 10 mg, Oral, Daily  senna-docusate sodium, 2 tablet, Oral, BID  sodium chloride, 10 mL, Intravenous, Q12H  traZODone, 50 mg,  Oral, Nightly    Infusions   Diet  Diet Regular; Cardiac, Consistent Carbohydrate, Renal       Assessment/Plan     Active Hospital Problems    Diagnosis  POA   • Hypoxia [R09.02]  Yes   • Pleuritic chest pain [R07.81]  Unknown   • Dyspnea [R06.00]  Unknown   • Essential (primary) hypertension [I10]  Yes   • Tracheostomy status (HCC) [Z93.0]  Not Applicable   • Hemochromatosis [E83.119]  Yes   • DM2 (diabetes mellitus, type 2) (HCC) [E11.9]  Yes   • Chronic low back pain [M54.50, G89.29]  Yes   • Chronic neck pain [M54.2, G89.29]  Yes      Resolved Hospital Problems   No resolved problems to display.     Assessment and plan  1.  Pleuritic chest pain with hypoxia, CT scan of the chest, does not show any evidence of pneumonia, pulmonary on board and following.  Patient has postoperative hypoxemia due to atelectasis, constipation is also contributing, we will intensify bowel regimen by adding lactulose today.  Discussed with Dr. Heredia.     2.  Severe sleep apnea, status post tracheostomy.  Continue trach care.     3.  Diabetes mellitus, continue Accu-Cheks and sliding scale insulin coverage.     4.  Hyperlipidemia, continue statin therapy.    5.  Morbid obesity, complicates all aspects of care, patient was encouraged to lose weight.     6.  CODE STATUS is full code.  Further plans based on hospital course.            Tico Sanches MD  [facility]  03/20/22  21:19 EDT

## 2022-03-21 NOTE — PROGRESS NOTES
Name: Fabio Juárez ADMIT: 3/18/2022   : 1965  PCP: Karley Amor APRN    MRN: [id number] LOS: 3 days   AGE/SEX: 57 y.o. male  ROOM: [facility]     Subjective   Subjective   Sitting up in chair.  Still has not had a bowel movement.  Complains of pain in his low back, pain is worse with deep breathing    Review of Systems  Denies chest pain, fever, headache, lower extremity swelling     Objective   Objective   Vital Signs  Temp:  [97.6 °F (36.4 °C)-98.2 °F (36.8 °C)] 97.6 °F (36.4 °C)  Heart Rate:  [80-88] 88  Resp:  [18] 18  BP: (110-122)/(78-82) 110/82  SpO2:  [90 %-95 %] 93 %  on  Flow (L/min):  [2] 2;   Device (Oxygen Therapy): nasal cannula  Body mass index is 45.1 kg/m².  Physical Exam  Constitutional:       General: He is not in acute distress.     Appearance: He is obese. He is not diaphoretic.   HENT:      Mouth/Throat:      Mouth: Mucous membranes are moist.   Eyes:      General: No scleral icterus.  Cardiovascular:      Rate and Rhythm: Normal rate and regular rhythm.   Pulmonary:      Effort: Pulmonary effort is normal. No respiratory distress.      Breath sounds: No wheezing or rhonchi.   Abdominal:      General: There is no distension.      Palpations: Abdomen is soft.      Tenderness: There is no abdominal tenderness. There is no guarding.   Musculoskeletal:      Right lower leg: No edema.      Left lower leg: No edema.   Skin:     General: Skin is warm and dry.   Neurological:      Mental Status: He is alert.   Psychiatric:         Mood and Affect: Mood normal.         Results Review     I reviewed the patient's new clinical results.  Results from last 7 days   Lab Units 22  0652 22  0658 22  0419 22  1249   WBC 10*3/mm3 7.69 9.08 10.25 12.29*   HEMOGLOBIN g/dL 14.4 14.6 15.0 15.2   PLATELETS 10*3/mm3 247 279 294 306     Results from last 7 days   Lab Units 22  0652 22  0658 22  0419 22  1249   SODIUM mmol/L 135* 131* 133* 135*   POTASSIUM  mmol/L 3.7 3.5 3.6 3.9   CHLORIDE mmol/L 97* 95* 97* 99   CO2 mmol/L 28.0 27.0 27.0 27.0   BUN mg/dL 15 17 17 18   CREATININE mg/dL 0.74* 0.77 0.76 0.90   GLUCOSE mg/dL 152* 175* 138* 134*   Estimated Creatinine Clearance: 135.7 mL/min (A) (by C-G formula based on SCr of 0.74 mg/dL (L)).  Results from last 7 days   Lab Units 03/21/22  0652 03/20/22  0658 03/19/22  0419 03/18/22  1249   ALBUMIN g/dL 3.00* 3.80 3.70 4.00   BILIRUBIN mg/dL 0.9 1.4* 1.8* 1.1   ALK PHOS U/L 81 73 71 69   AST (SGOT) U/L 16 15 12 16   ALT (SGPT) U/L 20 17 21 19     Results from last 7 days   Lab Units 03/21/22  0652 03/20/22  0658 03/19/22 0419 03/18/22  1249   CALCIUM mg/dL 8.8 9.1 8.9 9.3   ALBUMIN g/dL 3.00* 3.80 3.70 4.00   MAGNESIUM mg/dL  --   --   --  1.7     Results from last 7 days   Lab Units 03/18/22  1249   PROCALCITONIN ng/mL 0.05     COVID19   Date Value Ref Range Status   03/18/2022 Not Detected Not Detected - Ref. Range Final     SARS-CoV-2 YAW   Date Value Ref Range Status   09/09/2021 Not Detected Not Detected Final     Hemoglobin A1C   Date/Time Value Ref Range Status   03/19/2022 0419 8.40 (H) 4.80 - 5.60 % Final     Glucose   Date/Time Value Ref Range Status   03/21/2022 1101 189 (H) 70 - 130 mg/dL Final     Comment:     Meter: [id number] : [id number] Derrell SOUZA   03/21/2022 0640 189 (H) 70 - 130 mg/dL Final     Comment:     Meter: [id number] : [id number] Yan MANCINI   03/20/2022 2118 139 (H) 70 - 130 mg/dL Final     Comment:     Meter: [id number] : [id number] Ayanna Blanc RN   03/20/2022 1559 111 70 - 130 mg/dL Final     Comment:     Meter: [id number] : [id number] Jose SOUZA   03/20/2022 1046 178 (H) 70 - 130 mg/dL Final     Comment:     Meter: [id number] : [id number] Jose SOUZA   03/20/2022 0634 155 (H) 70 - 130 mg/dL Final     Comment:     Meter: [id number] : [id number] Uriel Wadsworth    03/19/2022 2227 120 70 - 130 mg/dL Final     Comment:     Meter: [id number]  : [id number] Uriel SOUZA       XR Abdomen KUB  Narrative: XR ABDOMEN KUB-     INDICATIONS: Constipation     TECHNIQUE: Supine views of the abdomen     COMPARISON: None available     FINDINGS:      The bowel gas pattern is nonspecific, with gaseous distention of  transverse colon, proximal colonic fecal retention. No supine evidence  for free intraperitoneal gas. No definite nephrolithiasis..  Follow-up/further evaluation can be obtained as indications persist.     Impression:    As described.     This report was finalized on 3/21/2022 2:20 PM by Dr. Frank Salinas M.D.       I reviewed the patient's daily medications.  Scheduled Medications  atorvastatin, 80 mg, Oral, Nightly  baclofen, 5 mg, Oral, Nightly  cetirizine, 5 mg, Oral, Daily  enoxaparin, 40 mg, Subcutaneous, Q24H  gabapentin, 300 mg, Oral, Q8H  glipizide, 5 mg, Oral, QAM AC  hydroCHLOROthiazide, 12.5 mg, Oral, Daily  insulin lispro, 0-7 Units, Subcutaneous, TID AC  lactulose, 20 g, Oral, TID  lisinopril, 10 mg, Oral, Daily  senna-docusate sodium, 2 tablet, Oral, BID  sodium chloride, 10 mL, Intravenous, Q12H  traZODone, 50 mg, Oral, Nightly    Infusions   Diet  Diet Regular; Cardiac, Consistent Carbohydrate, Renal       Assessment/Plan     Active Hospital Problems    Diagnosis  POA   • **Hypoxia [R09.02]  Yes   • Obesity, Class III, BMI 40-49.9 (morbid obesity) (formerly [facility]) [E66.01]  Yes   • Opiate dependence (formerly [facility]) [F11.20]  Yes   • Pleuritic chest pain [R07.81]  Yes   • Dyspnea [R06.00]  Yes   • Tracheostomy status (formerly [facility]) [Z93.0]  Not Applicable   • Essential (primary) hypertension [I10]  Yes   • Hemochromatosis [E83.119]  Yes   • DM2 (diabetes mellitus, type 2) (formerly [facility]) [E11.9]  Yes   • Chronic low back pain [M54.50, G89.29]  Yes   • Chronic neck pain [M54.2, G89.29]  Yes      Resolved Hospital Problems   No resolved problems to display.       57 y.o. male with history of morbid obesity, sleep apnea, diabetes and hypertension who presented with  progressive shortness of breath and pleuritic chest pain following laparoscopic umbilical hernia repair 3 weeks prior.  He has been hypoxic since admission, most likely due to postoperative atelectasis, with constipation contributing to his difficulty breathing.    Pleuritic chest pain with hypoxia:  -CTA chest on admission was negative for pneumonia or pulmonary embolism.  It did show bibasilar atelectasis  -Encourage incentive spirometry and out of bed ambulation.  Pulmonology following     Constipation: KUB today shows gaseous distention of the transverse colon with proximal colonic fecal retention.   - Continue oral bowel regimen, give bisacodyl suppository today.  If no bowel movement by later today, will need enema    Severe sleep apnea, status post tracheostomy:  Continue trach care.     Diabetes mellitus: Glucose acceptable currently.  Continue sliding scale insulin    Hypertension: Blood pressure acceptable acutely.  Continue HCTZ and lisinopril    Chronic back pain: Continue gabapentin, baclofen and oxycodone as needed.  Wean off IV pain medicine as tolerated     Lung nodule: Subcentimeter pulmonary nodule in the right upper lobe. Follow-up  chest CT in 12 months    · Lovenox 40 mg SC daily for DVT prophylaxis.  · Full code.  · Discussed with patient, nursing staff and CCP.  · Anticipate discharge home in 1-2 days.      Mesha Marie [facility]  03/21/22  14:28 EDT    Patient was placed in face mask on first look.  I wore protective equipment throughout this patient encounter including a face mask, gloves and protective eyewear.  Hand hygiene was performed before donning protective equipment and after removal when leaving the room.

## 2022-03-21 NOTE — CASE MANAGEMENT/SOCIAL WORK
Discharge Planning Assessment  [facility]     Patient Name: Fabio Juárez  MRN: [id number]  Today's Date: 3/21/2022    Admit Date: 3/18/2022     Discharge Needs Assessment     Row Name 03/21/22 1955       Living Environment    People in Home spouse;child(salvatore), adult;parent(s)    Name(s) of People in Home his wife Jossy Juárez [phone], 2 kids and his mother.    Current Living Arrangements home    Primary Care Provided by self;spouse/significant other    Provides Primary Care For no one, unable/limited ability to care for self    Family Caregiver if Needed spouse;child(salvatore), adult    Quality of Family Relationships helpful;involved;supportive    Able to Return to Prior Arrangements yes       Resource/Environmental Concerns    Resource/Environmental Concerns home accessibility    Home Accessibility Concerns stairs to enter home  2 steps with 1 handrail to enter their single story home.       Transition Planning    Patient/Family Anticipates Transition to home with family    Patient/Family Anticipated Services at Transition home health care;outpatient care    Transportation Anticipated family or friend will provide       Discharge Needs Assessment    Equipment Currently Used at Home walker, rolling    Concerns to be Addressed discharge planning    Anticipated Changes Related to Illness none    Equipment Needed After Discharge none    Discharge Facility/Level of Care Needs home with home health    Current Discharge Risk physical impairment               Discharge Plan     Row Name 03/21/22 1951       Plan    Plan Plans home with family assistance and HH or outpatient P.T.  Follow to see if [facility] can accept the patient.    Provided Post Acute Provider List? N/A    N/A Provider List Comment Referral made to [facility].    Provided Post Acute Provider Quality & Resource List? N/A    N/A Quality & Resource List Comment Referral made to [facility].    Patient/Family in Agreement with Plan yes    Plan  Comments Met with the patient at bedside; explained role of CCP, verified facesheet and discussed discharge planning needs. The patient plans to return home upon d/c with assistance from his wife and 2 kids.  The patient states that mother in law resides with them also.  The patient has a walker and there are 2 steps with 1 handrail to enter their single story home.  The patient’s PCP is ONE[facility] Fox, pharmacy is [facility] in [place] and he denies wanting to use [facility].  The patient denies any trouble remembering to take his medication or with affording his medication.  The patient is unsure who he has used for HH in the past, but states that he would want to use [facility] upon d/c as they are current with his mother at his home-referral made to [facility].  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  The patient states that if [facility] is not able to accept him that he would be agreeable to using any HH agency.  The patient states that if no HH agency can accept him that he would be agreeable to having outpatient P.T. arranged as his wife or Taxi service could transport him.  The patient states that if O2 is needed upon dc/ that he would be agreeable to using [facility].  The patient states that he orders his own trach supplies on line. CCP will follow to see if the patient will need Lovenox or insulin upon d/c.  CCP will follow to see if [facility] can accept the patient, if other HH referrals need to be made or if outpatient therapy needs to be considered.  TAMMY RODRIGUEZ              Continued Care and Services - Admitted Since 3/18/2022     Home Medical Care     Service Provider Request Status Selected Services Address Phone Fax Patient Preferred    [facility] [place]  Pending - Request Sent N/A [address] [facility] [address] [phone] --               Expected Discharge Date and Time     Expected Discharge Date Expected Discharge Time    Mar 23, 2022          Demographic Summary     Row Name 03/21/22 1954       General Information    Admission Type inpatient    Arrived From home    Referral Source admission list    Reason for Consult discharge planning    Preferred Language English               Functional Status     Row Name 03/21/22 1954       Functional Status    Usual Activity Tolerance good    Current Activity Tolerance moderate       Functional Status, IADL    Medications assistive equipment    Meal Preparation assistive equipment    Housekeeping assistive equipment    Laundry assistive equipment    Shopping assistive equipment       Mental Status    General Appearance WDL WDL       Mental Status Summary    Recent Changes in Mental Status/Cognitive Functioning no changes               Psychosocial    No documentation.                Abuse/Neglect    No documentation.                Legal    No documentation.                Substance Abuse    No documentation.                Patient Forms    No documentation.                   TAMMY Martinez

## 2022-03-21 NOTE — PROGRESS NOTES
"      [facility]         Dr Orlando Sayied   LOS: 3 days   Patient Care Team:  Karley Amor APRN as PCP - General (Nurse Practitioner)  Payton Marquez APRN as Nurse Practitioner (Nurse Practitioner)    Chief Complaint: Postop hypoxemia with atelectasis severe sleep apnea status post tracheostomy history of hemochromatosis    Interval History: Currently on 2 L oxygen nasal cannula.  Overall feels better and resting comfortably.    REVIEW OF SYSTEMS:   CARDIOVASCULAR: No chest pain, chest pressure or chest discomfort. No palpitations or edema.   RESPIRATORY: No shortness of breath, cough or sputum.   GASTROINTESTINAL: No anorexia, nausea, vomiting or diarrhea. No abdominal pain or blood.   HEMATOLOGIC: No bleeding or bruising.     Ventilator/Non-Invasive Ventilation Settings (From admission, onward)            None            Vital Signs  Temp:  [97.6 °F (36.4 °C)-98.2 °F (36.8 °C)] 97.6 °F (36.4 °C)  Heart Rate:  [80-88] 88  Resp:  [18] 18  BP: (110-122)/(78-82) 110/82    Intake/Output Summary (Last 24 hours) at 3/21/2022 1516  Last data filed at 3/21/2022 1145  Gross per 24 hour   Intake 836 ml   Output --   Net 836 ml     Flowsheet Rows    Flowsheet Row First Filed Value   Admission Height 165.1 cm (65\") Documented at 03/18/2022 1249   Admission Weight 123 kg (271 lb) Documented at 03/18/2022 1249          Physical Exam:  Patient is examined using the personal protective equipment as per guidelines from infection control for this particular patient as enacted.  Hand hygiene was performed before and after patient interaction.   General Appearance:    Alert, cooperative, in no acute distress.  Following simple commands  ENT Mallampati between 3 and 4 no nasal congestion  Neck midline trachea, no thyromegaly.  Tracheostomy in place   Lungs:    Diminished breath sounds more prominent on the right base    Heart:    Regular rhythm and normal rate, normal S1 and S2, no            murmur, no gallop, no " rub, no click   Chest Wall:    No abnormalities observed   Abdomen:     Normal bowel sounds, no masses, no organomegaly, soft        nontender, nondistended, no guarding, no rebound                tenderness   Extremities:   Moves all extremities well, no edema, no cyanosis, no             redness  CNS no focal neurological deficits normal sensory exam  Skin no rashes no nodules  Musculoskeletal no cyanosis no clubbing normal range of motion     Results Review:        Results from last 7 days   Lab Units 03/21/22  0652 03/20/22  0658 03/19/22  0419   SODIUM mmol/L 135* 131* 133*   POTASSIUM mmol/L 3.7 3.5 3.6   CHLORIDE mmol/L 97* 95* 97*   CO2 mmol/L 28.0 27.0 27.0   BUN mg/dL 15 17 17   CREATININE mg/dL 0.74* 0.77 0.76   GLUCOSE mg/dL 152* 175* 138*   CALCIUM mg/dL 8.8 9.1 8.9     Results from last 7 days   Lab Units 03/18/22  1249   TROPONIN T ng/mL <0.010     Results from last 7 days   Lab Units 03/21/22  0652 03/20/22  0658 03/19/22  0419   WBC 10*3/mm3 7.69 9.08 10.25   HEMOGLOBIN g/dL 14.4 14.6 15.0   HEMATOCRIT % 43.4 43.7 46.0   PLATELETS 10*3/mm3 247 279 294     Results from last 7 days   Lab Units 03/18/22  1249   INR  0.97   APTT seconds 24.4         Results from last 7 days   Lab Units 03/18/22  1249   MAGNESIUM mg/dL 1.7         Results from last 7 days   Lab Units 03/18/22  1519   PH, ARTERIAL pH units 7.419   PO2 ART mm Hg 73.4*   PCO2, ARTERIAL mm Hg 42.5   HCO3 ART mmol/L 27.5       I reviewed the patient's new clinical results.  I personally viewed and interpreted the patient's chest x-ray.        Medication Review:   atorvastatin, 80 mg, Oral, Nightly  baclofen, 5 mg, Oral, Nightly  cetirizine, 5 mg, Oral, Daily  enoxaparin, 40 mg, Subcutaneous, Q24H  gabapentin, 300 mg, Oral, Q8H  glipizide, 5 mg, Oral, QAM AC  hydroCHLOROthiazide, 12.5 mg, Oral, Daily  insulin lispro, 0-7 Units, Subcutaneous, TID AC  lactulose, 20 g, Oral, TID  lisinopril, 10 mg, Oral, Daily  senna-docusate sodium, 2 tablet,  Oral, BID  sodium chloride, 10 mL, Intravenous, Q12H  traZODone, 50 mg, Oral, Nightly             ASSESSMENT:   1. Postoperative hypoxemia  2. Atelectasis  3. Severe sleep apnea status post tracheostomy  4. Chronic trach care  5. Hemochromatosis  6. Abdominal hernia status post repair         PLAN:  Continue weaning down oxygen as tolerated.  Wean oxygen to keep sats above 90%  Mobilize ambulate  Incentive spirometry and aggressive pulmonary toilet  Avoid abdominal distention with aggressive bowel management  Monitor clinical course closely.      Darion Elizabeth MD  03/21/22  15:16 EDT

## 2022-03-21 NOTE — DISCHARGE PLACEMENT REQUEST
"Tayo Faustin (57 y.o. Male)             Date of Birth   1965    Social Security Number       Address   [address] [place] [address]    Home Phone   [phone]    MRN   [id number]       Hindu   None    Marital Status                               Admission Date   3/18/22    Admission Type   Emergency    Admitting Provider   Petra Goodman MD    Attending Provider   Mesha Marie DO    Department, Room/Bed   [address], [address]       Discharge Date       Discharge Disposition       Discharge Destination                               Attending Provider: Mesha Marie DO    Allergies: No Known Allergies    Isolation: None   Infection: None   Code Status: CPR   Advance Care Planning Activity    Ht: 165.1 cm (65\")   Wt: 123 kg (271 lb)    Admission Cmt: None   Principal Problem: Hypoxia [R09.02]                 Active Insurance as of 3/18/2022     Primary Coverage     Payor Plan Insurance Group Employer/Plan Group    [employer]UE [employer] PPO [id number]     Payor Plan Address Payor Plan Phone Number Payor Plan Fax Number Effective Dates    [address] [phone]  1/1/2020 - None Entered    [facility] [id number]       Subscriber Name Subscriber Birth Date Member ID       TAYO FAUSTIN 1965 [id number]                 Emergency Contacts      (Rel.) Home Phone Work Phone Mobile Phone    PEE FAUSTIN (Spouse) -- -- [phone]            {Outbreak/Travel/Exposure Documentation......;  Question Available Choices Patient Response   COVID-19 Outbreak Screen:  Do you currently have a new onset of the following symptoms?        Fever/Chills, Cough, Shortness of air, Loss of taste or smell, No, Unknown  (!) Cough;Shortness of Air (03/18/22 1230)   COVID-19 Outbreak Screen: In the last 14 days, have you had contact with anyone who is ill, has show any of the symptoms listed above and/or has been diagnosis with the " 2019 Novel Coronavirus? This includes any immediate household members but excludes any patients with whom you have been in contact within your normal work duties wearing proper PPE, if you are a healthcare worker.  Yes, No, Unknown              No (03/18/22 1230)   COVID-19 Outbreak Screen: Who was notified? Free text (not recorded)   Ebola Screening Outbreak Screen: Have you traveled to the [place] or [place] within the past 21 days?  Yes, No, Unknown (not recorded)   Ebola Screening Outbreak Screen: Do you have ANY of the following symptoms: Fever/Chills, Vomiting, Diarrhea, Fatigue, Headache, Muscle pain, Unexplained bleeding, Abdominal (stomach) pain, No, Unknown (not recorded)   Ebola Screening Outbreak Screen: Name of Person notified Free text (not recorded)   Travel Screen: Have you traveled in the last month? If so, to what country have you traveled? If [place] what state? Yes, No, Unknown  List of all countries  List of all States No (03/18/22 1230)  (not recorded)  (not recorded)   Infection Risk: Do you currently have the following symptoms?  (If cough is selected, the Tuberculosis Screen is performed.) Cough, Fever, Rash, No No (03/18/22 2027)   Tuberculosis Screen: Do you have any of the following Tuberculosis Risks?  · Have you lived or spent time with anyone who had or may have TB?  · Have you lived in or visited any of the following areas for more than one month: [place], [place], [place], [place] or [place], the [place] or [place]?  · Do you have HIV/AIDS?  · Have you lived in or worked in a nursing home, homeless shelter, correctional facility, or substance abuse treatment facility?   · No    If Yes do you have any of the following symptoms? Yes responses display to the right    If Yes, symptoms listed are:  Cough greater than or equal to 3 weeks, Loss of appetite, Unexplained weight loss, Night sweats, Bloody sputum or hemoptysis, Hoarseness, Fever, Fatigue, Chest  pain, No No (03/18/22 1230)  (not recorded)   Exposure Screen: Have you been exposed to any of these contagious diseases in the last month? Measles, Chickenpox, Meningitis, Pertussis, Whooping Cough, No No (03/18/22 1560)

## 2022-03-21 NOTE — NURSING NOTE
"Patient requesting to not take dulcolax suppository at this moment. Patient states he has been \"passing gas and might have a bowel movement before tomorrow morning.\" Told patient that suppository was available whenever patient needed.  "

## 2022-03-21 NOTE — PAYOR COMM NOTE
"Tayo Faustin (57 y.o. Male)     ATTN: INITIAL CLINICALS TO REVIEW FOR INPATIENT ADMISSION: ID# [id number]    PLEASE REPLY WITH APPROVED DAYS TO UR DEPT:  [phone],   [phone]              Date of Birth   1965    Social Security Number       Address   [address] [place] [address]    Home Phone   [phone]    MRN   [id number]       Hindu   None    Marital Status                               Admission Date   3/18/22    Admission Type   Emergency    Admitting Provider   Petra Goodman MD    Attending Provider   Mesha Marie DO    Department, Room/Bed   [address], S[address]/1       Discharge Date       Discharge Disposition       Discharge Destination                               Attending Provider: Mesha Marie DO    Allergies: No Known Allergies    Isolation: None   Infection: None   Code Status: CPR   Advance Care Planning Activity    Ht: 165.1 cm (65\")   Wt: 123 kg (271 lb)    Admission Cmt: None   Principal Problem: None                Active Insurance as of 3/18/2022     Primary Coverage     Payor Plan Insurance Group Employer/Plan Group    [email or web address] [facility] PPO [id number]     Payor Plan Address Payor Plan Phone Number Payor Plan Fax Number Effective Dates    [address] [phone]  2020 - None Entered    [place] [address]       Subscriber Name Subscriber Birth Date Member ID       TAYO FAUSTIN 1965 [id number]                 Emergency Contacts      (Rel.) Home Phone Work Phone Mobile Phone    PEE FAUSTIN (Spouse) -- -- [phone]               History & Physical      Petra Goodman MD at 22 1943          Internal medicine history and physical  INTERNAL MEDICINE   Psychiatric       Patient Identification:  Name: Tayo Faustin  Age: 57 y.o.  Sex: male  :  1965  MRN: [id number]                   Primary Care Physician: Mt" ONEIL Crandall                               Date of admission:3/18/2022    Chief Complaint: Progressive shortness of breath and bilateral rib pain for 1 week in the background of laparoscopic repair of recurrent umbilical hernia on 2/23/2022 at [facility].    History of Present Illness:   Patient is a 57-year-old morbidly obese male who underwent robotic repair of recurrent umbilical hernia on 2/23/2022 and had subsequent follow-up with his primary surgeon on 3/8/2020 during which he was noted with doing well except for significant abdominal discomfort.  No specific changes in treatment plan was made at that visit and plan was to see him back in couple weeks at that time.  It was also noted that she would be allowed to go back to work after his visit in 2 weeks from 3/8/2022.  According to the patient about a week or so ago i.e. few days after his visit with his surgeon he started having discomfort in his upper abdomen and bilateral lower chest that gets worse upon coughing and taking deep breath.  His shortness of breath is persistent and discomfort feels like statin pain in the ribs which gets worse with taking deep breaths.  Work-up in the emergency room including CT scan of the chest PE protocol did not show any acute pulmonary embolism but did show bibasilar atelectasis and/or pleural parenchymal scarring.  Subcentimeter pulmonary nodule in the right upper lobe for which repeat CT scan in 12 months was recommended.  Patient has history of chronic hypoxic respiratory failure and sleep apnea for which he had tracheostomy placed in 1990s which has been present since.  There is no recent changes or manipulation of his tracheostomy device.  He was noted to be slightly hypoxic with oxygen saturation down to 89% improved with 2 L nasal cannula oxygen placement.  Because of transient hypoxia mild leukocytosis bilateral chest discomfort after recent robotic umbilical hernia repair patient is being admitted  for further care.    Past Medical History:  Past Medical History:   Diagnosis Date   • Chronic pain    • Claustrophobia    • Coronary artery disease     BLOCKAGE 2015, SEE'S DR DIEGO EVERY 2 YEARS, DENIED CP/SOB    • Essential (primary) hypertension    • GERD (gastroesophageal reflux disease)    • Hemochromatosis     ASYMPTOMATIC    • History of COVID-19    • Left upper quadrant pain    • Low back pain    • Lung nodule    • Mixed hyperlipidemia    • Obstructive sleep apnea (adult) (pediatric)    • Other testicular dysfunction    • Pain in right foot    • Pain in right shoulder    • Recurrent umbilical hernia    • Renal stone 03/2017   • Tracheostomy status (HCC)     R/T SLEEP APNEA    • Type 2 diabetes mellitus (HCC)     BG RUNS 120-125 IN AM      Past Surgical History:  Past Surgical History:   Procedure Laterality Date   • ACHILLES TENDON REPAIR Right 10/2018   • APPENDECTOMY     • CARDIAC CATHETERIZATION  11/03/2015    LEFT VENTRIULOGRAM, CORONARY ANGIOGRAM    • CHOLECYSTECTOMY  07/2013   • COLONOSCOPY  07/28/2014    NORMAL RESULT    • HERNIA REPAIR     • JOINT REPLACEMENT Bilateral     RIGHT KNEE 01/2016   • TONSILLECTOMY AND ADENOIDECTOMY     • TRACHEOSTOMY      SECONDARY TO SLEEP APNEA   • VENTRAL HERNIA REPAIR N/A 2/23/2022    Procedure: ROBOTIC UMBILICAL HERNIA REPAIR WITH MESH PLACEMENT;  Surgeon: Garrett Anderson MD;  Location: [facility] (formerly [facility]  [facility];  Laterality: N/A;      Home Meds:  Medications Prior to Admission   Medication Sig Dispense Refill Last Dose   • atorvastatin (LIPITOR) 80 MG tablet TAKE ONE TABLET BY MOUTH EVERY NIGHT AT BEDTIME 90 tablet 0    • baclofen (LIORESAL) 10 MG tablet TAKE 1/2 TO 1 TABLET BY MOUTH AT BEDTIME FOR LOWER BACK PAIN/ MUSCLE SPASMS 90 tablet 1    • cetirizine (zyrTEC) 5 MG tablet TAKE ONE TABLET BY MOUTH DAILY 90 tablet 0    • gabapentin (NEURONTIN) 300 MG capsule gabapentin 300 mg oral capsule take 1 capsule (300 mg) by oral route 3 times per  "day   Suspended      • glimepiride (AMARYL) 2 MG tablet Take 2 mg by mouth Every Morning Before Breakfast. INSTRUCTED PER ANESTHESIA STANDING ORDERS      • hydroCHLOROthiazide (HYDRODIURIL) 12.5 MG tablet Take 12.5 mg by mouth Daily.      • lisinopril (PRINIVIL,ZESTRIL) 10 MG tablet TAKE ONE TABLET BY MOUTH EVERY MORNING 90 tablet 0    • metFORMIN ER (GLUCOPHAGE-XR) 500 MG 24 hr tablet Take 2 tablets by mouth 2 (Two) Times a Day With Meals. 360 tablet 0    • oxyCODONE (ROXICODONE) 15 MG immediate release tablet Take 15 mg by mouth As Needed for Moderate Pain .      • Semaglutide, 1 MG/DOSE, (Ozempic, 1 MG/DOSE,) 4 MG/3ML solution pen-injector Inject 1 mg under the skin into the appropriate area as directed 1 (One) Time Per Week for 90 days. 3 pen 0    • traZODone (DESYREL) 50 MG tablet TAKE ONE TABLET BY MOUTH ONCE NIGHTLY 90 tablet 0      Current Meds:   No current facility-administered medications for this encounter.  Allergies:  No Known Allergies  Social History:   Social History     Tobacco Use   • Smoking status: Never Smoker   • Smokeless tobacco: Never Used   Substance Use Topics   • Alcohol use: Yes     Comment: SOCIALLY       Family History:  Family History   Problem Relation Age of Onset   • Arrhythmia Mother    • Stroke Mother 73   • Diabetes type II Mother    • Heart attack Father    • Hypertension Brother    • Cerebral aneurysm Brother 38   • Diabetes type II Brother    • Coronary artery disease Other           Review of Systems  See history of present illness and past medical history.    As described in history of present illness.  Remainder of ROS is negative.      Vitals:   /75   Pulse 79   Temp 98.4 °F (36.9 °C) (Tympanic)   Resp 20   Ht 165.1 cm (65\")   Wt 123 kg (271 lb)   SpO2 95%   BMI 45.10 kg/m²   I/O: No intake or output data in the 24 hours ending 03/18/22 1943  Exam:  Patient is examined using the personal protective equipment as per guidelines from infection control for this " particular patient as enacted.  Hand washing was performed before and after patient interaction.  General Appearance:   Alert cooperative morbidly obese male does not appear to be in any acute distress   Head:    Normocephalic, without obvious abnormality, atraumatic   Eyes:    PERRL, conjunctiva/corneas clear, EOM's intact, both eyes   Ears:    Normal external ear canals, both ears   Nose:   Nares normal, septum midline, mucosa normal, no drainage    or sinus tenderness   Throat:   Lips, tongue, gums normal; oral mucosa pink and moist   Neck:  Neck: Tracheostomy device in place with no neck abnormalities swelling or cellulitic changes   Back:     Symmetric, no curvature, ROM normal, no CVA tenderness   Lungs:    Decreased breath sounds at the bases   Chest Wall:    No tenderness or deformity    Heart:    Regular rate and rhythm, S1 and S2 normal, no murmur, rub   or gallop   Abdomen:    Obese soft mild generalized distention and tenderness robotic port site of surgery appears to be well approximated with no inflammation or drainage.   Extremities:   Extremities normal, atraumatic, no cyanosis or edema   Pulses:   Pulses palpable in all extremities; symmetric all extremities   Skin:   Skin color normal, Skin is warm and dry,  no rashes or palpable lesions   Neurologic:  Grossly nonfocal examination.       Data Review:      I reviewed the patient's new clinical results.  Results from last 7 days   Lab Units 03/18/22  1249   WBC 10*3/mm3 12.29*   HEMOGLOBIN g/dL 15.2   PLATELETS 10*3/mm3 306     Results from last 7 days   Lab Units 03/18/22  1249   SODIUM mmol/L 135*   POTASSIUM mmol/L 3.9   CHLORIDE mmol/L 99   CO2 mmol/L 27.0   BUN mg/dL 18   CREATININE mg/dL 0.90   CALCIUM mg/dL 9.3   GLUCOSE mg/dL 134*     CT Angiogram Chest    Result Date: 3/18/2022  1.  No findings of pulmonary embolism. Bibasilar atelectasis and or pleural parenchymal scarring. 2.  Subcentimeter pulmonary nodule in the right upper lobe.  Follow-up chest CT in 12 months should be considered to ensure stability per Fleischner criteria. 3.  Other findings as above.  This report was finalized on 3/18/2022 2:49 PM by Dr. Efren Shepard M.D.      ECG 12 Lead   Final Result   HEART RATE= 83  bpm   RR Interval= 723  ms   NM Interval= 174  ms   P Horizontal Axis= 11  deg   P Front Axis= 37  deg   QRSD Interval= 93  ms   QT Interval= 343  ms   QRS Axis= 20  deg   T Wave Axis= -4  deg   - BORDERLINE ECG -   Sinus rhythm   Borderline T abnormalities, inferior leads   No change from previous tracing   Electronically Signed By: Erin Green ([facility]) 18-Mar-2022 15:20:57   Date and Time of Study: 2022-03-18 13:11:01        Microbiology Results (last 10 days)     Procedure Component Value - Date/Time    Respiratory Panel PCR w/COVID-19(SARS-CoV-2) HUNG/WARNER/VU/PAD/COR/MAD/FREDRICK In-House, NP Swab in UTM/VTM, 3-4 HR TAT - Swab, Nasopharynx [[id number]]  (Normal) Collected: 03/18/22 1408    Lab Status: Final result Specimen: Swab from Nasopharynx Updated: 03/18/22 1515     ADENOVIRUS, PCR Not Detected     Coronavirus 229E Not Detected     Coronavirus HKU1 Not Detected     Coronavirus NL63 Not Detected     Coronavirus OC43 Not Detected     COVID19 Not Detected     Human Metapneumovirus Not Detected     Human Rhinovirus/Enterovirus Not Detected     Influenza A PCR Not Detected     Influenza B PCR Not Detected     Parainfluenza Virus 1 Not Detected     Parainfluenza Virus 2 Not Detected     Parainfluenza Virus 3 Not Detected     Parainfluenza Virus 4 Not Detected     RSV, PCR Not Detected     Bordetella pertussis pcr Not Detected     Bordetella parapertussis PCR Not Detected     Chlamydophila pneumoniae PCR Not Detected     Mycoplasma pneumo by PCR Not Detected    Narrative:      In the setting of a positive respiratory panel with a viral infection PLUS a negative procalcitonin without other underlying concern for bacterial infection, consider observing off antibiotics or  discontinuation of antibiotics and continue supportive care. If the respiratory panel is positive for atypical bacterial infection (Bordetella pertussis, Chlamydophila pneumoniae, or Mycoplasma pneumoniae), consider antibiotic de-escalation to target atypical bacterial infection.          Assessment:  Active Hospital Problems    Diagnosis  POA   • Hypoxia [R09.02]  Yes   • Pleuritic chest pain [R07.81]  Unknown   • Dyspnea [R06.00]  Unknown   • Essential (primary) hypertension [I10]  Yes   • Tracheostomy status (HCC) [Z93.0]  Not Applicable   • Hemochromatosis [E83.119]  Yes   • DM2 (diabetes mellitus, type 2) (HCC) [E11.9]  Yes   • Chronic low back pain [M54.50, G89.29]  Yes   • Chronic neck pain [M54.2, G89.29]  Yes       Plan: See admitting orders  Provided with continuous pulse ox monitoring and oxygen supplementation as needed including incentive spirometry.  Continue with Accu-Cheks and sliding scale coverage and provide with hypoglycemia protocol and check hemoglobin A1c.      Petra Goodman MD   3/18/2022  19:43 EDT  Much of this encounter note is an electronic transcription/translation of spoken language to printed text. The electronic translation of spoken language may permit erroneous, or at times, nonsensical words or phrases to be inadvertently transcribed; Although I have reviewed the note for such errors, some may still exist      Electronically signed by Petra Goodman MD at 03/19/22 0590          Emergency Department Notes      Phyllis Riley, RN at 03/18/22 1231        All triage performed with this RN wearing appropriate PPE.  Pt placed in mask upon arrival to ED.  Patient c/o SOA for 1 week. He also c/o bilateral rib pain. He reports it began after a hernia repair.     Electronically signed by Phyllis Riley, CECE at 03/18/22 1232     Jeremie Kessler MD at 03/18/22 1234           EMERGENCY DEPARTMENT ENCOUNTER  I wore full protective equipment throughout this patient encounter including a N95 mask, eye  shield, gown and gloves. Hand hygiene was performed before donning protective equipment and after removal when leaving the room.    Room Number:  29/29  Date of encounter:  3/18/2022  PCP: Karley Amor APRN    HPI:  Context: Fabio Juárez is a 57 y.o. male who presents to the ED c/o chief complaint of shortness of breath.  Patient reports that he had umbilical hernia repair at the end of February outside [facility].  Patient reports afterwards had abdominal pain that made it difficulty to ambulate, abdominal pain has resolved but approximately a week ago, patient began having shortness of breath.  Patient reports shortness of breath is constant, reports occasionally he will get bilateral sharp stabbing rib pain in the bottom of his ribs on the lateral aspect of both sides, when this occurs the shortness of breath is worse.  Patient denies any inciting events to the chest pain, reports pain last for several seconds and then resolves, it is not exertional or pleuritic in nature.  Pain does not radiate.  Patient denies any nausea vomiting, no diaphoresis.  Patient has had runny nose as well as productive cough, denies any loss of sense of smell or taste, no vomiting or diarrhea, no fever shakes chills or night sweats.  Patient denies any recent sick contacts, has been vaccinated against COVID-19, reports that he was just checked for Covid and would not like to be checked today.  Patient denies any history of blood clots, not on anticoagulation, no recent trauma, not being treated for cancer, was immobilized after surgery.    MEDICAL HISTORY REVIEW  Reviewed in [facility]      Resolved Ambulatory Problems     Diagnosis Date Noted   • Renal stone 03/2017   • Pain in right shoulder    • Pain in right foot    • Other testicular dysfunction    • History of COVID-19    • Achilles tendinitis of right lower extremity 03/15/2018   • Calcaneal spur of right foot 03/15/2018   • Dizziness 06/16/2021   • Edema 06/16/2021   •  Liver disease 06/16/2021   • Plantar fasciitis 06/16/2021   • Leukocytosis 06/16/2021   • Tension type headache 06/16/2021   • Tracheostomy dependence (HCC) 06/16/2021   • Tracheostomy infection (HCC) 06/16/2021       ALLERGIES  Patient has no known allergies.    The patient's allergies have been reviewed    REVIEW OF SYSTEMS  All systems reviewed and negative except for those discussed in HPI.     PHYSICAL EXAM  I have reviewed the triage vital signs and nursing notes.  ED Triage Vitals [03/18/22 1230]   Temp Heart Rate Resp BP SpO2   98.4 °F (36.9 °C) 89 20 -- 93 %      Temp src Heart Rate Source Patient Position BP Location FiO2 (%)   Tympanic Monitor -- -- --       General: No acute distress.  HENT: NCAT, PERRL, Nares patent.  Eyes: no scleral icterus.  Neck: trachea midline, no ROM limitations.  Trach collar in place.  CV: regular rhythm, regular rate.  Unable to assess JVD secondary to habitus, no peripheral pitting edema.  Respiratory: normal effort, diminished at bilateral lung bases with crackles.  Abdomen: soft, nondistended, NTTP, no rebound tenderness, no guarding or rigidity.  Musculoskeletal: no deformity.  Neuro: alert, moves all extremities, follows commands.  Skin: warm, dry.Bilateral lower extremities: No edema, no redness warmth or skin changes, no palpable cords, negative Homans.    I ordered the above labs and reviewed the results.    RADIOLOGY  XR Chest 1 View    Result Date: 3/18/2022  ONE VIEW PORTABLE CHEST  HISTORY: Shortness of breath.  FINDINGS: The lungs are moderately expanded and clear except for some minimal vague atelectasis at the right base. Allowing for the lung expansion, the heart size is normal. A tracheostomy tube is in place.  This report was finalized on 3/18/2022 2:18 PM by Dr. Femi Joseph M.D.      CT Angiogram Chest    Result Date: 3/18/2022  CT ANGIOGRAM OF THE CHEST. MULTIPLE CORONAL, SAGITTAL, AND 3-D RECONSTRUCTIONS.  HISTORY: Short of air, recent abdominal  surgery; evaluate for pulmonary embolism  TECHNIQUE: Radiation dose reduction techniques were utilized, including automated exposure control and exposure modulation based on body size. CT angiogram of the chest was performed. Multiple coronal, sagittal, and 3-D reconstruction images were obtained.  COMPARISON:None  FINDINGS:  Tracheostomy is present. The gallbladder is surgically absent. No hilar, mediastinal or axillary adenopathy is present by size criteria. There is a trace pericardial effusion.  Bibasilar atelectasis and or pleural parenchymal scarring is present. No findings of pulmonary embolism are present. There is no pleural effusion or pneumothorax. Subcentimeter pulmonary nodule within the right upper lobe is present.  No suspicious lytic blastic osseous lesion is present.      1.  No findings of pulmonary embolism. Bibasilar atelectasis and or pleural parenchymal scarring. 2.  Subcentimeter pulmonary nodule in the right upper lobe. Follow-up chest CT in 12 months should be considered to ensure stability per Fleischner criteria. 3.  Other findings as above.  This report was finalized on 3/18/2022 2:49 PM by Dr. Efren Shepard M.D.        I ordered the above noted radiological studies. I reviewed the images and results. I agree with the radiologist interpretation.    PROCEDURES  Procedures    MEDICATIONS GIVEN IN ER  Medications   iopamidol (ISOVUE-370) 76 % injection 100 mL (100 mL Intravenous Given 3/18/22 1359)       PROGRESS, DATA ANALYSIS, CONSULTS, AND MEDICAL DECISION MAKING  A complete history and physical exam have been performed.  All available laboratory and imaging results have been reviewed by myself prior to disposition.    MDM  After the initial H&P, I discussed pertinent information from history and physical exam with patient/family.  Discussed differential diagnosis.  Discussed plan for ED evaluation/workup/treatment.  All questions answered.  Patient/family is agreeable with plan.  ED  Course as of 03/18/22 1624   Fri Mar 18, 2022   1235 My differential diagnosis for dyspnea includes but is not limited to:  Asthma, COPD, pneumonia, pulmonary embolus, acute respiratory distress syndrome, pneumothorax, pleural effusion, pulmonary fibrosis, congestive heart failure, myocardial infarction, DKA, uremia, acidosis, sepsis, anemia, drug related, hyperventilation, CNS disease     [JG]   1308 Patient was saturating approximately 92% during interview, informed by nursing staff that patient desaturated to 88% on room air with good Plath, patient placed on 2 L, now oxygenating at 94%.  Patient denies any history of lung disease, no history of CHF, patient is a non-smoker.  Discussed need to obtain Covid test as patient will likely require admission for hypoxia, after discussion, patient is now agreeable. [JG]   1313 EKG independently viewed and contemporaneously interpreted by ED physician. Time: 13 11 PM.  Rate 83.  Interpretation: Normal sinus rhythm, normal axis, normal QRS, no acute ST changes. [JG]   1313 When compared with prior EKG on 2/23/2022, no acute changes are present. [JG]   1340 Patient reports that he had his trach placed and 96 secondary to sleep apnea, previously used it with CPAP, has not used it for extended period of time, keeps plug in place but otherwise does not use, did discuss having it removed at one point but never was. [JG]   1536 No clear etiology for patient's hypoxia on work-up to present, patient still currently pending Covid testing although CT chest unremarkable other than atelectasis.  Concern for possible false hypoxia secondary to poor sensor although good Plath with finger sensor.  New forehead probe attached, patient taken off of oxygen, patient desaturated to 89%, placed back on 2 L oxygen, oxygenation returned to 94%.   [JG]   1605 Patient reassessed.  Discussed ED findings, differential diagnosis, and the need for admission for evaluation/treatment.  They are  agreeable to admission and all questions were answered.     [JG]   1622 Phone call with KEVIN Islas.  Discussed the patient, relevant history, exam, diagnostics, ED findings/progress, and concerns. They agree to admit the patient to telemetry. Care assumed by the admitting physician at this time.     [JG]      ED Course User Index  [JG] Jeremie Kessler MD       AS OF 16:24 EDT VITALS:    BP - 108/69  HR - 78  TEMP - 98.4 °F (36.9 °C) (Tympanic)  O2 SATS - 95%    DIAGNOSIS  Final diagnoses:   Hypoxia   Dyspnea, unspecified type   Leukocytosis, unspecified type   Pulmonary nodule         DISPOSITION  ADMISSION    Discussed treatment plan and reason for admission with pt/family and admitting physician.  Pt/family voiced understanding of the plan for admission for further testing/treatment as needed.          Jeremie Kessler MD  03/18/22 1624      Electronically signed by Jeremie Kessler MD at 03/18/22 1624       Vital Signs (last 3 days)     Date/Time Temp Temp src Pulse Resp BP Patient Position SpO2    03/21/22 0706 97.7 (36.5) Oral 83 18 120/78 Lying 90    03/21/22 0026 98.2 (36.8) Oral -- 18 122/78 Lying 95    03/20/22 1945 98 (36.7) Oral 80 18 120/81 Lying 94    03/20/22 1220 98 (36.7) Oral 70 18 121/76 Sitting 96    03/20/22 0806 98.6 (37) Oral 86 18 100/83 Sitting 93    03/20/22 0030 97.2 (36.2) Oral 81 18 123/73 -- 93    03/19/22 1947 97.5 (36.4) Oral 82 18 109/75 Sitting 93    03/19/22 1311 98.4 (36.9) Oral 80 18 103/73 Sitting 93    03/19/22 0813 98 (36.7) Oral 81 18 113/69 Sitting 91    03/18/22 2334 97.6 (36.4) Oral 86 18 115/68 Lying 91    03/18/22 1945 -- -- 79 18 116/80 Lying 94    03/18/22 1849 -- -- 79 20 -- Lying 95    03/18/22 1631 -- -- 80 -- 110/75 -- 94    03/18/22 1533 -- -- 78 -- -- -- 95    03/18/22 1530 -- -- 78 -- 108/69 -- 91    03/18/22 1500 -- -- 75 -- 99/67 -- 93    03/18/22 1430 -- -- 75 -- 98/71 -- 93    03/18/22 1257 -- -- 90 20 -- -- 95    03/18/22 1236 -- -- 93 20 109/68  Lying 88    03/18/22 1230 98.4 (36.9) Tympanic 89 20 -- -- 93          Oxygen Therapy (last 3 days)     Date/Time SpO2 Device (Oxygen Therapy) Flow (L/min) Oxygen Concentration (%) ETCO2 (mmHg)    03/21/22 0706 90 room air -- -- --    03/21/22 0027 -- nasal cannula 2 -- --    03/21/22 0026 95 nasal cannula 2 -- --    03/20/22 2238 -- nasal cannula 2 -- --    03/20/22 1945 94 nasal cannula 2 -- --    03/20/22 1442 -- nasal cannula 2 -- --    03/20/22 1220 96 nasal cannula 2 -- --    03/20/22 0842 -- nasal cannula 2 -- --    03/20/22 0806 93 nasal cannula 2 -- --    03/20/22 0030 93 nasal cannula 2 -- --    03/19/22 2100 -- nasal cannula 2 -- --    03/19/22 1947 93 nasal cannula 2 -- --    03/19/22 1406 -- nasal cannula 2 -- --    03/19/22 1311 93 nasal cannula 2 -- --    03/19/22 0903 -- nasal cannula 2 -- --    03/19/22 0813 91 nasal cannula 2 -- --    03/19/22 0009 -- nasal cannula 2 -- --    03/18/22 2334 91 nasal cannula 2 -- --    03/18/22 2030 -- nasal cannula 2 -- --    03/18/22 1945 94 nasal cannula 2 -- --    03/18/22 1849 95 nasal cannula 2 -- --    03/18/22 1631 94 -- -- -- --    03/18/22 1533 95 nasal cannula 2 -- --    03/18/22 1530 91 room air -- -- --    03/18/22 1500 93 -- -- -- --    03/18/22 1430 93 nasal cannula 2 -- --    03/18/22 1257 95 nasal cannula 2 -- --    03/18/22 1256 -- nasal cannula 2 -- --    03/18/22 1236 88 room air -- -- --    03/18/22 1230 93 room air -- -- --        Intake & Output (last 3 days)       03/18 0701 03/19 0700 03/19 0701 03/20 0700 03/20 0701 03/21 0700 03/21 0701 03/22 0700    P.O.  240 240 236    Total Intake(mL/kg)  240 (2) 240 (2) 236 (1.9)    Net  +240 +240 +236            Urine Unmeasured Occurrence    2 x         Lines, Drains & Airways     Active LDAs     Name Placement date Placement time Site Days    Peripheral IV 03/18/22 1249 Right Antecubital 03/18/22  1249  Antecubital  2    Surgical Airway 4 --  --  4  --                Medication Administration  Report for Fabio Juárez KRYSTEN as of 03/21/22 1031   Legend:    Given Hold Not Given Due Canceled Entry Other Actions    Time Time (Time) Time  Time-Action       Discontinued     Completed     Future     MAR Hold     Linked           Medications 03/15/22 03/16/22 03/17/22 03/18/22 03/19/22 03/20/22 03/21/22    acetaminophen (TYLENOL) tablet 650 mg  Dose: 650 mg  Freq: Every 4 Hours PRN Route: PO  PRN Reason: Mild Pain   Start: 03/18/22 1946   Admin Instructions:   Do not exceed 4 grams of acetaminophen in a 24 hr period. Max dose of 2gm for AST/ALT greater than 120 units/L    If given for fever, use fever parameter: fever greater than 100.4 °F.    If given for pain, use the following pain scale:   Mild Pain = Pain Score of 1-3, CPOT 1-2  Moderate Pain = Pain Score of 4-6, CPOT 3-4  Severe Pain = Pain Score of 7-10, CPOT 5-8             Or  acetaminophen (TYLENOL) 160 MG/5ML solution 650 mg  Dose: 650 mg  Freq: Every 4 Hours PRN Route: PO  PRN Reason: Mild Pain   Start: 03/18/22 1946   Admin Instructions:   Do not exceed 4 grams of acetaminophen in a 24 hr period. Max dose of 2gm for AST/ALT greater than 120 units/L    If given for fever, use fever parameter: fever greater than 100.4 °F.    If given for pain, use the following pain scale:   Mild Pain = Pain Score of 1-3, CPOT 1-2  Moderate Pain = Pain Score of 4-6, CPOT 3-4  Severe Pain = Pain Score of 7-10, CPOT 5-8             Or  acetaminophen (TYLENOL) suppository 650 mg  Dose: 650 mg  Freq: Every 4 Hours PRN Route: RE  PRN Reason: Mild Pain   Start: 03/18/22 1946   Admin Instructions:   Do not exceed 4 grams of acetaminophen in a 24 hr period. Max dose of 2gm for AST/ALT greater than 120 units/L    If given for fever, use fever parameter: fever greater than 100.4 °F.    If given for pain, use the following pain scale:   Mild Pain = Pain Score of 1-3, CPOT 1-2  Moderate Pain = Pain Score of 4-6, CPOT 3-4  Severe Pain = Pain Score of 7-10, CPOT 5-8               atorvastatin (LIPITOR) tablet 80 mg  Dose: 80 mg  Freq: Nightly Route: PO  Start: 03/18/22 2100   Admin Instructions:   Avoid grapefruit juice.       2123-Given        2112-Given        2245-Given 2100           baclofen (LIORESAL) tablet 5 mg  Dose: 5 mg  Freq: Nightly Route: PO  Start: 03/18/22 2100   Admin Instructions:   Take with food if GI upset occurs.       2325-Given        2112-Given        2245-Given 2100           sennosides-docusate (PERICOLACE) 8.6-50 MG per tablet 2 tablet  Dose: 2 tablet  Freq: 2 Times Daily Route: PO  Start: 03/20/22 2100   Admin Instructions:   HOLD MEDICATION IF PATIENT HAS HAD BOWEL MOVEMENT. Start bowel management regimen if patient has not had a bowel movement after 12 hours.         2246-Given 0924-Given 2100          And  polyethylene glycol (MIRALAX) packet 17 g  Dose: 17 g  Freq: Daily PRN Route: PO  PRN Reason: Constipation  PRN Comment: Use if senna-docusate is ineffective  Start: 03/20/22 1937   Admin Instructions:   Use if no bowel movement after 12 hours. Mix in 6-8 ounces of water.  Use 4-8 ounces of water, tea, or juice for each 17 gram dose.             And  bisacodyl (DULCOLAX) EC tablet 5 mg  Dose: 5 mg  Freq: Daily PRN Route: PO  PRN Reason: Constipation  PRN Comment: Use if polyethylene glycol is ineffective  Start: 03/20/22 1937   Admin Instructions:   Use if no bowel movement after 12 hours.  Swallow whole. Do not crush, split, or chew tablet.             And  bisacodyl (DULCOLAX) suppository 10 mg  Dose: 10 mg  Freq: Daily PRN Route: RE  PRN Reason: Constipation  PRN Comment: Use if bisacodyl oral is ineffective  Start: 03/20/22 1937   Admin Instructions:   Use if no bowel movement after 12 hours.              cetirizine (zyrTEC) tablet 5 mg  Dose: 5 mg  Freq: Daily Route: PO  Start: 03/19/22 0900        0903-Given        0836-Given        0924-Given           dextrose (D50W) (25 g/50 mL) IV injection 25 g  Dose: 25 g  Freq: Every  15 Minutes PRN Route: IV  PRN Reason: Low Blood Sugar  PRN Comment: Blood Sugar Less Than 70  Start: 03/19/22 1501   Admin Instructions:   Blood sugar less than 70; patient has IV access - Unresponsive, NPO or Unable To Safely Swallow              dextrose (GLUTOSE) oral gel 15 g  Dose: 15 g  Freq: Every 15 Minutes PRN Route: PO  PRN Reason: Low Blood Sugar  PRN Comment: Blood sugar less than 70  Start: 03/19/22 1501   Admin Instructions:   BS<70, Patient Alert, Is not NPO, Can safely swallow.              gabapentin (NEURONTIN) capsule 300 mg  Dose: 300 mg  Freq: Every 8 Hours Scheduled Route: PO  Start: 03/18/22 2200   Admin Instructions:            2123-Given        0629-Given       1336-Given       2113-Given        0544-Given       1513-Given       2246-Given        0622-Given       1400       2200           glipizide (GLUCOTROL) tablet 5 mg  Dose: 5 mg  Freq: Every Morning Before Breakfast Route: PO  Start: 03/19/22 0730   Admin Instructions:   Caution: Look alike/sound alike drug alert        0903-Given        0836-Given        0924-Given           glucagon (human recombinant) (GLUCAGEN DIAGNOSTIC) injection 1 mg  Dose: 1 mg  Freq: Every 15 Minutes PRN Route: IM  PRN Reason: Low Blood Sugar  PRN Comment: Blood Glucose Less Than 70  Start: 03/19/22 1501   Admin Instructions:   Blood Glucose Less Than 70 - Patient Without IV Access - Unresponsive, NPO or Unable To Safely Swallow              hydroCHLOROthiazide (HYDRODIURIL) oral 12.5 mg  Dose: 12.5 mg  Freq: Daily Route: PO  Start: 03/19/22 0900   Admin Instructions:   Caution: Look alike/sound alike drug alert        (0900)-Not Given [C]        (0850)-Not Given        0924-Given           insulin lispro (ADMELOG) injection 0-7 Units  Dose: 0-7 Units  Freq: 3 Times Daily Before Meals Route: SC  Start: 03/19/22 1730   Admin Instructions:   Correction - Low Dose.  Less than 40 units/day total insulin dose or lean, elderly, renal patients    Blood glucose 150-199  mg/dL - 2 units  Blood glucose 200-249 mg/dL - 3 units  Blood glucose 250-299 mg/dL - 4 units  Blood glucose 300-349 mg/dL - 5 units  Blood glucose 350-400 mg/dL - 6 units  Blood glucose greater than 400 mg/dL - 7 units and call provider     Caution: Look alike/sound alike drug alert          (1659)-Not Given [C]        0835-Given [C]       1143-Given [C]       (1647)-Not Given [C]        0925-Given [C]       1130       1730           lactulose (CHRONULAC) 10 GM/15ML solution 20 g  Dose: 20 g  Freq: 3 Times Daily Route: PO  Start: 03/20/22 2215   Admin Instructions:   May be mixed with fruit juice, water, or milk.          0037-Given       0925-Given       1600       2100           lisinopril (PRINIVIL,ZESTRIL) tablet 10 mg  Dose: 10 mg  Freq: Daily Route: PO  Start: 03/18/22 2015 2325-Given        (1410)-Not Given [C]        (0849)-Not Given        0924-Given           morphine injection 4 mg  Dose: 4 mg  Freq: Every 4 Hours PRN Route: IV  PRN Reason: Moderate Pain   Start: 03/18/22 1946   End: 03/25/22 1945   Admin Instructions:   If given for pain, use the following pain scale:  Mild Pain = Pain Score of 1-3, CPOT 1-2  Moderate Pain = Pain Score of 4-6, CPOT 3-4  Severe Pain = Pain Score of 7-10, CPOT 5-8       2123-Given        0254-Given       0913-Given       2200-Return to Cabinet        0028-Given       0842-Given       1846-Given        0038-Given       0546-Given          And  naloxone (NARCAN) injection 0.4 mg  Dose: 0.4 mg  Freq: Every 5 Minutes PRN Route: IV  PRN Reason: Respiratory Depression  Start: 03/18/22 1946   Admin Instructions:   If respiratory rate is less than 8 breaths/minute or patient is difficult to arouse stop any narcotics and contact physician.   Administer slow IV push. Repeat as ordered until patient's respiratory rate is greater than 12 breaths/minute.              nitroglycerin (NITROSTAT) SL tablet 0.4 mg  Dose: 0.4 mg  Freq: Every 5 Minutes PRN Route: SL  PRN Reason: Chest  Pain  PRN Comment: Only if SBP Greater Than 100  Start: 03/18/22 1945   Admin Instructions:   If Pain Unrelieved After 3 Doses Notify MD              ondansetron (ZOFRAN) injection 4 mg  Dose: 4 mg  Freq: Every 6 Hours PRN Route: IV  PRN Reasons: Nausea,Vomiting  Start: 03/18/22 1946   Admin Instructions:   If BOTH ondansetron (ZOFRAN) and promethazine (PHENERGAN) are ordered use ondansetron first and THEN promethazine IF ondansetron is ineffective.         0847-Given       1846-Given        0037-Given       0622-Given           oxyCODONE (ROXICODONE) immediate release tablet 15 mg  Dose: 15 mg  Freq: Every 6 Hours PRN Route: PO  PRN Reason: Moderate Pain   Start: 03/18/22 2122   Admin Instructions:         If given for pain, use the following pain scale:  Mild Pain = Pain Score of 1-3, CPOT 1-2  Moderate Pain = Pain Score of 4-6, CPOT 3-4  Severe Pain = Pain Score of 7-10, CPOT 5-8       2325-Given        0629-Given       1336-Given       2112-Given        0544-Given       1516-Given        0936-Given           sodium chloride 0.9 % flush 10 mL  Dose: 10 mL  Freq: As Needed Route: IV  PRN Reason: Line Care  Start: 03/18/22 1945              sodium chloride 0.9 % flush 10 mL  Dose: 10 mL  Freq: Every 12 Hours Scheduled Route: IV  Start: 03/18/22 2100       2327-Given        1337-Given       2118-Given        0836-Given       2245-Given        0925-Given       2100           traZODone (DESYREL) tablet 50 mg  Dose: 50 mg  Freq: Nightly Route: PO  Start: 03/18/22 2100   Admin Instructions:   Take with food.  Caution: Look alike/sound alike drug alert       2325-Given        2112-Given        2246-Given        2100          Completed Medications  Medications 03/15/22 03/16/22 03/17/22 03/18/22 03/19/22 03/20/22 03/21/22       iopamidol (ISOVUE-370) 76 % injection 100 mL  Dose: 100 mL  Freq: Once in Imaging Route: IV  Start: 03/18/22 1400   End: 03/18/22 1359       1359-Given             Discontinued  Medications  Medications 03/15/22 03/16/22 03/17/22 03/18/22 03/19/22 03/20/22 03/21/22       oxyCODONE (ROXICODONE) immediate release tablet 15 mg  Dose: 15 mg  Freq: As Needed Route: PO  PRN Reason: Moderate Pain   Start: 03/18/22 1944   End: 03/18/22 2122   Admin Instructions:           If given for pain, use the following pain scale:  Mild Pain = Pain Score of 1-3, CPOT 1-2  Moderate Pain = Pain Score of 4-6, CPOT 3-4  Severe Pain = Pain Score of 7-10, CPOT 5-8                  Lab Results (last 72 hours)     Procedure Component Value Units Date/Time    Comprehensive Metabolic Panel [[id number]]  (Abnormal) Collected: 03/21/22 0652    Specimen: Blood Updated: 03/21/22 0745     Glucose 152 mg/dL      BUN 15 mg/dL      Creatinine 0.74 mg/dL      Sodium 135 mmol/L      Potassium 3.7 mmol/L      Chloride 97 mmol/L      CO2 28.0 mmol/L      Calcium 8.8 mg/dL      Total Protein 6.2 g/dL      Albumin 3.00 g/dL      ALT (SGPT) 20 U/L      AST (SGOT) 16 U/L      Alkaline Phosphatase 81 U/L      Total Bilirubin 0.9 mg/dL      Globulin 3.2 gm/dL      A/G Ratio 0.9 g/dL      BUN/Creatinine Ratio 20.3     Anion Gap 10.0 mmol/L      eGFR 106.3 mL/min/1.73      Comment: National Kidney Foundation and American Society of Nephrology (ASN) Task Force recommended calculation based on the Chronic Kidney Disease Epidemiology Collaboration (CKD-EPI) equation refit without adjustment for race.       Narrative:      GFR Normal >60  Chronic Kidney Disease <60  Kidney Failure <15      CBC & Differential [[id number]]  (Abnormal) Collected: 03/21/22 0652    Specimen: Blood Updated: 03/21/22 0722    Narrative:      The following orders were created for panel order CBC & Differential.  Procedure                               Abnormality         Status                     ---------                               -----------         ------                     CBC Auto Differential[[id number]]        Abnormal            Final result                  Please view results for these tests on the individual orders.    CBC Auto Differential [[id number]]  (Abnormal) Collected: 03/21/22 0652    Specimen: Blood Updated: 03/21/22 0722     WBC 7.69 10*3/mm3      RBC 4.69 10*6/mm3      Hemoglobin 14.4 g/dL      Hematocrit 43.4 %      MCV 92.5 fL      MCH 30.7 pg      MCHC 33.2 g/dL      RDW 11.9 %      RDW-SD 40.2 fl      MPV 9.1 fL      Platelets 247 10*3/mm3      Neutrophil % 53.5 %      Lymphocyte % 25.1 %      Monocyte % 15.9 %      Eosinophil % 3.6 %      Basophil % 1.0 %      Immature Grans % 0.9 %      Neutrophils, Absolute 4.11 10*3/mm3      Lymphocytes, Absolute 1.93 10*3/mm3      Monocytes, Absolute 1.22 10*3/mm3      Eosinophils, Absolute 0.28 10*3/mm3      Basophils, Absolute 0.08 10*3/mm3      Immature Grans, Absolute 0.07 10*3/mm3      nRBC 0.0 /100 WBC     POC Glucose Once [[id number]]  (Abnormal) Collected: 03/21/22 0640    Specimen: Blood Updated: 03/21/22 0655     Glucose 189 mg/dL      Comment: Meter: [id number] : [id number] Yan MANCINI       POC Glucose Once [[id number]]  (Abnormal) Collected: 03/20/22 2118    Specimen: Blood Updated: 03/20/22 2118     Glucose 139 mg/dL      Comment: Meter: [id number] : [id number] Ayanna Blanc RN       POC Glucose Once [[id number]]  (Normal) Collected: 03/20/22 1559    Specimen: Blood Updated: 03/20/22 1600     Glucose 111 mg/dL      Comment: Meter: [id number] : [id number] Jose SOUZA       POC Glucose Once [[id number]]  (Abnormal) Collected: 03/20/22 1046    Specimen: Blood Updated: 03/20/22 1047     Glucose 178 mg/dL      Comment: Meter: [id number] : [id number] Jose SOUZA       Comprehensive Metabolic Panel [[id number]]  (Abnormal) Collected: 03/20/22 0658    Specimen: Blood Updated: 03/20/22 0736     Glucose 175 mg/dL      BUN 17 mg/dL      Creatinine 0.77 mg/dL      Sodium 131 mmol/L      Potassium 3.5 mmol/L      Chloride 95 mmol/L      CO2 27.0 mmol/L      Calcium 9.1 mg/dL       Total Protein 6.8 g/dL      Albumin 3.80 g/dL      ALT (SGPT) 17 U/L      AST (SGOT) 15 U/L      Alkaline Phosphatase 73 U/L      Total Bilirubin 1.4 mg/dL      Globulin 3.0 gm/dL      A/G Ratio 1.3 g/dL      BUN/Creatinine Ratio 22.1     Anion Gap 9.0 mmol/L      eGFR 105.1 mL/min/1.73      Comment: National Kidney Foundation and American Society of Nephrology (ASN) Task Force recommended calculation based on the Chronic Kidney Disease Epidemiology Collaboration (CKD-EPI) equation refit without adjustment for race.       Narrative:      GFR Normal >60  Chronic Kidney Disease <60  Kidney Failure <15      CBC & Differential [[id number]]  (Abnormal) Collected: 03/20/22 0658    Specimen: Blood Updated: 03/20/22 0717    Narrative:      The following orders were created for panel order CBC & Differential.  Procedure                               Abnormality         Status                     ---------                               -----------         ------                     CBC Auto Differential[[id number]]        Abnormal            Final result                 Please view results for these tests on the individual orders.    CBC Auto Differential [[id number]]  (Abnormal) Collected: 03/20/22 0658    Specimen: Blood Updated: 03/20/22 0717     WBC 9.08 10*3/mm3      RBC 4.77 10*6/mm3      Hemoglobin 14.6 g/dL      Hematocrit 43.7 %      MCV 91.6 fL      MCH 30.6 pg      MCHC 33.4 g/dL      RDW 11.7 %      RDW-SD 39.5 fl      MPV 8.9 fL      Platelets 279 10*3/mm3      Neutrophil % 55.4 %      Lymphocyte % 24.3 %      Monocyte % 14.1 %      Eosinophil % 4.5 %      Basophil % 0.9 %      Immature Grans % 0.8 %      Neutrophils, Absolute 5.03 10*3/mm3      Lymphocytes, Absolute 2.21 10*3/mm3      Monocytes, Absolute 1.28 10*3/mm3      Eosinophils, Absolute 0.41 10*3/mm3      Basophils, Absolute 0.08 10*3/mm3      Immature Grans, Absolute 0.07 10*3/mm3      nRBC 0.0 /100 WBC     POC Glucose Once [[id number]]  (Abnormal)  Collected: 03/20/22 0634    Specimen: Blood Updated: 03/20/22 0635     Glucose 155 mg/dL      Comment: Meter: [id number] : [id number] Uriel Ahmya NA       POC Glucose Once [[id number]]  (Normal) Collected: 03/19/22 2227    Specimen: Blood Updated: 03/19/22 2228     Glucose 120 mg/dL      Comment: Meter: [id number] : [id number] Uriel Ahmya NA       POC Glucose Once [[id number]]  (Abnormal) Collected: 03/19/22 1613    Specimen: Blood Updated: 03/19/22 1614     Glucose 148 mg/dL      Comment: Meter: [id number] : [id number] Garcia Lillie NA       POC Glucose Once [[id number]]  (Normal) Collected: 03/19/22 1119    Specimen: Blood Updated: 03/19/22 1121     Glucose 130 mg/dL      Comment: Meter: [id number] : [id number] Garcia Lillie NA       Comprehensive Metabolic Panel [[id number]]  (Abnormal) Collected: 03/19/22 0419    Specimen: Blood Updated: 03/19/22 0454     Glucose 138 mg/dL      BUN 17 mg/dL      Creatinine 0.76 mg/dL      Sodium 133 mmol/L      Potassium 3.6 mmol/L      Chloride 97 mmol/L      CO2 27.0 mmol/L      Calcium 8.9 mg/dL      Total Protein 6.6 g/dL      Albumin 3.70 g/dL      ALT (SGPT) 21 U/L      AST (SGOT) 12 U/L      Alkaline Phosphatase 71 U/L      Total Bilirubin 1.8 mg/dL      Globulin 2.9 gm/dL      A/G Ratio 1.3 g/dL      BUN/Creatinine Ratio 22.4     Anion Gap 9.0 mmol/L      eGFR 105.5 mL/min/1.73      Comment: National Kidney Foundation and American Society of Nephrology (ASN) Task Force recommended calculation based on the Chronic Kidney Disease Epidemiology Collaboration (CKD-EPI) equation refit without adjustment for race.       Narrative:      GFR Normal >60  Chronic Kidney Disease <60  Kidney Failure <15      Hemoglobin A1c [[id number]]  (Abnormal) Collected: 03/19/22 0419    Specimen: Blood Updated: 03/19/22 0449     Hemoglobin A1C 8.40 %     Narrative:      Hemoglobin A1C Ranges:    Increased Risk for Diabetes  5.7% to 6.4%  Diabetes                     >= 6.5%  Diabetic  Goal                < 7.0%    CBC Auto Differential [[id number]]  (Abnormal) Collected: 03/19/22 0419    Specimen: Blood Updated: 03/19/22 0439     WBC 10.25 10*3/mm3      RBC 4.87 10*6/mm3      Hemoglobin 15.0 g/dL      Hematocrit 46.0 %      MCV 94.5 fL      MCH 30.8 pg      MCHC 32.6 g/dL      RDW 12.3 %      RDW-SD 42.8 fl      MPV 8.8 fL      Platelets 294 10*3/mm3      Neutrophil % 55.8 %      Lymphocyte % 25.1 %      Monocyte % 14.5 %      Eosinophil % 2.5 %      Basophil % 1.0 %      Immature Grans % 1.1 %      Neutrophils, Absolute 5.72 10*3/mm3      Lymphocytes, Absolute 2.57 10*3/mm3      Monocytes, Absolute 1.49 10*3/mm3      Eosinophils, Absolute 0.26 10*3/mm3      Basophils, Absolute 0.10 10*3/mm3      Immature Grans, Absolute 0.11 10*3/mm3      nRBC 0.1 /100 WBC     Blood Gas, Arterial - [[id number]]  (Abnormal) Collected: 03/18/22 1519    Specimen: Arterial Blood Updated: 03/18/22 1521     Site Arterial: right radial     Emiliano's Test Positive     pH, Arterial 7.419 pH units      pCO2, Arterial 42.5 mm Hg      pO2, Arterial 73.4 mm Hg      HCO3, Arterial 27.5 mmol/L      Base Excess, Arterial 2.5 mmol/L      O2 Saturation Calculated 94.8 %      Barometric Pressure for Blood Gas 746.6 mmHg      Comment: [address] [facility] Meter: [id number] : charlotte Trevinohan        Modality Room Air     Rate 18 Breaths/minute     Respiratory Panel PCR w/COVID-19(SARS-CoV-2) [facility]G/WARNER/VU/PAD/COR/MAD/FREDRICK In-House, NP Swab in [facility], 3-4 HR TAT - Swab, Nasopharynx [[id number]]  (Normal) Collected: 03/18/22 1408    Specimen: Swab from Nasopharynx Updated: 03/18/22 1515     ADENOVIRUS, PCR Not Detected     Coronavirus 229E Not Detected     Coronavirus HKU1 Not Detected     Coronavirus NL63 Not Detected     Coronavirus OC43 Not Detected     COVID19 Not Detected     Human Metapneumovirus Not Detected     Human Rhinovirus/Enterovirus Not Detected     Influenza A PCR Not Detected     Influenza B PCR Not Detected      Parainfluenza Virus 1 Not Detected     Parainfluenza Virus 2 Not Detected     Parainfluenza Virus 3 Not Detected     Parainfluenza Virus 4 Not Detected     RSV, PCR Not Detected     Bordetella pertussis pcr Not Detected     Bordetella parapertussis PCR Not Detected     Chlamydophila pneumoniae PCR Not Detected     Mycoplasma pneumo by PCR Not Detected    Narrative:      In the setting of a positive respiratory panel with a viral infection PLUS a negative procalcitonin without other underlying concern for bacterial infection, consider observing off antibiotics or discontinuation of antibiotics and continue supportive care. If the respiratory panel is positive for atypical bacterial infection (Bordetella pertussis, Chlamydophila pneumoniae, or Mycoplasma pneumoniae), consider antibiotic de-escalation to target atypical bacterial infection.    BNP [[id number]]  (Normal) Collected: 03/18/22 1249    Specimen: Blood Updated: 03/18/22 1446     proBNP 40.2 pg/mL     Narrative:      Among patients with dyspnea, NT-proBNP is highly sensitive for the detection of acute congestive heart failure. In addition NT-proBNP of <300 pg/ml effectively rules out acute congestive heart failure with 99% negative predictive value.    Results may be falsely decreased if patient taking Biotin.      Comprehensive Metabolic Panel [[id number]]  (Abnormal) Collected: 03/18/22 1249    Specimen: Blood Updated: 03/18/22 1341     Glucose 134 mg/dL      BUN 18 mg/dL      Creatinine 0.90 mg/dL      Sodium 135 mmol/L      Potassium 3.9 mmol/L      Chloride 99 mmol/L      CO2 27.0 mmol/L      Calcium 9.3 mg/dL      Total Protein 6.6 g/dL      Albumin 4.00 g/dL      ALT (SGPT) 19 U/L      AST (SGOT) 16 U/L      Alkaline Phosphatase 69 U/L      Total Bilirubin 1.1 mg/dL      Globulin 2.6 gm/dL      A/G Ratio 1.5 g/dL      BUN/Creatinine Ratio 20.0     Anion Gap 9.0 mmol/L      eGFR 100.2 mL/min/1.73      Comment: National Kidney Foundation and American  "Society of Nephrology (ASN) Task Force recommended calculation based on the Chronic Kidney Disease Epidemiology Collaboration (CKD-EPI) equation refit without adjustment for race.       Narrative:      GFR Normal >60  Chronic Kidney Disease <60  Kidney Failure <15      Magnesium [[id number]]  (Normal) Collected: 03/18/22 1249    Specimen: Blood Updated: 03/18/22 1341     Magnesium 1.7 mg/dL     Procalcitonin [[id number]]  (Normal) Collected: 03/18/22 1249    Specimen: Blood Updated: 03/18/22 1337     Procalcitonin 0.05 ng/mL     Narrative:      As a Marker for Sepsis (Non-Neonates):    1. <0.5 ng/mL represents a low risk of severe sepsis and/or septic shock.  2. >2 ng/mL represents a high risk of severe sepsis and/or septic shock.    As a Marker for Lower Respiratory Tract Infections that require antibiotic therapy:    PCT on Admission    Antibiotic Therapy       6-12 Hrs later    >0.5                Strongly Recommended  >0.25 - <0.5        Recommended   0.1 - 0.25          Discouraged              Remeasure/reassess PCT  <0.1                Strongly Discouraged     Remeasure/reassess PCT    As 28 day mortality risk marker: \"Change in Procalcitonin Result\" (>80% or <=80%) if Day 0 (or Day 1) and Day 4 values are available. Refer to [email or web address]    Change in PCT <=80%  A decrease of PCT levels below or equal to 80% defines a positive change in PCT test result representing a higher risk for 28-day all-cause mortality of patients diagnosed with severe sepsis for septic shock.    Change in PCT >80%  A decrease of PCT levels of more than 80% defines a negative change in PCT result representing a lower risk for 28-day all-cause mortality of patients diagnosed with severe sepsis or septic shock.       Troponin [[id number]]  (Normal) Collected: 03/18/22 1249    Specimen: Blood Updated: 03/18/22 1332     Troponin T <0.010 ng/mL     Narrative:      Troponin T Reference Range:  <= 0.03 ng/mL-   Negative " for AMI  >0.03 ng/mL-     Abnormal for myocardial necrosis.  Clinicians would have to utilize clinical acumen, EKG, Troponin and serial changes to determine if it is an Acute Myocardial Infarction or myocardial injury due to an underlying chronic condition.       Results may be falsely decreased if patient taking Biotin.      aPTT [[id number]]  (Normal) Collected: 03/18/22 1249    Specimen: Blood Updated: 03/18/22 1315     PTT 24.4 seconds     Protime-INR [[id number]]  (Normal) Collected: 03/18/22 1249    Specimen: Blood Updated: 03/18/22 1314     Protime 12.8 Seconds      INR 0.97    CBC & Differential [[id number]]  (Abnormal) Collected: 03/18/22 1249    Specimen: Blood Updated: 03/18/22 1301    Narrative:      The following orders were created for panel order CBC & Differential.  Procedure                               Abnormality         Status                     ---------                               -----------         ------                     CBC Auto Differential[[id number]]        Abnormal            Final result                 Please view results for these tests on the individual orders.    CBC Auto Differential [[id number]]  (Abnormal) Collected: 03/18/22 1249    Specimen: Blood Updated: 03/18/22 1301     WBC 12.29 10*3/mm3      RBC 4.93 10*6/mm3      Hemoglobin 15.2 g/dL      Hematocrit 44.7 %      MCV 90.7 fL      MCH 30.8 pg      MCHC 34.0 g/dL      RDW 12.3 %      RDW-SD 39.9 fl      MPV 8.5 fL      Platelets 306 10*3/mm3      Neutrophil % 60.1 %      Lymphocyte % 23.7 %      Monocyte % 12.9 %      Eosinophil % 1.8 %      Basophil % 0.7 %      Immature Grans % 0.8 %      Neutrophils, Absolute 7.39 10*3/mm3      Lymphocytes, Absolute 2.91 10*3/mm3      Monocytes, Absolute 1.59 10*3/mm3      Eosinophils, Absolute 0.22 10*3/mm3      Basophils, Absolute 0.08 10*3/mm3      Immature Grans, Absolute 0.10 10*3/mm3      nRBC 0.0 /100 WBC           Imaging Results (Last 72 Hours)     Procedure Component Value  Units Date/Time    CT Angiogram Chest [[id number]] Collected: 03/18/22 1432     Updated: 03/18/22 1452    Narrative:      CT ANGIOGRAM OF THE CHEST. MULTIPLE CORONAL, SAGITTAL, AND 3-D  RECONSTRUCTIONS.     HISTORY: Short of air, recent abdominal surgery; evaluate for pulmonary  embolism     TECHNIQUE: Radiation dose reduction techniques were utilized, including  automated exposure control and exposure modulation based on body size.   CT angiogram of the chest was performed. Multiple coronal, sagittal, and  3-D reconstruction images were obtained.      COMPARISON:None     FINDINGS:      Tracheostomy is present. The gallbladder is surgically absent. No hilar,  mediastinal or axillary adenopathy is present by size criteria. There is  a trace pericardial effusion.     Bibasilar atelectasis and or pleural parenchymal scarring is present. No  findings of pulmonary embolism are present. There is no pleural effusion  or pneumothorax. Subcentimeter pulmonary nodule within the right upper  lobe is present.     No suspicious lytic blastic osseous lesion is present.       Impression:      1.  No findings of pulmonary embolism. Bibasilar atelectasis and or  pleural parenchymal scarring.  2.  Subcentimeter pulmonary nodule in the right upper lobe. Follow-up  chest CT in 12 months should be considered to ensure stability per  Fleischner criteria.  3.  Other findings as above.     This report was finalized on 3/18/2022 2:49 PM by Dr. Efren Shepard M.D.       XR Chest 1 View [[id number]] Collected: 03/18/22 1414     Updated: 03/18/22 1421    Narrative:      ONE VIEW PORTABLE CHEST     HISTORY: Shortness of breath.     FINDINGS: The lungs are moderately expanded and clear except for some  minimal vague atelectasis at the right base. Allowing for the lung  expansion, the heart size is normal. A tracheostomy tube is in place.     This report was finalized on 3/18/2022 2:18 PM by Dr. Femi Joseph M.D.           ECG/EMG Results (last  "72 hours)     Procedure Component Value Units Date/Time    ECG 12 Lead [[id number]] Collected: 03/18/22 1311     Updated: 03/18/22 1521     QT Interval 343 ms     Narrative:      HEART RATE= 83  bpm  RR Interval= 723  ms  NE Interval= 174  ms  P Horizontal Axis= 11  deg  P Front Axis= 37  deg  QRSD Interval= 93  ms  QT Interval= 343  ms  QRS Axis= 20  deg  T Wave Axis= -4  deg  - BORDERLINE ECG -  Sinus rhythm  Borderline T abnormalities, inferior leads  No change from previous tracing  Electronically Signed By: Erin Green ([facility]) 18-Mar-2022 15:20:57  Date and Time of Study: 2022-03-18 13:11:01          Orders (last 72 hrs)      Start     Ordered    03/21/22 0656  POC Glucose Once  PROCEDURE ONCE         03/21/22 0640    03/21/22 0600  CBC & Differential  Morning Draw         03/20/22 2122 03/21/22 0600  Comprehensive Metabolic Panel  Morning Draw         03/20/22 2122 03/21/22 0600  CBC Auto Differential  PROCEDURE ONCE         03/20/22 2205    03/20/22 2215  lactulose (CHRONULAC) 10 GM/15ML solution 20 g  3 Times Daily         03/20/22 2118 03/20/22 2119  POC Glucose Once  PROCEDURE ONCE         03/20/22 2118    03/20/22 2100  sennosides-docusate (PERICOLACE) 8.6-50 MG per tablet 2 tablet  2 Times Daily        \"And\" Linked Group Details    03/20/22 1937 03/20/22 1937  polyethylene glycol (MIRALAX) packet 17 g  Daily PRN        \"And\" Linked Group Details    03/20/22 1937    03/20/22 1937  bisacodyl (DULCOLAX) EC tablet 5 mg  Daily PRN        \"And\" Linked Group Details    03/20/22 1937 03/20/22 1937  bisacodyl (DULCOLAX) suppository 10 mg  Daily PRN        \"And\" Linked Group Details    03/20/22 1937 03/20/22 1601  POC Glucose Once  PROCEDURE ONCE         03/20/22 1559    03/20/22 1047  POC Glucose Once  PROCEDURE ONCE         03/20/22 1046    03/20/22 0636  POC Glucose Once  PROCEDURE ONCE         03/20/22 0634    03/20/22 0600  CBC & Differential  Morning Draw         03/19/22 1849    03/20/22 " 0600  Comprehensive Metabolic Panel  Morning Draw         03/19/22 1849    03/20/22 0600  CBC Auto Differential  PROCEDURE ONCE         03/19/22 2205    03/19/22 2229  POC Glucose Once  PROCEDURE ONCE         03/19/22 2227    03/19/22 1800  Incentive Spirometry  Every 4 Hours While Awake       03/19/22 1625    03/19/22 1730  insulin lispro (ADMELOG) injection 0-7 Units  3 Times Daily Before Meals         03/19/22 1501    03/19/22 1700  POC Glucose TID AC  3 Times Daily Before Meals       03/19/22 1501    03/19/22 1615  POC Glucose Once  PROCEDURE ONCE         03/19/22 1613    03/19/22 1502  Do NOT Hold Basal or Correction Scale Insulin When Patient is NPO, Hold Scheduled Mealtime (Bolus) Insulin if NPO  Continuous         03/19/22 1501    03/19/22 1502  Follow [facility] Hypoglycemia Standing Orders For Blood Glucose Less Than 70 mg/dL  Until Discontinued        Comments: ALERT PATIENT - NOT NPO & CAN SAFELY SWALLOW  Administer 4 oz Fruit Juice OR 4 oz Regular Soda OR 8 oz Milk OR 15-30 grams (1 tube) of Glucose Gel.  Recheck Blood Glucose Approximately 15 Minutes After Ingestion, Repeat Treatment & Continue to Recheck Blood Sugar Approximately Every 15 Minutes Until Blood Glucose is 70 or Higher.  Once Blood Glucose is 70 or Higher & if It Will Be More Than 60 Minutes Until Next Meal, Provide Appropriate Snack (Including Carbohydrate Food) Based on Meal Plan Orde keisha. Give Meal Tray As Soon As Possible.    PATIENT HAS IV ACCESS - UNRESPONSIVE, NPO OR UNABLE TO SAFELY SWALLOW  Administer 25g (50ml) D50W IV Push.  Recheck Blood Glucose Approximately 15 Minutes After Administration, if Blood Glucose Remains Less Than 70, Repeat Treatment   Recheck Blood Glucose Approximately 15 Minutes After 2nd Administration, if Blood Glucose Remains Less Than 70 After 2nd Dose of D50W, Contact Provider for Further Treatment Orders & Consider Adding IVF With D5W for Mainte tenance    PATIENT WITHOUT IV ACCESS - UNRESPONSIVE, NPO OR UNABLE  TO SAFELY SWALLOW  Administer 1mg Glucagon SQ & Establish IV Access.  Turn Patient on Side - Nausea / Vomiting May Occur.  Recheck Blood Glucose Approximately 15 Minutes After Administration.  If Blood Glucose Remains Less Than 70, Administer 25g D50W IV Push (50ml).  Recheck Blood Glucose Approximately 15 Minutes After Administration of D50W, if Blood Glucose Remains Less Than 70, Contact Provider for Further Treatment Orders & C  Consider Adding IVF With D5 for Maintenance    Document Event & Patient Response to Interventions in EMR, Document Medications on MAR  Notify Provider if Hypoglycemia Treatment Needed    03/19/22 1501    03/19/22 1501  dextrose (GLUTOSE) oral gel 15 g  Every 15 Minutes PRN         03/19/22 1501    03/19/22 1501  dextrose (D50W) (25 g/50 mL) IV injection 25 g  Every 15 Minutes PRN         03/19/22 1501    03/19/22 1501  glucagon (human recombinant) (GLUCAGEN DIAGNOSTIC) injection 1 mg  Every 15 Minutes PRN         03/19/22 1501    03/19/22 1501  Inpatient Pulmonology Consult  Once        Specialty:  Pulmonary Disease  Provider:  Scotty Santo MD    03/19/22 1501    03/19/22 1501  Discontinue Patient Isolation  Once        Comments: D/c covid isolation precautions    03/19/22 1501    03/19/22 1122  POC Glucose Once  PROCEDURE ONCE         03/19/22 1119    03/19/22 0900  hydroCHLOROthiazide (HYDRODIURIL) oral 12.5 mg  Daily         03/18/22 1946 03/19/22 0900  cetirizine (zyrTEC) tablet 5 mg  Daily         03/18/22 1946 03/19/22 0730  glipizide (GLUCOTROL) tablet 5 mg  Every Morning Before Breakfast         03/18/22 1946 03/19/22 0600  Comprehensive Metabolic Panel  Morning Draw         03/18/22 1946 03/19/22 0600  CBC Auto Differential  Morning Draw         03/18/22 1946 03/19/22 0600  Hemoglobin A1c  Morning Draw         03/18/22 1946 03/19/22 0540  PT Consult: Eval & Treat Functional Mobility Below Baseline  Once         03/19/22 0539    03/19/22 0302  Opioid  "Administration - Continuous Pulse Oximetry (SpO2) Monitoring  Per Order Details         03/19/22 0301 03/19/22 0302  Opioid Administration - Document SpO2 Value With Each Set of Vitals & Any Change in Patient Status  Per Order Details         03/19/22 0301 03/19/22 0302  Opioid Administration - Notify Provider Pulse Oximetry (SpO2)  Until Discontinued         03/19/22 0301 03/18/22 2200  gabapentin (NEURONTIN) capsule 300 mg  Every 8 Hours Scheduled         03/18/22 1946 03/18/22 2122  oxyCODONE (ROXICODONE) immediate release tablet 15 mg  Every 6 Hours PRN         03/18/22 2122 03/18/22 2100  traZODone (DESYREL) tablet 50 mg  Nightly         03/18/22 1946 03/18/22 2100  baclofen (LIORESAL) tablet 5 mg  Nightly         03/18/22 1946 03/18/22 2100  atorvastatin (LIPITOR) tablet 80 mg  Nightly         03/18/22 1946 03/18/22 2100  sodium chloride 0.9 % flush 10 mL  Every 12 Hours Scheduled         03/18/22 1946 03/18/22 2015  lisinopril (PRINIVIL,ZESTRIL) tablet 10 mg  Daily         03/18/22 1946 03/18/22 2000  Vital Signs  Every 4 Hours       03/18/22 1946 03/18/22 1947  Diet Regular; Cardiac, Consistent Carbohydrate, Renal  Diet Effective Now         03/18/22 1946 03/18/22 1946  morphine injection 4 mg  Every 4 Hours PRN        \"And\" Linked Group Details    03/18/22 1946 03/18/22 1946  naloxone (NARCAN) injection 0.4 mg  Every 5 Minutes PRN        \"And\" Linked Group Details    03/18/22 1946 03/18/22 1946  acetaminophen (TYLENOL) tablet 650 mg  Every 4 Hours PRN        \"Or\" Linked Group Details    03/18/22 1946 03/18/22 1946  acetaminophen (TYLENOL) 160 MG/5ML solution 650 mg  Every 4 Hours PRN        \"Or\" Linked Group Details    03/18/22 1946 03/18/22 1946  acetaminophen (TYLENOL) suppository 650 mg  Every 4 Hours PRN        \"Or\" Linked Group Details    03/18/22 1946 03/18/22 1946  ondansetron (ZOFRAN) injection 4 mg  Every 6 Hours PRN         03/18/22 1946    " 03/18/22 1946  Intake & Output  Every Shift       03/18/22 1946 03/18/22 1946  Weigh Patient  Once         03/18/22 1946 03/18/22 1946  Oxygen Therapy- Nasal Cannula; Titrate for SPO2: 90% - 95%  Continuous         03/18/22 1946 03/18/22 1946  Insert Peripheral IV  Once         03/18/22 1946 03/18/22 1946  Saline Lock & Maintain IV Access  Continuous,   Status:  Canceled         03/18/22 1946 03/18/22 1946  Code Status and Medical Interventions:  Continuous         03/18/22 1946    03/18/22 1946  Place Sequential Compression Device  Once         03/18/22 1946 03/18/22 1946  Maintain Sequential Compression Device  Continuous         03/18/22 1946 03/18/22 1946  Telemetry - Maintain IV Access  Continuous         03/18/22 1946 03/18/22 1946  Continuous Cardiac Monitoring  Continuous         03/18/22 1946 03/18/22 1946  May Be Off Telemetry for Tests  Continuous         03/18/22 1946 03/18/22 1946  ACLS Protocol For Life Threatening Dysrhythmias (Unless Code Status Indicates Otherwise)  Continuous         03/18/22 1946 03/18/22 1946  Notify Provider if ACLS Protocol Activated  Until Discontinued         03/18/22 1946 03/18/22 1945  Telemetry - Pulse Oximetry  Continuous PRN,   Status:  Canceled      Comments: If Patient Develops Unresponsiveness, Acute Dyspnea, Cyanosis or Suspected Hypoxemia Start Continuous Pulse Ox Monitoring, Apply Oxygen & Notify Provider    03/18/22 1946 03/18/22 1945  nitroglycerin (NITROSTAT) SL tablet 0.4 mg  Every 5 Minutes PRN         03/18/22 1946 03/18/22 1945  sodium chloride 0.9 % flush 10 mL  As Needed         03/18/22 1946 03/18/22 1944  oxyCODONE (ROXICODONE) immediate release tablet 15 mg  As Needed,   Status:  Discontinued         03/18/22 1946 03/18/22 1624  Inpatient Admission  Once         03/18/22 1624    03/18/22 1607  [facility] (on-call MD unless specified) Details  Once        Specialty:  Hospitalist  Provider:  (Not yet assigned)     03/18/22 1606    03/18/22 1522  Blood Gas, Arterial -  PROCEDURE ONCE         03/18/22 1519    03/18/22 1515  Please place pulse oximeter on head, if pulse oxygenation improves from peripheral sensor, then please obtain a walking O2 sat on room air, inform MD of result.  [facility] Nursing Order (Specify)  Once        Comments: Please place pulse oximeter on head, if pulse oxygenation improves from peripheral sensor, then please obtain a walking O2 sat on room air, inform MD of result.    03/18/22 1515    03/18/22 1458  Blood Gas, Arterial -  STAT         03/18/22 1457    03/18/22 1420  BNP  Once         03/18/22 1419    03/18/22 1420  Add on BNP  Misc Nursing Order (Specify)  Once        Comments: Add on BNP    03/18/22 1419    03/18/22 1400  iopamidol (ISOVUE-370) 76 % injection 100 mL  Once in Imaging         03/18/22 1358    03/18/22 1310  Respiratory Panel PCR w/COVID-19(SARS-CoV-2) [facility]G/WARNER/VU/PAD/[facility]/MAD/FREDRICK In-House, NP Swab in UTM/VTM, 3-4 HR TAT - Swab, Nasopharynx  Once         03/18/22 1309    03/18/22 1237  Protime-INR  Once         03/18/22 1236    03/18/22 1237  aPTT  Once         03/18/22 1236    03/18/22 1236  CT Angiogram Chest  1 Time Imaging         03/18/22 1236    03/18/22 1236  CBC & Differential  Once         03/18/22 1236    03/18/22 1236  Comprehensive Metabolic Panel  Once         03/18/22 1236    03/18/22 1236  Troponin  Once         03/18/22 1236    03/18/22 1236  Magnesium  Once         03/18/22 1236    03/18/22 1236  Procalcitonin  Once         03/18/22 1236    03/18/22 1236  ECG 12 Lead  Once         03/18/22 1236    03/18/22 1236  XR Chest 1 View  1 Time Imaging         03/18/22 1236    03/18/22 1236  Cardiac Monitoring  Once,   Status:  Canceled         03/18/22 1236    03/18/22 1236  Continuous Pulse Oximetry  Continuous,   Status:  Canceled         03/18/22 1236    03/18/22 1236  CBC Auto Differential  PROCEDURE ONCE         03/18/22 1236    Unscheduled  Oxygen Therapy- Nasal Cannula;  Titrate for SPO2: 90% - 95%  Continuous PRN      Comments: If Patient Develops Unresponsiveness, Acute Dyspnea, Cyanosis or Suspected Hypoxemia Start Continuous Pulse Ox Monitoring, Apply Oxygen & Notify Provider    22    Unscheduled  ECG 12 Lead  As Needed      Comments: Nurse to Release if Patient Expericences Acute Chest Pain or Dysrhythmias    22    Unscheduled  Potassium  As Needed      Comments: For Ventricular Arrhythmias      22    Unscheduled  Magnesium  As Needed      Comments: For Ventricular Arrhythmias      22    Unscheduled  Troponin  As Needed      Comments: For Chest Pain      22    Unscheduled  Blood Gas, Arterial -  As Needed      Comments: Per O2 PolicyNotify Physician      22    --  omeprazole (priLOSEC) 40 MG capsule  Daily         22                   Physician Progress Notes       Tico Sanches MD at 22              Name: Fabio Juárez ADMIT: 3/18/2022   : 1965  PCP: Karley Amor APRN    MRN: [id number] LOS: 2 days   AGE/SEX: 57 y.o. male  ROOM: [facility]     Subjective   Subjective    Patient seen at bedside.      Objective   Objective   Vital Signs  Temp:  [97.2 °F (36.2 °C)-98.6 °F (37 °C)] 98 °F (36.7 °C)  Heart Rate:  [70-86] 80  Resp:  [18] 18  BP: (100-123)/(73-83) 120/81  SpO2:  [93 %-96 %] 94 %  on  Flow (L/min):  [2] 2;   Device (Oxygen Therapy): nasal cannula  Body mass index is 45.1 kg/m².  Physical Exam      General Appearance:   Alert cooperative morbidly obese male does not appear to be in any acute distress   Head:    Normocephalic, without obvious abnormality, atraumatic   Eyes:    PERRL, conjunctiva/corneas clear, EOM's intact, both eyes   Ears:    Normal external ear canals, both ears   Nose:   Nares normal, septum midline, mucosa normal, no drainage    or sinus tenderness   Throat:   Lips, tongue, gums normal; oral mucosa pink and moist   Neck:  Neck: Tracheostomy device  in place with no neck abnormalities swelling or cellulitic changes   Back:     Symmetric, no curvature, ROM normal, no CVA tenderness   Lungs:    Decreased breath sounds at the bases   Chest Wall:    No tenderness or deformity    Heart:    Regular rate and rhythm, S1 and S2 normal, no murmur, rub   or gallop   Abdomen:     mild generalized distention.   Extremities:   Extremities normal, atraumatic, no cyanosis or edema   Pulses:   Pulses palpable in all extremities; symmetric all extremities   Skin:   Skin color normal, Skin is warm and dry,  no rashes or palpable lesions   Neurologic:  Grossly nonfocal examination.       Results Review     I reviewed the patient's new clinical results.       Scheduled Medications  atorvastatin, 80 mg, Oral, Nightly  baclofen, 5 mg, Oral, Nightly  cetirizine, 5 mg, Oral, Daily  gabapentin, 300 mg, Oral, Q8H  glipizide, 5 mg, Oral, QAM AC  hydroCHLOROthiazide, 12.5 mg, Oral, Daily  insulin lispro, 0-7 Units, Subcutaneous, TID AC  lactulose, 20 g, Oral, TID  lisinopril, 10 mg, Oral, Daily  senna-docusate sodium, 2 tablet, Oral, BID  sodium chloride, 10 mL, Intravenous, Q12H  traZODone, 50 mg, Oral, Nightly    Infusions   Diet  Diet Regular; Cardiac, Consistent Carbohydrate, Renal      Assessment/Plan     Active Hospital Problems    Diagnosis  POA   • Hypoxia [R09.02]  Yes   • Pleuritic chest pain [R07.81]  Unknown   • Dyspnea [R06.00]  Unknown   • Essential (primary) hypertension [I10]  Yes   • Tracheostomy status (HCC) [Z93.0]  Not Applicable   • Hemochromatosis [E83.119]  Yes   • DM2 (diabetes mellitus, type 2) (HCC) [E11.9]  Yes   • Chronic low back pain [M54.50, G89.29]  Yes   • Chronic neck pain [M54.2, G89.29]  Yes      Resolved Hospital Problems   No resolved problems to display.     Assessment and plan  1.  Pleuritic chest pain with hypoxia, CT scan of the chest, does not show any evidence of pneumonia, pulmonary on board and following.  Patient has postoperative hypoxemia  due to atelectasis, constipation is also contributing, we will intensify bowel regimen by adding lactulose today.  Discussed with Dr. Heredia.     2.  Severe sleep apnea, status post tracheostomy.  Continue trach care.     3.  Diabetes mellitus, continue Accu-Cheks and sliding scale insulin coverage.     4.  Hyperlipidemia, continue statin therapy.    5.  Morbid obesity, complicates all aspects of care, patient was encouraged to lose weight.     6.  CODE STATUS is full code.  Further plans based on hospital course.            Tico Sanches MD  [facility]  22  21:19 EDT      Electronically signed by Tico Sanches MD at 22 2122     Scot Heredia MD at 22 1254            PROGRESS NOTE  Patient Name: Fabio Juárez  Age/Sex: 57 y.o. male  : 1965  MRN: [id number]    Date of Admission: 3/18/2022  Date of Encounter Visit: 22   LOS: 2 days   Patient Care Team:  Karley Amor APRN as PCP - General (Nurse Practitioner)  Payton Marquez APRN as Nurse Practitioner (Nurse Practitioner)    Chief Complaint: Hypoxemia    Hospital course: Patient is still on oxygen at 2 L/min, complaining of abdominal distention, still working with incentive spirometer, pulling around 1750 cc.  He is afebrile, the belly is distended and it hurts to take a deep breath.  He has not had any bowel movement for the last 2 days despite adequate p.o. intake    *    REVIEW OF SYSTEMS:   CONSTITUTIONAL: no fever or chills  CARDIOVASCULAR: No chest pain, chest pressure or chest discomfort. No palpitations or edema.   RESPIRATORY: No shortness of breath, cough or sputum.   GASTROINTESTINAL: No anorexia, nausea, vomiting or diarrhea, positive for constipation.  Abdominal pain and distention.   HEMATOLOGIC: No bleeding or bruising.     Ventilator/Non-Invasive Ventilation Settings (From admission, onward)            2 L/min nasal cannula oxygen            Vital Signs  Temp:  [97.2 °F (36.2 °C)-98.6  "°F (37 °C)] 98 °F (36.7 °C)  Heart Rate:  [70-86] 70  Resp:  [18] 18  BP: (100-123)/(73-83) 121/76  SpO2:  [93 %-96 %] 96 %  on  Flow (L/min):  [2] 2 Device (Oxygen Therapy): nasal cannula    Intake/Output Summary (Last 24 hours) at 3/20/2022 1254  Last data filed at 3/20/2022 0842  Gross per 24 hour   Intake 360 ml   Output --   Net 360 ml     Flowsheet Rows    Flowsheet Row First Filed Value   Admission Height 165.1 cm (65\") Documented at 03/18/2022 1249   Admission Weight 123 kg (271 lb) Documented at 03/18/2022 1249        Body mass index is 45.1 kg/m².      03/18/22  1249   Weight: 123 kg (271 lb)       Physical Exam:  GEN:  No acute distress, alert, cooperative, well developed on 2 L/min nasal cannula oxygen  EYES:   Sclerae clear. No icterus. PERRL. Normal EOM  ENT:   External ears/nose normal, no oral lesions, no thrush, mucous membranes moist  NECK:  Supple, midline trachea, no JVD  LUNGS: Normal chest on inspection,  positive crackles bilaterally with decreased breath sounds over the bases bilaterally..   CV:  Regular rhythm and rate. Normal S1/S2. No murmurs, gallops, or rubs noted.  ABD: Tense and tender to palpation, distended, positive bowel sounds  EXT:  Moves all extremities well. No cyanosis. No redness.  Trace edema  Skin: Dry, intact, no bleeding    Results Review:          Imaging:   Imaging Results (All)      I reviewed the patient's new clinical results.  I personally viewed and interpreted the patient's imaging results:        Medication Review:   atorvastatin, 80 mg, Oral, Nightly  baclofen, 5 mg, Oral, Nightly  cetirizine, 5 mg, Oral, Daily  gabapentin, 300 mg, Oral, Q8H  glipizide, 5 mg, Oral, QAM AC  hydroCHLOROthiazide, 12.5 mg, Oral, Daily  insulin lispro, 0-7 Units, Subcutaneous, TID AC  lisinopril, 10 mg, Oral, Daily  sodium chloride, 10 mL, Intravenous, Q12H  traZODone, 50 mg, Oral, Nightly       ASSESSMENT:   1. Postoperative hypoxemia  2. Atelectasis  3. Severe sleep apnea status " post tracheostomy  4. Chronic trach care  5. Hemochromatosis  6. Abdominal hernia status post repair    PLAN:  Patient has abdominal distention, with constipation  No bowel movement for few days  For the time being recommend to continue with incentive spirometry and the pulmonary hygiene measures, the patient has tense abdomen and has not had a bowel movement for several days and the constipation is detrimental in his case causing upward pressure on the diaphragm and further worsening his atelectasis.  Discussed with the primary team patient will be on aggressive bowel management system, anticipate improvement in his respiratory status and oxygen requirement if he is able to decompress his abdomen  Discussed with patient and primary team    Disposition: Per primary team    Scot Heredia MD  22  12:54 EDT        Dictated utilizing Dragon dictation    Electronically signed by Scot Heredia MD at 22 1320     Tico Sanches MD at 22 1847              Name: Fabio Juárez ADMIT: 3/18/2022   : 1965  PCP: Karley Amor APRN    MRN: [id number] LOS: 1 days   AGE/SEX: 57 y.o. male  ROOM: [facility]     Subjective   Subjective   Patient seen at bedside.      Objective   Objective   Vital Signs  Temp:  [97.6 °F (36.4 °C)-98.4 °F (36.9 °C)] 98.4 °F (36.9 °C)  Heart Rate:  [79-86] 80  Resp:  [18-20] 18  BP: (103-116)/(68-80) 103/73  SpO2:  [91 %-95 %] 93 %  on  Flow (L/min):  [2] 2;   Device (Oxygen Therapy): nasal cannula  Body mass index is 45.1 kg/m².  Physical Exam     General Appearance:   Alert cooperative morbidly obese male does not appear to be in any acute distress   Head:    Normocephalic, without obvious abnormality, atraumatic   Eyes:    PERRL, conjunctiva/corneas clear, EOM's intact, both eyes   Ears:    Normal external ear canals, both ears   Nose:   Nares normal, septum midline, mucosa normal, no drainage    or sinus tenderness   Throat:   Lips, tongue, gums normal; oral mucosa  pink and moist   Neck:  Neck: Tracheostomy device in place with no neck abnormalities swelling or cellulitic changes   Back:     Symmetric, no curvature, ROM normal, no CVA tenderness   Lungs:    Decreased breath sounds at the bases   Chest Wall:    No tenderness or deformity    Heart:    Regular rate and rhythm, S1 and S2 normal, no murmur, rub   or gallop   Abdomen:    Obese soft mild generalized distention and tenderness robotic port site of surgery appears to be well approximated with no inflammation or drainage.   Extremities:   Extremities normal, atraumatic, no cyanosis or edema   Pulses:   Pulses palpable in all extremities; symmetric all extremities   Skin:   Skin color normal, Skin is warm and dry,  no rashes or palpable lesions   Neurologic:  Grossly nonfocal examination.       Results Review     I reviewed the patient's new clinical results.      Scheduled Medications  atorvastatin, 80 mg, Oral, Nightly  baclofen, 5 mg, Oral, Nightly  cetirizine, 5 mg, Oral, Daily  gabapentin, 300 mg, Oral, Q8H  glipizide, 5 mg, Oral, QAM AC  hydroCHLOROthiazide, 12.5 mg, Oral, Daily  insulin lispro, 0-7 Units, Subcutaneous, TID AC  lisinopril, 10 mg, Oral, Daily  sodium chloride, 10 mL, Intravenous, Q12H  traZODone, 50 mg, Oral, Nightly    Infusions   Diet  Diet Regular; Cardiac, Consistent Carbohydrate, Renal      Assessment/Plan     Active Hospital Problems    Diagnosis  POA   • Hypoxia [R09.02]  Yes   • Pleuritic chest pain [R07.81]  Unknown   • Dyspnea [R06.00]  Unknown   • Essential (primary) hypertension [I10]  Yes   • Tracheostomy status (HCC) [Z93.0]  Not Applicable   • Hemochromatosis [E83.119]  Yes   • DM2 (diabetes mellitus, type 2) (HCC) [E11.9]  Yes   • Chronic low back pain [M54.50, G89.29]  Yes   • Chronic neck pain [M54.2, G89.29]  Yes      Resolved Hospital Problems   No resolved problems to display.     Assessment and plan  1.  Pleuritic chest pain with hypoxia, CT scan of the chest, does not show any  evidence of pneumonia, pulmonary consultation will be requested.    2.  Severe sleep apnea, status post tracheostomy.  Continue trach care.    3.  Diabetes mellitus, continue Accu-Cheks and sliding scale insulin coverage.    4.  Hyperlipidemia, continue statin therapy.    5.  CODE STATUS is full code.  Further plans based on hospital course.        Tico Sanches MD  [facility]  22  18:47 EDT      Electronically signed by Tico Sanches MD at 22 1849          Consult Notes (last 7 days)      Scot Heredia MD at 22 1621      Consult Orders    1. Inpatient Pulmonology Consult [[id number]] ordered by Tico Sanches MD at 22 1501                 Pulmonary Consultation     Patient Name: Fabio Juárez  Age/Sex: 57 y.o. male  : 1965  MRN: [id number]    Date of Admission: 3/18/2022  Date of Encounter Visit: 22  Encounter Provider: Scot Heredia MD  Referring Provider: No ref. provider found  Place of Service: [facility]  Patient Care Team:  Karley Amor APRN as PCP - General (Nurse Practitioner)  Payton Marquez APRN as Nurse Practitioner (Nurse Practitioner)      Subjective:     Consulted for: Hypoxemia    Chief Complaint: Shortness of breath and abdominal discomfort    History of Present Illness:  Fabio Juárez is a 57 y.o. male with history of severe sleep apnea with AHI above 80 who could not tolerate any treatment and ended up having a tracheostomy.  He had abdominal wall hernia status post repair on 2022.  Patient came in because of worsening abdominal discomfort and increased shortness of breath, was requiring oxygen to correct the hypoxemia.  There was some concern about venous thromboembolism which was ruled out with negative CT of the chest with IV contrast.  The films were reviewed he does not have any pneumonia but he does have some linear atelectatic changes over the bases.  Patient has abdominal obesity, he is denying any  fever or chills, no nausea or vomiting  Is currently requiring 2 L/min nasal cannula oxygen to maintain sats in the normal range.    Pulmonary Functions Testing Results:    No results found for: FEV1, FVC, HKJ1UTI, TLC, DLCO    Review of Systems:   Review of Systems   As per above on 12 point system review otherwise negative past Medical History:    Inpatient Medications:  Scheduled Meds:atorvastatin, 80 mg, Oral, Nightly  baclofen, 5 mg, Oral, Nightly  cetirizine, 5 mg, Oral, Daily  gabapentin, 300 mg, Oral, Q8H  glipizide, 5 mg, Oral, QAM AC  hydroCHLOROthiazide, 12.5 mg, Oral, Daily  insulin lispro, 0-7 Units, Subcutaneous, TID AC  lisinopril, 10 mg, Oral, Daily  sodium chloride, 10 mL, Intravenous, Q12H  traZODone, 50 mg, Oral, Nightly      Continuous Infusions:   PRN Meds:.•  acetaminophen **OR** acetaminophen **OR** acetaminophen  •  dextrose  •  dextrose  •  glucagon (human recombinant)  •  Morphine **AND** naloxone  •  nitroglycerin  •  ondansetron  •  oxyCODONE  •  sodium chloride    Allergies:  No Known Allergies      Objective:   Temp:  [97.6 °F (36.4 °C)-98.4 °F (36.9 °C)] 98.4 °F (36.9 °C)  Heart Rate:  [79-86] 80  Resp:  [18-20] 18  BP: (103-116)/(68-80) 103/73  SpO2:  [91 %-95 %] 93 %  on  Flow (L/min):  [2] 2 Device (Oxygen Therapy): nasal cannula     Intake/Output Summary (Last 24 hours) at 3/19/2022 1621  Last data filed at 3/19/2022 1406  Gross per 24 hour   Intake 240 ml   Output --   Net 240 ml     Body mass index is 45.1 kg/m².      03/18/22  1249   Weight: 123 kg (271 lb)     Weight change:     Physical Exam:   Physical Exam   General:    No acute distress, alert and oriented x4, pleasant, able to talk freely with the tracheostomy open                   Head:    Normocephalic, atraumatic. External ears and nose are normal   Eyes:          Conjunctivae and sclerae normal, no icterus, PERRLA, no  discharge   Throat:   No oral lesions, no thrush, oral mucosa moist.  Mallampati 4 airway   Neck:    Supple, trachea midline. No JVD, no cervical or supraclavicular lymphadenopathy.  Trach site is clean patient has Gallito metal trach   Lungs:     Normal chest on inspection, C patient has decreased breath sounds posteriorly he does have positive crackles over the bases posteriorly, no wheezes    Heart:    Regular rhythm and normal rate.  No murmurs, gallops, or rubs noted.   Abdomen:      truncal obesity, the abdomen is soft, tender to palpation,   Extremities:   No clubbing, cyanosis, very minimal trace edema.     Pulses:   Pulses palpable and equal bilaterally.    Skin:   No bleeding or rash. No bumps, good turgor pressure    Neuro:   Nonfocal.  Moves all extremities well. Strength 5/5 and symmetrical, no sensory deficit    Psychiatric:   Normal mood and affect.     I personally viewed and interpreted the patient's imaging studies.    Assessment:   Postoperative hypoxemia  Atelectasis  Severe sleep apnea status post tracheostomy  Chronic trach care  Hemochromatosis  Abdominal hernia status post repair    Plan:     Patient has no obvious finding to suggest pneumonia whether by imaging study or by lab criteria or by history  He does have abdominal pain and he does have splinting of the breath and he has some crackles on exam all consistent with atelectasis.  The other risk factor would be venous thromboembolism after surgery but he was ruled out for that already  Treatment recommendation is to use incentive spirometer and try to control the pain to allow the patient to deep breathe without splinting at the same time without overmedicating and causing excessive sedation  We will follow up on the oxygen requirement with the above measures and consider further recommendations accordingly  No steroids, no antibiotics indicated    Discussed with the patient, is agreeable with the above plan    Thank you for allowing me to participate in the care of Fabio Juárez. Feel free to contact me directly with any further  questions or concerns.    Scot Heredia MD  [facility]   03/19/22  16:21 EDT    Dictated utilizing Dragon dictation    Electronically signed by Scot Heredia MD at 03/19/22 2388

## 2022-03-21 NOTE — TELEPHONE ENCOUNTER
ATTEMPTED TO WARM TRANSFER BUT UNABLE TO REACH THE PRACTICE.     Caller: TAYO FAUSTIN    Relationship to patient: SELF    Best call back number: [phone]    Patient is needing: PT HAS F/U SCHEDULED FOR TOMORROW (3/22/22) BUT IS CURRENTLY HOSPITALIZED. PT ISN'T SURE WHEN THEY WILL BE DISCHARGED, SO DOESN'T KNOW WHEN TO RESCHEDULE F/U TO.     PT NEEDS TO BE SEEN BY DR LOGAN TO BE RELEASED TO GO BACK TO WORK, SO APPT IS NEED.     PLEASE CALL PT BACK TO ADVISE.

## 2022-03-22 LAB
BASOPHILS # BLD AUTO: 0.06 10*3/MM3 (ref 0–0.2)
BASOPHILS NFR BLD AUTO: 0.6 % (ref 0–1.5)
CRP SERPL-MCNC: 14.19 MG/DL (ref 0–0.5)
DEPRECATED RDW RBC AUTO: 41.9 FL (ref 37–54)
EOSINOPHIL # BLD AUTO: 0.29 10*3/MM3 (ref 0–0.4)
EOSINOPHIL NFR BLD AUTO: 3.1 % (ref 0.3–6.2)
ERYTHROCYTE [DISTWIDTH] IN BLOOD BY AUTOMATED COUNT: 12.1 % (ref 12.3–15.4)
ERYTHROCYTE [SEDIMENTATION RATE] IN BLOOD: 42 MM/HR (ref 0–20)
GLUCOSE BLDC GLUCOMTR-MCNC: 105 MG/DL (ref 70–130)
GLUCOSE BLDC GLUCOMTR-MCNC: 120 MG/DL (ref 70–130)
GLUCOSE BLDC GLUCOMTR-MCNC: 122 MG/DL (ref 70–130)
GLUCOSE BLDC GLUCOMTR-MCNC: 174 MG/DL (ref 70–130)
HCT VFR BLD AUTO: 44.5 % (ref 37.5–51)
HGB BLD-MCNC: 14.6 G/DL (ref 13–17.7)
IMM GRANULOCYTES # BLD AUTO: 0.07 10*3/MM3 (ref 0–0.05)
IMM GRANULOCYTES NFR BLD AUTO: 0.7 % (ref 0–0.5)
LYMPHOCYTES # BLD AUTO: 2.35 10*3/MM3 (ref 0.7–3.1)
LYMPHOCYTES NFR BLD AUTO: 25 % (ref 19.6–45.3)
MCH RBC QN AUTO: 30.6 PG (ref 26.6–33)
MCHC RBC AUTO-ENTMCNC: 32.8 G/DL (ref 31.5–35.7)
MCV RBC AUTO: 93.3 FL (ref 79–97)
MONOCYTES # BLD AUTO: 1.63 10*3/MM3 (ref 0.1–0.9)
MONOCYTES NFR BLD AUTO: 17.3 % (ref 5–12)
NEUTROPHILS NFR BLD AUTO: 5.01 10*3/MM3 (ref 1.7–7)
NEUTROPHILS NFR BLD AUTO: 53.3 % (ref 42.7–76)
NRBC BLD AUTO-RTO: 0 /100 WBC (ref 0–0.2)
PLATELET # BLD AUTO: 244 10*3/MM3 (ref 140–450)
PMV BLD AUTO: 8.9 FL (ref 6–12)
RBC # BLD AUTO: 4.77 10*6/MM3 (ref 4.14–5.8)
WBC NRBC COR # BLD: 9.41 10*3/MM3 (ref 3.4–10.8)

## 2022-03-22 PROCEDURE — 99221 1ST HOSP IP/OBS SF/LOW 40: CPT | Performed by: ORTHOPAEDIC SURGERY

## 2022-03-22 PROCEDURE — 82962 GLUCOSE BLOOD TEST: CPT

## 2022-03-22 PROCEDURE — 85652 RBC SED RATE AUTOMATED: CPT | Performed by: ORTHOPAEDIC SURGERY

## 2022-03-22 PROCEDURE — G0378 HOSPITAL OBSERVATION PER HR: HCPCS

## 2022-03-22 PROCEDURE — 97530 THERAPEUTIC ACTIVITIES: CPT

## 2022-03-22 PROCEDURE — 94761 N-INVAS EAR/PLS OXIMETRY MLT: CPT

## 2022-03-22 PROCEDURE — 63710000001 INSULIN LISPRO (HUMAN) PER 5 UNITS: Performed by: INTERNAL MEDICINE

## 2022-03-22 PROCEDURE — 86140 C-REACTIVE PROTEIN: CPT | Performed by: ORTHOPAEDIC SURGERY

## 2022-03-22 PROCEDURE — 94760 N-INVAS EAR/PLS OXIMETRY 1: CPT

## 2022-03-22 PROCEDURE — 85025 COMPLETE CBC W/AUTO DIFF WBC: CPT | Performed by: ORTHOPAEDIC SURGERY

## 2022-03-22 PROCEDURE — 25010000002 ENOXAPARIN PER 10 MG: Performed by: STUDENT IN AN ORGANIZED HEALTH CARE EDUCATION/TRAINING PROGRAM

## 2022-03-22 PROCEDURE — 94799 UNLISTED PULMONARY SVC/PX: CPT

## 2022-03-22 PROCEDURE — 94618 PULMONARY STRESS TESTING: CPT

## 2022-03-22 RX ORDER — MELOXICAM 15 MG/1
15 TABLET ORAL DAILY
Qty: 7 TABLET | Refills: 0 | Status: SHIPPED | OUTPATIENT
Start: 2022-03-22 | End: 2022-03-29

## 2022-03-22 RX ORDER — AMOXICILLIN 250 MG
2 CAPSULE ORAL 2 TIMES DAILY
Qty: 60 TABLET | Refills: 0 | Status: SHIPPED | OUTPATIENT
Start: 2022-03-22 | End: 2022-06-21

## 2022-03-22 RX ORDER — MELOXICAM 15 MG/1
15 TABLET ORAL ONCE
Status: COMPLETED | OUTPATIENT
Start: 2022-03-22 | End: 2022-03-22

## 2022-03-22 RX ADMIN — ATORVASTATIN CALCIUM 80 MG: 80 TABLET, FILM COATED ORAL at 20:36

## 2022-03-22 RX ADMIN — Medication 10 ML: at 09:10

## 2022-03-22 RX ADMIN — DOCUSATE SODIUM 50MG AND SENNOSIDES 8.6MG 2 TABLET: 8.6; 5 TABLET, FILM COATED ORAL at 20:36

## 2022-03-22 RX ADMIN — INSULIN LISPRO 2 UNITS: 100 INJECTION, SOLUTION INTRAVENOUS; SUBCUTANEOUS at 12:27

## 2022-03-22 RX ADMIN — MELOXICAM 15 MG: 15 TABLET ORAL at 17:22

## 2022-03-22 RX ADMIN — GABAPENTIN 300 MG: 300 CAPSULE ORAL at 00:38

## 2022-03-22 RX ADMIN — BACLOFEN 5 MG: 10 TABLET ORAL at 20:35

## 2022-03-22 RX ADMIN — GABAPENTIN 300 MG: 300 CAPSULE ORAL at 06:21

## 2022-03-22 RX ADMIN — ENOXAPARIN SODIUM 40 MG: 100 INJECTION SUBCUTANEOUS at 16:09

## 2022-03-22 RX ADMIN — LISINOPRIL 10 MG: 10 TABLET ORAL at 09:10

## 2022-03-22 RX ADMIN — OXYCODONE 15 MG: 5 TABLET ORAL at 20:42

## 2022-03-22 RX ADMIN — GLIPIZIDE 5 MG: 5 TABLET ORAL at 09:10

## 2022-03-22 RX ADMIN — CETIRIZINE HYDROCHLORIDE 5 MG: 10 TABLET ORAL at 09:10

## 2022-03-22 RX ADMIN — OXYCODONE 15 MG: 5 TABLET ORAL at 00:38

## 2022-03-22 RX ADMIN — TRAZODONE HYDROCHLORIDE 50 MG: 50 TABLET ORAL at 20:36

## 2022-03-22 RX ADMIN — HYDROCHLOROTHIAZIDE 12.5 MG: 12.5 CAPSULE ORAL at 09:10

## 2022-03-22 RX ADMIN — OXYCODONE 15 MG: 5 TABLET ORAL at 09:09

## 2022-03-22 RX ADMIN — DOCUSATE SODIUM 50MG AND SENNOSIDES 8.6MG 2 TABLET: 8.6; 5 TABLET, FILM COATED ORAL at 09:10

## 2022-03-22 RX ADMIN — GABAPENTIN 300 MG: 300 CAPSULE ORAL at 16:09

## 2022-03-22 RX ADMIN — Medication 10 ML: at 20:36

## 2022-03-22 RX ADMIN — LACTULOSE 20 G: 20 SOLUTION ORAL at 20:43

## 2022-03-22 NOTE — PLAN OF CARE
Goal Outcome Evaluation:              Outcome Evaluation: Patient continues having pain lower right back and knee, ambulated to the bathroom with walker and stand by asst. 2x BM

## 2022-03-22 NOTE — PROGRESS NOTES
"      [facility]         Dr Orlando Sayied   LOS: 4 days   Patient Care Team:  Karley Amor APRN as PCP - General (Nurse Practitioner)  Payton Marquez APRN as Nurse Practitioner (Nurse Practitioner)    Chief Complaint: Postop hypoxemia with atelectasis severe sleep apnea status post tracheostomy history of hemochromatosis    Interval History: Currently on 2 L oxygen nasal cannula.  Overall feels better and resting comfortably.    REVIEW OF SYSTEMS:   CARDIOVASCULAR: No chest pain, chest pressure or chest discomfort. No palpitations or edema.   RESPIRATORY: No shortness of breath, cough or sputum.   GASTROINTESTINAL: No anorexia, nausea, vomiting or diarrhea. No abdominal pain or blood.   HEMATOLOGIC: No bleeding or bruising.     Ventilator/Non-Invasive Ventilation Settings (From admission, onward)            None            Vital Signs  Temp:  [97.6 °F (36.4 °C)-98.4 °F (36.9 °C)] 97.9 °F (36.6 °C)  Heart Rate:  [81-88] 81  Resp:  [16-18] 16  BP: (106-123)/(66-82) 123/75    Intake/Output Summary (Last 24 hours) at 3/22/2022 1148  Last data filed at 3/21/2022 1631  Gross per 24 hour   Intake 240 ml   Output --   Net 240 ml     Flowsheet Rows    Flowsheet Row First Filed Value   Admission Height 165.1 cm (65\") Documented at 03/18/2022 1249   Admission Weight 123 kg (271 lb) Documented at 03/18/2022 1249          Physical Exam:  Patient is examined using the personal protective equipment as per guidelines from infection control for this particular patient as enacted.  Hand hygiene was performed before and after patient interaction.   General Appearance:    Alert, cooperative, in no acute distress.  Following simple commands  ENT Mallampati between 3 and 4 no nasal congestion  Neck midline trachea, no thyromegaly.  Tracheostomy in place   Lungs:    Diminished breath sounds more prominent on the right base    Heart:    Regular rhythm and normal rate, normal S1 and S2, no            murmur, no gallop, no " rub, no click   Chest Wall:    No abnormalities observed   Abdomen:     Normal bowel sounds, no masses, no organomegaly, soft        nontender, nondistended, no guarding, no rebound                tenderness   Extremities:   Moves all extremities well, no edema, no cyanosis, no             redness  CNS no focal neurological deficits normal sensory exam  Skin no rashes no nodules  Musculoskeletal no cyanosis no clubbing normal range of motion     Results Review:        Results from last 7 days   Lab Units 03/21/22  0652 03/20/22  0658 03/19/22  0419   SODIUM mmol/L 135* 131* 133*   POTASSIUM mmol/L 3.7 3.5 3.6   CHLORIDE mmol/L 97* 95* 97*   CO2 mmol/L 28.0 27.0 27.0   BUN mg/dL 15 17 17   CREATININE mg/dL 0.74* 0.77 0.76   GLUCOSE mg/dL 152* 175* 138*   CALCIUM mg/dL 8.8 9.1 8.9     Results from last 7 days   Lab Units 03/18/22  1249   TROPONIN T ng/mL <0.010     Results from last 7 days   Lab Units 03/21/22  0652 03/20/22  0658 03/19/22  0419   WBC 10*3/mm3 7.69 9.08 10.25   HEMOGLOBIN g/dL 14.4 14.6 15.0   HEMATOCRIT % 43.4 43.7 46.0   PLATELETS 10*3/mm3 247 279 294     Results from last 7 days   Lab Units 03/18/22  1249   INR  0.97   APTT seconds 24.4         Results from last 7 days   Lab Units 03/18/22  1249   MAGNESIUM mg/dL 1.7         Results from last 7 days   Lab Units 03/18/22  1519   PH, ARTERIAL pH units 7.419   PO2 ART mm Hg 73.4*   PCO2, ARTERIAL mm Hg 42.5   HCO3 ART mmol/L 27.5       I reviewed the patient's new clinical results.  I personally viewed and interpreted the patient's chest x-ray.        Medication Review:   atorvastatin, 80 mg, Oral, Nightly  baclofen, 5 mg, Oral, Nightly  cetirizine, 5 mg, Oral, Daily  enoxaparin, 40 mg, Subcutaneous, Q24H  gabapentin, 300 mg, Oral, Q8H  glipizide, 5 mg, Oral, QAM AC  hydroCHLOROthiazide, 12.5 mg, Oral, Daily  insulin lispro, 0-7 Units, Subcutaneous, TID AC  lactulose, 20 g, Oral, TID  lisinopril, 10 mg, Oral, Daily  senna-docusate sodium, 2 tablet,  Oral, BID  sodium chloride, 10 mL, Intravenous, Q12H  traZODone, 50 mg, Oral, Nightly             ASSESSMENT:   1. Postoperative hypoxemia  2. Atelectasis  3. Severe sleep apnea status post tracheostomy  4. Chronic trach care  5. Hemochromatosis  6. Abdominal hernia status post repair         PLAN:  Continue weaning down oxygen as tolerated.  Wean oxygen to keep sats above 90%  Mobilize ambulate.  Atelectasis should improve with increased mobilization  He is down to 2 L oxygen nasal cannula.  Incentive spirometry and aggressive pulmonary toilet  Avoid abdominal distention with aggressive bowel management  Monitor clinical course closely.      Darion Elizabeth MD  03/22/22  11:48 EDT

## 2022-03-22 NOTE — THERAPY TREATMENT NOTE
Patient Name: Fabio Juárez  : 1965    MRN: [id number]                              Today's Date: 3/22/2022       Admit Date: 3/18/2022    Visit Dx:     ICD-10-CM ICD-9-CM   1. Hypoxia  R09.02 799.02   2. Dyspnea, unspecified type  R06.00 786.09   3. Leukocytosis, unspecified type  D72.829 288.60   4. Pulmonary nodule  R91.1 793.11     Patient Active Problem List   Diagnosis   • DM2 (diabetes mellitus, type 2) ([facility])   • Sleep apnea   • Hypercholesteremia   • Lung nodule   • Chronic low back pain   • Left upper quadrant pain   • Hemochromatosis   • GERD (gastroesophageal reflux disease)   • Essential (primary) hypertension   • Chronic neck pain   • Claustrophobia   • Decreased testosterone level   • Peripheral neuropathy   • Tracheostomy status ([facility])   • Insomnia   • Diarrhea   • Recurrent umbilical hernia   • Hypoxia   • Pleuritic chest pain   • Dyspnea   • Obesity, Class III, BMI 40-49.9 (morbid obesity) ([facility])   • Opiate dependence ([facility])     Past Medical History:   Diagnosis Date   • Chronic pain    • Claustrophobia    • Coronary artery disease     BLOCKAGE , SEE'S DR DIEGO EVERY 2 YEARS, DENIED CP/SOB    • Essential (primary) hypertension    • GERD (gastroesophageal reflux disease)    • Hemochromatosis     ASYMPTOMATIC    • History of COVID-19    • Left upper quadrant pain    • Low back pain    • Lung nodule    • Mixed hyperlipidemia    • Obstructive sleep apnea (adult) (pediatric)    • Other testicular dysfunction    • Pain in right foot    • Pain in right shoulder    • Recurrent umbilical hernia    • Renal stone 2017   • Tracheostomy status ([facility])     R/T SLEEP APNEA    • Type 2 diabetes mellitus ([facility])     BG RUNS 120-125 IN AM      Past Surgical History:   Procedure Laterality Date   • ACHILLES TENDON REPAIR Right 10/2018   • APPENDECTOMY     • CARDIAC CATHETERIZATION  2015    LEFT VENTRIULOGRAM, CORONARY ANGIOGRAM    • CHOLECYSTECTOMY  2013   • COLONOSCOPY  2014    NORMAL  RESULT    • HERNIA REPAIR     • JOINT REPLACEMENT Bilateral     RIGHT KNEE 01/2016   • TONSILLECTOMY AND ADENOIDECTOMY     • TRACHEOSTOMY      SECONDARY TO SLEEP APNEA   • VENTRAL HERNIA REPAIR N/A 2/23/2022    Procedure: ROBOTIC UMBILICAL HERNIA REPAIR WITH MESH PLACEMENT;  Surgeon: Garrett Anderson MD;  Location: [facility] MAIN OR;  [facility];  Laterality: N/A;      General Information     Row Name 03/22/22 1525          Physical Therapy Time and Intention    Document Type therapy note (daily note)  -PH     Mode of Treatment physical therapy  -PH     Row Name 03/22/22 1525          General Information    Existing Precautions/Restrictions fall  -PH     Barriers to Rehab previous functional deficit;medically complex  trach  -PH     Row Name 03/22/22 1525          Stairs Within Home, Primary    Number of Stairs, Within Home, Primary two  -PH     Row Name 03/22/22 1525          Safety Issues, Functional Mobility    Impairments Affecting Function (Mobility) balance;endurance/activity tolerance;postural/trunk control;range of motion (ROM);pain  -PH     Comment, Safety Issues/Impairments (Mobility) non skid shoes and gt belt worn for safety  -PH           User Key  (r) = Recorded By, (t) = Taken By, (c) = Cosigned By    Initials Name Provider Type    PH Kalyn Kelsey PTA Physical Therapist Assistant               Mobility     Row Name 03/22/22 1526          Bed Mobility    Supine-Sit [place] (Bed Mobility) not tested  -PH     Sit-Supine [place] (Bed Mobility) not tested  -PH     Comment, (Bed Mobility) NT - UIC  -PH     Row Name 03/22/22 1526          Sit-Stand Transfer    Sit-Stand [place] (Transfers) contact guard;verbal cues  -PH     Assistive Device (Sit-Stand Transfers) walker, front-wheeled  -PH     Comment, (Sit-Stand Transfer) approp use of B hands for push off; educ to pt w/ sitting back down in chair to keep fww close  -PH     Row Name 03/22/22 1526          Gait/Stairs  (Locomotion)    [place] Level (Gait) contact guard;standby assist  -PH     Assistive Device (Gait) walker, front-wheeled  -PH     Distance in Feet (Gait) 210  -PH     Deviations/Abnormal Patterns (Gait) jenna decreased;gait speed decreased;antalgic  -PH     Bilateral Gait Deviations forward flexed posture  -PH     [place] Level (Stairs) contact guard;verbal cues  -PH     Handrail Location (Stairs) right side (ascending);left side (descending)  -PH     Number of Steps (Stairs) 3  -PH     Ascending Technique (Stairs) step-to-step  -PH     Descending Technique (Stairs) step-to-step  -PH     Comment, (Gait/Stairs) pt educ for 3 DAVID w/ step to step pattern 2/2 pain in R knee. pt slow, steady w/ no LOB  -PH           User Key  (r) = Recorded By, (t) = Taken By, (c) = Cosigned By    Initials Name Provider Type    PH Huckendunonar, Kalyn, PTA Physical Therapist Assistant               Obj/Interventions    No documentation.                Goals/Plan    No documentation.                Clinical Impression     Row Name 03/22/22 1528          Pain    Pretreatment Pain Rating 5/10  -PH     Posttreatment Pain Rating 5/10  -PH     Pain Location - Side/Orientation Right  -PH     Pain Location - knee  -PH     Pain Intervention(s) Repositioned;Ambulation/increased activity;Rest  -PH     Row Name 03/22/22 1528          Plan of Care Review    Plan of Care Reviewed With patient  -PH     Progress improving  -PH     Outcome Evaluation Pt making improvements w/ PT today. Pt stood req CG and use of fww. Pt then amb 260' req SBA and use of fww. Pt exhibiting slow, antalgic gait w/ c/o pain in R knee.  Pt educ for and asc/dsc 3 steps w/ 1 HR and step to step pattern. no overt safety issues noted w/ no LOB. Pt states he plans to return home after dc and declines  PT.  -PH     Row Name 03/22/22 1528          Positioning and Restraints    Pre-Treatment Position sitting in chair/recliner  -PH     Post Treatment Position chair  -PH      In Chair sitting;call light within reach;encouraged to call for assist  -PH           User Key  (r) = Recorded By, (t) = Taken By, (c) = Cosigned By    Initials Name Provider Type    Kalyn Grider PTA Physical Therapist Assistant               Outcome Measures     Row Name 03/22/22 1532          How much help from another person do you currently need...    Turning from your back to your side while in flat bed without using bedrails? 4  -PH     Moving from lying on back to sitting on the side of a flat bed without bedrails? 3  -PH     Moving to and from a bed to a chair (including a wheelchair)? 3  -PH     Standing up from a chair using your arms (e.g., wheelchair, bedside chair)? 3  -PH     Climbing 3-5 steps with a railing? 2  -PH     To walk in hospital room? 3  -PH     AM-PAC 6 Clicks Score (PT) 18  -PH     Row Name 03/22/22 1532          Functional Assessment    Outcome Measure Options AM-PAC 6 Clicks Basic Mobility (PT)  -PH           User Key  (r) = Recorded By, (t) = Taken By, (c) = Cosigned By    Initials Name Provider Type     Kalyn Kelsey PTA Physical Therapist Assistant                             Physical Therapy Education                 Title: PT OT SLP Therapies (Done)     Topic: Physical Therapy (Done)     Point: Mobility training (Done)     Learning Progress Summary           Patient Acceptance, E,D, VU by  at 3/22/2022 1533    Acceptance, E,TB, VU,NR by MS at 3/19/2022 1324                   Point: Home exercise program (Done)     Learning Progress Summary           Patient Acceptance, E,D, VU by  at 3/22/2022 1533    Acceptance, E,TB, VU,NR by MS at 3/19/2022 1324                   Point: Body mechanics (Done)     Learning Progress Summary           Patient Acceptance, E,D, VU by  at 3/22/2022 1533    Acceptance, E,TB, VU,NR by MS at 3/19/2022 1324                   Point: Precautions (Done)     Learning Progress Summary           Patient Acceptance, E,D, VU by  PH at 3/22/2022 1533    Acceptance, E,TB, VU,NR by MS at 3/19/2022 1324                               User Key     Initials Effective Dates Name Provider Type Discipline    MS 06/16/21 -  Jovana Matos PT Physical Therapist PT    PH 06/16/21 -  Kalyn Kelsey PTA Physical Therapist Assistant PT              PT Recommendation and Plan     Plan of Care Reviewed With: patient  Progress: improving  Outcome Evaluation: Pt making improvements w/ PT today. Pt stood req CG and use of fww. Pt then amb 260' req SBA and use of fww. Pt exhibiting slow, antalgic gait w/ c/o pain in R knee.  Pt educ for and asc/dsc 3 steps w/ 1 HR and step to step pattern. no overt safety issues noted w/ no LOB. Pt states he plans to return home after dc and declines HH PT.     Time Calculation:    PT Charges     Row Name 03/22/22 1533             Time Calculation    Start Time 1356  -PH      Stop Time 1408  -PH      Time Calculation (min) 12 min  -PH      PT Received On 03/22/22  -PH      PT - Next Appointment 03/23/22  -PH              Timed Charges    50713 - PT Therapeutic Activity Minutes 12  -PH              Total Minutes    Timed Charges Total Minutes 12  -PH       Total Minutes 12  -PH            User Key  (r) = Recorded By, (t) = Taken By, (c) = Cosigned By    Initials Name Provider Type    PH Kalyn Kelsey PTA Physical Therapist Assistant              Therapy Charges for Today     Code Description Service Date Service Provider Modifiers Qty    [id number]  PT THERAPEUTIC ACT EA 15 MIN 3/22/2022 Kalyn Kelsey PTA GP 1          PT G-Codes  Outcome Measure Options: AM-PAC 6 Clicks Basic Mobility (PT)  AM-PAC 6 Clicks Score (PT): 18    Kalyn Kelsey PTA  3/22/2022

## 2022-03-22 NOTE — PLAN OF CARE
Goal Outcome Evaluation:  Plan of Care Reviewed With: patient        Progress: improving  Outcome Evaluation: Pt making improvements w/ PT today. Pt stood req CG and use of fww. Pt then amb 260' req SBA and use of fww. Pt exhibiting slow, antalgic gait w/ c/o pain in R knee.  Pt educ for and asc/dsc 3 steps w/ 1 HR and step to step pattern. no overt safety issues noted w/ no LOB. Pt states he plans to return home after dc and declines  PT.    Patient was intermittently wearing a face mask during this therapy encounter. Therapist used appropriate personal protective equipment including eye protection, mask, and gloves.  Mask used was standard procedure mask. Appropriate PPE was worn during the entire therapy session. Hand hygiene was completed before and after therapy session. Patient is not in enhanced droplet precautions.

## 2022-03-22 NOTE — PROGRESS NOTES
Name: Fabio Juárez ADMIT: 3/18/2022   : 1965  PCP: Karley Amor APRN    MRN: [id number] LOS: 4 days   AGE/SEX: 57 y.o. male  ROOM: [facility]     Subjective   Subjective   Sitting up in chair.  Had 2 bowel movements yesterday.  Feeling well overall except for his left knee.  Says it hurts so bad he is not able to bear weight    Review of Systems  Denies chest pain, fever, headache, lower extremity swelling     Objective   Objective   Vital Signs  Temp:  [97.9 °F (36.6 °C)-98.4 °F (36.9 °C)] 98 °F (36.7 °C)  Heart Rate:  [81-83] 83  Resp:  [16-18] 16  BP: (106-126)/(65-75) 126/65  SpO2:  [96 %-97 %] 97 %  on  Flow (L/min):  [2] 2;   Device (Oxygen Therapy): room air  Body mass index is 45.1 kg/m².   Physical Exam  Constitutional:       General: He is not in acute distress.     Appearance: He is obese. He is not diaphoretic.   HENT:      Mouth/Throat:      Mouth: Mucous membranes are moist.   Eyes:      General: No scleral icterus.  Cardiovascular:      Rate and Rhythm: Normal rate and regular rhythm.   Pulmonary:      Effort: Pulmonary effort is normal. No respiratory distress.      Breath sounds: No wheezing or rhonchi.   Abdominal:      General: There is no distension.      Palpations: Abdomen is soft.      Tenderness: There is no abdominal tenderness. There is no guarding.   Musculoskeletal:      Right lower leg: No edema.      Left lower leg: No edema.   Skin:     General: Skin is warm and dry.   Neurological:      Mental Status: He is alert.   Psychiatric:         Mood and Affect: Mood normal.         Results Review     I reviewed the patient's new clinical results.  Results from last 7 days   Lab Units 22  0652 22  0658 22  0419 22  1249   WBC 10*3/mm3 7.69 9.08 10.25 12.29*   HEMOGLOBIN g/dL 14.4 14.6 15.0 15.2   PLATELETS 10*3/mm3 247 279 294 306     Results from last 7 days   Lab Units 22  0652 22  0658 22  0419 22  1249   SODIUM mmol/L 135*  131* 133* 135*   POTASSIUM mmol/L 3.7 3.5 3.6 3.9   CHLORIDE mmol/L 97* 95* 97* 99   CO2 mmol/L 28.0 27.0 27.0 27.0   BUN mg/dL 15 17 17 18   CREATININE mg/dL 0.74* 0.77 0.76 0.90   GLUCOSE mg/dL 152* 175* 138* 134*   Estimated Creatinine Clearance: 135.7 mL/min (A) (by C-G formula based on SCr of 0.74 mg/dL (L)).  Results from last 7 days   Lab Units 03/21/22  0652 03/20/22  0658 03/19/22  0419 03/18/22  1249   ALBUMIN g/dL 3.00* 3.80 3.70 4.00   BILIRUBIN mg/dL 0.9 1.4* 1.8* 1.1   ALK PHOS U/L 81 73 71 69   AST (SGOT) U/L 16 15 12 16   ALT (SGPT) U/L 20 17 21 19     Results from last 7 days   Lab Units 03/21/22  0652 03/20/22  0658 03/19/22  0419 03/18/22  1249   CALCIUM mg/dL 8.8 9.1 8.9 9.3   ALBUMIN g/dL 3.00* 3.80 3.70 4.00   MAGNESIUM mg/dL  --   --   --  1.7     Results from last 7 days   Lab Units 03/18/22  1249   PROCALCITONIN ng/mL 0.05     COVID19   Date Value Ref Range Status   03/18/2022 Not Detected Not Detected - Ref. Range Final     SARS-CoV-2 YAW   Date Value Ref Range Status   09/09/2021 Not Detected Not Detected Final     Glucose   Date/Time Value Ref Range Status   03/22/2022 1153 174 (H) 70 - 130 mg/dL Final     Comment:     Meter: [id number] : [id number] Cindy Malave NA   03/22/2022 0635 105 70 - 130 mg/dL Final     Comment:     Meter: [id number] : [id number] O'Nosto LIVE   03/21/2022 2215 106 70 - 130 mg/dL Final     Comment:     Meter: [id number] : [id number] O'Pet360ie LIVE   03/21/2022 1559 118 70 - 130 mg/dL Final     Comment:     Meter: [id number] : [id number] Derrell SOUZA   03/21/2022 1101 189 (H) 70 - 130 mg/dL Final     Comment:     Meter: [id number] : [id number] Derrell SOUZA   03/21/2022 0640 189 (H) 70 - 130 mg/dL Final     Comment:     Meter: [id number] : [id number] Yan MANCINI   03/20/2022 2118 139 (H) 70 - 130 mg/dL Final     Comment:     Meter: [id number] : [id number] Ayanna Blanc RN       XR Knee 1 or 2 View  Right  Narrative: XR KNEE 1 OR 2 VW RIGHT-     INDICATIONS: Pain     TECHNIQUE: 2 views of the right knee     COMPARISON: None available     FINDINGS:     Right knee arthroplasty hardware appears intact. No acute fracture,  erosion, or dislocation is identified. Trace knee effusion is suggested.  Faint arterial calcification is apparent. Follow-up/further evaluation  can be obtained as indications persist.     Impression:    As described.           This report was finalized on 3/21/2022 4:31 PM by Dr. Frank Salinas M.D.     XR Abdomen KUB  Narrative: XR ABDOMEN KUB-     INDICATIONS: Constipation     TECHNIQUE: Supine views of the abdomen     COMPARISON: None available     FINDINGS:      The bowel gas pattern is nonspecific, with gaseous distention of  transverse colon, proximal colonic fecal retention. No supine evidence  for free intraperitoneal gas. No definite nephrolithiasis..  Follow-up/further evaluation can be obtained as indications persist.     Impression:    As described.     This report was finalized on 3/21/2022 2:20 PM by Dr. Frank Salinas M.D.       I reviewed the patient's daily medications.  Scheduled Medications  atorvastatin, 80 mg, Oral, Nightly  baclofen, 5 mg, Oral, Nightly  cetirizine, 5 mg, Oral, Daily  enoxaparin, 40 mg, Subcutaneous, Q24H  gabapentin, 300 mg, Oral, Q8H  glipizide, 5 mg, Oral, QAM AC  hydroCHLOROthiazide, 12.5 mg, Oral, Daily  insulin lispro, 0-7 Units, Subcutaneous, TID AC  lactulose, 20 g, Oral, TID  lisinopril, 10 mg, Oral, Daily  senna-docusate sodium, 2 tablet, Oral, BID  sodium chloride, 10 mL, Intravenous, Q12H  traZODone, 50 mg, Oral, Nightly    Infusions   Diet  Diet Regular; Cardiac, Consistent Carbohydrate, Renal       Assessment/Plan     Active Hospital Problems    Diagnosis  POA   • **Hypoxia [R09.02]  Yes   • Obesity, Class III, BMI 40-49.9 (morbid obesity) ([facility]) [E66.01]  Yes   • Opiate dependence ([facility]) [F11.20]  Yes   • Pleuritic chest pain  [R07.81]  Yes   • Dyspnea [R06.00]  Yes   • Tracheostomy status ([facility]) [Z93.0]  Not Applicable   • Essential (primary) hypertension [I10]  Yes   • Hemochromatosis [E83.119]  Yes   • DM2 (diabetes mellitus, type 2) ([facility]) [E11.9]  Yes   • Chronic low back pain [M54.50, G89.29]  Yes   • Chronic neck pain [M54.2, G89.29]  Yes      Resolved Hospital Problems   No resolved problems to display.       57 y.o. male with history of morbid obesity, sleep apnea, diabetes and hypertension who presented with progressive shortness of breath and pleuritic chest pain following laparoscopic umbilical hernia repair 3 weeks prior.  He has been hypoxic since admission, most likely due to postoperative atelectasis, with constipation contributing to his difficulty breathing.    Today: Complains of severe left knee pain.  X-ray yesterday with no fracture, previous arthroplasty in place.  Will ask orthopedics to evaluate him as he says he is not able to bear weight.  If okay with Ortho, he can be discharged today.  Can discharge with a 1 week course of meloxicam  No home oxygen needed    Pleuritic chest pain with hypoxia:  -CTA chest on admission was negative for pneumonia or pulmonary embolism.  It did show bibasilar atelectasis  -Encourage incentive spirometry and out of bed ambulation.  Pulmonology evaluated     Constipation: KUB today shows gaseous distention of the transverse colon with proximal colonic fecal retention.   - Continue oral bowel regimen, give bisacodyl suppository today.  If no bowel movement by later today, will need enema    Severe sleep apnea, status post tracheostomy:  Continue trach care.     Diabetes mellitus: Glucose acceptable currently.  Continue sliding scale insulin    Hypertension: Blood pressure acceptable acutely.  Continue HCTZ and lisinopril    Chronic back pain: Continue gabapentin, baclofen and oxycodone as needed.  Wean off IV pain medicine as tolerated     Lung nodule: Subcentimeter pulmonary nodule in  the right upper lobe. Follow-up  chest CT in 12 months    · Lovenox 40 mg SC daily for DVT prophylaxis.  · Full code.  · Discussed with patient, nursing staff and CCP.  · Anticipate discharge home today      Mesha Marie DO  [facility]  03/22/22  15:32 EDT    Patient was placed in face mask on first look.  I wore protective equipment throughout this patient encounter including a face mask, gloves and protective eyewear.  Hand hygiene was performed before donning protective equipment and after removal when leaving the room.

## 2022-03-22 NOTE — THERAPY EVALUATION
Exercise Oximetry    Patient Name:Fabio Juárez   MRN: [id number]   Date: 03/22/22             ROOM AIR BASELINE   SpO2%       93   Heart Rate    Blood Pressure      EXERCISE ON ROOM AIR SpO2% EXERCISE ON O2 @  LPM SpO2%   1 MINUTE       96 1 MINUTE    2 MINUTES       95 2 MINUTES    3 MINUTES       95 3 MINUTES    4 MINUTES      92 4 MINUTES    5 MINUTES     92 5 MINUTES    6 MINUTES      92 6 MINUTES               Distance Walked   Distance Walked   Dyspnea (Ruby Scale)   Dyspnea (Ruby Scale)   Fatigue (Ruby Scale)   Fatigue (Ruby Scale)   SpO2% Post Exercise   SpO2% Post Exercise   HR Post Exercise   HR Post Exercise   Time to Recovery   Time to Recovery     Comments:     Used a forehead probe on patient for good measure. Used a walker and patient had stated he had knee soreness but was able to walk. We walked slowly but well in the vee. sats never dropped below 92%. Left on room air back in his room. Patient sat probe on finger wasn't picking up well it seemed like according to the waveform.     Thank you   juan leslie RRT

## 2022-03-23 LAB
APPEARANCE FLD: ABNORMAL
COLOR FLD: YELLOW
CRYSTALS FLD MICRO: NORMAL
GLUCOSE BLDC GLUCOMTR-MCNC: 114 MG/DL (ref 70–130)
GLUCOSE BLDC GLUCOMTR-MCNC: 115 MG/DL (ref 70–130)
GLUCOSE BLDC GLUCOMTR-MCNC: 155 MG/DL (ref 70–130)
GLUCOSE BLDC GLUCOMTR-MCNC: 218 MG/DL (ref 70–130)
METHOD: ABNORMAL
MONOS+MACROS NFR FLD: 3 %
NEUTROPHILS NFR FLD MANUAL: 97 %
NUC CELL # FLD: 8087 /MM3
RBC # FLD AUTO: 7000 /MM3

## 2022-03-23 PROCEDURE — 87205 SMEAR GRAM STAIN: CPT | Performed by: ORTHOPAEDIC SURGERY

## 2022-03-23 PROCEDURE — 20610 DRAIN/INJ JOINT/BURSA W/O US: CPT | Performed by: ORTHOPAEDIC SURGERY

## 2022-03-23 PROCEDURE — 82962 GLUCOSE BLOOD TEST: CPT

## 2022-03-23 PROCEDURE — 89051 BODY FLUID CELL COUNT: CPT | Performed by: ORTHOPAEDIC SURGERY

## 2022-03-23 PROCEDURE — 25010000002 ENOXAPARIN PER 10 MG: Performed by: STUDENT IN AN ORGANIZED HEALTH CARE EDUCATION/TRAINING PROGRAM

## 2022-03-23 PROCEDURE — 87075 CULTR BACTERIA EXCEPT BLOOD: CPT | Performed by: ORTHOPAEDIC SURGERY

## 2022-03-23 PROCEDURE — 87015 SPECIMEN INFECT AGNT CONCNTJ: CPT | Performed by: ORTHOPAEDIC SURGERY

## 2022-03-23 PROCEDURE — 99231 SBSQ HOSP IP/OBS SF/LOW 25: CPT | Performed by: ORTHOPAEDIC SURGERY

## 2022-03-23 PROCEDURE — 63710000001 INSULIN LISPRO (HUMAN) PER 5 UNITS: Performed by: INTERNAL MEDICINE

## 2022-03-23 PROCEDURE — 87070 CULTURE OTHR SPECIMN AEROBIC: CPT | Performed by: ORTHOPAEDIC SURGERY

## 2022-03-23 PROCEDURE — 89060 EXAM SYNOVIAL FLUID CRYSTALS: CPT | Performed by: ORTHOPAEDIC SURGERY

## 2022-03-23 PROCEDURE — 0S9C3ZX DRAINAGE OF RIGHT KNEE JOINT, PERCUTANEOUS APPROACH, DIAGNOSTIC: ICD-10-PCS | Performed by: ORTHOPAEDIC SURGERY

## 2022-03-23 RX ORDER — ACETAMINOPHEN 650 MG
TABLET, EXTENDED RELEASE ORAL ONCE
Status: COMPLETED | OUTPATIENT
Start: 2022-03-23 | End: 2022-03-23

## 2022-03-23 RX ORDER — LIDOCAINE HYDROCHLORIDE 10 MG/ML
5 INJECTION, SOLUTION EPIDURAL; INFILTRATION; INTRACAUDAL; PERINEURAL ONCE
Status: COMPLETED | OUTPATIENT
Start: 2022-03-23 | End: 2022-03-23

## 2022-03-23 RX ORDER — MELOXICAM 15 MG/1
15 TABLET ORAL DAILY
Status: DISCONTINUED | OUTPATIENT
Start: 2022-03-24 | End: 2022-03-24 | Stop reason: HOSPADM

## 2022-03-23 RX ADMIN — INSULIN LISPRO 3 UNITS: 100 INJECTION, SOLUTION INTRAVENOUS; SUBCUTANEOUS at 12:02

## 2022-03-23 RX ADMIN — LISINOPRIL 10 MG: 10 TABLET ORAL at 10:06

## 2022-03-23 RX ADMIN — GABAPENTIN 300 MG: 300 CAPSULE ORAL at 22:03

## 2022-03-23 RX ADMIN — DOCUSATE SODIUM 50MG AND SENNOSIDES 8.6MG 2 TABLET: 8.6; 5 TABLET, FILM COATED ORAL at 10:06

## 2022-03-23 RX ADMIN — BACLOFEN 5 MG: 10 TABLET ORAL at 22:04

## 2022-03-23 RX ADMIN — LACTULOSE 20 G: 20 SOLUTION ORAL at 22:03

## 2022-03-23 RX ADMIN — OXYCODONE 15 MG: 5 TABLET ORAL at 07:35

## 2022-03-23 RX ADMIN — ATORVASTATIN CALCIUM 80 MG: 80 TABLET, FILM COATED ORAL at 22:04

## 2022-03-23 RX ADMIN — GABAPENTIN 300 MG: 300 CAPSULE ORAL at 06:48

## 2022-03-23 RX ADMIN — GLIPIZIDE 5 MG: 5 TABLET ORAL at 06:48

## 2022-03-23 RX ADMIN — DOCUSATE SODIUM 50MG AND SENNOSIDES 8.6MG 2 TABLET: 8.6; 5 TABLET, FILM COATED ORAL at 22:04

## 2022-03-23 RX ADMIN — GABAPENTIN 300 MG: 300 CAPSULE ORAL at 15:53

## 2022-03-23 RX ADMIN — HYDROCHLOROTHIAZIDE 12.5 MG: 12.5 CAPSULE ORAL at 10:06

## 2022-03-23 RX ADMIN — ENOXAPARIN SODIUM 40 MG: 100 INJECTION SUBCUTANEOUS at 15:52

## 2022-03-23 RX ADMIN — INSULIN LISPRO 2 UNITS: 100 INJECTION, SOLUTION INTRAVENOUS; SUBCUTANEOUS at 07:35

## 2022-03-23 RX ADMIN — OXYCODONE 15 MG: 5 TABLET ORAL at 22:03

## 2022-03-23 RX ADMIN — TRAZODONE HYDROCHLORIDE 50 MG: 50 TABLET ORAL at 22:03

## 2022-03-23 RX ADMIN — CETIRIZINE HYDROCHLORIDE 5 MG: 10 TABLET ORAL at 10:06

## 2022-03-23 RX ADMIN — LACTULOSE 20 G: 20 SOLUTION ORAL at 10:05

## 2022-03-23 RX ADMIN — Medication 10 ML: at 10:06

## 2022-03-23 RX ADMIN — LACTULOSE 20 G: 20 SOLUTION ORAL at 17:33

## 2022-03-23 RX ADMIN — LIDOCAINE HYDROCHLORIDE ANHYDROUS 5 ML: 10 INJECTION, SOLUTION INFILTRATION at 16:05

## 2022-03-23 RX ADMIN — POVIDONE-IODINE: 10 SOLUTION TOPICAL at 16:05

## 2022-03-23 RX ADMIN — Medication 10 ML: at 22:04

## 2022-03-23 NOTE — PROGRESS NOTES
Name: Fabio Juárez ADMIT: 3/18/2022   : 1965  PCP: Karley Amor APRN    MRN: [id number] LOS: 0 days   AGE/SEX: 57 y.o. male  ROOM: [facility] [[email or web address]     Subjective   Subjective   Sitting up in chair. Knee pain is improved with meloxicam. He was able to walk with PT.     Review of Systems  Denies chest pain, fever, headache, lower extremity swelling     Objective   Objective   Vital Signs  Temp:  [98.2 °F (36.8 °C)-99.2 °F (37.3 °C)] 99.2 °F (37.3 °C)  Heart Rate:  [83-91] 91  Resp:  [16-18] 18  BP: ()/(60-79) 114/79  SpO2:  [93 %-100 %] 93 %  on  Flow (L/min):  [2] 2;   Device (Oxygen Therapy): room air  Body mass index is 45.1 kg/m².   Physical Exam  Constitutional:       General: He is not in acute distress.     Appearance: He is obese. He is not diaphoretic.   HENT:      Mouth/Throat:      Mouth: Mucous membranes are moist.   Eyes:      General: No scleral icterus.  Cardiovascular:      Rate and Rhythm: Normal rate and regular rhythm.   Pulmonary:      Effort: Pulmonary effort is normal. No respiratory distress.      Breath sounds: No wheezing or rhonchi.   Abdominal:      General: There is no distension.      Palpations: Abdomen is soft.      Tenderness: There is no abdominal tenderness. There is no guarding.   Musculoskeletal:      Right lower leg: No edema.      Left lower leg: No edema.   Skin:     General: Skin is warm and dry.   Neurological:      Mental Status: He is alert.   Psychiatric:         Mood and Affect: Mood normal.         Results Review     I reviewed the patient's new clinical results.  Results from last 7 days   Lab Units 22  0652 22  0658 22  0419   WBC 10*3/mm3 9.41 7.69 9.08 10.25   HEMOGLOBIN g/dL 14.6 14.4 14.6 15.0   PLATELETS 10*3/mm3 244 247 279 294     Results from last 7 days   Lab Units 22  0652 22  0658 22  0419 22  1249   SODIUM mmol/L 135* 131* 133* 135*   POTASSIUM mmol/L 3.7 3.5 3.6 3.9   CHLORIDE mmol/L  97* 95* 97* 99   CO2 mmol/L 28.0 27.0 27.0 27.0   BUN mg/dL 15 17 17 18   CREATININE mg/dL 0.74* 0.77 0.76 0.90   GLUCOSE mg/dL 152* 175* 138* 134*   Estimated Creatinine Clearance: 135.7 mL/min (A) (by C-G formula based on SCr of 0.74 mg/dL (L)).  Results from last 7 days   Lab Units 03/21/22  0652 03/20/22  0658 03/19/22  0419 03/18/22  1249   ALBUMIN g/dL 3.00* 3.80 3.70 4.00   BILIRUBIN mg/dL 0.9 1.4* 1.8* 1.1   ALK PHOS U/L 81 73 71 69   AST (SGOT) U/L 16 15 12 16   ALT (SGPT) U/L 20 17 21 19     Results from last 7 days   Lab Units 03/21/22  0652 03/20/22  0658 03/19/22  0419 03/18/22  1249   CALCIUM mg/dL 8.8 9.1 8.9 9.3   ALBUMIN g/dL 3.00* 3.80 3.70 4.00   MAGNESIUM mg/dL  --   --   --  1.7     Results from last 7 days   Lab Units 03/18/22  1249   PROCALCITONIN ng/mL 0.05     COVID19   Date Value Ref Range Status   03/18/2022 Not Detected Not Detected - Ref. Range Final     SARS-CoV-2 YAW   Date Value Ref Range Status   09/09/2021 Not Detected Not Detected Final     Glucose   Date/Time Value Ref Range Status   03/23/2022 1556 114 70 - 130 mg/dL Final     Comment:     Meter: [id number] : [id number] Lambert Mejiasd NA   03/23/2022 1056 218 (H) 70 - 130 mg/dL Final     Comment:     Meter: [id number] : [id number] Goodman Prado NA   03/23/2022 0638 155 (H) 70 - 130 mg/dL Final     Comment:     Meter: [id number] : [id number] Joes Bullock NA   03/22/2022 2126 120 70 - 130 mg/dL Final     Comment:     Meter: [id number] : [id number] Jose Bullock    03/22/2022 1615 122 70 - 130 mg/dL Final     Comment:     Meter: [id number] : [id number] Goodknight Frieda NA   03/22/2022 1153 174 (H) 70 - 130 mg/dL Final     Comment:     Meter: [id number] : [id number] Goodknight Frieda NA   03/22/2022 0635 105 70 - 130 mg/dL Final     Comment:     Meter: [id number] : [id number] Yan MANCINI       XR Knee 1 or 2 View Right  Narrative: XR KNEE 1 OR 2 VW RIGHT-     INDICATIONS: Pain     TECHNIQUE: 2  views of the right knee     COMPARISON: None available     FINDINGS:     Right knee arthroplasty hardware appears intact. No acute fracture,  erosion, or dislocation is identified. Trace knee effusion is suggested.  Faint arterial calcification is apparent. Follow-up/further evaluation  can be obtained as indications persist.     Impression:    As described.           This report was finalized on 3/21/2022 4:31 PM by Dr. Frank Salinas M.D.     XR Abdomen KUB  Narrative: XR ABDOMEN KUB-     INDICATIONS: Constipation     TECHNIQUE: Supine views of the abdomen     COMPARISON: None available     FINDINGS:      The bowel gas pattern is nonspecific, with gaseous distention of  transverse colon, proximal colonic fecal retention. No supine evidence  for free intraperitoneal gas. No definite nephrolithiasis..  Follow-up/further evaluation can be obtained as indications persist.     Impression:    As described.     This report was finalized on 3/21/2022 2:20 PM by Dr. Frank Salinas M.D.       I reviewed the patient's daily medications.  Scheduled Medications  atorvastatin, 80 mg, Oral, Nightly  baclofen, 5 mg, Oral, Nightly  cetirizine, 5 mg, Oral, Daily  enoxaparin, 40 mg, Subcutaneous, Q24H  gabapentin, 300 mg, Oral, Q8H  glipizide, 5 mg, Oral, QAM AC  hydroCHLOROthiazide, 12.5 mg, Oral, Daily  insulin lispro, 0-7 Units, Subcutaneous, TID AC  lactulose, 20 g, Oral, TID  lisinopril, 10 mg, Oral, Daily  [START ON 3/24/2022] meloxicam, 15 mg, Oral, Daily  senna-docusate sodium, 2 tablet, Oral, BID  sodium chloride, 10 mL, Intravenous, Q12H  traZODone, 50 mg, Oral, Nightly    Infusions   Diet  Diet Regular; Cardiac, Consistent Carbohydrate, Renal       Assessment/Plan     Active Hospital Problems    Diagnosis  POA   • **Hypoxia [R09.02]  Yes   • Obesity, Class III, BMI 40-49.9 (morbid obesity) (Formerly [facility]) [E66.01]  Yes   • Opiate dependence ([facility]) [F11.20]  Yes   • Pleuritic chest pain [R07.81]  Yes   • Dyspnea [R06.00]   Yes   • Tracheostomy status (HCC) [Z93.0]  Not Applicable   • Essential (primary) hypertension [I10]  Yes   • Hemochromatosis [E83.119]  Yes   • DM2 (diabetes mellitus, type 2) (HCC) [E11.9]  Yes   • Chronic low back pain [M54.50, G89.29]  Yes   • Chronic neck pain [M54.2, G89.29]  Yes      Resolved Hospital Problems   No resolved problems to display.       57 y.o. male with history of morbid obesity, sleep apnea, diabetes and hypertension who presented with progressive shortness of breath and pleuritic chest pain following laparoscopic umbilical hernia repair 3 weeks prior.  He has been hypoxic since admission, most likely due to postoperative atelectasis, with constipation contributing to his difficulty breathing.    Today: Knee aspiration today by ortho, will follow up results for crystals, WBC or bacteria. Pain is improving with NSAID.    Pleuritic chest pain with hypoxia:  -CTA chest on admission was negative for pneumonia or pulmonary embolism.  It did show bibasilar atelectasis  -Encourage incentive spirometry and out of bed ambulation.  Pulmonology evaluated     Constipation: resolved. Continue oral bowel regimen    Severe sleep apnea, status post tracheostomy:  Continue trach care.     Diabetes mellitus: Glucose acceptable currently.  Continue sliding scale insulin    Hypertension: Blood pressure acceptable acutely.  Continue HCTZ and lisinopril    Chronic back pain: Continue gabapentin, baclofen and oxycodone as needed.       Lung nodule: Subcentimeter pulmonary nodule in the right upper lobe. Follow-up  chest CT in 12 months    · Lovenox 40 mg SC daily for DVT prophylaxis.  · Full code.  · Discussed with patient, nursing staff and CCP.  · Anticipate discharge home today      Mesha Marie DO  [facility]  03/23/22  18:04 EDT    Patient was placed in face mask on first look.  I wore protective equipment throughout this patient encounter including a face mask, gloves and protective  eyewear.  Hand hygiene was performed before donning protective equipment and after removal when leaving the room.

## 2022-03-23 NOTE — PROGRESS NOTES
I saw the patient this afternoon at the request of my partner as he was not on campus today.  Patient had ESR and CRP labs drawn that were elevated and given his history of a previous knee replacement knee aspiration was warranted.    Patient states his knee feels better today he can lift it and move it more.  Still some discomfort with range of motion which is still limited.    Exam  Some tenderness palpation with deep palpation specifically laterally no overt knee warmth or redness.  Possible slight effusion.  Knee range of motion 0 to 60 degrees.      Knee aspiration was performed bedside.  The right knee was cleaned with alcohol and Betadine.  Some local lidocaine anesthetic was administered.  We were able to aspirate about 40 cc of dark yellow fluid that did not appear purulent.  Aspiration was sent to the lab.  Patient tolerated this well.    We will follow up lab results.    Please call with any questions or concerns thank you    Saurav Sauer MD

## 2022-03-23 NOTE — PLAN OF CARE
Problem: Adult Inpatient Plan of Care  Goal: Plan of Care Review  Outcome: Ongoing, Progressing  Flowsheets (Taken 3/23/2022 1130)  Outcome Evaluation:   VSS   on RA. Refused bath despite education. Some synovial join fluid planned to be aspirated before d/c.  Plan to d/c today. Will continue to monitor.     Problem: Adult Inpatient Plan of Care  Goal: Absence of Hospital-Acquired Illness or Injury  Outcome: Ongoing, Progressing  Intervention: Identify and Manage Fall Risk  Recent Flowsheet Documentation  Taken 3/23/2022 0957 by Tori Gonsalez RN  Safety Promotion/Fall Prevention:   activity supervised   room organization consistent   safety round/check completed  Intervention: Prevent Skin Injury  Recent Flowsheet Documentation  Taken 3/23/2022 0957 by Tori Gonsalez RN  Body Position: (sitting in chair) --  Skin Protection: adhesive use limited  Intervention: Prevent and Manage VTE (Venous Thromboembolism) Risk  Recent Flowsheet Documentation  Taken 3/23/2022 0957 by Tori Gonsalez RN  Activity Management: activity adjusted per tolerance  VTE Prevention/Management:   bilateral   dorsiflexion/plantar flexion performed  Intervention: Prevent Infection  Recent Flowsheet Documentation  Taken 3/23/2022 0957 by Tori Gonsalez RN  Infection Prevention:   cohorting utilized   hand hygiene promoted   Goal Outcome Evaluation:              Outcome Evaluation: VSS; on RA. Refused bath despite education. Some synovial join fluid planned to be aspirated before d/c.  Plan to d/c today. Will continue to monitor.

## 2022-03-23 NOTE — CONSULTS
Orthopedic Consult      Patient: Fabio Juárez    Date of Admission: 3/18/2022 12:33 PM    YOB: 1965    Medical Record Number: [id number]    Consulting Physician: Mesha Marie DO    Chief Complaints: Right knee pain    History of Present Illness: 57 y.o. male admitted to [facility] to services of Mesha Marie DO seen for evaluation of a complaint of right knee pain.  He has a history of a right total knee arthroplasty about 4 years ago per his report.  He says the knee was doing fine until yesterday when it started to bother him.  Denies any injury, precipitating event or factor.  Denies any fevers, redness or swelling.  Says the pain is rather diffuse and severe.  It is worse with standing or walking.    Allergies: No Known Allergies    Home Medications:    Current Facility-Administered Medications:   •  acetaminophen (TYLENOL) tablet 650 mg, 650 mg, Oral, Q4H PRN **OR** acetaminophen (TYLENOL) 160 MG/5ML solution 650 mg, 650 mg, Oral, Q4H PRN **OR** acetaminophen (TYLENOL) suppository 650 mg, 650 mg, Rectal, Q4H PRN, Petra Goodman MD  •  atorvastatin (LIPITOR) tablet 80 mg, 80 mg, Oral, Nightly, Petra Goodman MD, 80 mg at 03/21/22 2026  •  baclofen (LIORESAL) tablet 5 mg, 5 mg, Oral, Nightly, Petra Goodman MD, 5 mg at 03/21/22 2025  •  sennosides-docusate (PERICOLACE) 8.6-50 MG per tablet 2 tablet, 2 tablet, Oral, BID, 2 tablet at 03/22/22 0910 **AND** polyethylene glycol (MIRALAX) packet 17 g, 17 g, Oral, Daily PRN **AND** bisacodyl (DULCOLAX) EC tablet 5 mg, 5 mg, Oral, Daily PRN **AND** bisacodyl (DULCOLAX) suppository 10 mg, 10 mg, Rectal, Daily PRN, Sneha Mujica, ONEIL  •  cetirizine (zyrTEC) tablet 5 mg, 5 mg, Oral, Daily, Petra Goodman MD, 5 mg at 03/22/22 0910  •  dextrose (D50W) (25 g/50 mL) IV injection 25 g, 25 g, Intravenous, Q15 Min PRN, iTco Sanches MD  •  dextrose (GLUTOSE) oral gel 15 g, 15 g, Oral, Q15 Min PRN, Tico Sanches MD  •  enoxaparin  (LOVENOX) syringe 40 mg, 40 mg, Subcutaneous, Q24H, Mesha Marie, , 40 mg at 03/22/22 1609  •  gabapentin (NEURONTIN) capsule 300 mg, 300 mg, Oral, Q8H, Petra Goodman MD, 300 mg at 03/22/22 1609  •  glipizide (GLUCOTROL) tablet 5 mg, 5 mg, Oral, QAM AC, Petra Goodman MD, 5 mg at 03/22/22 0910  •  glucagon (human recombinant) (GLUCAGEN DIAGNOSTIC) injection 1 mg, 1 mg, Intramuscular, Q15 Min PRN, Tico Sanches MD  •  hydroCHLOROthiazide (HYDRODIURIL) oral 12.5 mg, 12.5 mg, Oral, Daily, Petra Goodman MD, 12.5 mg at 03/22/22 0910  •  insulin lispro (ADMELOG) injection 0-7 Units, 0-7 Units, Subcutaneous, TID WAYNE, Tico Sanches MD, 2 Units at 03/22/22 1227  •  lactulose (CHRONULAC) 10 GM/15ML solution 20 g, 20 g, Oral, TID, Tico Sanches MD, 20 g at 03/21/22 2025  •  lisinopril (PRINIVIL,ZESTRIL) tablet 10 mg, 10 mg, Oral, Daily, Petra Goodman MD, 10 mg at 03/22/22 0910  •  morphine injection 4 mg, 4 mg, Intravenous, Q4H PRN, 4 mg at 03/21/22 1313 **AND** naloxone (NARCAN) injection 0.4 mg, 0.4 mg, Intravenous, Q5 Min PRN, Petra Goodman MD  •  nitroglycerin (NITROSTAT) SL tablet 0.4 mg, 0.4 mg, Sublingual, Q5 Min PRN, Petra Goodman MD  •  ondansetron (ZOFRAN) injection 4 mg, 4 mg, Intravenous, Q6H PRN, Petra Goodman MD, 4 mg at 03/21/22 1326  •  oxyCODONE (ROXICODONE) immediate release tablet 15 mg, 15 mg, Oral, Q6H PRN, Petra Goodman MD, 15 mg at 03/22/22 0909  •  sodium chloride 0.9 % flush 10 mL, 10 mL, Intravenous, Q12H, Petra Goodman MD, 10 mL at 03/22/22 0910  •  sodium chloride 0.9 % flush 10 mL, 10 mL, Intravenous, PRN, Petra Goodman MD  •  traZODone (DESYREL) tablet 50 mg, 50 mg, Oral, Nightly, Petra Goodman MD, 50 mg at 03/21/22 2026    Current Medications:  Scheduled Meds:atorvastatin, 80 mg, Oral, Nightly  baclofen, 5 mg, Oral, Nightly  cetirizine, 5 mg, Oral, Daily  enoxaparin, 40 mg, Subcutaneous, Q24H  gabapentin, 300 mg, Oral, Q8H  glipizide, 5 mg, Oral, QAM AC  hydroCHLOROthiazide, 12.5  mg, Oral, Daily  insulin lispro, 0-7 Units, Subcutaneous, TID AC  lactulose, 20 g, Oral, TID  lisinopril, 10 mg, Oral, Daily  senna-docusate sodium, 2 tablet, Oral, BID  sodium chloride, 10 mL, Intravenous, Q12H  traZODone, 50 mg, Oral, Nightly      Continuous Infusions:   PRN Meds:.•  acetaminophen **OR** acetaminophen **OR** acetaminophen  •  senna-docusate sodium **AND** polyethylene glycol **AND** bisacodyl **AND** bisacodyl  •  dextrose  •  dextrose  •  glucagon (human recombinant)  •  Morphine **AND** naloxone  •  nitroglycerin  •  ondansetron  •  oxyCODONE  •  sodium chloride    Past Medical History:   Diagnosis Date   • Chronic pain    • Claustrophobia    • Coronary artery disease     BLOCKAGE 2015, SEE'S DR DIEGO EVERY 2 YEARS, DENIED CP/SOB    • Essential (primary) hypertension    • GERD (gastroesophageal reflux disease)    • Hemochromatosis     ASYMPTOMATIC    • History of COVID-19    • Left upper quadrant pain    • Low back pain    • Lung nodule    • Mixed hyperlipidemia    • Obstructive sleep apnea (adult) (pediatric)    • Other testicular dysfunction    • Pain in right foot    • Pain in right shoulder    • Recurrent umbilical hernia    • Renal stone 03/2017   • Tracheostomy status (HCC)     R/T SLEEP APNEA    • Type 2 diabetes mellitus (HCC)     BG RUNS 120-125 IN AM        Past Surgical History:   Procedure Laterality Date   • ACHILLES TENDON REPAIR Right 10/2018   • APPENDECTOMY     • CARDIAC CATHETERIZATION  11/03/2015    LEFT VENTRIULOGRAM, CORONARY ANGIOGRAM    • CHOLECYSTECTOMY  07/2013   • COLONOSCOPY  07/28/2014    NORMAL RESULT    • HERNIA REPAIR     • JOINT REPLACEMENT Bilateral     RIGHT KNEE 01/2016   • TONSILLECTOMY AND ADENOIDECTOMY     • TRACHEOSTOMY      SECONDARY TO SLEEP APNEA   • VENTRAL HERNIA REPAIR N/A 2/23/2022    Procedure: ROBOTIC UMBILICAL HERNIA REPAIR WITH MESH PLACEMENT;  Surgeon: Garrett Anderson MD;  Location: [facility];  Service: Robotics - [facility];  Laterality:  N/A;       Social History     Occupational History   • Occupation: [employer] / [employer]    Tobacco Use   • Smoking status: Never Smoker   • Smokeless tobacco: Never Used   Vaping Use   • Vaping Use: Never used   Substance and Sexual Activity   • Alcohol use: Yes     Comment: SOCIALLY    • Drug use: Never   • Sexual activity: Defer      Social History     Social History Narrative   • Not on file       Family History   Problem Relation Age of Onset   • Arrhythmia Mother    • Stroke Mother 73   • Diabetes type II Mother    • Heart attack Father    • Hypertension Brother    • Cerebral aneurysm Brother 38   • Diabetes type II Brother    • Coronary artery disease Other        Review of Systems:     Constitutional:  Denies fever, shaking or chills   Eyes:  Denies change in visual acuity   HEENT:  Denies nasal congestion or sore throat   Respiratory:  Denies cough or shortness of breath   Cardiovascular:  Denies chest pain or edema  Endocrine: Denies tremors, palpitations, intolerance of heat or cold, polyuria, polydipsia.  GI:  Denies abdominal pain, nausea, vomiting, bloody stools or diarrhea  :  Denies frequency, urgency, incontinence, retention, or nocturia.  Musculoskeletal:  Denies numbness tingling or loss of motor function except as above  Integument:  Denies rash, lesion or ulceration   Neurologic:  Denies headache or focal weakness, deficits  Heme:  Denies epistaxis, spontaneous or excessive bleeding, hematuria, melena, fatigue, enlarged or tender lymph nodes.      All other pertinent positives and negatives as noted above in HPI.    Physical Exam: 57 y.o. male    Vitals:    03/21/22 1941 03/22/22 0023 03/22/22 0731 03/22/22 1353   BP:  106/66 123/75 126/65   BP Location:  Right arm Right arm Left arm   Patient Position:  Lying Lying Sitting   Pulse:   81 83   Resp: 18 18 16 16   Temp:  98.4 °F (36.9 °C) 97.9 °F (36.6 °C) 98 °F (36.7 °C)   TempSrc:  Oral Oral Oral   SpO2:   96% 97%   Weight:        Height:         General:  Awake, alert. No acute distress.      Head/Neck:  Normocephalic, atraumatic.  Conjunctivae and sclerae clear.  Hearing adequate for the exam.  Neck is supple with normal ROM.    Psych:  Affect and demeanor appropriate.    CV:  Regular rate and rhythm.  Hemodynamically stable.    Lungs:  Good chest expansion, breathing unlabored.    Abdomen:  Soft.  Nontender, nondistended.    Extremities: Right knee: Well-healed surgical scar.  Skin is benign.  He has a little bit of increased warmth about the knee but no erythema.  There is no appreciable effusion.  Range of motion is about 50 degrees overall.  I could get amounts about 10 degrees shy of full extension and then flex him down to about 60 or so.  His knee is stable as best I can tell with a somewhat guarded exam.  He can actively extend the knee.  Calf is soft.  Intact motor and sensory function in his lower leg and foot.  Brisk capillary refill.  All other extremities atraumatic without gross abnormality.     Diagnostic Tests:    No results displayed because visit has over 200 results.        Lab Results (last 24 hours)     Procedure Component Value Units Date/Time    POC Glucose Once [[id number]]  (Normal) Collected: 03/22/22 1615    Specimen: Blood Updated: 03/22/22 1616     Glucose 122 mg/dL      Comment: Meter: [id number] : [id number] Goodknight Frieda NA       POC Glucose Once [[id number]]  (Abnormal) Collected: 03/22/22 1153    Specimen: Blood Updated: 03/22/22 1155     Glucose 174 mg/dL      Comment: Meter: [id number] : [id number] Goodknight Frieda NA       POC Glucose Once [[id number]]  (Normal) Collected: 03/22/22 0635    Specimen: Blood Updated: 03/22/22 0659     Glucose 105 mg/dL      Comment: Meter: [id number] : [id number] CARO'Fracisco MANCINI       POC Glucose Once [[id number]]  (Normal) Collected: 03/21/22 2215    Specimen: Blood Updated: 03/21/22 2238     Glucose 106 mg/dL      Comment: Meter: [id number] : [id number]  Yan Champagne LIVE             Imaging: AP and lateral views of the right knee are reviewed to evaluate his complaint.  No comparison films are available.  He has a total knee arthroplasty without any complicating process or acute abnormality.    Assessment: Recent history of right knee pain status post total knee arthroplasty    Plan: I am going to get some baseline lab work.  I have a low index of suspicion for infection and I do not consider that an aspiration is warranted at this point.  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  Depending on what the lab work shows, we may be able to start him on a Dosepak and let him follow-up as an outpatient.  Thank you for the consultation.    Date: 3/22/2022    Clifton Lester MD    CC: Mesha Marie,

## 2022-03-23 NOTE — PROGRESS NOTES
"      [facility]         Dr Orlando Sayied   LOS: 0 days   Patient Care Team:  Karley Amor APRN as PCP - General (Nurse Practitioner)  Payton Marquez APRN as Nurse Practitioner (Nurse Practitioner)    Chief Complaint: Postop hypoxemia with atelectasis severe sleep apnea status post tracheostomy history of hemochromatosis    Interval History: Currently on 2 L oxygen nasal cannula overall feels better resting comfortably.    REVIEW OF SYSTEMS:   CARDIOVASCULAR: No chest pain, chest pressure or chest discomfort. No palpitations or edema.   RESPIRATORY: No shortness of breath, cough or sputum.   GASTROINTESTINAL: No anorexia, nausea, vomiting or diarrhea. No abdominal pain or blood.   HEMATOLOGIC: No bleeding or bruising.     Ventilator/Non-Invasive Ventilation Settings (From admission, onward)            None            Vital Signs  Temp:  [98 °F (36.7 °C)-98.3 °F (36.8 °C)] 98.3 °F (36.8 °C)  Heart Rate:  [83-90] 89  Resp:  [16-18] 18  BP: ()/(60-66) 121/64    Intake/Output Summary (Last 24 hours) at 3/23/2022 1116  Last data filed at 3/23/2022 0648  Gross per 24 hour   Intake 480 ml   Output --   Net 480 ml     Flowsheet Rows    Flowsheet Row First Filed Value   Admission Height 165.1 cm (65\") Documented at 03/18/2022 1249   Admission Weight 123 kg (271 lb) Documented at 03/18/2022 1249          Physical Exam:  Patient is examined using the personal protective equipment as per guidelines from infection control for this particular patient as enacted.  Hand hygiene was performed before and after patient interaction.   General Appearance:    Alert, cooperative, in no acute distress.  Following simple commands  ENT Mallampati between 3 and 4 no nasal congestion  Neck midline trachea, no thyromegaly.  Tracheostomy in place   Lungs:    Diminished breath sounds more prominent on the right base    Heart:    Regular rhythm and normal rate, normal S1 and S2, no            murmur, no gallop, no rub, no " click   Chest Wall:    No abnormalities observed   Abdomen:     Normal bowel sounds, no masses, no organomegaly, soft        nontender, nondistended, no guarding, no rebound                tenderness   Extremities:   Moves all extremities well, no edema, no cyanosis, no             redness  CNS no focal neurological deficits normal sensory exam  Skin no rashes no nodules  Musculoskeletal no cyanosis no clubbing normal range of motion     Results Review:        Results from last 7 days   Lab Units 03/21/22  0652 03/20/22  0658 03/19/22  0419   SODIUM mmol/L 135* 131* 133*   POTASSIUM mmol/L 3.7 3.5 3.6   CHLORIDE mmol/L 97* 95* 97*   CO2 mmol/L 28.0 27.0 27.0   BUN mg/dL 15 17 17   CREATININE mg/dL 0.74* 0.77 0.76   GLUCOSE mg/dL 152* 175* 138*   CALCIUM mg/dL 8.8 9.1 8.9     Results from last 7 days   Lab Units 03/18/22  1249   TROPONIN T ng/mL <0.010     Results from last 7 days   Lab Units 03/22/22  2043 03/21/22  0652 03/20/22  0658   WBC 10*3/mm3 9.41 7.69 9.08   HEMOGLOBIN g/dL 14.6 14.4 14.6   HEMATOCRIT % 44.5 43.4 43.7   PLATELETS 10*3/mm3 244 247 279     Results from last 7 days   Lab Units 03/18/22  1249   INR  0.97   APTT seconds 24.4         Results from last 7 days   Lab Units 03/18/22  1249   MAGNESIUM mg/dL 1.7         Results from last 7 days   Lab Units 03/18/22  1519   PH, ARTERIAL pH units 7.419   PO2 ART mm Hg 73.4*   PCO2, ARTERIAL mm Hg 42.5   HCO3 ART mmol/L 27.5       I reviewed the patient's new clinical results.  I personally viewed and interpreted the patient's chest x-ray.        Medication Review:   atorvastatin, 80 mg, Oral, Nightly  baclofen, 5 mg, Oral, Nightly  cetirizine, 5 mg, Oral, Daily  enoxaparin, 40 mg, Subcutaneous, Q24H  gabapentin, 300 mg, Oral, Q8H  glipizide, 5 mg, Oral, QAM AC  hydroCHLOROthiazide, 12.5 mg, Oral, Daily  insulin lispro, 0-7 Units, Subcutaneous, TID AC  lactulose, 20 g, Oral, TID  lidocaine PF 1%, 5 mL, Injection, Once  lisinopril, 10 mg, Oral,  Daily  povidone-iodine, , Topical, Once  senna-docusate sodium, 2 tablet, Oral, BID  sodium chloride, 10 mL, Intravenous, Q12H  traZODone, 50 mg, Oral, Nightly             ASSESSMENT:   1. Postoperative hypoxemia  2. Atelectasis  3. Severe sleep apnea status post tracheostomy  4. Chronic trach care  5. Hemochromatosis  6. Abdominal hernia status post repair         PLAN:  Continue weaning down oxygen as tolerated.  Wean oxygen to keep sats above 90%  Mobilize ambulate.  Atelectasis should improve with increased mobilization  He is down to 2 L oxygen nasal cannula.  Incentive spirometry and aggressive pulmonary toilet  Avoid abdominal distention with aggressive bowel management  Monitor clinical course closely.  No objections to discharge anytime from pulmonary point of view      Darion Elizabeth MD  03/23/22  11:16 EDT

## 2022-03-24 ENCOUNTER — READMISSION MANAGEMENT (OUTPATIENT)
Dept: CALL CENTER | Facility: HOSPITAL | Age: 57
End: 2022-03-24

## 2022-03-24 VITALS
HEART RATE: 87 BPM | TEMPERATURE: 98.2 F | HEIGHT: 65 IN | WEIGHT: 271 LBS | BODY MASS INDEX: 45.15 KG/M2 | OXYGEN SATURATION: 90 % | DIASTOLIC BLOOD PRESSURE: 74 MMHG | SYSTOLIC BLOOD PRESSURE: 128 MMHG | RESPIRATION RATE: 18 BRPM

## 2022-03-24 PROBLEM — M11.261 PSEUDOGOUT OF RIGHT KNEE: Status: ACTIVE | Noted: 2022-03-24

## 2022-03-24 LAB
GLUCOSE BLDC GLUCOMTR-MCNC: 134 MG/DL (ref 70–130)
GLUCOSE BLDC GLUCOMTR-MCNC: 146 MG/DL (ref 70–130)

## 2022-03-24 PROCEDURE — 82962 GLUCOSE BLOOD TEST: CPT

## 2022-03-24 PROCEDURE — 63710000001 METHYLPREDNISOLONE 4 MG TABLET THERAPY PACK 21 EACH DISP PACK: Performed by: ORTHOPAEDIC SURGERY

## 2022-03-24 PROCEDURE — 99231 SBSQ HOSP IP/OBS SF/LOW 25: CPT | Performed by: ORTHOPAEDIC SURGERY

## 2022-03-24 RX ORDER — METHYLPREDNISOLONE 4 MG/1
4 TABLET ORAL
Status: DISCONTINUED | OUTPATIENT
Start: 2022-03-25 | End: 2022-03-24 | Stop reason: HOSPADM

## 2022-03-24 RX ORDER — METHYLPREDNISOLONE 4 MG/1
8 TABLET ORAL
Status: DISCONTINUED | OUTPATIENT
Start: 2022-03-24 | End: 2022-03-24 | Stop reason: HOSPADM

## 2022-03-24 RX ORDER — METHYLPREDNISOLONE 4 MG/1
4 TABLET ORAL
Status: DISCONTINUED | OUTPATIENT
Start: 2022-03-24 | End: 2022-03-24 | Stop reason: HOSPADM

## 2022-03-24 RX ORDER — METHYLPREDNISOLONE 4 MG/1
4 TABLET ORAL
Status: DISCONTINUED | OUTPATIENT
Start: 2022-03-28 | End: 2022-03-24 | Stop reason: HOSPADM

## 2022-03-24 RX ORDER — METHYLPREDNISOLONE 4 MG/1
4 TABLET ORAL
Status: DISCONTINUED | OUTPATIENT
Start: 2022-03-26 | End: 2022-03-24 | Stop reason: HOSPADM

## 2022-03-24 RX ORDER — METHYLPREDNISOLONE 4 MG/1
TABLET ORAL
Qty: 21 TABLET | Refills: 0 | Status: SHIPPED | OUTPATIENT
Start: 2022-03-24 | End: 2022-04-24

## 2022-03-24 RX ORDER — METHYLPREDNISOLONE 4 MG/1
4 TABLET ORAL
Status: DISCONTINUED | OUTPATIENT
Start: 2022-03-29 | End: 2022-03-24 | Stop reason: HOSPADM

## 2022-03-24 RX ORDER — METHYLPREDNISOLONE 4 MG/1
8 TABLET ORAL
Status: DISCONTINUED | OUTPATIENT
Start: 2022-03-25 | End: 2022-03-24 | Stop reason: HOSPADM

## 2022-03-24 RX ORDER — METHYLPREDNISOLONE 4 MG/1
8 TABLET ORAL
Status: COMPLETED | OUTPATIENT
Start: 2022-03-24 | End: 2022-03-24

## 2022-03-24 RX ORDER — METHYLPREDNISOLONE 4 MG/1
4 TABLET ORAL
Status: DISCONTINUED | OUTPATIENT
Start: 2022-03-27 | End: 2022-03-24 | Stop reason: HOSPADM

## 2022-03-24 RX ADMIN — GLIPIZIDE 5 MG: 5 TABLET ORAL at 09:27

## 2022-03-24 RX ADMIN — Medication 10 ML: at 09:29

## 2022-03-24 RX ADMIN — MELOXICAM 15 MG: 15 TABLET ORAL at 09:27

## 2022-03-24 RX ADMIN — LISINOPRIL 10 MG: 10 TABLET ORAL at 09:27

## 2022-03-24 RX ADMIN — CETIRIZINE HYDROCHLORIDE 5 MG: 10 TABLET ORAL at 09:27

## 2022-03-24 RX ADMIN — DOCUSATE SODIUM 50MG AND SENNOSIDES 8.6MG 2 TABLET: 8.6; 5 TABLET, FILM COATED ORAL at 09:27

## 2022-03-24 RX ADMIN — HYDROCHLOROTHIAZIDE 12.5 MG: 12.5 CAPSULE ORAL at 09:27

## 2022-03-24 RX ADMIN — METHYLPREDNISOLONE 8 MG: 4 TABLET ORAL at 09:28

## 2022-03-24 RX ADMIN — GABAPENTIN 300 MG: 300 CAPSULE ORAL at 05:44

## 2022-03-24 RX ADMIN — LACTULOSE 20 G: 20 SOLUTION ORAL at 09:27

## 2022-03-24 NOTE — PAYOR COMM NOTE
"Tayo Faustin (57 y.o. Male)     ATTN: CONTINUED STAY CLINICALS TO REVIEW: [phone]    PLEASE REPLY TO UR DEPT: .[phone],  [phone]              Date of Birth   1965    Social Security Number       Address   [address] [place] [address]    Home Phone   [phone]    MRN   [id number]       Restorationism   None    Marital Status                               Admission Date   3/18/22    Admission Type   Emergency    Admitting Provider   Petra Goodman MD    Attending Provider   Mesha Marie DO    Department, Room/Bed   [address], S418/1       Discharge Date       Discharge Disposition   Home or Self Care    Discharge Destination                               Attending Provider: Mesha Marie DO    Allergies: No Known Allergies    Isolation: None   Infection: None   Code Status: CPR   Advance Care Planning Activity    Ht: 165.1 cm (65\")   Wt: 123 kg (271 lb)    Admission Cmt: None   Principal Problem: Hypoxia [R09.02]                 Active Insurance as of 3/18/2022     Primary Coverage     Payor Plan Insurance Group Employer/Plan Group    [employer] BLUE [employer] PPO [id number]     Payor Plan Address Payor Plan Phone Number Payor Plan Fax Number Effective Dates    [address] [phone]  1/1/2020 - None Entered    [place] [address]       Subscriber Name Subscriber Birth Date Member ID       TAYO FAUSTIN 1965 [id number]                 Emergency Contacts      (Rel.) Home Phone Work Phone Mobile Phone    PEE FAUSTIN (Spouse) -- -- [phone]            Vital Signs (last 2 days)     Date/Time Temp Temp src Pulse Resp BP Patient Position SpO2    03/24/22 0725 98.2 (36.8) Oral 87 18 128/74 Lying 90    03/23/22 2338 98.1 (36.7) Oral 90 18 122/69 Lying 94    03/23/22 1932 97.6 (36.4) Oral 85 16 130/76 Lying 90    03/23/22 1308 99.2 (37.3) Oral 91 18 114/79 Sitting 93    03/23/22 0700 98.3 " (36.8) Oral 89 18 121/64 Lying 93    03/23/22 0653 -- -- 83 16 112/66 Lying 100    03/23/22 0037 98.3 (36.8) Oral 90 16 94/60 Lying 94    03/22/22 2006 98.2 (36.8) Oral 90 16 103/66 Lying 94    03/22/22 1353 98 (36.7) Oral 83 16 126/65 Sitting 97    03/22/22 0731 97.9 (36.6) Oral 81 16 123/75 Lying 96    03/22/22 0023 98.4 (36.9) Oral -- 18 106/66 Lying --        Oxygen Therapy (last 2 days)     Date/Time SpO2 Device (Oxygen Therapy) Flow (L/min) Oxygen Concentration (%) ETCO2 (mmHg)    03/24/22 0725 90 nasal cannula -- -- --    03/24/22 0030 -- nasal cannula 2 -- --    03/23/22 2338 94 nasal cannula 2 -- --    03/23/22 2200 -- nasal cannula 2 -- --    03/23/22 1932 90 nasal cannula 2 -- --    03/23/22 1455 -- room air -- -- --    03/23/22 1308 93 room air -- -- --    03/23/22 0957 -- room air -- -- --    03/23/22 0700 93 nasal cannula 2 -- --    03/23/22 0653 100 nasal cannula 2 -- --    03/23/22 0037 94 nasal cannula 2 -- --    03/23/22 0025 -- nasal cannula 2 -- --    03/22/22 2033 -- nasal cannula 2 -- --    03/22/22 2006 94 nasal cannula 2 -- --    03/22/22 1353 97 room air -- -- --    03/22/22 1351 -- room air -- -- --    03/22/22 1219 -- room air -- -- --    03/22/22 0800 -- nasal cannula 2 -- --    03/22/22 0731 96 nasal cannula 2 -- --    03/22/22 0023 -- nasal cannula 2 -- --        Intake & Output (last 2 days)       03/22 0701  03/23 0700 03/23 0701 03/24 0700 03/24 0701  03/25 0700    P.O. 480      Total Intake(mL/kg) 480 (3.9)      Net +480             Urine Unmeasured Occurrence 1 x      Stool Unmeasured Occurrence 2 x          Lines, Drains & Airways     Active LDAs     Name Placement date Placement time Site Days    Peripheral IV 03/18/22 1249 Right Antecubital 03/18/22  1249  Antecubital  5    Surgical Airway 4 --  --  4  --                Lab Results (last 48 hours)     Procedure Component Value Units Date/Time    Body Fluid Culture - Synovial Fluid, Knee, Right [[id number]] Collected: 03/23/22  1616    Specimen: Synovial Fluid from Knee, Right Updated: 03/24/22 0703     Body Fluid Culture No growth     Gram Stain No WBCs or organisms seen    POC Glucose Once [[id number]]  (Abnormal) Collected: 03/24/22 0621    Specimen: Blood Updated: 03/24/22 0629     Glucose 134 mg/dL      Comment: Meter: [id number] : [id number] August SOUZA       POC Glucose Once [[id number]]  (Normal) Collected: 03/23/22 2107    Specimen: Blood Updated: 03/23/22 2117     Glucose 115 mg/dL      Comment: Meter: [id number] : [id number] August SOUZA       Body Fluid Cell Count With Differential - Synovial Fluid, Knee, Right [[id number]]  (Abnormal) Collected: 03/23/22 1616    Specimen: Synovial Fluid from Knee, Right Updated: 03/23/22 1833    Narrative:      The following orders were created for panel order Body Fluid Cell Count With Differential - Synovial Fluid, Knee, Right.  Procedure                               Abnormality         Status                     ---------                               -----------         ------                     Body fluid cell count - ...[[id number]]  Abnormal            Final result               Body fluid differential ...[[id number]]                      Final result                 Please view results for these tests on the individual orders.    Body fluid differential - Synovial Fluid, Knee, Right [[id number]] Collected: 03/23/22 1616    Specimen: Synovial Fluid from Knee, Right Updated: 03/23/22 1833     Neutrophils, Fluid 97 %      Mononuclear, Fluid 3 %     Crystal Exam, Fluid - Synovial Fluid, Knee, Right [[id number]] Collected: 03/23/22 1616    Specimen: Synovial Fluid from Knee, Right Updated: 03/23/22 1831     Crystals, Fluid Occasional Calcium pyrophosphate, extra and intracellular    Body fluid cell count - Synovial Fluid, Knee, Right [[id number]]  (Abnormal) Collected: 03/23/22 1616    Specimen: Synovial Fluid from Knee, Right Updated: 03/23/22 1741     Color, Fluid Yellow      Appearance, Fluid Cloudy     RBC, Fluid 7,000 /mm3      Nucleated Cells, Fluid 8,087 /mm3      Method: Automated Sysmex XN Method    Narrative:      No reference range established. Physician to interpret results with clinical findings.    Anaerobic Culture - Synovial Fluid, Knee, Right [[id number]] Collected: 03/23/22 1616    Specimen: Synovial Fluid from Knee, Right Updated: 03/23/22 1627    POC Glucose Once [[id number]]  (Normal) Collected: 03/23/22 1556    Specimen: Blood Updated: 03/23/22 1558     Glucose 114 mg/dL      Comment: Meter: [id number] : [id number] Lambert Banks NA       POC Glucose Once [[id number]]  (Abnormal) Collected: 03/23/22 1056    Specimen: Blood Updated: 03/23/22 1058     Glucose 218 mg/dL      Comment: Meter: [id number] : [id number] Goodman Prado NA       POC Glucose Once [[id number]]  (Abnormal) Collected: 03/23/22 0638    Specimen: Blood Updated: 03/23/22 0640     Glucose 155 mg/dL      Comment: Meter: [id number] : [id number] Jose Bullock NA       POC Glucose Once [[id number]]  (Normal) Collected: 03/22/22 2126    Specimen: Blood Updated: 03/22/22 2127     Glucose 120 mg/dL      Comment: Meter: [id number] : [id number] Jose Ara NA       C-reactive Protein [[id number]]  (Abnormal) Collected: 03/22/22 2043    Specimen: Blood Updated: 03/22/22 2118     C-Reactive Protein 14.19 mg/dL     Sedimentation Rate [[id number]]  (Abnormal) Collected: 03/22/22 2043    Specimen: Blood Updated: 03/22/22 2106     Sed Rate 42 mm/hr     CBC & Differential [[id number]]  (Abnormal) Collected: 03/22/22 2043    Specimen: Blood Updated: 03/22/22 2102    Narrative:      The following orders were created for panel order CBC & Differential.  Procedure                               Abnormality         Status                     ---------                               -----------         ------                     CBC Auto Differential[[id number]]        Abnormal            Final result                  Please view results for these tests on the individual orders.    CBC Auto Differential [[id number]]  (Abnormal) Collected: 03/22/22 2043    Specimen: Blood Updated: 03/22/22 2102     WBC 9.41 10*3/mm3      RBC 4.77 10*6/mm3      Hemoglobin 14.6 g/dL      Hematocrit 44.5 %      MCV 93.3 fL      MCH 30.6 pg      MCHC 32.8 g/dL      RDW 12.1 %      RDW-SD 41.9 fl      MPV 8.9 fL      Platelets 244 10*3/mm3      Neutrophil % 53.3 %      Lymphocyte % 25.0 %      Monocyte % 17.3 %      Eosinophil % 3.1 %      Basophil % 0.6 %      Immature Grans % 0.7 %      Neutrophils, Absolute 5.01 10*3/mm3      Lymphocytes, Absolute 2.35 10*3/mm3      Monocytes, Absolute 1.63 10*3/mm3      Eosinophils, Absolute 0.29 10*3/mm3      Basophils, Absolute 0.06 10*3/mm3      Immature Grans, Absolute 0.07 10*3/mm3      nRBC 0.0 /100 WBC     POC Glucose Once [[id number]]  (Normal) Collected: 03/22/22 1615    Specimen: Blood Updated: 03/22/22 1616     Glucose 122 mg/dL      Comment: Meter: [id number] : [id number] [employer] Frieda NA       POC Glucose Once [[id number]]  (Abnormal) Collected: 03/22/22 1153    Specimen: Blood Updated: 03/22/22 1155     Glucose 174 mg/dL      Comment: Meter: [id number] : [id number] [employer] Frieda NA               Orders (last 48 hrs)      Start     Ordered    03/29/22 0730  methylPREDNISolone (MEDROL) dose pack 4 mg  Before Breakfast         03/24/22 0711    03/28/22 2100  methylPREDNISolone (MEDROL) dose pack 4 mg  Nightly - One Time         03/24/22 0711    03/28/22 0730  methylPREDNISolone (MEDROL) dose pack 4 mg  Before Breakfast         03/24/22 0711    03/27/22 0730  methylPREDNISolone (MEDROL) dose pack 4 mg  3 Times Daily Around Food         03/24/22 0711    03/26/22 0730  methylPREDNISolone (MEDROL) dose pack 4 mg  4 Times Daily Tapering         03/24/22 0711    03/25/22 2100  methylPREDNISolone (MEDROL) dose pack 8 mg  Nightly - One Time         03/24/22 0711    03/25/22 0730  methylPREDNISolone  (MEDROL) dose pack 4 mg  3 Times Daily Around Food         03/24/22 0711    03/24/22 2100  methylPREDNISolone (MEDROL) dose pack 8 mg  Nightly - One Time         03/24/22 0711    03/24/22 1900  methylPREDNISolone (MEDROL) dose pack 4 mg  After Dinner         03/24/22 0711    03/24/22 1300  methylPREDNISolone (MEDROL) dose pack 4 mg  After Lunch         03/24/22 0711    03/24/22 0900  meloxicam (MOBIC) tablet 15 mg  Daily         03/23/22 1803    03/24/22 0843  Discharge patient  Once         03/24/22 0843    03/24/22 0800  methylPREDNISolone (MEDROL) dose pack 8 mg  Before Breakfast         03/24/22 0711    03/24/22 0630  POC Glucose Once  PROCEDURE ONCE         03/24/22 0621    03/24/22 0000  methylPREDNISolone (Medrol) 4 MG dose pack         03/24/22 0712    03/23/22 2118  POC Glucose Once  PROCEDURE ONCE         03/23/22 2107    03/23/22 1741  Body fluid differential - Synovial Fluid, Knee, Right  Once         03/23/22 1740    03/23/22 1559  POC Glucose Once  PROCEDURE ONCE         03/23/22 1556    03/23/22 1246  Discharge patient  Once,   Status:  Canceled         03/23/22 1246    03/23/22 1233  Inpatient Admission  Once         03/23/22 1232    03/23/22 1233  Discharge patient  Once,   Status:  Canceled         03/23/22 1232    03/23/22 1059  POC Glucose Once  PROCEDURE ONCE         03/23/22 1056    03/23/22 1021  Discharge patient  Once,   Status:  Canceled         03/23/22 1021    03/23/22 0915  lidocaine PF 1% (XYLOCAINE) injection 5 mL  Once         03/23/22 0819    03/23/22 0915  povidone-iodine (BETADINE) external solution  Once         03/23/22 0819    03/23/22 0808  Supplies to bedside for Aspiration  Once        Comments: 5cc Syringe x1  60cc syringe x1  25-1.5 in needle x 1  18g-1.5 in. Needle x 2  1 Pair Sterile Gloves (Size 8)  Alcohol swabs x 6  4x4 gauze x4  Bandaids    03/23/22 0819    03/23/22 0807  Body Fluid Cell Count With Differential - Synovial Fluid, Knee, Right  Once         03/23/22 0819  "   03/23/22 0807  Body Fluid Culture - Synovial Fluid, Knee, Right  Once         03/23/22 0819    03/23/22 0807  Crystal Exam, Fluid - Synovial Fluid, Knee, Right  Once         03/23/22 0819    03/23/22 0807  Anaerobic Culture - Synovial Fluid, Knee, Right  Once         03/23/22 0819    03/23/22 0807  Body fluid cell count - Synovial Fluid, Knee, Right  PROCEDURE ONCE         03/23/22 0819    03/23/22 0641  POC Glucose Once  PROCEDURE ONCE         03/23/22 0638    03/22/22 2128  POC Glucose Once  PROCEDURE ONCE         03/22/22 2126    03/22/22 2003  C-reactive Protein  Once         03/22/22 2002 03/22/22 2002  CBC & Differential  Once         03/22/22 2002 03/22/22 2002  Sedimentation Rate  Once         03/22/22 2002 03/22/22 2002  CBC Auto Differential  PROCEDURE ONCE         03/22/22 2002 03/22/22 1720  Initiate Observation Status  Once         03/22/22 1719    03/22/22 1630  meloxicam (MOBIC) tablet 15 mg  Once         03/22/22 1538    03/22/22 1617  POC Glucose Once  PROCEDURE ONCE         03/22/22 1615    03/22/22 1348  Discharge patient  Once,   Status:  Canceled         03/22/22 1348    03/22/22 1156  POC Glucose Once  PROCEDURE ONCE         03/22/22 1153    03/22/22 1051  Walking Oximetry  Once,   Status:  Canceled         03/22/22 1050    03/22/22 1007  Inpatient Orthopedic Surgery Consult  Once        Specialty:  Orthopedic Surgery  Provider:  Clifton Lester MD    03/22/22 1007    03/22/22 0000  sennosides-docusate (PERICOLACE) 8.6-50 MG per tablet  2 Times Daily         03/22/22 1348    03/22/22 0000  meloxicam (Mobic) 15 MG tablet  Daily         03/22/22 1350    03/21/22 1500  enoxaparin (LOVENOX) syringe 40 mg  Every 24 Hours         03/21/22 1426    03/20/22 2215  lactulose (CHRONULAC) 10 GM/15ML solution 20 g  3 Times Daily         03/20/22 2118    03/20/22 2100  sennosides-docusate (PERICOLACE) 8.6-50 MG per tablet 2 tablet  2 Times Daily        \"And\" Linked Group Details    03/20/22 " "1937 03/20/22 1937  polyethylene glycol (MIRALAX) packet 17 g  Daily PRN        \"And\" Linked Group Details    03/20/22 1937 03/20/22 1937  bisacodyl (DULCOLAX) EC tablet 5 mg  Daily PRN        \"And\" Linked Group Details    03/20/22 1937 03/20/22 1937  bisacodyl (DULCOLAX) suppository 10 mg  Daily PRN        \"And\" Linked Group Details    03/20/22 1937 03/19/22 1800  Incentive Spirometry  Every 4 Hours While Awake       03/19/22 1625    03/19/22 1730  insulin lispro (ADMELOG) injection 0-7 Units  3 Times Daily Before Meals         03/19/22 1501    03/19/22 1700  POC Glucose TID AC  3 Times Daily Before Meals       03/19/22 1501    03/19/22 1501  dextrose (GLUTOSE) oral gel 15 g  Every 15 Minutes PRN         03/19/22 1501    03/19/22 1501  dextrose (D50W) (25 g/50 mL) IV injection 25 g  Every 15 Minutes PRN         03/19/22 1501    03/19/22 1501  glucagon (human recombinant) (GLUCAGEN DIAGNOSTIC) injection 1 mg  Every 15 Minutes PRN         03/19/22 1501    03/19/22 0900  hydroCHLOROthiazide (HYDRODIURIL) oral 12.5 mg  Daily         03/18/22 1946 03/19/22 0900  cetirizine (zyrTEC) tablet 5 mg  Daily         03/18/22 1946 03/19/22 0730  glipizide (GLUCOTROL) tablet 5 mg  Every Morning Before Breakfast         03/18/22 1946 03/18/22 2200  gabapentin (NEURONTIN) capsule 300 mg  Every 8 Hours Scheduled         03/18/22 1946 03/18/22 2122  oxyCODONE (ROXICODONE) immediate release tablet 15 mg  Every 6 Hours PRN         03/18/22 2122 03/18/22 2100  traZODone (DESYREL) tablet 50 mg  Nightly         03/18/22 1946 03/18/22 2100  baclofen (LIORESAL) tablet 5 mg  Nightly         03/18/22 1946 03/18/22 2100  atorvastatin (LIPITOR) tablet 80 mg  Nightly         03/18/22 1946 03/18/22 2100  sodium chloride 0.9 % flush 10 mL  Every 12 Hours Scheduled         03/18/22 1946 03/18/22 2015  lisinopril (PRINIVIL,ZESTRIL) tablet 10 mg  Daily         03/18/22 1946 03/18/22 2000  Vital Signs  Every 4 " "Hours       03/18/22 1946 03/18/22 1946  morphine injection 4 mg  Every 4 Hours PRN,   Status:  Discontinued        \"And\" Linked Group Details    03/18/22 1946 03/18/22 1946  naloxone (NARCAN) injection 0.4 mg  Every 5 Minutes PRN        \"And\" Linked Group Details    03/18/22 1946 03/18/22 1946  acetaminophen (TYLENOL) tablet 650 mg  Every 4 Hours PRN        \"Or\" Linked Group Details    03/18/22 1946 03/18/22 1946  acetaminophen (TYLENOL) 160 MG/5ML solution 650 mg  Every 4 Hours PRN        \"Or\" Linked Group Details    03/18/22 1946 03/18/22 1946  acetaminophen (TYLENOL) suppository 650 mg  Every 4 Hours PRN        \"Or\" Linked Group Details    03/18/22 1946 03/18/22 1946  ondansetron (ZOFRAN) injection 4 mg  Every 6 Hours PRN         03/18/22 1946 03/18/22 1946  Intake & Output  Every Shift       03/18/22 1946 03/18/22 1945  Oxygen Therapy- Nasal Cannula; Titrate for SPO2: 90% - 95%  Continuous PRN,   Status:  Canceled      Comments: If Patient Develops Unresponsiveness, Acute Dyspnea, Cyanosis or Suspected Hypoxemia Start Continuous Pulse Ox Monitoring, Apply Oxygen & Notify Provider    03/18/22 1946 03/18/22 1945  nitroglycerin (NITROSTAT) SL tablet 0.4 mg  Every 5 Minutes PRN         03/18/22 1946 03/18/22 1945  sodium chloride 0.9 % flush 10 mL  As Needed         03/18/22 1946    Unscheduled  ECG 12 Lead  As Needed      Comments: Nurse to Release if Patient Expericences Acute Chest Pain or Dysrhythmias    03/18/22 1946    Unscheduled  Potassium  As Needed      Comments: For Ventricular Arrhythmias      03/18/22 1946    Unscheduled  Magnesium  As Needed      Comments: For Ventricular Arrhythmias      03/18/22 1946    Unscheduled  Troponin  As Needed      Comments: For Chest Pain      03/18/22 1946    Unscheduled  Blood Gas, Arterial -  As Needed      Comments: Per O2 PolicyNotify Physician      03/18/22 1946    Unscheduled  Tap Water Enema  As Needed       03/21/22 8895    --  " omeprazole (priLOSEC) 40 MG capsule  Daily         22                     Physician Progress Notes (last 48 hours)      Clifton Lester MD at 22 0709        I have seen and evaluated the patient this morning.  He says his knee is about the same, may be slightly improved.  His knee is not red.  He has slight increased warmth.  No palpable effusion.  He can range his knee from full extension to about 60 or 70 degrees of flexion.  We reviewed his aspirate results.  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  Cultures are negative thus far but certainly it is early.  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  I recommend we start him in a Medrol Dosepak and have him follow-up as an outpatient.  I need to see him back in 1 week for reevaluation.  He can be discharged later today if okay with the primary team.    Clifton Lester MD      Electronically signed by Clifton Lester MD at 22 0737     Mesha Marie DO at 22              Name: Fabio Juárez ADMIT: 3/18/2022   : 1965  PCP: Karley Amor APRN    MRN: [id number] LOS: 0 days   AGE/SEX: 57 y.o. male  ROOM: [facility]     Subjective   Subjective   Sitting up in chair. Knee pain is improved with meloxicam. He was able to walk with PT.     Review of Systems  Denies chest pain, fever, headache, lower extremity swelling    Objective   Objective   Vital Signs  Temp:  [98.2 °F (36.8 °C)-99.2 °F (37.3 °C)] 99.2 °F (37.3 °C)  Heart Rate:  [83-91] 91  Resp:  [16-18] 18  BP: ()/(60-79) 114/79  SpO2:  [93 %-100 %] 93 %  on  Flow (L/min):  [2] 2;   Device (Oxygen Therapy): room air  Body mass index is 45.1 kg/m².   Physical Exam  Constitutional:       General: He is not in acute distress.     Appearance: He is obese. He is not diaphoretic.   HENT:      Mouth/Throat:      Mouth: Mucous membranes are moist.   Eyes:      General: No scleral icterus.  Cardiovascular:       Rate and Rhythm: Normal rate and regular rhythm.   Pulmonary:      Effort: Pulmonary effort is normal. No respiratory distress.      Breath sounds: No wheezing or rhonchi.   Abdominal:      General: There is no distension.      Palpations: Abdomen is soft.      Tenderness: There is no abdominal tenderness. There is no guarding.   Musculoskeletal:      Right lower leg: No edema.      Left lower leg: No edema.   Skin:     General: Skin is warm and dry.   Neurological:      Mental Status: He is alert.   Psychiatric:         Mood and Affect: Mood normal.         Results Review     I reviewed the patient's new clinical results.  Results from last 7 days   Lab Units 03/22/22 2043 03/21/22 0652 03/20/22  0658 03/19/22  0419   WBC 10*3/mm3 9.41 7.69 9.08 10.25   HEMOGLOBIN g/dL 14.6 14.4 14.6 15.0   PLATELETS 10*3/mm3 244 247 279 294     Results from last 7 days   Lab Units 03/21/22 0652 03/20/22  0658 03/19/22 0419 03/18/22  1249   SODIUM mmol/L 135* 131* 133* 135*   POTASSIUM mmol/L 3.7 3.5 3.6 3.9   CHLORIDE mmol/L 97* 95* 97* 99   CO2 mmol/L 28.0 27.0 27.0 27.0   BUN mg/dL 15 17 17 18   CREATININE mg/dL 0.74* 0.77 0.76 0.90   GLUCOSE mg/dL 152* 175* 138* 134*   Estimated Creatinine Clearance: 135.7 mL/min (A) (by C-G formula based on SCr of 0.74 mg/dL (L)).  Results from last 7 days   Lab Units 03/21/22 0652 03/20/22 0658 03/19/22 0419 03/18/22  1249   ALBUMIN g/dL 3.00* 3.80 3.70 4.00   BILIRUBIN mg/dL 0.9 1.4* 1.8* 1.1   ALK PHOS U/L 81 73 71 69   AST (SGOT) U/L 16 15 12 16   ALT (SGPT) U/L 20 17 21 19     Results from last 7 days   Lab Units 03/21/22 0652 03/20/22  0658 03/19/22 0419 03/18/22  1249   CALCIUM mg/dL 8.8 9.1 8.9 9.3   ALBUMIN g/dL 3.00* 3.80 3.70 4.00   MAGNESIUM mg/dL  --   --   --  1.7     Results from last 7 days   Lab Units 03/18/22  1249   PROCALCITONIN ng/mL 0.05     COVID19   Date Value Ref Range Status   03/18/2022 Not Detected Not Detected - Ref. Range Final     SARS-CoV-2 YAW   Date  Value Ref Range Status   09/09/2021 Not Detected Not Detected Final     Glucose   Date/Time Value Ref Range Status   03/23/2022 1556 114 70 - 130 mg/dL Final     Comment:     Meter: [id number] : [id number] Lambert Banks NA   03/23/2022 1056 218 (H) 70 - 130 mg/dL Final     Comment:     Meter: [id number] : [id number] Goodman Prado NA   03/23/2022 0638 155 (H) 70 - 130 mg/dL Final     Comment:     Meter: [id number] : [id number] Jose Ara NA   03/22/2022 2126 120 70 - 130 mg/dL Final     Comment:     Meter: [id number] : [id number] Jose Ara NA   03/22/2022 1615 122 70 - 130 mg/dL Final     Comment:     Meter: [id number] : [id number] [facility] Frieda NA   03/22/2022 1153 174 (H) 70 - 130 mg/dL Final     Comment:     Meter: [id number] : [id number] [employer] Frieda NA   03/22/2022 0635 105 70 - 130 mg/dL Final     Comment:     Meter: [id number] : [id number] CARO'Fracisco MANCINI       XR Knee 1 or 2 View Right  Narrative: XR KNEE 1 OR 2 VW RIGHT-     INDICATIONS: Pain     TECHNIQUE: 2 views of the right knee     COMPARISON: None available     FINDINGS:     Right knee arthroplasty hardware appears intact. No acute fracture,  erosion, or dislocation is identified. Trace knee effusion is suggested.  Faint arterial calcification is apparent. Follow-up/further evaluation  can be obtained as indications persist.     Impression:    As described.           This report was finalized on 3/21/2022 4:31 PM by Dr. Frank Salinas M.D.     XR Abdomen KUB  Narrative: XR ABDOMEN KUB-     INDICATIONS: Constipation     TECHNIQUE: Supine views of the abdomen     COMPARISON: None available     FINDINGS:      The bowel gas pattern is nonspecific, with gaseous distention of  transverse colon, proximal colonic fecal retention. No supine evidence  for free intraperitoneal gas. No definite nephrolithiasis..  Follow-up/further evaluation can be obtained as indications persist.     Impression:    As  described.     This report was finalized on 3/21/2022 2:20 PM by Dr. Frank Salinas M.D.       I reviewed the patient's daily medications.  Scheduled Medications  atorvastatin, 80 mg, Oral, Nightly  baclofen, 5 mg, Oral, Nightly  cetirizine, 5 mg, Oral, Daily  enoxaparin, 40 mg, Subcutaneous, Q24H  gabapentin, 300 mg, Oral, Q8H  glipizide, 5 mg, Oral, QAM AC  hydroCHLOROthiazide, 12.5 mg, Oral, Daily  insulin lispro, 0-7 Units, Subcutaneous, TID AC  lactulose, 20 g, Oral, TID  lisinopril, 10 mg, Oral, Daily  [START ON 3/24/2022] meloxicam, 15 mg, Oral, Daily  senna-docusate sodium, 2 tablet, Oral, BID  sodium chloride, 10 mL, Intravenous, Q12H  traZODone, 50 mg, Oral, Nightly    Infusions   Diet  Diet Regular; Cardiac, Consistent Carbohydrate, Renal      Assessment/Plan     Active Hospital Problems    Diagnosis  POA   • **Hypoxia [R09.02]  Yes   • Obesity, Class III, BMI 40-49.9 (morbid obesity) ([facility] [facility] [E66.01]  Yes   • Opiate dependence ([facility]) [F11.20]  Yes   • Pleuritic chest pain [R07.81]  Yes   • Dyspnea [R06.00]  Yes   • Tracheostomy status ([facility]) [Z93.0]  Not Applicable   • Essential (primary) hypertension [I10]  Yes   • Hemochromatosis [E83.119]  Yes   • DM2 (diabetes mellitus, type 2) ([facility]) [E11.9]  Yes   • Chronic low back pain [M54.50, G89.29]  Yes   • Chronic neck pain [M54.2, G89.29]  Yes      Resolved Hospital Problems   No resolved problems to display.       57 y.o. male with history of morbid obesity, sleep apnea, diabetes and hypertension who presented with progressive shortness of breath and pleuritic chest pain following laparoscopic umbilical hernia repair 3 weeks prior.  He has been hypoxic since admission, most likely due to postoperative atelectasis, with constipation contributing to his difficulty breathing.    Today: Knee aspiration today by ortho, will follow up results for crystals, WBC or bacteria. Pain is improving with NSAID.    Pleuritic chest pain with hypoxia:  -CTA chest on  admission was negative for pneumonia or pulmonary embolism.  It did show bibasilar atelectasis  -Encourage incentive spirometry and out of bed ambulation.  Pulmonology evaluated     Constipation: resolved. Continue oral bowel regimen    Severe sleep apnea, status post tracheostomy:  Continue trach care.     Diabetes mellitus: Glucose acceptable currently.  Continue sliding scale insulin    Hypertension: Blood pressure acceptable acutely.  Continue HCTZ and lisinopril    Chronic back pain: Continue gabapentin, baclofen and oxycodone as needed.       Lung nodule: Subcentimeter pulmonary nodule in the right upper lobe. Follow-up  chest CT in 12 months    · Lovenox 40 mg SC daily for DVT prophylaxis.  · Full code.  · Discussed with patient, nursing staff and CCP.  · Anticipate discharge home today      Mesha Marie DO  [facility]  03/23/22  18:04 EDT    Patient was placed in face mask on first look.  I wore protective equipment throughout this patient encounter including a face mask, gloves and protective eyewear.  Hand hygiene was performed before donning protective equipment and after removal when leaving the room.          Electronically signed by Mesha Marie DO at 03/23/22 1810     Saurav Sauer MD at 03/23/22 1632        I saw the patient this afternoon at the request of my partner as he was not on campus today.  Patient had ESR and CRP labs drawn that were elevated and given his history of a previous knee replacement knee aspiration was warranted.    Patient states his knee feels better today he can lift it and move it more.  Still some discomfort with range of motion which is still limited.    Exam  Some tenderness palpation with deep palpation specifically laterally no overt knee warmth or redness.  Possible slight effusion.  Knee range of motion 0 to 60 degrees.      Knee aspiration was performed bedside.  The right knee was cleaned with alcohol and Betadine.  Some local  "lidocaine anesthetic was administered.  We were able to aspirate about 40 cc of dark yellow fluid that did not appear purulent.  Aspiration was sent to the lab.  Patient tolerated this well.    We will follow up lab results.    Please call with any questions or concerns thank you    Saurav Sauer MD      Electronically signed by Saurav Sauer MD at 03/23/22 1636     Darion Elizabeth MD at 03/23/22 1116                [facility]         Dr Darion Elizabeth   LOS: 0 days   Patient Care Team:  Karley Amor APRN as PCP - General (Nurse Practitioner)  Jimmy April, APRMAJOR as Nurse Practitioner (Nurse Practitioner)    Chief Complaint: Postop hypoxemia with atelectasis severe sleep apnea status post tracheostomy history of hemochromatosis    Interval History: Currently on 2 L oxygen nasal cannula overall feels better resting comfortably.    REVIEW OF SYSTEMS:   CARDIOVASCULAR: No chest pain, chest pressure or chest discomfort. No palpitations or edema.   RESPIRATORY: No shortness of breath, cough or sputum.   GASTROINTESTINAL: No anorexia, nausea, vomiting or diarrhea. No abdominal pain or blood.   HEMATOLOGIC: No bleeding or bruising.     Ventilator/Non-Invasive Ventilation Settings (From admission, onward)            None            Vital Signs  Temp:  [98 °F (36.7 °C)-98.3 °F (36.8 °C)] 98.3 °F (36.8 °C)  Heart Rate:  [83-90] 89  Resp:  [16-18] 18  BP: ()/(60-66) 121/64    Intake/Output Summary (Last 24 hours) at 3/23/2022 1116  Last data filed at 3/23/2022 0648  Gross per 24 hour   Intake 480 ml   Output --   Net 480 ml     Flowsheet Rows    Flowsheet Row First Filed Value   Admission Height 165.1 cm (65\") Documented at 03/18/2022 1249   Admission Weight 123 kg (271 lb) Documented at 03/18/2022 1249          Physical Exam:  Patient is examined using the personal protective equipment as per guidelines from infection control for this particular patient as enacted.  Hand hygiene was performed " before and after patient interaction.   General Appearance:    Alert, cooperative, in no acute distress.  Following simple commands  ENT Mallampati between 3 and 4 no nasal congestion  Neck midline trachea, no thyromegaly.  Tracheostomy in place   Lungs:    Diminished breath sounds more prominent on the right base    Heart:    Regular rhythm and normal rate, normal S1 and S2, no            murmur, no gallop, no rub, no click   Chest Wall:    No abnormalities observed   Abdomen:     Normal bowel sounds, no masses, no organomegaly, soft        nontender, nondistended, no guarding, no rebound                tenderness   Extremities:   Moves all extremities well, no edema, no cyanosis, no             redness  CNS no focal neurological deficits normal sensory exam  Skin no rashes no nodules  Musculoskeletal no cyanosis no clubbing normal range of motion     Results Review:        Results from last 7 days   Lab Units 03/21/22  0652 03/20/22  0658 03/19/22  0419   SODIUM mmol/L 135* 131* 133*   POTASSIUM mmol/L 3.7 3.5 3.6   CHLORIDE mmol/L 97* 95* 97*   CO2 mmol/L 28.0 27.0 27.0   BUN mg/dL 15 17 17   CREATININE mg/dL 0.74* 0.77 0.76   GLUCOSE mg/dL 152* 175* 138*   CALCIUM mg/dL 8.8 9.1 8.9     Results from last 7 days   Lab Units 03/18/22  1249   TROPONIN T ng/mL <0.010     Results from last 7 days   Lab Units 03/22/22  2043 03/21/22  0652 03/20/22  0658   WBC 10*3/mm3 9.41 7.69 9.08   HEMOGLOBIN g/dL 14.6 14.4 14.6   HEMATOCRIT % 44.5 43.4 43.7   PLATELETS 10*3/mm3 244 247 279     Results from last 7 days   Lab Units 03/18/22  1249   INR  0.97   APTT seconds 24.4         Results from last 7 days   Lab Units 03/18/22  1249   MAGNESIUM mg/dL 1.7         Results from last 7 days   Lab Units 03/18/22  1519   PH, ARTERIAL pH units 7.419   PO2 ART mm Hg 73.4*   PCO2, ARTERIAL mm Hg 42.5   HCO3 ART mmol/L 27.5       I reviewed the patient's new clinical results.  I personally viewed and interpreted the patient's chest  x-ray.        Medication Review:   atorvastatin, 80 mg, Oral, Nightly  baclofen, 5 mg, Oral, Nightly  cetirizine, 5 mg, Oral, Daily  enoxaparin, 40 mg, Subcutaneous, Q24H  gabapentin, 300 mg, Oral, Q8H  glipizide, 5 mg, Oral, QAM AC  hydroCHLOROthiazide, 12.5 mg, Oral, Daily  insulin lispro, 0-7 Units, Subcutaneous, TID AC  lactulose, 20 g, Oral, TID  lidocaine PF 1%, 5 mL, Injection, Once  lisinopril, 10 mg, Oral, Daily  povidone-iodine, , Topical, Once  senna-docusate sodium, 2 tablet, Oral, BID  sodium chloride, 10 mL, Intravenous, Q12H  traZODone, 50 mg, Oral, Nightly         ASSESSMENT:   1. Postoperative hypoxemia  2. Atelectasis  3. Severe sleep apnea status post tracheostomy  4. Chronic trach care  5. Hemochromatosis  6. Abdominal hernia status post repair         PLAN:  Continue weaning down oxygen as tolerated.  Wean oxygen to keep sats above 90%  Mobilize ambulate.  Atelectasis should improve with increased mobilization  He is down to 2 L oxygen nasal cannula.  Incentive spirometry and aggressive pulmonary toilet  Avoid abdominal distention with aggressive bowel management  Monitor clinical course closely.  No objections to discharge anytime from pulmonary point of view      Darion Elizabeth MD  22  11:16 EDT            Electronically signed by Darion Elizabeth MD at 22 1116     Mesha Marie DO at 22 1532              Name: Fabio Juárez ADMIT: 3/18/2022   : 1965  PCP: Karley Amor APRN    MRN: [id number] LOS: 4 days   AGE/SEX: 57 y.o. male  ROOM: 18/1     Subjective   Subjective   Sitting up in chair.  Had 2 bowel movements yesterday.  Feeling well overall except for his left knee.  Says it hurts so bad he is not able to bear weight    Review of Systems  Denies chest pain, fever, headache, lower extremity swelling    Objective   Objective   Vital Signs  Temp:  [97.9 °F (36.6 °C)-98.4 °F (36.9 °C)] 98 °F (36.7 °C)  Heart Rate:  [81-83] 83  Resp:  [16-18] 16  BP:  (106-126)/(65-75) 126/65  SpO2:  [96 %-97 %] 97 %  on  Flow (L/min):  [2] 2;   Device (Oxygen Therapy): room air  Body mass index is 45.1 kg/m².   Physical Exam  Constitutional:       General: He is not in acute distress.     Appearance: He is obese. He is not diaphoretic.   HENT:      Mouth/Throat:      Mouth: Mucous membranes are moist.   Eyes:      General: No scleral icterus.  Cardiovascular:      Rate and Rhythm: Normal rate and regular rhythm.   Pulmonary:      Effort: Pulmonary effort is normal. No respiratory distress.      Breath sounds: No wheezing or rhonchi.   Abdominal:      General: There is no distension.      Palpations: Abdomen is soft.      Tenderness: There is no abdominal tenderness. There is no guarding.   Musculoskeletal:      Right lower leg: No edema.      Left lower leg: No edema.   Skin:     General: Skin is warm and dry.   Neurological:      Mental Status: He is alert.   Psychiatric:         Mood and Affect: Mood normal.         Results Review     I reviewed the patient's new clinical results.  Results from last 7 days   Lab Units 03/21/22 0652 03/20/22 0658 03/19/22 0419 03/18/22  1249   WBC 10*3/mm3 7.69 9.08 10.25 12.29*   HEMOGLOBIN g/dL 14.4 14.6 15.0 15.2   PLATELETS 10*3/mm3 247 279 294 306     Results from last 7 days   Lab Units 03/21/22 0652 03/20/22 0658 03/19/22 0419 03/18/22  1249   SODIUM mmol/L 135* 131* 133* 135*   POTASSIUM mmol/L 3.7 3.5 3.6 3.9   CHLORIDE mmol/L 97* 95* 97* 99   CO2 mmol/L 28.0 27.0 27.0 27.0   BUN mg/dL 15 17 17 18   CREATININE mg/dL 0.74* 0.77 0.76 0.90   GLUCOSE mg/dL 152* 175* 138* 134*   Estimated Creatinine Clearance: 135.7 mL/min (A) (by C-G formula based on SCr of 0.74 mg/dL (L)).  Results from last 7 days   Lab Units 03/21/22 0652 03/20/22 0658 03/19/22 0419 03/18/22  1249   ALBUMIN g/dL 3.00* 3.80 3.70 4.00   BILIRUBIN mg/dL 0.9 1.4* 1.8* 1.1   ALK PHOS U/L 81 73 71 69   AST (SGOT) U/L 16 15 12 16   ALT (SGPT) U/L 20 17 21 19      Results from last 7 days   Lab Units 03/21/22  0652 03/20/22  0658 03/19/22  0419 03/18/22  1249   CALCIUM mg/dL 8.8 9.1 8.9 9.3   ALBUMIN g/dL 3.00* 3.80 3.70 4.00   MAGNESIUM mg/dL  --   --   --  1.7     Results from last 7 days   Lab Units 03/18/22  1249   PROCALCITONIN ng/mL 0.05     COVID19   Date Value Ref Range Status   03/18/2022 Not Detected Not Detected - Ref. Range Final     SARS-CoV-2 YAW   Date Value Ref Range Status   09/09/2021 Not Detected Not Detected Final     Glucose   Date/Time Value Ref Range Status   03/22/2022 1153 174 (H) 70 - 130 mg/dL Final     Comment:     Meter: [id number] : [id number] Josehaja Malave NA   03/22/2022 0635 105 70 - 130 mg/dL Final     Comment:     Meter: [id number] : [id number] O'Fracisco Diorie LIVE   03/21/2022 2215 106 70 - 130 mg/dL Final     Comment:     Meter: [id number] : [id number] CARO'Amoret Mahi LIVE   03/21/2022 1559 118 70 - 130 mg/dL Final     Comment:     Meter: [id number] : [id number] Doveadria Mcgeeandrew NA   03/21/2022 1101 189 (H) 70 - 130 mg/dL Final     Comment:     Meter: [id number] : [id number] Derrell Arelyandrew NA   03/21/2022 0640 189 (H) 70 - 130 mg/dL Final     Comment:     Meter: [id number] : [id number] CARO'Amoret Mahi LIVE   03/20/2022 2118 139 (H) 70 - 130 mg/dL Final     Comment:     Meter: [id number] : [id number] Ayanna Blanc RN       XR Knee 1 or 2 View Right  Narrative: XR KNEE 1 OR 2 VW RIGHT-     INDICATIONS: Pain     TECHNIQUE: 2 views of the right knee     COMPARISON: None available     FINDINGS:     Right knee arthroplasty hardware appears intact. No acute fracture,  erosion, or dislocation is identified. Trace knee effusion is suggested.  Faint arterial calcification is apparent. Follow-up/further evaluation  can be obtained as indications persist.     Impression:    As described.           This report was finalized on 3/21/2022 4:31 PM by Dr. Frank Salinas M.D.     XR Abdomen KUB  Narrative: XR ABDOMEN KUB-      INDICATIONS: Constipation     TECHNIQUE: Supine views of the abdomen     COMPARISON: None available     FINDINGS:      The bowel gas pattern is nonspecific, with gaseous distention of  transverse colon, proximal colonic fecal retention. No supine evidence  for free intraperitoneal gas. No definite nephrolithiasis..  Follow-up/further evaluation can be obtained as indications persist.     Impression:    As described.     This report was finalized on 3/21/2022 2:20 PM by Dr. Frank Salinas M.D.       I reviewed the patient's daily medications.  Scheduled Medications  atorvastatin, 80 mg, Oral, Nightly  baclofen, 5 mg, Oral, Nightly  cetirizine, 5 mg, Oral, Daily  enoxaparin, 40 mg, Subcutaneous, Q24H  gabapentin, 300 mg, Oral, Q8H  glipizide, 5 mg, Oral, QAM AC  hydroCHLOROthiazide, 12.5 mg, Oral, Daily  insulin lispro, 0-7 Units, Subcutaneous, TID AC  lactulose, 20 g, Oral, TID  lisinopril, 10 mg, Oral, Daily  senna-docusate sodium, 2 tablet, Oral, BID  sodium chloride, 10 mL, Intravenous, Q12H  traZODone, 50 mg, Oral, Nightly    Infusions   Diet  Diet Regular; Cardiac, Consistent Carbohydrate, Renal      Assessment/Plan     Active Hospital Problems    Diagnosis  POA   • **Hypoxia [R09.02]  Yes   • Obesity, Class III, BMI 40-49.9 (morbid obesity) ([facility]) [E66.01]  Yes   • Opiate dependence ([facility]) [F11.20]  Yes   • Pleuritic chest pain [R07.81]  Yes   • Dyspnea [R06.00]  Yes   • Tracheostomy status ([facility]) [Z93.0]  Not Applicable   • Essential (primary) hypertension [I10]  Yes   • Hemochromatosis [E83.119]  Yes   • DM2 (diabetes mellitus, type 2) ([facility]) [E11.9]  Yes   • Chronic low back pain [M54.50, G89.29]  Yes   • Chronic neck pain [M54.2, G89.29]  Yes      Resolved Hospital Problems   No resolved problems to display.       57 y.o. male with history of morbid obesity, sleep apnea, diabetes and hypertension who presented with progressive shortness of breath and pleuritic chest pain following laparoscopic umbilical  hernia repair 3 weeks prior.  He has been hypoxic since admission, most likely due to postoperative atelectasis, with constipation contributing to his difficulty breathing.    Today: Complains of severe left knee pain.  X-ray yesterday with no fracture, previous arthroplasty in place.  Will ask orthopedics to evaluate him as he says he is not able to bear weight.  If okay with Ortho, he can be discharged today.  Can discharge with a 1 week course of meloxicam  No home oxygen needed    Pleuritic chest pain with hypoxia:  -CTA chest on admission was negative for pneumonia or pulmonary embolism.  It did show bibasilar atelectasis  -Encourage incentive spirometry and out of bed ambulation.  Pulmonology evaluated     Constipation: KUB today shows gaseous distention of the transverse colon with proximal colonic fecal retention.   - Continue oral bowel regimen, give bisacodyl suppository today.  If no bowel movement by later today, will need enema    Severe sleep apnea, status post tracheostomy:  Continue trach care.     Diabetes mellitus: Glucose acceptable currently.  Continue sliding scale insulin    Hypertension: Blood pressure acceptable acutely.  Continue HCTZ and lisinopril    Chronic back pain: Continue gabapentin, baclofen and oxycodone as needed.  Wean off IV pain medicine as tolerated     Lung nodule: Subcentimeter pulmonary nodule in the right upper lobe. Follow-up  chest CT in 12 months    · Lovenox 40 mg SC daily for DVT prophylaxis.  · Full code.  · Discussed with patient, nursing staff and CCP.  · Anticipate discharge home today      Mesha Marie DO  [facility]  03/22/22  15:32 EDT    Patient was placed in face mask on first look.  I wore protective equipment throughout this patient encounter including a face mask, gloves and protective eyewear.  Hand hygiene was performed before donning protective equipment and after removal when leaving the room.          Electronically signed  "by Mesha Marie DO at 03/22/22 1537     Darion Elizabeth MD at 03/22/22 1148                [facility]         Dr Darion Elizabeth   LOS: 4 days   Patient Care Team:  Karley Amor APRN as PCP - General (Nurse Practitioner)  Payton Marquez APRN as Nurse Practitioner (Nurse Practitioner)    Chief Complaint: Postop hypoxemia with atelectasis severe sleep apnea status post tracheostomy history of hemochromatosis    Interval History: Currently on 2 L oxygen nasal cannula.  Overall feels better and resting comfortably.    REVIEW OF SYSTEMS:   CARDIOVASCULAR: No chest pain, chest pressure or chest discomfort. No palpitations or edema.   RESPIRATORY: No shortness of breath, cough or sputum.   GASTROINTESTINAL: No anorexia, nausea, vomiting or diarrhea. No abdominal pain or blood.   HEMATOLOGIC: No bleeding or bruising.     Ventilator/Non-Invasive Ventilation Settings (From admission, onward)            None            Vital Signs  Temp:  [97.6 °F (36.4 °C)-98.4 °F (36.9 °C)] 97.9 °F (36.6 °C)  Heart Rate:  [81-88] 81  Resp:  [16-18] 16  BP: (106-123)/(66-82) 123/75    Intake/Output Summary (Last 24 hours) at 3/22/2022 1148  Last data filed at 3/21/2022 1631  Gross per 24 hour   Intake 240 ml   Output --   Net 240 ml     Flowsheet Rows    Flowsheet Row First Filed Value   Admission Height 165.1 cm (65\") Documented at 03/18/2022 1249   Admission Weight 123 kg (271 lb) Documented at 03/18/2022 1249          Physical Exam:  Patient is examined using the personal protective equipment as per guidelines from infection control for this particular patient as enacted.  Hand hygiene was performed before and after patient interaction.   General Appearance:    Alert, cooperative, in no acute distress.  Following simple commands  ENT Mallampati between 3 and 4 no nasal congestion  Neck midline trachea, no thyromegaly.  Tracheostomy in place   Lungs:    Diminished breath sounds more prominent on the right base    " Heart:    Regular rhythm and normal rate, normal S1 and S2, no            murmur, no gallop, no rub, no click   Chest Wall:    No abnormalities observed   Abdomen:     Normal bowel sounds, no masses, no organomegaly, soft        nontender, nondistended, no guarding, no rebound                tenderness   Extremities:   Moves all extremities well, no edema, no cyanosis, no             redness  CNS no focal neurological deficits normal sensory exam  Skin no rashes no nodules  Musculoskeletal no cyanosis no clubbing normal range of motion     Results Review:        Results from last 7 days   Lab Units 03/21/22  0652 03/20/22  0658 03/19/22  0419   SODIUM mmol/L 135* 131* 133*   POTASSIUM mmol/L 3.7 3.5 3.6   CHLORIDE mmol/L 97* 95* 97*   CO2 mmol/L 28.0 27.0 27.0   BUN mg/dL 15 17 17   CREATININE mg/dL 0.74* 0.77 0.76   GLUCOSE mg/dL 152* 175* 138*   CALCIUM mg/dL 8.8 9.1 8.9     Results from last 7 days   Lab Units 03/18/22  1249   TROPONIN T ng/mL <0.010     Results from last 7 days   Lab Units 03/21/22  0652 03/20/22  0658 03/19/22  0419   WBC 10*3/mm3 7.69 9.08 10.25   HEMOGLOBIN g/dL 14.4 14.6 15.0   HEMATOCRIT % 43.4 43.7 46.0   PLATELETS 10*3/mm3 247 279 294     Results from last 7 days   Lab Units 03/18/22  1249   INR  0.97   APTT seconds 24.4         Results from last 7 days   Lab Units 03/18/22  1249   MAGNESIUM mg/dL 1.7         Results from last 7 days   Lab Units 03/18/22  1519   PH, ARTERIAL pH units 7.419   PO2 ART mm Hg 73.4*   PCO2, ARTERIAL mm Hg 42.5   HCO3 ART mmol/L 27.5       I reviewed the patient's new clinical results.  I personally viewed and interpreted the patient's chest x-ray.        Medication Review:   atorvastatin, 80 mg, Oral, Nightly  baclofen, 5 mg, Oral, Nightly  cetirizine, 5 mg, Oral, Daily  enoxaparin, 40 mg, Subcutaneous, Q24H  gabapentin, 300 mg, Oral, Q8H  glipizide, 5 mg, Oral, QAM AC  hydroCHLOROthiazide, 12.5 mg, Oral, Daily  insulin lispro, 0-7 Units, Subcutaneous, TID  AC  lactulose, 20 g, Oral, TID  lisinopril, 10 mg, Oral, Daily  senna-docusate sodium, 2 tablet, Oral, BID  sodium chloride, 10 mL, Intravenous, Q12H  traZODone, 50 mg, Oral, Nightly             ASSESSMENT:   7. Postoperative hypoxemia  8. Atelectasis  9. Severe sleep apnea status post tracheostomy  10. Chronic trach care  11. Hemochromatosis  12. Abdominal hernia status post repair         PLAN:  Continue weaning down oxygen as tolerated.  Wean oxygen to keep sats above 90%  Mobilize ambulate.  Atelectasis should improve with increased mobilization  He is down to 2 L oxygen nasal cannula.  Incentive spirometry and aggressive pulmonary toilet  Avoid abdominal distention with aggressive bowel management  Monitor clinical course closely.      Darion Elizabeth MD  03/22/22  11:48 EDT            Electronically signed by Darion Elizabeth MD at 03/22/22 1202          Consult Notes (last 48 hours)      Clifton Lester MD at 03/22/22 2002             Orthopedic Consult      Patient: Fabio Juárez    Date of Admission: 3/18/2022 12:33 PM    YOB: 1965    Medical Record Number: [id number]    Consulting Physician: Mesha Marie DO    Chief Complaints: Right knee pain    History of Present Illness: 57 y.o. male admitted to [facility] to services of Mesha Marie DO seen for evaluation of a complaint of right knee pain.  He has a history of a right total knee arthroplasty about 4 years ago per his report.  He says the knee was doing fine until yesterday when it started to bother him.  Denies any injury, precipitating event or factor.  Denies any fevers, redness or swelling.  Says the pain is rather diffuse and severe.  It is worse with standing or walking.    Allergies: No Known Allergies    Home Medications:    Current Facility-Administered Medications:   •  acetaminophen (TYLENOL) tablet 650 mg, 650 mg, Oral, Q4H PRN **OR** acetaminophen (TYLENOL) 160 MG/5ML solution 650 mg, 650 mg, Oral, Q4H  PRN **OR** acetaminophen (TYLENOL) suppository 650 mg, 650 mg, Rectal, Q4H PRN, Petra Goodman MD  •  atorvastatin (LIPITOR) tablet 80 mg, 80 mg, Oral, Nightly, Petra Goodman MD, 80 mg at 03/21/22 2026  •  baclofen (LIORESAL) tablet 5 mg, 5 mg, Oral, Nightly, Petra Goodman MD, 5 mg at 03/21/22 2025  •  sennosides-docusate (PERICOLACE) 8.6-50 MG per tablet 2 tablet, 2 tablet, Oral, BID, 2 tablet at 03/22/22 0910 **AND** polyethylene glycol (MIRALAX) packet 17 g, 17 g, Oral, Daily PRN **AND** bisacodyl (DULCOLAX) EC tablet 5 mg, 5 mg, Oral, Daily PRN **AND** bisacodyl (DULCOLAX) suppository 10 mg, 10 mg, Rectal, Daily PRN, Sneha Mujica APRN  •  cetirizine (zyrTEC) tablet 5 mg, 5 mg, Oral, Daily, Petra Goodman MD, 5 mg at 03/22/22 0910  •  dextrose (D50W) (25 g/50 mL) IV injection 25 g, 25 g, Intravenous, Q15 Min PRN, Tico Sanches MD  •  dextrose (GLUTOSE) oral gel 15 g, 15 g, Oral, Q15 Min PRN, Tico Sanches MD  •  enoxaparin (LOVENOX) syringe 40 mg, 40 mg, Subcutaneous, Q24H, Mesha Marie DO, 40 mg at 03/22/22 1609  •  gabapentin (NEURONTIN) capsule 300 mg, 300 mg, Oral, Q8H, Petra Goodman MD, 300 mg at 03/22/22 1609  •  glipizide (GLUCOTROL) tablet 5 mg, 5 mg, Oral, QAM AC, Petra Goodman MD, 5 mg at 03/22/22 0910  •  glucagon (human recombinant) (GLUCAGEN DIAGNOSTIC) injection 1 mg, 1 mg, Intramuscular, Q15 Min PRN, Tico Sanches MD  •  hydroCHLOROthiazide (HYDRODIURIL) oral 12.5 mg, 12.5 mg, Oral, Daily, Petra Goodman MD, 12.5 mg at 03/22/22 0910  •  insulin lispro (ADMELOG) injection 0-7 Units, 0-7 Units, Subcutaneous, TID AC, Tico Sanches MD, 2 Units at 03/22/22 1227  •  lactulose (CHRONULAC) 10 GM/15ML solution 20 g, 20 g, Oral, TID, Tico Sanches MD, 20 g at 03/21/22 2025  •  lisinopril (PRINIVIL,ZESTRIL) tablet 10 mg, 10 mg, Oral, Daily, Petra Goodman MD, 10 mg at 03/22/22 0910  •  morphine injection 4 mg, 4 mg, Intravenous, Q4H PRN, 4 mg at 03/21/22 1313 **AND** naloxone  (NARCAN) injection 0.4 mg, 0.4 mg, Intravenous, Q5 Min PRN, Petra Goodman MD  •  nitroglycerin (NITROSTAT) SL tablet 0.4 mg, 0.4 mg, Sublingual, Q5 Min PRN, Petra Goodman MD  •  ondansetron (ZOFRAN) injection 4 mg, 4 mg, Intravenous, Q6H PRN, Petra Goodman MD, 4 mg at 03/21/22 1326  •  oxyCODONE (ROXICODONE) immediate release tablet 15 mg, 15 mg, Oral, Q6H PRN, Petra Goodman MD, 15 mg at 03/22/22 0909  •  sodium chloride 0.9 % flush 10 mL, 10 mL, Intravenous, Q12H, Petra Goodman MD, 10 mL at 03/22/22 0910  •  sodium chloride 0.9 % flush 10 mL, 10 mL, Intravenous, PRN, Petra Goodman MD  •  traZODone (DESYREL) tablet 50 mg, 50 mg, Oral, Nightly, Petra Goodman MD, 50 mg at 03/21/22 2026    Current Medications:  Scheduled Meds:atorvastatin, 80 mg, Oral, Nightly  baclofen, 5 mg, Oral, Nightly  cetirizine, 5 mg, Oral, Daily  enoxaparin, 40 mg, Subcutaneous, Q24H  gabapentin, 300 mg, Oral, Q8H  glipizide, 5 mg, Oral, QAM AC  hydroCHLOROthiazide, 12.5 mg, Oral, Daily  insulin lispro, 0-7 Units, Subcutaneous, TID AC  lactulose, 20 g, Oral, TID  lisinopril, 10 mg, Oral, Daily  senna-docusate sodium, 2 tablet, Oral, BID  sodium chloride, 10 mL, Intravenous, Q12H  traZODone, 50 mg, Oral, Nightly      Continuous Infusions:   PRN Meds:.•  acetaminophen **OR** acetaminophen **OR** acetaminophen  •  senna-docusate sodium **AND** polyethylene glycol **AND** bisacodyl **AND** bisacodyl  •  dextrose  •  dextrose  •  glucagon (human recombinant)  •  Morphine **AND** naloxone  •  nitroglycerin  •  ondansetron  •  oxyCODONE  •  sodium chloride    Past Medical History:   Diagnosis Date   • Chronic pain    • Claustrophobia    • Coronary artery disease     BLOCKAGE 2015, SEE'S DR DIEGO EVERY 2 YEARS, DENIED CP/SOB    • Essential (primary) hypertension    • GERD (gastroesophageal reflux disease)    • Hemochromatosis     ASYMPTOMATIC    • History of COVID-19    • Left upper quadrant pain    • Low back pain    • Lung nodule    • Mixed  hyperlipidemia    • Obstructive sleep apnea (adult) (pediatric)    • Other testicular dysfunction    • Pain in right foot    • Pain in right shoulder    • Recurrent umbilical hernia    • Renal stone 03/2017   • Tracheostomy status ([facility])     R/T SLEEP APNEA    • Type 2 diabetes mellitus ([facility])     BG RUNS 120-125 IN AM        Past Surgical History:   Procedure Laterality Date   • ACHILLES TENDON REPAIR Right 10/2018   • APPENDECTOMY     • CARDIAC CATHETERIZATION  11/03/2015    LEFT VENTRIULOGRAM, CORONARY ANGIOGRAM    • CHOLECYSTECTOMY  07/2013   • COLONOSCOPY  07/28/2014    NORMAL RESULT    • HERNIA REPAIR     • JOINT REPLACEMENT Bilateral     RIGHT KNEE 01/2016   • TONSILLECTOMY AND ADENOIDECTOMY     • TRACHEOSTOMY      SECONDARY TO SLEEP APNEA   • VENTRAL HERNIA REPAIR N/A 2/23/2022    Procedure: ROBOTIC UMBILICAL HERNIA REPAIR WITH MESH PLACEMENT;  Surgeon: Garrett Anderson MD;  Location: [facility];  Service: Robotics - DaVinci;  Laterality: N/A;       Social History     Occupational History   • Occupation: [employer]    Tobacco Use   • Smoking status: Never Smoker   • Smokeless tobacco: Never Used   Vaping Use   • Vaping Use: Never used   Substance and Sexual Activity   • Alcohol use: Yes     Comment: SOCIALLY    • Drug use: Never   • Sexual activity: Defer      Social History     Social History Narrative   • Not on file       Family History   Problem Relation Age of Onset   • Arrhythmia Mother    • Stroke Mother 73   • Diabetes type II Mother    • Heart attack Father    • Hypertension Brother    • Cerebral aneurysm Brother 38   • Diabetes type II Brother    • Coronary artery disease Other        Review of Systems:     Constitutional:  Denies fever, shaking or chills   Eyes:  Denies change in visual acuity   HEENT:  Denies nasal congestion or sore throat   Respiratory:  Denies cough or shortness of breath   Cardiovascular:  Denies chest pain or edema  Endocrine: Denies tremors,  palpitations, intolerance of heat or cold, polyuria, polydipsia.  GI:  Denies abdominal pain, nausea, vomiting, bloody stools or diarrhea  :  Denies frequency, urgency, incontinence, retention, or nocturia.  Musculoskeletal:  Denies numbness tingling or loss of motor function except as above  Integument:  Denies rash, lesion or ulceration   Neurologic:  Denies headache or focal weakness, deficits  Heme:  Denies epistaxis, spontaneous or excessive bleeding, hematuria, melena, fatigue, enlarged or tender lymph nodes.      All other pertinent positives and negatives as noted above in HPI.    Physical Exam: 57 y.o. male    Vitals:    03/21/22 1941 03/22/22 0023 03/22/22 0731 03/22/22 1353   BP:  106/66 123/75 126/65   BP Location:  Right arm Right arm Left arm   Patient Position:  Lying Lying Sitting   Pulse:   81 83   Resp: 18 18 16 16   Temp:  98.4 °F (36.9 °C) 97.9 °F (36.6 °C) 98 °F (36.7 °C)   TempSrc:  Oral Oral Oral   SpO2:   96% 97%   Weight:       Height:         General:  Awake, alert. No acute distress.      Head/Neck:  Normocephalic, atraumatic.  Conjunctivae and sclerae clear.  Hearing adequate for the exam.  Neck is supple with normal ROM.    Psych:  Affect and demeanor appropriate.    CV:  Regular rate and rhythm.  Hemodynamically stable.    Lungs:  Good chest expansion, breathing unlabored.    Abdomen:  Soft.  Nontender, nondistended.    Extremities: Right knee: Well-healed surgical scar.  Skin is benign.  He has a little bit of increased warmth about the knee but no erythema.  There is no appreciable effusion.  Range of motion is about 50 degrees overall.  I could get amounts about 10 degrees shy of full extension and then flex him down to about 60 or so.  His knee is stable as best I can tell with a somewhat guarded exam.  He can actively extend the knee.  Calf is soft.  Intact motor and sensory function in his lower leg and foot.  Brisk capillary refill.  All other extremities atraumatic without  gross abnormality.     Diagnostic Tests:    No results displayed because visit has over 200 results.        Lab Results (last 24 hours)     Procedure Component Value Units Date/Time    POC Glucose Once [[id number]]  (Normal) Collected: 03/22/22 1615    Specimen: Blood Updated: 03/22/22 1616     Glucose 122 mg/dL      Comment: Meter: [id number] : [id number] [place] NA       POC Glucose Once [[id number]]  (Abnormal) Collected: 03/22/22 1153    Specimen: Blood Updated: 03/22/22 1155     Glucose 174 mg/dL      Comment: Meter: [id number] : [id number] [place] NA       POC Glucose Once [[id number]]  (Normal) Collected: 03/22/22 0635    Specimen: Blood Updated: 03/22/22 0659     Glucose 105 mg/dL      Comment: Meter: [id number] : [id number] [place] LIVE       POC Glucose Once [[id number]]  (Normal) Collected: 03/21/22 2215    Specimen: Blood Updated: 03/21/22 2238     Glucose 106 mg/dL      Comment: Meter: [id number] : [id number] [place]             Imaging: AP and lateral views of the right knee are reviewed to evaluate his complaint.  No comparison films are available.  He has a total knee arthroplasty without any complicating process or acute abnormality.    Assessment: Recent history of right knee pain status post total knee arthroplasty    Plan: I am going to get some baseline lab work.  I have a low index of suspicion for infection and I do not consider that an aspiration is warranted at this point.  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  Depending on what the lab work shows, we may be able to start him on a Dosepak and let him follow-up as an outpatient.  Thank you for the consultation.    Date: 3/22/2022    Clifton Lester MD    CC: Mesha Marie DO        Electronically signed by Clifton Lester MD at 03/22/22 2005

## 2022-03-24 NOTE — NURSING NOTE
Patient discharged and information and discharge information discussed and had no further questions or concerns. IV removed and pt coordinated ride for home and script given for order for wheelchair.

## 2022-03-24 NOTE — PLAN OF CARE
Goal Outcome Evaluation:  Plan of Care Reviewed With: patient            Patient being discharged and having no issues medications from pharmacy was picked up and returned to pt that he brought from home. No further issues and VSS remain stable.

## 2022-03-24 NOTE — PROGRESS NOTES
"      [facility]         Dr Orlando Sayied   LOS: 1 day   Patient Care Team:  Karley Amor APRN as PCP - General (Nurse Practitioner)  Payton Marquez APRN as Nurse Practitioner (Nurse Practitioner)    Chief Complaint: Postop hypoxemia with atelectasis severe sleep apnea status post tracheostomy history of hemochromatosis    Interval History: Resting comfortably no overnight issues.  Nasal cannula oxygen currently being weaned down.    REVIEW OF SYSTEMS:   CARDIOVASCULAR: No chest pain, chest pressure or chest discomfort. No palpitations or edema.   RESPIRATORY: No shortness of breath, cough or sputum.   GASTROINTESTINAL: No anorexia, nausea, vomiting or diarrhea. No abdominal pain or blood.   HEMATOLOGIC: No bleeding or bruising.     Ventilator/Non-Invasive Ventilation Settings (From admission, onward)            None            Vital Signs  Temp:  [97.6 °F (36.4 °C)-99.2 °F (37.3 °C)] 98.2 °F (36.8 °C)  Heart Rate:  [85-91] 87  Resp:  [16-18] 18  BP: (114-130)/(69-79) 128/74  No intake or output data in the 24 hours ending 03/24/22 1105  Flowsheet Rows    Flowsheet Row First Filed Value   Admission Height 165.1 cm (65\") Documented at 03/18/2022 1249   Admission Weight 123 kg (271 lb) Documented at 03/18/2022 1249          Physical Exam:  Patient is examined using the personal protective equipment as per guidelines from infection control for this particular patient as enacted.  Hand hygiene was performed before and after patient interaction.   General Appearance:    Alert, cooperative, in no acute distress.  Following simple commands  ENT Mallampati between 3 and 4 no nasal congestion  Neck midline trachea, no thyromegaly.  Tracheostomy in place   Lungs:    Diminished breath sounds more prominent on the right base    Heart:    Regular rhythm and normal rate, normal S1 and S2, no            murmur, no gallop, no rub, no click   Chest Wall:    No abnormalities observed   Abdomen:     Normal bowel " sounds, no masses, no organomegaly, soft        nontender, nondistended, no guarding, no rebound                tenderness   Extremities:   Moves all extremities well, no edema, no cyanosis, no             redness  CNS no focal neurological deficits normal sensory exam  Skin no rashes no nodules  Musculoskeletal no cyanosis no clubbing normal range of motion     Results Review:        Results from last 7 days   Lab Units 03/21/22  0652 03/20/22  0658 03/19/22  0419   SODIUM mmol/L 135* 131* 133*   POTASSIUM mmol/L 3.7 3.5 3.6   CHLORIDE mmol/L 97* 95* 97*   CO2 mmol/L 28.0 27.0 27.0   BUN mg/dL 15 17 17   CREATININE mg/dL 0.74* 0.77 0.76   GLUCOSE mg/dL 152* 175* 138*   CALCIUM mg/dL 8.8 9.1 8.9     Results from last 7 days   Lab Units 03/18/22  1249   TROPONIN T ng/mL <0.010     Results from last 7 days   Lab Units 03/22/22  2043 03/21/22  0652 03/20/22  0658   WBC 10*3/mm3 9.41 7.69 9.08   HEMOGLOBIN g/dL 14.6 14.4 14.6   HEMATOCRIT % 44.5 43.4 43.7   PLATELETS 10*3/mm3 244 247 279     Results from last 7 days   Lab Units 03/18/22  1249   INR  0.97   APTT seconds 24.4         Results from last 7 days   Lab Units 03/18/22  1249   MAGNESIUM mg/dL 1.7         Results from last 7 days   Lab Units 03/18/22  1519   PH, ARTERIAL pH units 7.419   PO2 ART mm Hg 73.4*   PCO2, ARTERIAL mm Hg 42.5   HCO3 ART mmol/L 27.5       I reviewed the patient's new clinical results.  I personally viewed and interpreted the patient's chest x-ray.        Medication Review:   atorvastatin, 80 mg, Oral, Nightly  baclofen, 5 mg, Oral, Nightly  cetirizine, 5 mg, Oral, Daily  enoxaparin, 40 mg, Subcutaneous, Q24H  gabapentin, 300 mg, Oral, Q8H  glipizide, 5 mg, Oral, QAM AC  hydroCHLOROthiazide, 12.5 mg, Oral, Daily  insulin lispro, 0-7 Units, Subcutaneous, TID AC  lactulose, 20 g, Oral, TID  lisinopril, 10 mg, Oral, Daily  meloxicam, 15 mg, Oral, Daily  methylPREDNISolone, 4 mg, Oral, After Lunch  methylPREDNISolone, 4 mg, Oral, After  Dinner  [START ON 3/25/2022] methylPREDNISolone, 4 mg, Oral, TID Around Food  [START ON 3/26/2022] methylPREDNISolone, 4 mg, Oral, 4x Daily Taper  [START ON 3/27/2022] methylPREDNISolone, 4 mg, Oral, TID Around Food  [START ON 3/28/2022] methylPREDNISolone, 4 mg, Oral, Before Breakfast  [START ON 3/28/2022] methylPREDNISolone, 4 mg, Oral, Tonight  [START ON 3/29/2022] methylPREDNISolone, 4 mg, Oral, Before Breakfast  methylPREDNISolone, 8 mg, Oral, Tonight  [START ON 3/25/2022] methylPREDNISolone, 8 mg, Oral, Tonight  senna-docusate sodium, 2 tablet, Oral, BID  sodium chloride, 10 mL, Intravenous, Q12H  traZODone, 50 mg, Oral, Nightly             ASSESSMENT:   1. Postoperative hypoxemia  2. Atelectasis  3. Severe sleep apnea status post tracheostomy  4. Chronic trach care  5. Hemochromatosis  6. Abdominal hernia status post repair         PLAN:  Pulmonary wise remained stable and continue weaning down oxygen as tolerated  Mobilize ambulate.  Atelectasis should improve with increased mobilization  He is down to 2 L oxygen nasal cannula.  Incentive spirometry and aggressive pulmonary toilet  Avoid abdominal distention with aggressive bowel management  Monitor clinical course closely.  No objections to discharge anytime from pulmonary point of view      Darion Elizabeth MD  03/24/22  11:05 EDT

## 2022-03-24 NOTE — PROGRESS NOTES
I have seen and evaluated the patient this morning.  He says his knee is about the same, may be slightly improved.  His knee is not red.  He has slight increased warmth.  No palpable effusion.  He can range his knee from full extension to about 60 or 70 degrees of flexion.  We reviewed his aspirate results.  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  Cultures are negative thus far but certainly it is early.  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  I recommend we start him in a Medrol Dosepak and have him follow-up as an outpatient.  I need to see him back in 1 week for reevaluation.  He can be discharged later today if okay with the primary team.    Clifton Lester MD

## 2022-03-24 NOTE — DISCHARGE PLACEMENT REQUEST
"Tayo Faustin (57 y.o. Male)             Date of Birth   1965    Social Security Number       Address   [address] [place] [address]    Home Phone   [phone]    MRN   [id number]       Hinduism   None    Marital Status                               Admission Date   3/18/22    Admission Type   Emergency    Admitting Provider   Petra Goodman MD    Attending Provider   Mesha Marie DO    Department, Room/Bed   [address], [address]       Discharge Date       Discharge Disposition   Home or Self Care    Discharge Destination                               Attending Provider: Mesha Marie DO    Allergies: No Known Allergies    Isolation: None   Infection: None   Code Status: CPR   Advance Care Planning Activity    Ht: 165.1 cm (65\")   Wt: 123 kg (271 lb)    Admission Cmt: None   Principal Problem: Hypoxia [R09.02]                 Active Insurance as of 3/18/2022     Primary Coverage     Payor Plan Insurance Group Employer/Plan Group    [employer]UE [employer] PPO [id number]     Payor Plan Address Payor Plan Phone Number Payor Plan Fax Number Effective Dates    [address] [phone]  1/1/2020 - None Entered    [facility] [id number]       Subscriber Name Subscriber Birth Date Member ID       TAYO FAUSTIN 1965 [id number]                 Emergency Contacts      (Rel.) Home Phone Work Phone Mobile Phone    PEE FAUSTIN (Spouse) -- -- [phone]              "

## 2022-03-24 NOTE — DISCHARGE SUMMARY
Patient Name: Fabio Juárez  : 1965  MRN: [id number]    Date of Admission: 3/18/2022  Date of Discharge:  3/24/2022  Primary Care Physician: Karley Amor APRN      Chief Complaint:   Shortness of Breath      Discharge Diagnoses     Active Hospital Problems    Diagnosis  POA   • **Hypoxia [R09.02]  Yes   • Pseudogout of right knee [M11.261]  Yes   • Obesity, Class III, BMI 40-49.9 (morbid obesity) ([facility]) [E66.01]  Yes   • Opiate dependence ([facility]) [F11.20]  Yes   • Pleuritic chest pain [R07.81]  Yes   • Dyspnea [R06.00]  Yes   • Tracheostomy status ([facility]) [Z93.0]  Not Applicable   • Essential (primary) hypertension [I10]  Yes   • Hemochromatosis [E83.119]  Yes   • DM2 (diabetes mellitus, type 2) ([facility]) [E11.9]  Yes   • Chronic low back pain [M54.50, G89.29]  Yes   • Chronic neck pain [M54.2, G89.29]  Yes      Resolved Hospital Problems   No resolved problems to display.        Hospital Course     Mr. Juárez is a 57 y.o. male with history of morbid obesity, sleep apnea, diabetes and chronic prescription opiate use who presented with progressive shortness of breath and pleuritic chest pain following laparoscopic umbilical hernia repair 3 weeks prior.    He was hypoxic on admission, most likely due to postoperative atelectasis, with constipation contributing.  His hospital course was complicated by acute on chronic right knee pain due to pseudogout.     Pleuritic chest pain with hypoxia due to postoperative atelectasis: CTA chest on admission was negative for pneumonia, effusion, pneumothorax or pulmonary embolism.  It did show bibasilar atelectasis.  His hypoxia resolved with incentive spirometry and improved mobilization. Pulmonology evaluated.  He was discharged on room air     Constipation: resolved.   Likely exacerbated by chronic opioid use.  Continue oral bowel regimen    Right knee pseudogout: He had evaluation and right knee joint aspiration by orthopedics.  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  Cultures are negative at 24 hours.  He was discharged with a Medrol Dosepak.  He needs follow-up with orthopedics in 1 week.     Lung nodule: Subcentimeter pulmonary nodule in the right upper lobe. Follow-up  chest CT in 12 months    At the time of discharge patient was told to take all medications as prescribed, keep all follow-up appointments, and call their doctor or return to the hospital with any worsening or concerning symptoms.    Day of Discharge     Subjective:  Feeling well this morning.  Knee pain is improving, he is able to ambulate.  Requests a walker for home.  No new complaints, optimistic for discharge    Review of Systems   Constitutional: Negative for chills and fever.   Respiratory: Negative for cough and shortness of breath.    Cardiovascular: Negative for chest pain and palpitations.   Gastrointestinal: Negative for abdominal pain, diarrhea, nausea and vomiting.   Genitourinary: Negative for dysuria and flank pain.       Physical Exam:  Temp:  [97.6 °F (36.4 °C)-98.2 °F (36.8 °C)] 98.2 °F (36.8 °C)  Heart Rate:  [85-90] 87  Resp:  [16-18] 18  BP: (122-130)/(69-76) 128/74  Body mass index is 45.1 kg/m².  Physical Exam  Constitutional:       General: He is not in acute distress.     Appearance: He is obese. He is not diaphoretic.   HENT:      Mouth/Throat:      Mouth: Mucous membranes are moist.   Eyes:      General: No scleral icterus.  Cardiovascular:      Rate and Rhythm: Normal rate and regular rhythm.   Pulmonary:      Effort: Pulmonary effort is normal. No respiratory distress.      Breath sounds: No wheezing or rhonchi.   Abdominal:      General: There is no distension.      Palpations: Abdomen is soft.      Tenderness: There is no abdominal tenderness. There is no guarding.   Musculoskeletal:      Right lower leg: No edema.      Left lower leg: No edema.   Skin:     General: Skin is warm and dry.   Neurological:      Mental Status: He is  alert.   Psychiatric:         Mood and Affect: Mood normal.         Consultants     Consult Orders (all) (From admission, onward)     Start     Ordered    03/22/22 1007  Inpatient Orthopedic Surgery Consult  Once        Specialty:  Orthopedic Surgery  Provider:  Clifton Lester MD    03/22/22 1007    03/19/22 1501  Inpatient Pulmonology Consult  Once        Specialty:  Pulmonary Disease  Provider:  Scotty Santo MD    03/19/22 1501    03/18/22 1607  [facility] (on-call MD unless specified) Details  Once        Specialty:  Hospitalist  Provider:  (Not yet assigned)    03/18/22 1606              Procedures     Imaging Results (All)     Procedure Component Value Units Date/Time    XR Knee 1 or 2 View Right [[id number]] Collected: 03/21/22 1628     Updated: 03/21/22 1634    Narrative:      XR KNEE 1 OR 2 VW RIGHT-     INDICATIONS: Pain     TECHNIQUE: 2 views of the right knee     COMPARISON: None available     FINDINGS:     Right knee arthroplasty hardware appears intact. No acute fracture,  erosion, or dislocation is identified. Trace knee effusion is suggested.  Faint arterial calcification is apparent. Follow-up/further evaluation  can be obtained as indications persist.       Impression:         As described.           This report was finalized on 3/21/2022 4:31 PM by Dr. Frank Salinas M.D.       XR Abdomen KUB [[id number]] Collected: 03/21/22 1419     Updated: 03/21/22 1423    Narrative:      XR ABDOMEN KUB-     INDICATIONS: Constipation     TECHNIQUE: Supine views of the abdomen     COMPARISON: None available     FINDINGS:      The bowel gas pattern is nonspecific, with gaseous distention of  transverse colon, proximal colonic fecal retention. No supine evidence  for free intraperitoneal gas. No definite nephrolithiasis..  Follow-up/further evaluation can be obtained as indications persist.       Impression:         As described.     This report was finalized on 3/21/2022 2:20 PM by Dr. Frank CREWS  LIDA Salinas.       CT Angiogram Chest [[id number]] Collected: 03/18/22 1432     Updated: 03/18/22 1452    Narrative:      CT ANGIOGRAM OF THE CHEST. MULTIPLE CORONAL, SAGITTAL, AND 3-D  RECONSTRUCTIONS.     HISTORY: Short of air, recent abdominal surgery; evaluate for pulmonary  embolism     TECHNIQUE: Radiation dose reduction techniques were utilized, including  automated exposure control and exposure modulation based on body size.   CT angiogram of the chest was performed. Multiple coronal, sagittal, and  3-D reconstruction images were obtained.      COMPARISON:None     FINDINGS:      Tracheostomy is present. The gallbladder is surgically absent. No hilar,  mediastinal or axillary adenopathy is present by size criteria. There is  a trace pericardial effusion.     Bibasilar atelectasis and or pleural parenchymal scarring is present. No  findings of pulmonary embolism are present. There is no pleural effusion  or pneumothorax. Subcentimeter pulmonary nodule within the right upper  lobe is present.     No suspicious lytic blastic osseous lesion is present.       Impression:      1.  No findings of pulmonary embolism. Bibasilar atelectasis and or  pleural parenchymal scarring.  2.  Subcentimeter pulmonary nodule in the right upper lobe. Follow-up  chest CT in 12 months should be considered to ensure stability per  Fleischner criteria.  3.  Other findings as above.     This report was finalized on 3/18/2022 2:49 PM by Dr. Efren Shepard M.D.       XR Chest 1 View [[id number]] Collected: 03/18/22 1414     Updated: 03/18/22 1421    Narrative:      ONE VIEW PORTABLE CHEST     HISTORY: Shortness of breath.     FINDINGS: The lungs are moderately expanded and clear except for some  minimal vague atelectasis at the right base. Allowing for the lung  expansion, the heart size is normal. A tracheostomy tube is in place.     This report was finalized on 3/18/2022 2:18 PM by Dr. Femi Joseph M.D.             Pertinent Labs      Results from last 7 days   Lab Units 03/22/22 2043 03/21/22  0652 03/20/22  0658 03/19/22  0419   WBC 10*3/mm3 9.41 7.69 9.08 10.25   HEMOGLOBIN g/dL 14.6 14.4 14.6 15.0   PLATELETS 10*3/mm3 244 247 279 294     Results from last 7 days   Lab Units 03/21/22  0652 03/20/22  0658 03/19/22  0419 03/18/22  1249   SODIUM mmol/L 135* 131* 133* 135*   POTASSIUM mmol/L 3.7 3.5 3.6 3.9   CHLORIDE mmol/L 97* 95* 97* 99   CO2 mmol/L 28.0 27.0 27.0 27.0   BUN mg/dL 15 17 17 18   CREATININE mg/dL 0.74* 0.77 0.76 0.90   GLUCOSE mg/dL 152* 175* 138* 134*   Estimated Creatinine Clearance: 135.7 mL/min (A) (by C-G formula based on SCr of 0.74 mg/dL (L)).  Results from last 7 days   Lab Units 03/21/22  0652 03/20/22  0658 03/19/22  0419 03/18/22  1249   ALBUMIN g/dL 3.00* 3.80 3.70 4.00   BILIRUBIN mg/dL 0.9 1.4* 1.8* 1.1   ALK PHOS U/L 81 73 71 69   AST (SGOT) U/L 16 15 12 16   ALT (SGPT) U/L 20 17 21 19     Results from last 7 days   Lab Units 03/21/22  0652 03/20/22  0658 03/19/22  0419 03/18/22  1249   CALCIUM mg/dL 8.8 9.1 8.9 9.3   ALBUMIN g/dL 3.00* 3.80 3.70 4.00   MAGNESIUM mg/dL  --   --   --  1.7       Results from last 7 days   Lab Units 03/18/22  1249   TROPONIN T ng/mL <0.010   PROBNP pg/mL 40.2           Invalid input(s): LDLCALC  Results from last 7 days   Lab Units 03/23/22  1616   BODYFLDCX  No growth       Test Results Pending at Discharge     Pending Labs     Order Current Status    Anaerobic Culture - Synovial Fluid, Knee, Right In process    Body Fluid Culture - Synovial Fluid, Knee, Right Preliminary result          Discharge Details        Discharge Medications      New Medications      Instructions Start Date   meloxicam 15 MG tablet  Commonly known as: Mobic   15 mg, Oral, Daily      methylPREDNISolone 4 MG dose pack  Commonly known as: Medrol   Take as directed on package instructions      sennosides-docusate 8.6-50 MG per tablet  Commonly known as: PERICOLACE   2 tablets, Oral, 2 Times Daily          Continue These Medications      Instructions Start Date   atorvastatin 80 MG tablet  Commonly known as: LIPITOR   TAKE ONE TABLET BY MOUTH EVERY NIGHT AT BEDTIME      baclofen 10 MG tablet  Commonly known as: LIORESAL   TAKE 1/2 TO 1 TABLET BY MOUTH AT BEDTIME FOR LOWER BACK PAIN/ MUSCLE SPASMS      cetirizine 5 MG tablet  Commonly known as: zyrTEC   TAKE ONE TABLET BY MOUTH DAILY      gabapentin 300 MG capsule  Commonly known as: NEURONTIN   4 Times Daily      glimepiride 2 MG tablet  Commonly known as: AMARYL   2 mg, Oral, Every Morning Before Breakfast, INSTRUCTED PER ANESTHESIA STANDING ORDERS      hydroCHLOROthiazide 12.5 MG tablet  Commonly known as: HYDRODIURIL   12.5 mg, Oral, Daily      lisinopril 10 MG tablet  Commonly known as: PRINIVIL,ZESTRIL   TAKE ONE TABLET BY MOUTH EVERY MORNING      metFORMIN  MG 24 hr tablet  Commonly known as: GLUCOPHAGE-XR   1,000 mg, Oral, 2 Times Daily With Meals      omeprazole 40 MG capsule  Commonly known as: priLOSEC   40 mg, Oral, Daily      oxyCODONE 15 MG immediate release tablet  Commonly known as: ROXICODONE   15 mg, Oral, As Needed      Ozempic (1 MG/DOSE) 4 MG/3ML solution pen-injector  Generic drug: Semaglutide (1 MG/DOSE)   1 mg, Subcutaneous, Weekly      traZODone 50 MG tablet  Commonly known as: DESYREL   TAKE ONE TABLET BY MOUTH ONCE NIGHTLY             No Known Allergies      Discharge Disposition:  Home or Self Care    Discharge Diet:  No active diet order      Discharge Activity:   As tolerated    CODE STATUS:    Code Status and Medical Interventions:   Ordered at: 03/18/22 1947     Code Status (Patient has no pulse and is not breathing):    CPR (Attempt to Resuscitate)     Medical Interventions (Patient has pulse or is breathing):    Full Support       Future Appointments   Date Time Provider Department Center   3/29/2022  9:15 AM Garrett Anderson MD [facility]      Contact information for follow-up providers     Karley Amor APRN.  Schedule an appointment as soon as possible for a visit in 1 week(s).    Specialties: Nurse Practitioner, Family Medicine  Contact information:  [address] [place] [id number]  [phone]                   Contact information for after-discharge care     Durable Medical Equipment     [facility]    Service: Durable Medical Equipment  Contact information:  [address]  [facility] [id number]  [phone]                             Time Spent on Discharge:  Greater than 30 minutes      Mesha Marie DO  [facility]  03/24/22  08:43 EDT

## 2022-03-25 ENCOUNTER — TRANSITIONAL CARE MANAGEMENT TELEPHONE ENCOUNTER (OUTPATIENT)
Dept: CALL CENTER | Facility: HOSPITAL | Age: 57
End: 2022-03-25

## 2022-03-25 NOTE — OUTREACH NOTE
Case Management Call Center Follow-up    Flowsheet Row Responses   [facility] Call Center Tracking Reason? No DME   Has the Call Center Case Management Follow-up issue been resolved? Yes   Follow-up Comments I forwarded this to Dr. Mesha Marie since she ordered the walker.  The APRN messaged me back and the patient has an appointment on Tuesday and they will discuss this with him at that time.          Shannon Epley, RN    3/25/2022, 12:38 EDT

## 2022-03-25 NOTE — PAYOR COMM NOTE
"Tayo Faustin (57 y.o. Male)     ATTN: DISCHARGE SUMMARY TO REVIEW: REF# [id number]     DEPT: [phone],  [phone]                  Date of Birth   1965    Social Security Number       Address   [address] [place] [address]    Home Phone   [phone]    MRN   [id number]       Voodoo   None    Marital Status                               Admission Date   3/18/22    Admission Type   Emergency    Admitting Provider   Petra Goodman MD    Attending Provider       Department, Room/Bed   [address], [address]/       Discharge Date   3/24/2022    Discharge Disposition   Home or Self Care    Discharge Destination                               Attending Provider: (none)   Allergies: No Known Allergies    Isolation: None   Infection: None   Code Status: Prior   Advance Care Planning Activity    Ht: 165.1 cm (65\")   Wt: 123 kg (271 lb)    Admission Cmt: None   Principal Problem: Hypoxia [R09.02]                 Active Insurance as of 3/18/2022     Primary Coverage     Payor Plan Insurance Group Employer/Plan Group    [employer] PPO [id number]     Payor Plan Address Payor Plan Phone Number Payor Plan Fax Number Effective Dates    [address] [phone]  2020 - None Entered    [place] [address]       Subscriber Name Subscriber Birth Date Member ID       TAYO FAUSTIN 1965 [id number]                 Emergency Contacts      (Rel.) Home Phone Work Phone Mobile Phone    PEE FAUSTIN (Spouse) -- -- [phone]               Discharge Summary      Mesha Marie DO at 22 0843              Patient Name: Tayo Faustin  : 1965  MRN: [id number]    Date of Admission: 3/18/2022  Date of Discharge:  3/24/2022  Primary Care Physician: Karley Amor APRN      Chief Complaint:   Shortness of Breath      Discharge Diagnoses     Active Hospital Problems    Diagnosis  POA   • " **Hypoxia [R09.02]  Yes   • Pseudogout of right knee [M11.261]  Yes   • Obesity, Class III, BMI 40-49.9 (morbid obesity) (Formerly [facility]) [E66.01]  Yes   • Opiate dependence (Formerly [facility]) [F11.20]  Yes   • Pleuritic chest pain [R07.81]  Yes   • Dyspnea [R06.00]  Yes   • Tracheostomy status (Formerly [facility]) [Z93.0]  Not Applicable   • Essential (primary) hypertension [I10]  Yes   • Hemochromatosis [E83.119]  Yes   • DM2 (diabetes mellitus, type 2) (Formerly [facility]) [E11.9]  Yes   • Chronic low back pain [M54.50, G89.29]  Yes   • Chronic neck pain [M54.2, G89.29]  Yes      Resolved Hospital Problems   No resolved problems to display.        Hospital Course     Mr. Juárez is a 57 y.o. male with history of morbid obesity, sleep apnea, diabetes and chronic prescription opiate use who presented with progressive shortness of breath and pleuritic chest pain following laparoscopic umbilical hernia repair 3 weeks prior.    He was hypoxic on admission, most likely due to postoperative atelectasis, with constipation contributing.  His hospital course was complicated by acute on chronic right knee pain due to pseudogout.     Pleuritic chest pain with hypoxia due to postoperative atelectasis: CTA chest on admission was negative for pneumonia, effusion, pneumothorax or pulmonary embolism.  It did show bibasilar atelectasis.  His hypoxia resolved with incentive spirometry and improved mobilization. Pulmonology evaluated.  He was discharged on room air     Constipation: resolved.   Likely exacerbated by chronic opioid use.  Continue oral bowel regimen    Right knee pseudogout: He had evaluation and right knee joint aspiration by orthopedics.  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  Cultures are negative at 24 hours.  He was discharged with a Medrol Dosepak.  He needs follow-up with orthopedics in 1 week.     Lung nodule: Subcentimeter pulmonary nodule in the right upper lobe. Follow-up  chest CT in 12 months    At the time of  discharge patient was told to take all medications as prescribed, keep all follow-up appointments, and call their doctor or return to the hospital with any worsening or concerning symptoms.    Day of Discharge     Subjective:  Feeling well this morning.  Knee pain is improving, he is able to ambulate.  Requests a walker for home.  No new complaints, optimistic for discharge    Review of Systems   Constitutional: Negative for chills and fever.   Respiratory: Negative for cough and shortness of breath.    Cardiovascular: Negative for chest pain and palpitations.   Gastrointestinal: Negative for abdominal pain, diarrhea, nausea and vomiting.   Genitourinary: Negative for dysuria and flank pain.       Physical Exam:  Temp:  [97.6 °F (36.4 °C)-98.2 °F (36.8 °C)] 98.2 °F (36.8 °C)  Heart Rate:  [85-90] 87  Resp:  [16-18] 18  BP: (122-130)/(69-76) 128/74  Body mass index is 45.1 kg/m².  Physical Exam  Constitutional:       General: He is not in acute distress.     Appearance: He is obese. He is not diaphoretic.   HENT:      Mouth/Throat:      Mouth: Mucous membranes are moist.   Eyes:      General: No scleral icterus.  Cardiovascular:      Rate and Rhythm: Normal rate and regular rhythm.   Pulmonary:      Effort: Pulmonary effort is normal. No respiratory distress.      Breath sounds: No wheezing or rhonchi.   Abdominal:      General: There is no distension.      Palpations: Abdomen is soft.      Tenderness: There is no abdominal tenderness. There is no guarding.   Musculoskeletal:      Right lower leg: No edema.      Left lower leg: No edema.   Skin:     General: Skin is warm and dry.   Neurological:      Mental Status: He is alert.   Psychiatric:         Mood and Affect: Mood normal.         Consultants     Consult Orders (all) (From admission, onward)     Start     Ordered    03/22/22 1007  Inpatient Orthopedic Surgery Consult  Once        Specialty:  Orthopedic Surgery  Provider:  Clifton Lester MD    03/22/22 1007     03/19/22 1501  Inpatient Pulmonology Consult  Once        Specialty:  Pulmonary Disease  Provider:  Scotty Santo MD    03/19/22 1501    03/18/22 1607  [facility] (on-call MD unless specified) Details  Once        Specialty:  Hospitalist  Provider:  (Not yet assigned)    03/18/22 1606              Procedures     Imaging Results (All)     Procedure Component Value Units Date/Time    XR Knee 1 or 2 View Right [[id number]] Collected: 03/21/22 1628     Updated: 03/21/22 1634    Narrative:      XR KNEE 1 OR 2 VW RIGHT-     INDICATIONS: Pain     TECHNIQUE: 2 views of the right knee     COMPARISON: None available     FINDINGS:     Right knee arthroplasty hardware appears intact. No acute fracture,  erosion, or dislocation is identified. Trace knee effusion is suggested.  Faint arterial calcification is apparent. Follow-up/further evaluation  can be obtained as indications persist.       Impression:         As described.           This report was finalized on 3/21/2022 4:31 PM by Dr. Frank Salinas M.D.       XR Abdomen KUB [[id number]] Collected: 03/21/22 1419     Updated: 03/21/22 1423    Narrative:      XR ABDOMEN KUB-     INDICATIONS: Constipation     TECHNIQUE: Supine views of the abdomen     COMPARISON: None available     FINDINGS:      The bowel gas pattern is nonspecific, with gaseous distention of  transverse colon, proximal colonic fecal retention. No supine evidence  for free intraperitoneal gas. No definite nephrolithiasis..  Follow-up/further evaluation can be obtained as indications persist.       Impression:         As described.     This report was finalized on 3/21/2022 2:20 PM by Dr. Frank Salinas M.D.       CT Angiogram Chest [[id number]] Collected: 03/18/22 1432     Updated: 03/18/22 1452    Narrative:      CT ANGIOGRAM OF THE CHEST. MULTIPLE CORONAL, SAGITTAL, AND 3-D  RECONSTRUCTIONS.     HISTORY: Short of air, recent abdominal surgery; evaluate for pulmonary  embolism     TECHNIQUE:  Radiation dose reduction techniques were utilized, including  automated exposure control and exposure modulation based on body size.   CT angiogram of the chest was performed. Multiple coronal, sagittal, and  3-D reconstruction images were obtained.      COMPARISON:None     FINDINGS:      Tracheostomy is present. The gallbladder is surgically absent. No hilar,  mediastinal or axillary adenopathy is present by size criteria. There is  a trace pericardial effusion.     Bibasilar atelectasis and or pleural parenchymal scarring is present. No  findings of pulmonary embolism are present. There is no pleural effusion  or pneumothorax. Subcentimeter pulmonary nodule within the right upper  lobe is present.     No suspicious lytic blastic osseous lesion is present.       Impression:      1.  No findings of pulmonary embolism. Bibasilar atelectasis and or  pleural parenchymal scarring.  2.  Subcentimeter pulmonary nodule in the right upper lobe. Follow-up  chest CT in 12 months should be considered to ensure stability per  Fleischner criteria.  3.  Other findings as above.     This report was finalized on 3/18/2022 2:49 PM by Dr. Efren Shepard M.D.       XR Chest 1 View [[id number]] Collected: 03/18/22 1414     Updated: 03/18/22 1421    Narrative:      ONE VIEW PORTABLE CHEST     HISTORY: Shortness of breath.     FINDINGS: The lungs are moderately expanded and clear except for some  minimal vague atelectasis at the right base. Allowing for the lung  expansion, the heart size is normal. A tracheostomy tube is in place.     This report was finalized on 3/18/2022 2:18 PM by Dr. Femi Joseph M.D.             Pertinent Labs     Results from last 7 days   Lab Units 03/22/22 2043 03/21/22 0652 03/20/22 0658 03/19/22  0419   WBC 10*3/mm3 9.41 7.69 9.08 10.25   HEMOGLOBIN g/dL 14.6 14.4 14.6 15.0   PLATELETS 10*3/mm3 244 247 279 294     Results from last 7 days   Lab Units 03/21/22 0652 03/20/22 0658 03/19/22  0419  03/18/22  1249   SODIUM mmol/L 135* 131* 133* 135*   POTASSIUM mmol/L 3.7 3.5 3.6 3.9   CHLORIDE mmol/L 97* 95* 97* 99   CO2 mmol/L 28.0 27.0 27.0 27.0   BUN mg/dL 15 17 17 18   CREATININE mg/dL 0.74* 0.77 0.76 0.90   GLUCOSE mg/dL 152* 175* 138* 134*   Estimated Creatinine Clearance: 135.7 mL/min (A) (by C-G formula based on SCr of 0.74 mg/dL (L)).  Results from last 7 days   Lab Units 03/21/22  0652 03/20/22  0658 03/19/22  0419 03/18/22  1249   ALBUMIN g/dL 3.00* 3.80 3.70 4.00   BILIRUBIN mg/dL 0.9 1.4* 1.8* 1.1   ALK PHOS U/L 81 73 71 69   AST (SGOT) U/L 16 15 12 16   ALT (SGPT) U/L 20 17 21 19     Results from last 7 days   Lab Units 03/21/22  0652 03/20/22  0658 03/19/22 0419 03/18/22  1249   CALCIUM mg/dL 8.8 9.1 8.9 9.3   ALBUMIN g/dL 3.00* 3.80 3.70 4.00   MAGNESIUM mg/dL  --   --   --  1.7       Results from last 7 days   Lab Units 03/18/22  1249   TROPONIN T ng/mL <0.010   PROBNP pg/mL 40.2           Invalid input(s): LDLCALC  Results from last 7 days   Lab Units 03/23/22  1616   BODYFLDCX  No growth       Test Results Pending at Discharge     Pending Labs     Order Current Status    Anaerobic Culture - Synovial Fluid, Knee, Right In process    Body Fluid Culture - Synovial Fluid, Knee, Right Preliminary result          Discharge Details        Discharge Medications      New Medications      Instructions Start Date   meloxicam 15 MG tablet  Commonly known as: Mobic   15 mg, Oral, Daily      methylPREDNISolone 4 MG dose pack  Commonly known as: Medrol   Take as directed on package instructions      sennosides-docusate 8.6-50 MG per tablet  Commonly known as: PERICOLACE   2 tablets, Oral, 2 Times Daily         Continue These Medications      Instructions Start Date   atorvastatin 80 MG tablet  Commonly known as: LIPITOR   TAKE ONE TABLET BY MOUTH EVERY NIGHT AT BEDTIME      baclofen 10 MG tablet  Commonly known as: LIORESAL   TAKE 1/2 TO 1 TABLET BY MOUTH AT BEDTIME FOR LOWER BACK PAIN/ MUSCLE SPASMS       cetirizine 5 MG tablet  Commonly known as: zyrTEC   TAKE ONE TABLET BY MOUTH DAILY      gabapentin 300 MG capsule  Commonly known as: NEURONTIN   4 Times Daily      glimepiride 2 MG tablet  Commonly known as: AMARYL   2 mg, Oral, Every Morning Before Breakfast, INSTRUCTED PER ANESTHESIA STANDING ORDERS      hydroCHLOROthiazide 12.5 MG tablet  Commonly known as: HYDRODIURIL   12.5 mg, Oral, Daily      lisinopril 10 MG tablet  Commonly known as: PRINIVIL,ZESTRIL   TAKE ONE TABLET BY MOUTH EVERY MORNING      metFORMIN  MG 24 hr tablet  Commonly known as: GLUCOPHAGE-XR   1,000 mg, Oral, 2 Times Daily With Meals      omeprazole 40 MG capsule  Commonly known as: priLOSEC   40 mg, Oral, Daily      oxyCODONE 15 MG immediate release tablet  Commonly known as: ROXICODONE   15 mg, Oral, As Needed      Ozempic (1 MG/DOSE) 4 MG/3ML solution pen-injector  Generic drug: Semaglutide (1 MG/DOSE)   1 mg, Subcutaneous, Weekly      traZODone 50 MG tablet  Commonly known as: DESYREL   TAKE ONE TABLET BY MOUTH ONCE NIGHTLY             No Known Allergies      Discharge Disposition:  Home or Self Care    Discharge Diet:  No active diet order      Discharge Activity:   As tolerated    CODE STATUS:    Code Status and Medical Interventions:   Ordered at: 03/18/22 1947     Code Status (Patient has no pulse and is not breathing):    CPR (Attempt to Resuscitate)     Medical Interventions (Patient has pulse or is breathing):    Full Support       Future Appointments   Date Time Provider Department Center   3/29/2022  9:15 AM Garrett Anderson MD [facility]      Contact information for follow-up providers     Karley Amor APRN. Schedule an appointment as soon as possible for a visit in 1 week(s).    Specialties: Nurse Practitioner, Family Medicine  Contact information:  [address]  [address] [address] [phone]                   Contact information for after-discharge care     Durable Medical Equipment      [facility] .    Service: Durable Medical Equipment  Contact information:  [id number]  [facility] [id number] [phone]                             Time Spent on Discharge:  Greater than 30 minutes      Mesha Marie DO  [facility]  03/24/22  08:43 EDT                Electronically signed by Mesha Marie DO at 03/24/22 3996

## 2022-03-25 NOTE — OUTREACH NOTE
"Call Center TCM Note    Flowsheet Row Responses   [facility] patient discharged from? [facility]   Does the patient have one of the following disease processes/diagnoses(primary or secondary)? Other   TCM attempt successful? Yes   Call start time 1005   Call end time 1019   Discharge diagnosis Hypoxia Tracheostomy status Pleuritic chest pain    Meds reviewed with patient/caregiver? Yes   Is the patient having any side effects they believe may be caused by any medication additions or changes? No   Does the patient have all medications ordered at discharge? Yes   Prescription comments Patient will  medications this morning   Is the patient taking all medications as directed (includes completed medication regime)? Yes   Comments regarding appointments Dr. Anderson 3/29/22   Does the patient have a primary care provider?  Yes   Does the patient have an appointment with their PCP within 7 days of discharge? Yes   Comments regarding PCP Hospital PCP FOLLOW UP APPOINTMENT IS 3/29/22@1100am   Has the patient kept scheduled appointments due by today? N/A   Has home health visited the patient within 72 hours of discharge? N/A   What DME was ordered? walker at [facility]   Has all DME been delivered? No   DME comments Reports walker ordered was without wheels and he needs one w/wheels--will send a note to CM   Did the patient receive a copy of their discharge instructions? Yes   Nursing interventions Reviewed instructions with patient   What is the patient's perception of their health status since discharge? Improving  [Reports he is tired, his knee feels better and he can now walk.  Endorses \"a little SOA\" some residual pain to posterior lung right side-encouraged deep breathing. Patient aware to notify MD of worsening s/s.]   Is the patient/caregiver able to teach back signs and symptoms related to disease process for when to call PCP? Yes   Is the patient/caregiver able to teach back signs and symptoms related to " disease process for when to call 911? Yes   Is the patient/caregiver able to teach back the hierarchy of who to call/visit for symptoms/problems? PCP, Specialist, Home health nurse, Urgent Care, ED, 911 Yes   Additional teach back comments Patient constipation improved--3 BMS since discharge. Patient w/surgical f/u on 3/29/22 with Dr. Anderson.    TCM call completed? Yes          Reina He RN    3/25/2022, 10:22 EDT

## 2022-03-25 NOTE — OUTREACH NOTE
Prep Survey    Flowsheet Row Responses   Holiness facility patient discharged from? [place]   Is LACE score < 7 ? No   Emergency Room discharge w/ pulse ox? No   Eligibility [facility]   Date of Admission 03/18/22   Date of Discharge 03/24/22   Discharge Disposition Home or Self Care   Discharge diagnosis Hypoxia Tracheostomy status Pleuritic chest pain    Does the patient have one of the following disease processes/diagnoses(primary or secondary)? Other   Does the patient have Home health ordered? No   Is there a DME ordered? Yes   What DME was ordered? walker at [facility]   Prep survey completed? Yes          ERIN MONROY - Registered Nurse

## 2022-03-28 LAB
BACTERIA FLD CULT: NORMAL
BACTERIA SPEC ANAEROBE CULT: NORMAL
GRAM STN SPEC: NORMAL

## 2022-03-29 ENCOUNTER — OFFICE VISIT (OUTPATIENT)
Dept: SURGERY | Facility: CLINIC | Age: 57
End: 2022-03-29

## 2022-03-29 ENCOUNTER — OFFICE VISIT (OUTPATIENT)
Dept: FAMILY MEDICINE CLINIC | Age: 57
End: 2022-03-29

## 2022-03-29 VITALS — BODY MASS INDEX: 45.15 KG/M2 | WEIGHT: 271 LBS | TEMPERATURE: 96.9 F | HEIGHT: 65 IN

## 2022-03-29 VITALS
SYSTOLIC BLOOD PRESSURE: 117 MMHG | DIASTOLIC BLOOD PRESSURE: 75 MMHG | HEIGHT: 65 IN | WEIGHT: 266.6 LBS | HEART RATE: 69 BPM | BODY MASS INDEX: 44.42 KG/M2 | OXYGEN SATURATION: 94 %

## 2022-03-29 DIAGNOSIS — Z98.890 S/P HERNIA REPAIR: ICD-10-CM

## 2022-03-29 DIAGNOSIS — M25.561 ACUTE PAIN OF RIGHT KNEE: ICD-10-CM

## 2022-03-29 DIAGNOSIS — Z87.19 S/P HERNIA REPAIR: ICD-10-CM

## 2022-03-29 DIAGNOSIS — K42.9 RECURRENT UMBILICAL HERNIA: Primary | ICD-10-CM

## 2022-03-29 DIAGNOSIS — R91.1 RIGHT UPPER LOBE PULMONARY NODULE: ICD-10-CM

## 2022-03-29 DIAGNOSIS — R10.84 GENERALIZED ABDOMINAL PAIN: Primary | ICD-10-CM

## 2022-03-29 DIAGNOSIS — R91.1 LUNG NODULE: ICD-10-CM

## 2022-03-29 PROCEDURE — 99496 TRANSJ CARE MGMT HIGH F2F 7D: CPT | Performed by: NURSE PRACTITIONER

## 2022-03-29 PROCEDURE — 99024 POSTOP FOLLOW-UP VISIT: CPT | Performed by: SURGERY

## 2022-03-29 RX ORDER — MELOXICAM 7.5 MG/1
TABLET ORAL
COMMUNITY
Start: 2021-09-21 | End: 2022-03-29

## 2022-03-29 RX ORDER — PREDNISONE 20 MG/1
TABLET ORAL
COMMUNITY
Start: 2022-03-12 | End: 2022-04-24

## 2022-03-29 RX ORDER — GABAPENTIN 300 MG/1
CAPSULE ORAL
COMMUNITY
Start: 2021-10-13 | End: 2022-03-29

## 2022-03-29 RX ORDER — ALBUTEROL SULFATE 90 UG/1
AEROSOL, METERED RESPIRATORY (INHALATION)
COMMUNITY
Start: 2022-03-12

## 2022-03-29 RX ORDER — LISINOPRIL 10 MG/1
TABLET ORAL
COMMUNITY
End: 2022-03-29 | Stop reason: SDUPTHER

## 2022-03-29 RX ORDER — CETIRIZINE HYDROCHLORIDE 5 MG/1
TABLET ORAL
COMMUNITY
End: 2022-03-29

## 2022-03-29 RX ORDER — METHOCARBAMOL 500 MG/1
TABLET, FILM COATED ORAL
COMMUNITY
Start: 2021-09-21 | End: 2022-04-24

## 2022-03-29 NOTE — PROGRESS NOTES
Chief Complaint  Hernia (FOLLOW UP Recurrent umbilical hernia/)    Subjective          Fabio Juárez presents to [facility]ERY  History of Present Illness    Fabio Juárez is a 57 y.o. male  who presents today for a postoperative visit.     Patient is here for a follow-up after a robotic repair of a recurrent umbilical hernia.  He still a little bit sore but is getting better.    Past History:  Medical History: has a past medical history of Chronic pain, Claustrophobia, Coronary artery disease, Essential (primary) hypertension, GERD (gastroesophageal reflux disease), Hemochromatosis, History of COVID-19, Left upper quadrant pain, Low back pain, Lung nodule, Mixed hyperlipidemia, Obstructive sleep apnea (adult) (pediatric), Other testicular dysfunction, Pain in right foot, Pain in right shoulder, Recurrent umbilical hernia, Renal stone (03/2017), Tracheostomy status ([facility]), and Type 2 diabetes mellitus ([facility]).   Surgical History: has a past surgical history that includes Cholecystectomy (07/2013); Joint replacement (Bilateral); Appendectomy; Tonsillectomy and adenoidectomy; Tracheostomy tube placement; Achilles tendon repair (Right, 10/2018); Colonoscopy (07/28/2014); Hernia repair; Cardiac catheterization (11/03/2015); and Ventral hernia repair (N/A, 2/23/2022).   Family History: family history includes Arrhythmia in his mother; Cerebral aneurysm (age of onset: 38) in his brother; Coronary artery disease in an other family member; Diabetes type II in his brother and mother; Heart attack in his father; Hypertension in his brother; Stroke (age of onset: 73) in his mother.   Social History: reports that he has never smoked. He has never used smokeless tobacco. He reports current alcohol use. He reports that he does not use drugs.  Allergies: Patient has no known allergies.       Current Outpatient Medications:   •  albuterol sulfate  (90 Base) MCG/ACT inhaler, EVERY 4 HOURS AS  NEEDED as needed for SHORTNESS OF BREATH, Disp: , Rfl:   •  atorvastatin (LIPITOR) 80 MG tablet, TAKE ONE TABLET BY MOUTH EVERY NIGHT AT BEDTIME, Disp: 90 tablet, Rfl: 0  •  baclofen (LIORESAL) 10 MG tablet, TAKE 1/2 TO 1 TABLET BY MOUTH AT BEDTIME FOR LOWER BACK PAIN/ MUSCLE SPASMS, Disp: 90 tablet, Rfl: 1  •  cetirizine (zyrTEC) 5 MG tablet, TAKE ONE TABLET BY MOUTH DAILY, Disp: 90 tablet, Rfl: 0  •  gabapentin (NEURONTIN) 300 MG capsule, 4 (Four) Times a Day., Disp: , Rfl:   •  glimepiride (AMARYL) 2 MG tablet, Take 2 mg by mouth Every Morning Before Breakfast. INSTRUCTED PER ANESTHESIA STANDING ORDERS, Disp: , Rfl:   •  hydroCHLOROthiazide (HYDRODIURIL) 12.5 MG tablet, Take 12.5 mg by mouth Daily., Disp: , Rfl:   •  lisinopril (PRINIVIL,ZESTRIL) 10 MG tablet, TAKE ONE TABLET BY MOUTH EVERY MORNING, Disp: 90 tablet, Rfl: 0  •  lisinopril (PRINIVIL,ZESTRIL) 10 MG tablet, ONCE A DAY, Disp: , Rfl:   •  meloxicam (Mobic) 15 MG tablet, Take 1 tablet by mouth Daily for 7 days., Disp: 7 tablet, Rfl: 0  •  metFORMIN ER (GLUCOPHAGE-XR) 500 MG 24 hr tablet, Take 2 tablets by mouth 2 (Two) Times a Day With Meals., Disp: 360 tablet, Rfl: 0  •  methocarbamol (ROBAXIN) 500 MG tablet, Three Times Daily as needed for MUSCLE SPASMS, Disp: , Rfl:   •  omeprazole (priLOSEC) 40 MG capsule, Take 40 mg by mouth Daily., Disp: , Rfl:   •  oxyCODONE (ROXICODONE) 15 MG immediate release tablet, Take 15 mg by mouth As Needed for Moderate Pain ., Disp: , Rfl:   •  Semaglutide, 1 MG/DOSE, (Ozempic, 1 MG/DOSE,) 4 MG/3ML solution pen-injector, Inject 1 mg under the skin into the appropriate area as directed 1 (One) Time Per Week for 90 days., Disp: 3 pen, Rfl: 0  •  sennosides-docusate (PERICOLACE) 8.6-50 MG per tablet, Take 2 tablets by mouth 2 (Two) Times a Day., Disp: 60 tablet, Rfl: 0  •  traZODone (DESYREL) 50 MG tablet, TAKE ONE TABLET BY MOUTH ONCE NIGHTLY, Disp: 90 tablet, Rfl: 0  •  methylPREDNISolone (Medrol) 4 MG dose pack, Take as  "directed on package instructions, Disp: 21 tablet, Rfl: 0  •  predniSONE (DELTASONE) 20 MG tablet, WITH BREAKFAST, Disp: , Rfl:        Physical Exam  His incisions look good and his hernia repair feels intact.  Objective     Vital Signs:   Temp 96.9 °F (36.1 °C)   Ht 165.1 cm (65\")   Wt 123 kg (271 lb)   BMI 45.10 kg/m²              Assessment and Plan    Diagnoses and all orders for this visit:    1. Recurrent umbilical hernia (Primary)    We will see him back on an as-needed basis.  We will let him go back to work at the beginning of next week.      "

## 2022-03-29 NOTE — PROGRESS NOTES
Chief Complaint  Fabio Juárez presents to [facility] for Hospital Follow Up Visit ([facility] [place] 2/23/2022 for hernia surgery. Went to [facility] in [place] on 3/17/2022 for severe pain to abdomen and sides/back was admitted. Was told constipation was the cause of the pain, putting pressure against everything causing his pain and discomfort. Was in hospital for 7 days. Still having pain to right lower back, per dr. Anderson that pain is from being on the operating table. )    Subjective          Within 48 business hours after discharge our office contacted him via telephone to coordinate his care and needs.     I reviewed and discussed the details of that call along with the discharge summary, hospital problems, inpatient lab results, inpatient diagnostic studies, and consultation reports with Fabio.    Current outpatient and discharge medications have been reconciled for the patient.  Reviewed by: ONEIL Fox    Fabio was admitted to [facility] in [facility] on 3/18/22 and was discharged on 3/24/2022 with a diagnosis of hypoxia, pseudogout of right knee, pleuritic chest pain, dyspnea, tracheostomy status, obesity, opiate dependence, DM2, chronic low back pain, chronic neck pain, constipation.    During his hospital stay, he  was treated by Mesha Marie DO and consults during hospitalization included Dr. Clifton Smith , Orthopedic surgery;  Dr. Scotty Santo, Pulmonary disease  Abnormal findings during hospitalization included   Labs:   Sodium 131-135; glucose 134-175, synovial fluid, knee negative culture , positive crystals Occasional Calcium pyrophosphate, extra and intracellular )  Imaging:  KUB (constipation) , CT chest subcentimeter pulmonary nodule in Right upper lobe  Additional discharge recommendations include: (recommended 12 month follow up for CT nodule, follow up with ortho pending results of knee aspirate (scheduled tomorrow).     Fabio reports that his condition is much improved since discharge.          Review of Systems      No Known Allergies   Past Medical History:   Diagnosis Date   • Chronic pain    • Claustrophobia    • Coronary artery disease     BLOCKAGE 2015, SEE'S DR DIEGO EVERY 2 YEARS, DENIED CP/SOB    • Essential (primary) hypertension    • GERD (gastroesophageal reflux disease)    • Hemochromatosis     ASYMPTOMATIC    • History of COVID-19    • Left upper quadrant pain    • Low back pain    • Lung nodule    • Mixed hyperlipidemia    • Obstructive sleep apnea (adult) (pediatric)    • Other testicular dysfunction    • Pain in right foot    • Pain in right shoulder    • Recurrent umbilical hernia    • Renal stone 03/2017   • Tracheostomy status (HCC)     R/T SLEEP APNEA    • Type 2 diabetes mellitus (HCC)     BG RUNS 120-125 IN AM      Current Outpatient Medications   Medication Sig Dispense Refill   • albuterol sulfate  (90 Base) MCG/ACT inhaler EVERY 4 HOURS AS NEEDED as needed for SHORTNESS OF BREATH     • atorvastatin (LIPITOR) 80 MG tablet TAKE ONE TABLET BY MOUTH EVERY NIGHT AT BEDTIME 90 tablet 0   • baclofen (LIORESAL) 10 MG tablet TAKE 1/2 TO 1 TABLET BY MOUTH AT BEDTIME FOR LOWER BACK PAIN/ MUSCLE SPASMS 90 tablet 1   • cetirizine (zyrTEC) 5 MG tablet TAKE ONE TABLET BY MOUTH DAILY 90 tablet 0   • gabapentin (NEURONTIN) 300 MG capsule 4 (Four) Times a Day.     • glimepiride (AMARYL) 2 MG tablet Take 2 mg by mouth Every Morning Before Breakfast. INSTRUCTED PER ANESTHESIA STANDING ORDERS     • hydroCHLOROthiazide (HYDRODIURIL) 12.5 MG tablet Take 12.5 mg by mouth Daily.     • lisinopril (PRINIVIL,ZESTRIL) 10 MG tablet TAKE ONE TABLET BY MOUTH EVERY MORNING 90 tablet 0   • meloxicam (Mobic) 15 MG tablet Take 1 tablet by mouth Daily for 7 days. 7 tablet 0   • metFORMIN ER (GLUCOPHAGE-XR) 500 MG 24 hr tablet Take 2 tablets by mouth 2 (Two) Times a Day With Meals. 360 tablet 0   • methocarbamol (ROBAXIN) 500 MG  tablet Three Times Daily as needed for MUSCLE SPASMS     • methylPREDNISolone (Medrol) 4 MG dose pack Take as directed on package instructions 21 tablet 0   • omeprazole (priLOSEC) 40 MG capsule Take 40 mg by mouth Daily.     • oxyCODONE (ROXICODONE) 15 MG immediate release tablet Take 15 mg by mouth As Needed for Moderate Pain .     • predniSONE (DELTASONE) 20 MG tablet WITH BREAKFAST     • Semaglutide, 1 MG/DOSE, (Ozempic, 1 MG/DOSE,) 4 MG/3ML solution pen-injector Inject 1 mg under the skin into the appropriate area as directed 1 (One) Time Per Week for 90 days. 3 pen 0   • sennosides-docusate (PERICOLACE) 8.6-50 MG per tablet Take 2 tablets by mouth 2 (Two) Times a Day. 60 tablet 0   • traZODone (DESYREL) 50 MG tablet TAKE ONE TABLET BY MOUTH ONCE NIGHTLY 90 tablet 0   • lisinopril (PRINIVIL,ZESTRIL) 10 MG tablet ONCE A DAY       No current facility-administered medications for this visit.     Past Surgical History:   Procedure Laterality Date   • ACHILLES TENDON REPAIR Right 10/2018   • APPENDECTOMY     • CARDIAC CATHETERIZATION  11/03/2015    LEFT VENTRIULOGRAM, CORONARY ANGIOGRAM    • CHOLECYSTECTOMY  07/2013   • COLONOSCOPY  07/28/2014    NORMAL RESULT    • HERNIA REPAIR     • JOINT REPLACEMENT Bilateral     RIGHT KNEE 01/2016   • TONSILLECTOMY AND ADENOIDECTOMY     • TRACHEOSTOMY      SECONDARY TO SLEEP APNEA   • VENTRAL HERNIA REPAIR N/A 2/23/2022    Procedure: ROBOTIC UMBILICAL HERNIA REPAIR WITH MESH PLACEMENT;  Surgeon: Garrett Anderson MD;  Location: [facility];  Service: Robotics - [facility];  Laterality: N/A;      Social History     Tobacco Use   • Smoking status: Never Smoker   • Smokeless tobacco: Never Used   Vaping Use   • Vaping Use: Never used   Substance Use Topics   • Alcohol use: Yes     Comment: SOCIALLY    • Drug use: Never     Family History   Problem Relation Age of Onset   • Arrhythmia Mother    • Stroke Mother 73   • Diabetes type II Mother    • Heart attack Father    • Hypertension  "Brother    • Cerebral aneurysm Brother 38   • Diabetes type II Brother    • Coronary artery disease Other      Health Maintenance Due   Topic Date Due   • Pneumococcal Vaccine 0-64 (1 of 2 - PPSV23) [birth year]/1972   • Hepatitis B (1 of 3 - Risk 3-dose series) Never done   • ZOSTER VACCINE (1 of 2) Never done   • HEPATITIS C SCREENING  Never done   • DIABETIC FOOT EXAM  02/24/2021   • DIABETIC EYE EXAM  Never done   • LIPID PANEL  06/16/2021   • URINE MICROALBUMIN  08/01/2021   • ANNUAL PHYSICAL  01/22/2022      Immunization History   Administered Date(s) Administered   • COVID-19 (PFIZER) PURPLE CAP 02/24/2021, 03/17/2021, 12/09/2021   • FluLaval/Fluarix/Fluzone >6 12/09/2021   • Influenza Quad Vaccine (Inpatient) 01/09/2018   • Influenza TIV (IM) 01/08/2013   • Influenza, Unspecified 01/20/2021, 01/20/2021   • Pneumococcal, Unspecified 08/08/2016, 08/08/2016   • Td 03/26/2019, 03/26/2019        Objective     Vitals:    03/29/22 1100   BP: 117/75   Pulse: 69   SpO2: 94%   Weight: 121 kg (266 lb 9.6 oz)   Height: 165.1 cm (65\")     Body mass index is 44.36 kg/m².     Physical Exam  Vitals reviewed.   Constitutional:       Appearance: Normal appearance. He is obese.   HENT:      Head: Normocephalic.   Eyes:      Pupils: Pupils are equal, round, and reactive to light.   Cardiovascular:      Rate and Rhythm: Normal rate and regular rhythm.      Heart sounds: No murmur heard.  Pulmonary:      Effort: Pulmonary effort is normal.      Breath sounds: Normal breath sounds.   Musculoskeletal:         General: Normal range of motion.   Neurological:      Mental Status: He is alert.   Psychiatric:         Mood and Affect: Mood normal.         Behavior: Behavior normal.           Result Review :                               Assessment and Plan      Diagnoses and all orders for this visit:    1. Generalized abdominal pain (Primary)  Comments:  Much improved.  Reports bowel movements are regular at this time followed up with " Justin today and got clearance to return to work Monday.    2. S/P hernia repair  Comments:  Okay to return to work Monday per Dr. Anderson at today's visit.    3. Lung nodule    4. Right upper lobe pulmonary nodule  -     CT Chest Without Contrast; Future    5. Acute pain of right knee  Comments:  Follow-up with Dr. Andre as scheduled              Follow Up     Return in about 3 months (around 6/29/2022).

## 2022-03-31 ENCOUNTER — TELEPHONE (OUTPATIENT)
Dept: SURGERY | Facility: CLINIC | Age: 57
End: 2022-03-31

## 2022-03-31 ENCOUNTER — APPOINTMENT (OUTPATIENT)
Dept: CT IMAGING | Facility: HOSPITAL | Age: 57
End: 2022-03-31

## 2022-03-31 ENCOUNTER — HOSPITAL ENCOUNTER (EMERGENCY)
Facility: HOSPITAL | Age: 57
Discharge: HOME | End: 2022-03-31
Attending: EMERGENCY MEDICINE | Admitting: EMERGENCY MEDICINE

## 2022-03-31 VITALS
OXYGEN SATURATION: 91 % | HEIGHT: 64 IN | BODY MASS INDEX: 45.28 KG/M2 | TEMPERATURE: 97.3 F | WEIGHT: 265.21 LBS | HEART RATE: 76 BPM | RESPIRATION RATE: 18 BRPM | DIASTOLIC BLOOD PRESSURE: 72 MMHG | SYSTOLIC BLOOD PRESSURE: 101 MMHG

## 2022-03-31 DIAGNOSIS — E78.00 HYPERCHOLESTEREMIA: ICD-10-CM

## 2022-03-31 DIAGNOSIS — I26.99 ACUTE PULMONARY EMBOLISM WITHOUT ACUTE COR PULMONALE, UNSPECIFIED PULMONARY EMBOLISM TYPE: ICD-10-CM

## 2022-03-31 DIAGNOSIS — M54.6 ACUTE LEFT-SIDED THORACIC BACK PAIN: Primary | ICD-10-CM

## 2022-03-31 LAB
ALBUMIN SERPL-MCNC: 4 G/DL (ref 3.5–5.2)
ALBUMIN/GLOB SERPL: 1.4 G/DL
ALP SERPL-CCNC: 69 U/L (ref 39–117)
ALT SERPL W P-5'-P-CCNC: 25 U/L (ref 1–41)
ANION GAP SERPL CALCULATED.3IONS-SCNC: 12.2 MMOL/L (ref 5–15)
AST SERPL-CCNC: 17 U/L (ref 1–40)
BASOPHILS # BLD AUTO: 0.11 10*3/MM3 (ref 0–0.2)
BASOPHILS NFR BLD AUTO: 1 % (ref 0–1.5)
BILIRUB SERPL-MCNC: 0.8 MG/DL (ref 0–1.2)
BILIRUB UR QL STRIP: NEGATIVE
BUN SERPL-MCNC: 19 MG/DL (ref 6–20)
BUN/CREAT SERPL: 20.7 (ref 7–25)
CALCIUM SPEC-SCNC: 10.2 MG/DL (ref 8.6–10.5)
CHLORIDE SERPL-SCNC: 101 MMOL/L (ref 98–107)
CLARITY UR: CLEAR
CO2 SERPL-SCNC: 25.8 MMOL/L (ref 22–29)
COLOR UR: ABNORMAL
CREAT SERPL-MCNC: 0.92 MG/DL (ref 0.76–1.27)
DEPRECATED RDW RBC AUTO: 40.3 FL (ref 37–54)
EGFRCR SERPLBLD CKD-EPI 2021: 97.6 ML/MIN/1.73
EOSINOPHIL # BLD AUTO: 0.19 10*3/MM3 (ref 0–0.4)
EOSINOPHIL NFR BLD AUTO: 1.7 % (ref 0.3–6.2)
ERYTHROCYTE [DISTWIDTH] IN BLOOD BY AUTOMATED COUNT: 12.4 % (ref 12.3–15.4)
GLOBULIN UR ELPH-MCNC: 2.9 GM/DL
GLUCOSE SERPL-MCNC: 113 MG/DL (ref 65–99)
GLUCOSE UR STRIP-MCNC: NEGATIVE MG/DL
HCT VFR BLD AUTO: 44.6 % (ref 37.5–51)
HGB BLD-MCNC: 15.1 G/DL (ref 13–17.7)
HGB UR QL STRIP.AUTO: NEGATIVE
HOLD SPECIMEN: NORMAL
HOLD SPECIMEN: NORMAL
IMM GRANULOCYTES # BLD AUTO: 0.11 10*3/MM3 (ref 0–0.05)
IMM GRANULOCYTES NFR BLD AUTO: 1 % (ref 0–0.5)
KETONES UR QL STRIP: NEGATIVE
LEUKOCYTE ESTERASE UR QL STRIP.AUTO: NEGATIVE
LIPASE SERPL-CCNC: 55 U/L (ref 13–60)
LYMPHOCYTES # BLD AUTO: 3.92 10*3/MM3 (ref 0.7–3.1)
LYMPHOCYTES NFR BLD AUTO: 36 % (ref 19.6–45.3)
MCH RBC QN AUTO: 30.6 PG (ref 26.6–33)
MCHC RBC AUTO-ENTMCNC: 33.9 G/DL (ref 31.5–35.7)
MCV RBC AUTO: 90.3 FL (ref 79–97)
MONOCYTES # BLD AUTO: 1.27 10*3/MM3 (ref 0.1–0.9)
MONOCYTES NFR BLD AUTO: 11.7 % (ref 5–12)
NEUTROPHILS NFR BLD AUTO: 48.6 % (ref 42.7–76)
NEUTROPHILS NFR BLD AUTO: 5.29 10*3/MM3 (ref 1.7–7)
NITRITE UR QL STRIP: NEGATIVE
NRBC BLD AUTO-RTO: 0 /100 WBC (ref 0–0.2)
PH UR STRIP.AUTO: <=5 [PH] (ref 5–8)
PLATELET # BLD AUTO: 310 10*3/MM3 (ref 140–450)
PMV BLD AUTO: 8.3 FL (ref 6–12)
POTASSIUM SERPL-SCNC: 4.1 MMOL/L (ref 3.5–5.2)
PROT SERPL-MCNC: 6.9 G/DL (ref 6–8.5)
PROT UR QL STRIP: NEGATIVE
RBC # BLD AUTO: 4.94 10*6/MM3 (ref 4.14–5.8)
SODIUM SERPL-SCNC: 139 MMOL/L (ref 136–145)
SP GR UR STRIP: 1.03 (ref 1–1.03)
TROPONIN T SERPL-MCNC: <0.01 NG/ML (ref 0–0.03)
UROBILINOGEN UR QL STRIP: ABNORMAL
WBC NRBC COR # BLD: 10.89 10*3/MM3 (ref 3.4–10.8)
WHOLE BLOOD HOLD SPECIMEN: NORMAL
WHOLE BLOOD HOLD SPECIMEN: NORMAL

## 2022-03-31 PROCEDURE — 85025 COMPLETE CBC W/AUTO DIFF WBC: CPT

## 2022-03-31 PROCEDURE — 99283 EMERGENCY DEPT VISIT LOW MDM: CPT

## 2022-03-31 PROCEDURE — 74177 CT ABD & PELVIS W/CONTRAST: CPT

## 2022-03-31 PROCEDURE — 25010000002 MORPHINE PER 10 MG: Performed by: EMERGENCY MEDICINE

## 2022-03-31 PROCEDURE — 80053 COMPREHEN METABOLIC PANEL: CPT

## 2022-03-31 PROCEDURE — 96374 THER/PROPH/DIAG INJ IV PUSH: CPT

## 2022-03-31 PROCEDURE — 0 IOPAMIDOL PER 1 ML: Performed by: EMERGENCY MEDICINE

## 2022-03-31 PROCEDURE — 36415 COLL VENOUS BLD VENIPUNCTURE: CPT

## 2022-03-31 PROCEDURE — 25010000002 ONDANSETRON PER 1 MG: Performed by: EMERGENCY MEDICINE

## 2022-03-31 PROCEDURE — 96375 TX/PRO/DX INJ NEW DRUG ADDON: CPT

## 2022-03-31 PROCEDURE — 83690 ASSAY OF LIPASE: CPT

## 2022-03-31 PROCEDURE — 84484 ASSAY OF TROPONIN QUANT: CPT | Performed by: EMERGENCY MEDICINE

## 2022-03-31 PROCEDURE — 71260 CT THORAX DX C+: CPT

## 2022-03-31 PROCEDURE — 81003 URINALYSIS AUTO W/O SCOPE: CPT

## 2022-03-31 PROCEDURE — 25010000002 KETOROLAC TROMETHAMINE PER 15 MG: Performed by: EMERGENCY MEDICINE

## 2022-03-31 PROCEDURE — 99284 EMERGENCY DEPT VISIT MOD MDM: CPT

## 2022-03-31 RX ORDER — SODIUM CHLORIDE 0.9 % (FLUSH) 0.9 %
10 SYRINGE (ML) INJECTION AS NEEDED
Status: DISCONTINUED | OUTPATIENT
Start: 2022-03-31 | End: 2022-03-31 | Stop reason: HOSPADM

## 2022-03-31 RX ORDER — KETOROLAC TROMETHAMINE 30 MG/ML
30 INJECTION, SOLUTION INTRAMUSCULAR; INTRAVENOUS ONCE
Status: COMPLETED | OUTPATIENT
Start: 2022-03-31 | End: 2022-03-31

## 2022-03-31 RX ORDER — ONDANSETRON 2 MG/ML
4 INJECTION INTRAMUSCULAR; INTRAVENOUS ONCE
Status: COMPLETED | OUTPATIENT
Start: 2022-03-31 | End: 2022-03-31

## 2022-03-31 RX ORDER — ATORVASTATIN CALCIUM 80 MG/1
TABLET, FILM COATED ORAL
Qty: 90 TABLET | Refills: 0 | Status: SHIPPED | OUTPATIENT
Start: 2022-03-31 | End: 2022-07-05

## 2022-03-31 RX ADMIN — APIXABAN 10 MG: 5 TABLET, FILM COATED ORAL at 21:46

## 2022-03-31 RX ADMIN — IOPAMIDOL 100 ML: 755 INJECTION, SOLUTION INTRAVENOUS at 20:00

## 2022-03-31 RX ADMIN — KETOROLAC TROMETHAMINE 30 MG: 30 INJECTION, SOLUTION INTRAMUSCULAR; INTRAVENOUS at 18:50

## 2022-03-31 RX ADMIN — MORPHINE SULFATE 4 MG: 4 INJECTION, SOLUTION INTRAMUSCULAR; INTRAVENOUS at 18:51

## 2022-03-31 RX ADMIN — ONDANSETRON 4 MG: 2 INJECTION INTRAMUSCULAR; INTRAVENOUS at 18:50

## 2022-03-31 NOTE — TELEPHONE ENCOUNTER
Hub staff attempted to follow warm transfer process and was unsuccessful     Caller: Fabio Juárez    Relationship to patient: Self    Best call back number: [phone]  Patient is needing: PT STILL HAVING PAIN IN LEFT SIDE, WISHES TO SPEAK WITH DR LOGAN OR MEDICAL STAFF

## 2022-03-31 NOTE — TELEPHONE ENCOUNTER
Pt called and he has developed pain and soa since 2:30 am wed I informed pt to go back to the er due to recent surgery and respiratory status . He said that he will have someone take him to a [facility] facility. Karley Amor inf

## 2022-04-01 ENCOUNTER — TELEPHONE (OUTPATIENT)
Dept: SURGERY | Facility: CLINIC | Age: 57
End: 2022-04-01

## 2022-04-01 ENCOUNTER — TELEPHONE (OUTPATIENT)
Dept: FAMILY MEDICINE CLINIC | Age: 57
End: 2022-04-01

## 2022-04-01 DIAGNOSIS — I26.99 OTHER ACUTE PULMONARY EMBOLISM, UNSPECIFIED WHETHER ACUTE COR PULMONALE PRESENT: Primary | ICD-10-CM

## 2022-04-01 NOTE — TELEPHONE ENCOUNTER
Please write a work note for this patient to be off until 4/13/22. Place the note in [employer] box for pick-up. He will come in on Monday.

## 2022-04-01 NOTE — TELEPHONE ENCOUNTER
Pt went to ED last night, due pain in his side. They discovered he has a blood clot in his lung. Due to go back to work Monday. Pt needs to be advised on what he should please call.

## 2022-04-01 NOTE — TELEPHONE ENCOUNTER
Hub staff attempted to follow warm transfer process and was unsuccessful     Caller: Fabio Juárez    Relationship to patient: Self    Best call back number: [phone]    Patient is needing:SHAINA STATED HE IS CALLING BACK ABOUT PAIN IN LEFT SIDE, HAS BEEN IN [facility] ON 3/31/22 AND NEEDING TO DISCUSS FINDINGS

## 2022-04-01 NOTE — DISCHARGE INSTRUCTIONS
Your CT scan of the chest today did show small blood clot in the lung (pulmonary embolism) in the left side, which is probably causing the pain you feel when he breathes in.    You can treat this by taking the blood thinning Eliquis medication twice a day for the next few months and please call your doctor to see how long you need to be on the medicine.    Turn to the ER for any worsening symptoms.

## 2022-04-01 NOTE — TELEPHONE ENCOUNTER
Pt wants to know if you can let him know what to do about going back to work on Monday he does not think he can due to pain .

## 2022-04-01 NOTE — TELEPHONE ENCOUNTER
Patient had pain in left side lung and went to ER 03/31.  Said he has a blood clot in the lung, and was placed on blood thinners.  He sees his PCP, Karley Amor, on 04/12/22.  He said she doesn't want him to go to work until then.    Patient said Dr. Anderson has him returning to work 04/04 from his hernia repair on 02/23.  He wants to know if he will extend for him to return to work on 04/12, so he doesn't have to go through another [facility] or anything.

## 2022-04-12 ENCOUNTER — CONSULT (OUTPATIENT)
Dept: ONCOLOGY | Facility: HOSPITAL | Age: 57
End: 2022-04-12

## 2022-04-12 ENCOUNTER — LAB (OUTPATIENT)
Dept: ONCOLOGY | Facility: HOSPITAL | Age: 57
End: 2022-04-12

## 2022-04-12 ENCOUNTER — OFFICE VISIT (OUTPATIENT)
Dept: FAMILY MEDICINE CLINIC | Age: 57
End: 2022-04-12

## 2022-04-12 ENCOUNTER — HOSPITAL ENCOUNTER (OUTPATIENT)
Dept: CT IMAGING | Facility: HOSPITAL | Age: 57
Discharge: HOME | End: 2022-04-12
Admitting: NURSE PRACTITIONER

## 2022-04-12 VITALS
WEIGHT: 270.5 LBS | OXYGEN SATURATION: 99 % | DIASTOLIC BLOOD PRESSURE: 84 MMHG | RESPIRATION RATE: 18 BRPM | HEART RATE: 86 BPM | BODY MASS INDEX: 46.43 KG/M2 | TEMPERATURE: 96.4 F | SYSTOLIC BLOOD PRESSURE: 152 MMHG

## 2022-04-12 VITALS
BODY MASS INDEX: 46.2 KG/M2 | OXYGEN SATURATION: 95 % | HEIGHT: 64 IN | DIASTOLIC BLOOD PRESSURE: 79 MMHG | SYSTOLIC BLOOD PRESSURE: 116 MMHG | HEART RATE: 94 BPM | WEIGHT: 270.6 LBS

## 2022-04-12 DIAGNOSIS — I26.99 OTHER ACUTE PULMONARY EMBOLISM, UNSPECIFIED WHETHER ACUTE COR PULMONALE PRESENT: ICD-10-CM

## 2022-04-12 DIAGNOSIS — R91.1 RIGHT UPPER LOBE PULMONARY NODULE: ICD-10-CM

## 2022-04-12 DIAGNOSIS — R91.1 LUNG NODULE: Primary | ICD-10-CM

## 2022-04-12 PROBLEM — G56.01 CARPAL TUNNEL SYNDROME ON RIGHT: Status: ACTIVE | Noted: 2022-04-12

## 2022-04-12 PROBLEM — T81.49XA WOUND INFECTION AFTER SURGERY: Status: ACTIVE | Noted: 2022-04-12

## 2022-04-12 PROCEDURE — 99203 OFFICE O/P NEW LOW 30 MIN: CPT | Performed by: INTERNAL MEDICINE

## 2022-04-12 PROCEDURE — 86147 CARDIOLIPIN ANTIBODY EA IG: CPT

## 2022-04-12 PROCEDURE — 99213 OFFICE O/P EST LOW 20 MIN: CPT | Performed by: NURSE PRACTITIONER

## 2022-04-12 PROCEDURE — 71250 CT THORAX DX C-: CPT

## 2022-04-12 PROCEDURE — G0463 HOSPITAL OUTPT CLINIC VISIT: HCPCS

## 2022-04-12 PROCEDURE — 36415 COLL VENOUS BLD VENIPUNCTURE: CPT

## 2022-04-12 RX ORDER — OMEPRAZOLE 40 MG/1
CAPSULE, DELAYED RELEASE ORAL
Qty: 90 CAPSULE | Refills: 1 | Status: SHIPPED | OUTPATIENT
Start: 2022-04-12 | End: 2022-04-24

## 2022-04-12 NOTE — PROGRESS NOTES
Chief Complaint  Fabio Juárez presents to [facility] for Imaging Only (Here to follow up on ct scan) and BLOOD CLOT    Subjective          Mauri is here to follow up on recent CT scan.  This was preformed due to finding on [facility] CT noting a nodule.  Mauri has had nodules in the past and was thought to be due to the presence of his tracheostomy.  The CT on 4/12 was noted to show no concerning nodule  - only scarring.  No recommendation for follow up.  He has had a PET scan in the past - which was found to be negative.         Review of Systems      No Known Allergies   Past Medical History:   Diagnosis Date   • Chronic pain    • Claustrophobia    • Coronary artery disease     BLOCKAGE 2015, SEE'S DR DIEGO EVERY 2 YEARS, DENIED CP/SOB    • Essential (primary) hypertension    • GERD (gastroesophageal reflux disease)    • Hemochromatosis     ASYMPTOMATIC    • History of COVID-19    • Left upper quadrant pain    • Low back pain    • Lung nodule    • Mixed hyperlipidemia    • Obstructive sleep apnea (adult) (pediatric)    • Other testicular dysfunction    • Pain in right foot    • Pain in right shoulder    • Recurrent umbilical hernia    • Renal stone 03/2017   • Tracheostomy status (HCC)     R/T SLEEP APNEA    • Type 2 diabetes mellitus (HCC)     BG RUNS 120-125 IN AM      Current Outpatient Medications   Medication Sig Dispense Refill   • albuterol sulfate  (90 Base) MCG/ACT inhaler EVERY 4 HOURS AS NEEDED as needed for SHORTNESS OF BREATH     • apixaban (ELIQUIS) 5 MG tablet tablet Take 1 tablet by mouth 2 (Two) Times a Day. 60 tablet 1   • atorvastatin (LIPITOR) 80 MG tablet TAKE ONE TABLET BY MOUTH EVERY NIGHT AT BEDTIME 90 tablet 0   • baclofen (LIORESAL) 10 MG tablet TAKE 1/2 TO 1 TABLET BY MOUTH AT BEDTIME FOR LOWER BACK PAIN/ MUSCLE SPASMS 90 tablet 1   • cetirizine (zyrTEC) 5 MG tablet TAKE ONE TABLET BY MOUTH DAILY 90 tablet 0   • gabapentin (NEURONTIN) 300 MG  capsule 4 (Four) Times a Day.     • glimepiride (AMARYL) 2 MG tablet Take 2 mg by mouth Every Morning Before Breakfast. INSTRUCTED PER ANESTHESIA STANDING ORDERS     • hydroCHLOROthiazide (HYDRODIURIL) 12.5 MG tablet Take 12.5 mg by mouth Daily.     • lisinopril (PRINIVIL,ZESTRIL) 10 MG tablet TAKE ONE TABLET BY MOUTH EVERY MORNING 90 tablet 0   • metFORMIN ER (GLUCOPHAGE-XR) 500 MG 24 hr tablet Take 2 tablets by mouth 2 (Two) Times a Day With Meals. 360 tablet 0   • oxyCODONE (ROXICODONE) 5 MG immediate release tablet Take 5 mg by mouth As Needed for Moderate Pain .     • Semaglutide, 1 MG/DOSE, (Ozempic, 1 MG/DOSE,) 4 MG/3ML solution pen-injector Inject 1 mg under the skin into the appropriate area as directed 1 (One) Time Per Week for 90 days. 3 pen 0   • sennosides-docusate (PERICOLACE) 8.6-50 MG per tablet Take 2 tablets by mouth 2 (Two) Times a Day. 60 tablet 0   • traZODone (DESYREL) 50 MG tablet TAKE ONE TABLET BY MOUTH ONCE NIGHTLY 90 tablet 0   • cyclobenzaprine (FLEXERIL) 10 MG tablet Take 10 mg by mouth 3 (Three) Times a Day As Needed for Muscle Spasms.     • methocarbamol (ROBAXIN) 500 MG tablet Three Times Daily as needed for MUSCLE SPASMS     • methylPREDNISolone (Medrol) 4 MG dose pack Take as directed on package instructions 21 tablet 0   • omeprazole (priLOSEC) 40 MG capsule TAKE ONE CAPSULE BY MOUTH DAILY 90 capsule 1   • predniSONE (DELTASONE) 20 MG tablet WITH BREAKFAST       No current facility-administered medications for this visit.     Past Surgical History:   Procedure Laterality Date   • ACHILLES TENDON REPAIR Right 10/2018   • APPENDECTOMY     • CARDIAC CATHETERIZATION  11/03/2015    LEFT VENTRIULOGRAM, CORONARY ANGIOGRAM    • CHOLECYSTECTOMY  07/2013   • COLONOSCOPY  07/28/2014    NORMAL RESULT    • HERNIA REPAIR     • JOINT REPLACEMENT Bilateral     RIGHT KNEE 01/2016   • TONSILLECTOMY AND ADENOIDECTOMY     • TRACHEOSTOMY      SECONDARY TO SLEEP APNEA   • VENTRAL HERNIA REPAIR N/A  "2/23/2022    Procedure: ROBOTIC UMBILICAL HERNIA REPAIR WITH MESH PLACEMENT;  Surgeon: Garrett Anderson MD;  Location: [facility];  Service: Robotics - [employer];  Laterality: N/A;      Social History     Tobacco Use   • Smoking status: Never Smoker   • Smokeless tobacco: Never Used   Vaping Use   • Vaping Use: Never used   Substance Use Topics   • Alcohol use: Yes     Comment: SOCIALLY    • Drug use: Never     Family History   Problem Relation Age of Onset   • Arrhythmia Mother    • Stroke Mother 73   • Diabetes type II Mother    • Heart attack Father    • Hypertension Brother    • Cerebral aneurysm Brother 38   • Diabetes type II Brother    • Coronary artery disease Other      Health Maintenance Due   Topic Date Due   • Pneumococcal Vaccine 0-64 (1 - PCV) 02/22/1972   • Hepatitis B (1 of 3 - Risk 3-dose series) Never done   • ZOSTER VACCINE (1 of 2) Never done   • HEPATITIS C SCREENING  Never done   • DIABETIC FOOT EXAM  02/24/2021   • DIABETIC EYE EXAM  Never done   • LIPID PANEL  06/16/2021   • URINE MICROALBUMIN  08/01/2021   • ANNUAL PHYSICAL  01/22/2022      Immunization History   Administered Date(s) Administered   • COVID-19 (PFIZER) PURPLE CAP 02/24/2021, 03/17/2021, 12/09/2021   • FluLaval/Fluarix/Fluzone >6 12/09/2021   • Influenza Quad Vaccine (Inpatient) 01/09/2018   • Influenza TIV (IM) 01/08/2013   • Influenza, Unspecified 01/20/2021, 01/20/2021   • Pneumococcal, Unspecified 08/08/2016, 08/08/2016   • Td 03/26/2019, 03/26/2019        Objective     Vitals:    04/12/22 1617   BP: 116/79   Pulse: 94   SpO2: 95%   Weight: 123 kg (270 lb 9.6 oz)   Height: 162.6 cm (64.02\")     Body mass index is 46.43 kg/m².     Physical Exam  Vitals reviewed.   Constitutional:       Appearance: Normal appearance. He is obese.   HENT:      Head: Normocephalic.   Eyes:      Pupils: Pupils are equal, round, and reactive to light.   Cardiovascular:      Rate and Rhythm: Normal rate and regular rhythm.      Heart sounds: " No murmur heard.  Pulmonary:      Effort: Pulmonary effort is normal.      Breath sounds: Normal breath sounds.   Musculoskeletal:         General: Normal range of motion.   Neurological:      Mental Status: He is alert.   Psychiatric:         Mood and Affect: Mood normal.         Behavior: Behavior normal.           Result Review :                               Assessment and Plan      Diagnoses and all orders for this visit:    1. Lung nodule (Primary)  Comments:  stable CT chest with no acute finding - Follow up PRN  May return to work if able to perform duties               Follow Up     Return if symptoms worsen or fail to improve.

## 2022-04-12 NOTE — PROGRESS NOTES
Fabio Juárez   : 1965     LOCATION: [facility] HEMATOLOGY & ONCOLOGY     Chief Complaint  pulm embulous    Referring Provider: ONEIL Fox  PCP: Karley Amor APRN    Oncology/Hematology History    No history exists.       Subjective        History of Present Illness   57-year-old male presents for consultation evaluation for history of a recent pulmonary embolus  Patient reports that in February he underwent hernia repair  Approximately a few weeks later he started to have some left-sided chest pain.  He went to an outside ER and had a CTA done which showed no evidence of a pulmonary embolus  Lately the pain continued and he was seen at another ER where he had a CT without contrast which again showed no evidence for pulmonary embolus.  Unfortunately he again had chest pain however now on the left side and so he presented to [facility] ER where a third CT with contrast was done whch showed a small pulm embolus  Pt currently on anticoagulation with eliquis    Ct 3/31/22  IMPRESSION:                 1. Standard CT was performed in a pulmonary embolus protocol.  2. Despite suboptimal technique, there appears to be a small pulmonary embolus in a posterior   segmental right upper lobe pulmonary artery.  This could be is acute or chronic.  3. Coronary calcification.  4. Mild somewhat linear opacities in the lower lobes and right middle lobe favor atelectasis over   pneumonia.  5. The tracheostomy tube in expected position.  See CT abdomen for abdominal findings.      Review of Systems   Constitutional: Positive for fatigue.   All other systems reviewed and are negative.    Current Outpatient Medications on File Prior to Visit   Medication Sig Dispense Refill   • albuterol sulfate  (90 Base) MCG/ACT inhaler EVERY 4 HOURS AS NEEDED as needed for SHORTNESS OF BREATH     • Apixaban Starter Pack tablet therapy pack Take two 5 mg tablets by mouth every 12 hours  for 7 days. Followed by one 5 mg tablet every 12 hours. (Dispense starter pack if available) 74 tablet 0   • atorvastatin (LIPITOR) 80 MG tablet TAKE ONE TABLET BY MOUTH EVERY NIGHT AT BEDTIME 90 tablet 0   • baclofen (LIORESAL) 10 MG tablet TAKE 1/2 TO 1 TABLET BY MOUTH AT BEDTIME FOR LOWER BACK PAIN/ MUSCLE SPASMS 90 tablet 1   • cetirizine (zyrTEC) 5 MG tablet TAKE ONE TABLET BY MOUTH DAILY 90 tablet 0   • gabapentin (NEURONTIN) 300 MG capsule 4 (Four) Times a Day.     • glimepiride (AMARYL) 2 MG tablet Take 2 mg by mouth Every Morning Before Breakfast. INSTRUCTED PER ANESTHESIA STANDING ORDERS     • hydroCHLOROthiazide (HYDRODIURIL) 12.5 MG tablet Take 12.5 mg by mouth Daily.     • lisinopril (PRINIVIL,ZESTRIL) 10 MG tablet TAKE ONE TABLET BY MOUTH EVERY MORNING 90 tablet 0   • metFORMIN ER (GLUCOPHAGE-XR) 500 MG 24 hr tablet Take 2 tablets by mouth 2 (Two) Times a Day With Meals. 360 tablet 0   • methocarbamol (ROBAXIN) 500 MG tablet Three Times Daily as needed for MUSCLE SPASMS     • methylPREDNISolone (Medrol) 4 MG dose pack Take as directed on package instructions 21 tablet 0   • omeprazole (priLOSEC) 40 MG capsule Take 40 mg by mouth Daily.     • oxyCODONE (ROXICODONE) 15 MG immediate release tablet Take 15 mg by mouth As Needed for Moderate Pain .     • predniSONE (DELTASONE) 20 MG tablet WITH BREAKFAST     • Semaglutide, 1 MG/DOSE, (Ozempic, 1 MG/DOSE,) 4 MG/3ML solution pen-injector Inject 1 mg under the skin into the appropriate area as directed 1 (One) Time Per Week for 90 days. 3 pen 0   • sennosides-docusate (PERICOLACE) 8.6-50 MG per tablet Take 2 tablets by mouth 2 (Two) Times a Day. 60 tablet 0   • traZODone (DESYREL) 50 MG tablet TAKE ONE TABLET BY MOUTH ONCE NIGHTLY 90 tablet 0     No current facility-administered medications on file prior to visit.       No Known Allergies    Past Medical History:   Diagnosis Date   • Chronic pain    • Claustrophobia    • Coronary artery disease     BLOCKAGE  2015, SEE'S DR DIEGO EVERY 2 YEARS, DENIED CP/SOB    • Essential (primary) hypertension    • GERD (gastroesophageal reflux disease)    • Hemochromatosis     ASYMPTOMATIC    • History of COVID-19    • Left upper quadrant pain    • Low back pain    • Lung nodule    • Mixed hyperlipidemia    • Obstructive sleep apnea (adult) (pediatric)    • Other testicular dysfunction    • Pain in right foot    • Pain in right shoulder    • Recurrent umbilical hernia    • Renal stone 03/2017   • Tracheostomy status (HCC)     R/T SLEEP APNEA    • Type 2 diabetes mellitus (HCC)     BG RUNS 120-125 IN AM      Past Surgical History:   Procedure Laterality Date   • ACHILLES TENDON REPAIR Right 10/2018   • APPENDECTOMY     • CARDIAC CATHETERIZATION  11/03/2015    LEFT VENTRIULOGRAM, CORONARY ANGIOGRAM    • CHOLECYSTECTOMY  07/2013   • COLONOSCOPY  07/28/2014    NORMAL RESULT    • HERNIA REPAIR     • JOINT REPLACEMENT Bilateral     RIGHT KNEE 01/2016   • TONSILLECTOMY AND ADENOIDECTOMY     • TRACHEOSTOMY      SECONDARY TO SLEEP APNEA   • VENTRAL HERNIA REPAIR N/A 2/23/2022    Procedure: ROBOTIC UMBILICAL HERNIA REPAIR WITH MESH PLACEMENT;  Surgeon: Garrett Anderson MD;  Location: [facility] OR;  Service: Robotics - DaVinci;  Laterality: N/A;     Social History     Socioeconomic History   • Marital status:    • Number of children: 2   Tobacco Use   • Smoking status: Never Smoker   • Smokeless tobacco: Never Used   Vaping Use   • Vaping Use: Never used   Substance and Sexual Activity   • Alcohol use: Yes     Comment: SOCIALLY    • Drug use: Never   • Sexual activity: Defer     Family History   Problem Relation Age of Onset   • Arrhythmia Mother    • Stroke Mother 73   • Diabetes type II Mother    • Heart attack Father    • Hypertension Brother    • Cerebral aneurysm Brother 38   • Diabetes type II Brother    • Coronary artery disease Other      Immunization History   Administered Date(s) Administered   • COVID-19 (PFIZER) PURPLE  CAP 02/24/2021, 03/17/2021, 12/09/2021   • FluLaval/Fluarix/Fluzone >6 12/09/2021   • Influenza Quad Vaccine (Inpatient) 01/09/2018   • Influenza TIV (IM) 01/08/2013   • Influenza, Unspecified 01/20/2021, 01/20/2021   • Pneumococcal, Unspecified 08/08/2016, 08/08/2016   • Td 03/26/2019, 03/26/2019       Objective     /84   Pulse 86   Temp 96.4 °F (35.8 °C)   Resp 18   Wt 123 kg (270 lb 8.1 oz)   SpO2 99%   BMI 46.43 kg/m²          Physical Exam  Constitutional:       Appearance: He is obese.   HENT:      Head: Normocephalic.   Eyes:      Pupils: Pupils are equal, round, and reactive to light.   Cardiovascular:      Rate and Rhythm: Normal rate.      Pulses: Normal pulses.   Musculoskeletal:         General: Normal range of motion.      Cervical back: Normal range of motion.   Neurological:      Mental Status: He is alert.   Psychiatric:         Mood and Affect: Mood normal.     neck with tracheostomy          No results found for: IRON, LABIRON, TRANSFERRIN, TIBC, FERRITIN, [id number], FOLATE  ECOG score: 0           PHQ-9 Total Score:           Result Review :   The following data was reviewed by: Debbie Bazan MD on 04/12/2022:  Lab Results   Component Value Date    HGB 15.1 03/31/2022    HCT 44.6 03/31/2022    MCV 90.3 03/31/2022     03/31/2022    WBC 10.89 (H) 03/31/2022    NEUTROABS 5.29 03/31/2022    LYMPHSABS 3.92 (H) 03/31/2022    MONOSABS 1.27 (H) 03/31/2022    EOSABS 0.19 03/31/2022    BASOSABS 0.11 03/31/2022     Lab Results   Component Value Date    GLUCOSE 113 (H) 03/31/2022    BUN 19 03/31/2022    CREATININE 0.92 03/31/2022     03/31/2022    K 4.1 03/31/2022     03/31/2022    CO2 25.8 03/31/2022    CALCIUM 10.2 03/31/2022    PROTEINTOT 6.9 03/31/2022    ALBUMIN 4.00 03/31/2022    BILITOT 0.8 03/31/2022    ALKPHOS 69 03/31/2022    AST 17 03/31/2022    ALT 25 03/31/2022         Data reviewed: imaging          Assessment and Plan      ASSESSMENT  Pulmonary  embolus  PLAN/RECOMMENDATIONS  It appears that the patient had a provoked pulmonary embolus(provoked by recent surgery)  Patient should be anticoagulated for a minimum of 3 months and then anticoagulation discontinued  Does not need evaluation for inherited risk factors for anticoagulation  We will obtain evaluation for antiphospholipid syndrome as this if present  would be the only indication for prolonged anticoagulation  Patient advised to continue anticoagulation for 3 months  If the APS titers are positive he will be contacted for follow-up  Patient advised to follow-up with primary care provider  Diagnoses and all orders for this visit:    1. Other acute pulmonary embolism, unspecified whether acute cor pulmonale present (HCC)  -     Cardiolipin Antibody, IgG; Future  -     Cardiolipin Antibody, IgM; Future  -     apixaban (ELIQUIS) 5 MG tablet tablet; Take 1 tablet by mouth 2 (Two) Times a Day.  Dispense: 60 tablet; Refill: 1    I spent 30 minutes caring for Fabio on this date of service. This time includes time spent by me in the following activities: reviewing tests, obtaining and/or reviewing a separately obtained history, counseling and educating the patient/family/caregiver, ordering medications, tests, or procedures, documenting information in the medical record and independently interpreting results and communicating that information with the patient/family/caregiver      Patient was given instructions and counseling regarding his condition or for health maintenance advice. Please see specific information pulled into the AVS if appropriate.     Debbie Bazan MD    4/12/2022

## 2022-04-13 LAB
CARDIOLIPIN IGG SER IA-ACNC: <9 GPL U/ML (ref 0–14)
CARDIOLIPIN IGM SER IA-ACNC: <9 MPL U/ML (ref 0–12)

## 2022-04-14 ENCOUNTER — HOSPITAL ENCOUNTER (EMERGENCY)
Dept: HOSPITAL 49 - FER | Age: 57
Discharge: HOME | End: 2022-04-14
Payer: COMMERCIAL

## 2022-04-14 ENCOUNTER — TELEPHONE (OUTPATIENT)
Dept: FAMILY MEDICINE CLINIC | Age: 57
End: 2022-04-14

## 2022-04-14 DIAGNOSIS — Z79.01: ICD-10-CM

## 2022-04-14 DIAGNOSIS — I10: ICD-10-CM

## 2022-04-14 DIAGNOSIS — E11.9: ICD-10-CM

## 2022-04-14 DIAGNOSIS — M54.14: Primary | ICD-10-CM

## 2022-04-14 DIAGNOSIS — Z79.84: ICD-10-CM

## 2022-04-14 LAB
ALBUMIN SERPL-MCNC: 3.1 G/DL (ref 3.4–5)
ALKALINE PHOSHATASE: 60 U/L (ref 46–116)
ALT SERPL-CCNC: 25 U/L (ref 16–63)
AMPHETAMINES: NEGATIVE
AST: 17 U/L (ref 15–37)
BARBITURATES: NEGATIVE
BASOPHIL: 0.9 % (ref 0–2)
BILIRUBIN - TOTAL: 0.9 MG/DL (ref 0.2–1)
BILIRUBIN: NEGATIVE MG/DL
BLOOD: NEGATIVE ERY/UL
BUN SERPL-MCNC: 14 MG/DL (ref 7–18)
BUN/CREAT RATIO (CALC): 19.7 RATIO
CHLORIDE: 104 MMOL/L (ref 98–107)
CLARITY UR: CLEAR
CO2 (BICARBONATE): 27 MMOL/L (ref 21–32)
COLOR: YELLOW
CREATININE: 0.71 MG/DL (ref 0.67–1.17)
D DIMER PPP FEU-MCNC: 1 UG/MLFEU (ref 0–0.41)
ECSTASY (MDMA): NEGATIVE
EOSINOPHIL: 2.9 % (ref 0–5)
GLOBULIN (CALCULATION): 3.4 G/DL
GLUCOSE (U): (no result) MG/DL
GLUCOSE SERPL-MCNC: 220 MG/DL (ref 74–106)
HCT: 42.2 % (ref 42–52)
HGB BLD-MCNC: 14.1 G/DL (ref 13.2–18)
INR PPP: 1.06 (ref 0.9–1.2)
LEUKOCYTES: NEGATIVE LEU/UL
LYMPHOCYTE: 30.5 % (ref 15–48)
MARIJUANA (THC): NEGATIVE
MCH RBC QN AUTO: 30.5 PG (ref 25–31)
MCHC RBC AUTO-ENTMCNC: 33.4 G/DL (ref 32–36)
MCV: 91.3 FL (ref 78–100)
METHADONE: NEGATIVE
MONOCYTE: 14.5 % (ref 0–12)
MPV: 9.1 FL (ref 6–9.5)
NEUTROPHIL: 50.6 % (ref 41–80)
NITRITE: NEGATIVE MG/DL
NRBC: 0
OPIATES: NEGATIVE
OXYCODONE: POSITIVE
PLT: 247 K/UL (ref 150–400)
POTASSIUM: 3.7 MMOL/L (ref 3.5–5.1)
PROTEIN: NEGATIVE MG/DL
PROTHROMBIN TIME: 13.2 SECONDS (ref 11.8–13.4)
PTT: 27.5 SECONDS (ref 24.4–34.7)
RBC MORPHOLOGY: NORMAL
RBC: 4.62 M/UL (ref 4.7–6)
RDW: 13 % (ref 11.5–14)
SPECIFIC GRAVITY: >=1.03 (ref 1–1.03)
TOTAL PROTEIN: 6.5 G/DL (ref 6.4–8.2)
UROBILINOGEN: 0.2 MG/DL (ref 0.2–1)
WBC: 8.2 K/UL (ref 4–10.5)

## 2022-04-14 NOTE — TELEPHONE ENCOUNTER
Caller: Fabio Juárez    Relationship: Self    Best call back number: [phone]    What is the best time to reach you: ANYTIME    Who are you requesting to speak with (clinical staff, provider,  specific staff member): NURSE     What was the call regarding: PATIENT IS CALLING STATING THAT HE WENT BACK TO WORK, AND THEN ENDED UP IN THE ER WITH SIDE PAIN, HE NEEDS TO KNOW WHAT TO DO NEXT.  PLEASE CALL HIM TODAY.     Do you require a callback: YES

## 2022-04-15 ENCOUNTER — OFFICE VISIT (OUTPATIENT)
Dept: FAMILY MEDICINE CLINIC | Age: 57
End: 2022-04-15

## 2022-04-15 VITALS
DIASTOLIC BLOOD PRESSURE: 69 MMHG | SYSTOLIC BLOOD PRESSURE: 114 MMHG | BODY MASS INDEX: 46.57 KG/M2 | HEIGHT: 64 IN | HEART RATE: 90 BPM | WEIGHT: 272.8 LBS

## 2022-04-15 DIAGNOSIS — S29.019D THORACIC MYOFASCIAL STRAIN, SUBSEQUENT ENCOUNTER: ICD-10-CM

## 2022-04-15 DIAGNOSIS — R10.9 ACUTE LEFT FLANK PAIN: Primary | ICD-10-CM

## 2022-04-15 LAB
BILIRUB BLD-MCNC: NEGATIVE MG/DL
CLARITY, POC: CLEAR
COLOR UR: YELLOW
EXPIRATION DATE: ABNORMAL
GLUCOSE UR STRIP-MCNC: NEGATIVE MG/DL
KETONES UR QL: NEGATIVE
LEUKOCYTE EST, POC: NEGATIVE
Lab: ABNORMAL
NITRITE UR-MCNC: NEGATIVE MG/ML
PH UR: 6 [PH] (ref 5–8)
PROT UR STRIP-MCNC: NEGATIVE MG/DL
RBC # UR STRIP: NEGATIVE /UL
SP GR UR: 1.03 (ref 1–1.03)
UROBILINOGEN UR QL: ABNORMAL

## 2022-04-15 PROCEDURE — 81003 URINALYSIS AUTO W/O SCOPE: CPT | Performed by: NURSE PRACTITIONER

## 2022-04-15 PROCEDURE — 99213 OFFICE O/P EST LOW 20 MIN: CPT | Performed by: NURSE PRACTITIONER

## 2022-04-15 RX ORDER — CYCLOBENZAPRINE HCL 10 MG
10 TABLET ORAL 3 TIMES DAILY PRN
COMMUNITY
End: 2022-05-18 | Stop reason: DRUGHIGH

## 2022-04-15 NOTE — PROGRESS NOTES
Chief Complaint  Fabio Juárez presents to [facility] for Back Pain    Subjective          Fabio was seen in the ER at  [facility] on 4/14/2022 was diagnosed with  Low back strain/ thoracic strain.  - left flank pain  Abnormal findings  Included none  Labs:  All within his normal limits    Imaging:   CT negative PE;  Xray lumbar and thoracic spine was no acute findings  His treatment included medrol pack and muscle relaxers   Fabio reports that hiscondition is slightly improved since discharge.    Mauri did go back to work yesterday for the first time since his surgery and thinks he might have over did it at work.  He does a lot of bending, stooping, lifting heavy objects and thinks that they may have pressed him too hard at work.  He is unsure if his urine was sent He does have a history of kidney stones        Review of Systems      No Known Allergies   Past Medical History:   Diagnosis Date   • Chronic pain    • Claustrophobia    • Coronary artery disease     BLOCKAGE 2015, SEE'S DR DIEGO EVERY 2 YEARS, DENIED CP/SOB    • Essential (primary) hypertension    • GERD (gastroesophageal reflux disease)    • Hemochromatosis     ASYMPTOMATIC    • History of COVID-19    • Left upper quadrant pain    • Low back pain    • Lung nodule    • Mixed hyperlipidemia    • Obstructive sleep apnea (adult) (pediatric)    • Other testicular dysfunction    • Pain in right foot    • Pain in right shoulder    • Recurrent umbilical hernia    • Renal stone 03/2017   • Tracheostomy status ([facility])     R/T SLEEP APNEA    • Type 2 diabetes mellitus (HCC)     BG RUNS 120-125 IN AM      Current Outpatient Medications   Medication Sig Dispense Refill   • albuterol sulfate  (90 Base) MCG/ACT inhaler EVERY 4 HOURS AS NEEDED as needed for SHORTNESS OF BREATH     • apixaban (ELIQUIS) 5 MG tablet tablet Take 1 tablet by mouth 2 (Two) Times a Day. 60 tablet 1   • atorvastatin (LIPITOR) 80 MG  tablet TAKE ONE TABLET BY MOUTH EVERY NIGHT AT BEDTIME 90 tablet 0   • baclofen (LIORESAL) 10 MG tablet TAKE 1/2 TO 1 TABLET BY MOUTH AT BEDTIME FOR LOWER BACK PAIN/ MUSCLE SPASMS 90 tablet 1   • cetirizine (zyrTEC) 5 MG tablet TAKE ONE TABLET BY MOUTH DAILY 90 tablet 0   • cyclobenzaprine (FLEXERIL) 10 MG tablet Take 10 mg by mouth 3 (Three) Times a Day As Needed for Muscle Spasms.     • gabapentin (NEURONTIN) 300 MG capsule 4 (Four) Times a Day.     • glimepiride (AMARYL) 2 MG tablet Take 2 mg by mouth Every Morning Before Breakfast. INSTRUCTED PER ANESTHESIA STANDING ORDERS     • hydroCHLOROthiazide (HYDRODIURIL) 12.5 MG tablet Take 12.5 mg by mouth Daily.     • lisinopril (PRINIVIL,ZESTRIL) 10 MG tablet TAKE ONE TABLET BY MOUTH EVERY MORNING 90 tablet 0   • metFORMIN ER (GLUCOPHAGE-XR) 500 MG 24 hr tablet Take 2 tablets by mouth 2 (Two) Times a Day With Meals. 360 tablet 0   • methylPREDNISolone (Medrol) 4 MG dose pack Take as directed on package instructions 21 tablet 0   • omeprazole (priLOSEC) 40 MG capsule TAKE ONE CAPSULE BY MOUTH DAILY 90 capsule 1   • oxyCODONE (ROXICODONE) 5 MG immediate release tablet Take 5 mg by mouth As Needed for Moderate Pain .     • Semaglutide, 1 MG/DOSE, (Ozempic, 1 MG/DOSE,) 4 MG/3ML solution pen-injector Inject 1 mg under the skin into the appropriate area as directed 1 (One) Time Per Week for 90 days. 3 pen 0   • traZODone (DESYREL) 50 MG tablet TAKE ONE TABLET BY MOUTH ONCE NIGHTLY 90 tablet 0   • methocarbamol (ROBAXIN) 500 MG tablet Three Times Daily as needed for MUSCLE SPASMS     • predniSONE (DELTASONE) 20 MG tablet WITH BREAKFAST     • sennosides-docusate (PERICOLACE) 8.6-50 MG per tablet Take 2 tablets by mouth 2 (Two) Times a Day. 60 tablet 0     No current facility-administered medications for this visit.     Past Surgical History:   Procedure Laterality Date   • ACHILLES TENDON REPAIR Right 10/2018   • APPENDECTOMY     • CARDIAC CATHETERIZATION  11/03/2015    LEFT  VENTRIULOGRAM, CORONARY ANGIOGRAM    • CHOLECYSTECTOMY  07/2013   • COLONOSCOPY  07/28/2014    NORMAL RESULT    • HERNIA REPAIR     • JOINT REPLACEMENT Bilateral     RIGHT KNEE 01/2016   • TONSILLECTOMY AND ADENOIDECTOMY     • TRACHEOSTOMY      SECONDARY TO SLEEP APNEA   • VENTRAL HERNIA REPAIR N/A 2/23/2022    Procedure: ROBOTIC UMBILICAL HERNIA REPAIR WITH MESH PLACEMENT;  Surgeon: Garrett Anderson MD;  Location: [facility];  Service: Robotics - DaVinci;  Laterality: N/A;      Social History     Tobacco Use   • Smoking status: Never Smoker   • Smokeless tobacco: Never Used   Vaping Use   • Vaping Use: Never used   Substance Use Topics   • Alcohol use: Yes     Comment: SOCIALLY    • Drug use: Never     Family History   Problem Relation Age of Onset   • Arrhythmia Mother    • Stroke Mother 73   • Diabetes type II Mother    • Heart attack Father    • Hypertension Brother    • Cerebral aneurysm Brother 38   • Diabetes type II Brother    • Coronary artery disease Other      Health Maintenance Due   Topic Date Due   • Pneumococcal Vaccine 0-64 (1 - PCV) [birth year]/22/1972   • Hepatitis B (1 of 3 - Risk 3-dose series) Never done   • ZOSTER VACCINE (1 of 2) Never done   • HEPATITIS C SCREENING  Never done   • DIABETIC FOOT EXAM  02/24/2021   • DIABETIC EYE EXAM  Never done   • LIPID PANEL  06/16/2021   • URINE MICROALBUMIN  08/01/2021   • ANNUAL PHYSICAL  01/22/2022      Immunization History   Administered Date(s) Administered   • COVID-19 (PFIZER) PURPLE CAP 02/24/2021, 03/17/2021, 12/09/2021   • FluLaval/Fluarix/Fluzone >6 12/09/2021   • Influenza Quad Vaccine (Inpatient) 01/09/2018   • Influenza TIV (IM) 01/08/2013   • Influenza, Unspecified 01/20/2021, 01/20/2021   • Pneumococcal, Unspecified 08/08/2016, 08/08/2016   • Td 03/26/2019, 03/26/2019        Objective     Vitals:    04/15/22 0816   BP: 114/69   BP Location: Right arm   Patient Position: Sitting   Pulse: 90   Weight: 124 kg (272 lb 12.8 oz)   Height: 162.6 cm  "(64.02\")     Body mass index is 46.8 kg/m².     Physical Exam  Vitals reviewed.   Constitutional:       Appearance: Normal appearance.   HENT:      Head: Normocephalic.   Eyes:      Pupils: Pupils are equal, round, and reactive to light.   Cardiovascular:      Rate and Rhythm: Normal rate and regular rhythm.      Heart sounds: No murmur heard.  Pulmonary:      Effort: Pulmonary effort is normal.      Breath sounds: Normal breath sounds.   Musculoskeletal:         General: Normal range of motion.      Thoracic back: Tenderness (left flank) present.   Neurological:      Mental Status: He is alert.   Psychiatric:         Mood and Affect: Mood normal.         Behavior: Behavior normal.           Result Review :                               Assessment and Plan      Diagnoses and all orders for this visit:    1. Acute left flank pain (Primary)  -     POCT urinalysis dipstick, automated    2. Thoracic myofascial strain, subsequent encounter  Comments:  recommend easing into his work related dueties, consider PT if persists heating pad, continue treatment from [place] ER visit   follow up if new or worsening               Follow Up     Return if symptoms worsen or fail to improve.             "

## 2022-05-01 ENCOUNTER — HOSPITAL ENCOUNTER (EMERGENCY)
Dept: HOSPITAL 49 - FER | Age: 57
Discharge: HOME | End: 2022-05-01
Payer: COMMERCIAL

## 2022-05-01 DIAGNOSIS — E11.9: ICD-10-CM

## 2022-05-01 DIAGNOSIS — R07.89: Primary | ICD-10-CM

## 2022-05-01 DIAGNOSIS — Z79.84: ICD-10-CM

## 2022-05-01 DIAGNOSIS — I10: ICD-10-CM

## 2022-05-01 LAB
ALBUMIN SERPL-MCNC: 3.2 G/DL (ref 3.4–5)
ALKALINE PHOSHATASE: 67 U/L (ref 46–116)
ALT SERPL-CCNC: 25 U/L (ref 16–63)
AST: 15 U/L (ref 15–37)
BASOPHIL: 0.8 % (ref 0–2)
BILIRUBIN - TOTAL: 1.1 MG/DL (ref 0.2–1)
BUN SERPL-MCNC: 14 MG/DL (ref 7–18)
BUN/CREAT RATIO (CALC): 18.4 RATIO
CHLORIDE: 100 MMOL/L (ref 98–107)
CO2 (BICARBONATE): 28 MMOL/L (ref 21–32)
CREATININE: 0.76 MG/DL (ref 0.67–1.17)
D DIMER PPP FEU-MCNC: 0.61 UG/MLFEU (ref 0–0.41)
EOSINOPHIL: 3.3 % (ref 0–5)
GLOBULIN (CALCULATION): 3.8 G/DL
GLUCOSE SERPL-MCNC: 170 MG/DL (ref 74–106)
HCT: 44.2 % (ref 42–52)
HGB BLD-MCNC: 14.8 G/DL (ref 13.2–18)
INR PPP: 1.14 (ref 0.9–1.2)
LIPASE: 125 U/L (ref 73–393)
LYMPHOCYTE: 31.9 % (ref 15–48)
MCH RBC QN AUTO: 30.6 PG (ref 25–31)
MCHC RBC AUTO-ENTMCNC: 33.5 G/DL (ref 32–36)
MCV: 91.5 FL (ref 78–100)
MONOCYTE: 15 % (ref 0–12)
MPV: 8.6 FL (ref 6–9.5)
NEUTROPHIL: 48.5 % (ref 41–80)
NRBC: 0
PLT: 248 K/UL (ref 150–400)
POTASSIUM: 3.8 MMOL/L (ref 3.5–5.1)
PROTHROMBIN TIME: 14 SECONDS (ref 11.8–13.4)
PTT: 30.4 SECONDS (ref 24.4–34.7)
RBC MORPHOLOGY: NORMAL
RBC: 4.83 M/UL (ref 4.7–6)
RDW: 12.6 % (ref 11.5–14)
TOTAL PROTEIN: 7 G/DL (ref 6.4–8.2)
WBC: 9.2 K/UL (ref 4–10.5)

## 2022-05-02 ENCOUNTER — TELEPHONE (OUTPATIENT)
Dept: FAMILY MEDICINE CLINIC | Age: 57
End: 2022-05-02

## 2022-05-02 DIAGNOSIS — S29.019D THORACIC MYOFASCIAL STRAIN, SUBSEQUENT ENCOUNTER: Primary | ICD-10-CM

## 2022-05-02 DIAGNOSIS — Z86.711 HISTORY OF PULMONARY EMBOLUS (PE): ICD-10-CM

## 2022-05-02 DIAGNOSIS — R91.1 LUNG NODULE: ICD-10-CM

## 2022-05-02 LAB
BILIRUBIN: NEGATIVE MG/DL
BLOOD: NEGATIVE ERY/UL
CLARITY UR: CLEAR
COLOR: YELLOW
GLUCOSE (U): NORMAL MG/DL
LEUKOCYTES: NEGATIVE LEU/UL
NITRITE: NEGATIVE MG/DL
PROTEIN: NEGATIVE MG/DL
SPECIFIC GRAVITY: 1.01 (ref 1–1.03)
UROBILINOGEN: 0.2 MG/DL (ref 0.2–1)

## 2022-05-02 NOTE — TELEPHONE ENCOUNTER
Caller: Fabio Juárez    Relationship: Self    Best call back number: [phone]    What is the best time to reach you: ANYTIME    Who are you requesting to speak with (clinical staff, provider,  specific staff member): CLINICAL      What was the call regarding: THE PATIENT STATED HE HAD SURGERY 02/23/22 AND TWO WEEKS LATER A BLOOD CLOT APPEARED IN HIS LEFT LUNG. THE CLOT HAS NOW MOVED TO HIS RIGHT LUNG. HE STATED HE WENT TO EMERGENCY ROOM FOR 6 HOURS 05/01/22 AND THEY WERE UNABLE TO FIND CLOT AND NOT ABLE TO HELP. HE WOULD LIKE TO KNOW IF PCP WOULD RECOMMEND AN MRI OR ADDITIONAL IMAGING TO FIND WHAT IS CAUSING THE PAIN AND A CALL BACK TO DISCUSS THE NEXT STEPS.     Do you require a callback: YES

## 2022-05-02 NOTE — TELEPHONE ENCOUNTER
I would recommend that he get in with pulmonary for further evaluation if he continues to have symptoms.  I am unsure that an MRI is an appropriate form of evaluation - this would be better evaluated by a pulmonologist.  Does he want to go to same as have seen in past?

## 2022-05-04 ENCOUNTER — HOSPITAL ENCOUNTER (EMERGENCY)
Facility: HOSPITAL | Age: 57
Discharge: HOME | End: 2022-05-04
Attending: EMERGENCY MEDICINE | Admitting: EMERGENCY MEDICINE

## 2022-05-04 ENCOUNTER — APPOINTMENT (OUTPATIENT)
Dept: CT IMAGING | Facility: HOSPITAL | Age: 57
End: 2022-05-04

## 2022-05-04 ENCOUNTER — APPOINTMENT (OUTPATIENT)
Dept: GENERAL RADIOLOGY | Facility: HOSPITAL | Age: 57
End: 2022-05-04

## 2022-05-04 VITALS
RESPIRATION RATE: 24 BRPM | SYSTOLIC BLOOD PRESSURE: 123 MMHG | DIASTOLIC BLOOD PRESSURE: 70 MMHG | BODY MASS INDEX: 46.37 KG/M2 | TEMPERATURE: 98.2 F | WEIGHT: 271.61 LBS | HEART RATE: 84 BPM | HEIGHT: 64 IN | OXYGEN SATURATION: 94 %

## 2022-05-04 DIAGNOSIS — R10.9 ABDOMINAL PAIN, UNSPECIFIED ABDOMINAL LOCATION: Primary | ICD-10-CM

## 2022-05-04 LAB
ALBUMIN SERPL-MCNC: 3.7 G/DL (ref 3.5–5.2)
ALBUMIN/GLOB SERPL: 1.3 G/DL
ALP SERPL-CCNC: 69 U/L (ref 39–117)
ALT SERPL W P-5'-P-CCNC: 16 U/L (ref 1–41)
ANION GAP SERPL CALCULATED.3IONS-SCNC: 11.9 MMOL/L (ref 5–15)
AST SERPL-CCNC: 15 U/L (ref 1–40)
BASOPHILS # BLD AUTO: 0.05 10*3/MM3 (ref 0–0.2)
BASOPHILS NFR BLD AUTO: 0.5 % (ref 0–1.5)
BILIRUB SERPL-MCNC: 0.7 MG/DL (ref 0–1.2)
BILIRUB UR QL STRIP: NEGATIVE
BUN SERPL-MCNC: 12 MG/DL (ref 6–20)
BUN/CREAT SERPL: 14.8 (ref 7–25)
CALCIUM SPEC-SCNC: 8.8 MG/DL (ref 8.6–10.5)
CHLORIDE SERPL-SCNC: 101 MMOL/L (ref 98–107)
CLARITY UR: CLEAR
CO2 SERPL-SCNC: 23.1 MMOL/L (ref 22–29)
COLOR UR: YELLOW
CREAT SERPL-MCNC: 0.81 MG/DL (ref 0.76–1.27)
DEPRECATED RDW RBC AUTO: 42 FL (ref 37–54)
EGFRCR SERPLBLD CKD-EPI 2021: 103.5 ML/MIN/1.73
EOSINOPHIL # BLD AUTO: 0.24 10*3/MM3 (ref 0–0.4)
EOSINOPHIL NFR BLD AUTO: 2.5 % (ref 0.3–6.2)
ERYTHROCYTE [DISTWIDTH] IN BLOOD BY AUTOMATED COUNT: 12.7 % (ref 12.3–15.4)
GLOBULIN UR ELPH-MCNC: 2.9 GM/DL
GLUCOSE SERPL-MCNC: 228 MG/DL (ref 65–99)
GLUCOSE UR STRIP-MCNC: ABNORMAL MG/DL
HCT VFR BLD AUTO: 41.3 % (ref 37.5–51)
HGB BLD-MCNC: 14 G/DL (ref 13–17.7)
HGB UR QL STRIP.AUTO: NEGATIVE
HOLD SPECIMEN: NORMAL
HOLD SPECIMEN: NORMAL
IMM GRANULOCYTES # BLD AUTO: 0.05 10*3/MM3 (ref 0–0.05)
IMM GRANULOCYTES NFR BLD AUTO: 0.5 % (ref 0–0.5)
KETONES UR QL STRIP: NEGATIVE
LEUKOCYTE ESTERASE UR QL STRIP.AUTO: NEGATIVE
LIPASE SERPL-CCNC: 25 U/L (ref 13–60)
LYMPHOCYTES # BLD AUTO: 2.24 10*3/MM3 (ref 0.7–3.1)
LYMPHOCYTES NFR BLD AUTO: 23.6 % (ref 19.6–45.3)
MCH RBC QN AUTO: 30.8 PG (ref 26.6–33)
MCHC RBC AUTO-ENTMCNC: 33.9 G/DL (ref 31.5–35.7)
MCV RBC AUTO: 90.8 FL (ref 79–97)
MONOCYTES # BLD AUTO: 1.23 10*3/MM3 (ref 0.1–0.9)
MONOCYTES NFR BLD AUTO: 12.9 % (ref 5–12)
NEUTROPHILS NFR BLD AUTO: 5.69 10*3/MM3 (ref 1.7–7)
NEUTROPHILS NFR BLD AUTO: 60 % (ref 42.7–76)
NITRITE UR QL STRIP: NEGATIVE
NRBC BLD AUTO-RTO: 0 /100 WBC (ref 0–0.2)
PH UR STRIP.AUTO: <=5 [PH] (ref 5–8)
PLATELET # BLD AUTO: 244 10*3/MM3 (ref 140–450)
PMV BLD AUTO: 8.6 FL (ref 6–12)
POTASSIUM SERPL-SCNC: 3.5 MMOL/L (ref 3.5–5.2)
PROT SERPL-MCNC: 6.6 G/DL (ref 6–8.5)
PROT UR QL STRIP: NEGATIVE
RBC # BLD AUTO: 4.55 10*6/MM3 (ref 4.14–5.8)
SODIUM SERPL-SCNC: 136 MMOL/L (ref 136–145)
SP GR UR STRIP: 1.02 (ref 1–1.03)
TROPONIN T SERPL-MCNC: <0.01 NG/ML (ref 0–0.03)
UROBILINOGEN UR QL STRIP: ABNORMAL
WBC NRBC COR # BLD: 9.5 10*3/MM3 (ref 3.4–10.8)
WHOLE BLOOD HOLD SPECIMEN: NORMAL
WHOLE BLOOD HOLD SPECIMEN: NORMAL

## 2022-05-04 PROCEDURE — 99283 EMERGENCY DEPT VISIT LOW MDM: CPT

## 2022-05-04 PROCEDURE — 80053 COMPREHEN METABOLIC PANEL: CPT

## 2022-05-04 PROCEDURE — 25010000002 HYDROMORPHONE 1 MG/ML SOLUTION: Performed by: EMERGENCY MEDICINE

## 2022-05-04 PROCEDURE — 85025 COMPLETE CBC W/AUTO DIFF WBC: CPT

## 2022-05-04 PROCEDURE — 71045 X-RAY EXAM CHEST 1 VIEW: CPT

## 2022-05-04 PROCEDURE — 0 IOPAMIDOL PER 1 ML: Performed by: EMERGENCY MEDICINE

## 2022-05-04 PROCEDURE — 84484 ASSAY OF TROPONIN QUANT: CPT

## 2022-05-04 PROCEDURE — 36415 COLL VENOUS BLD VENIPUNCTURE: CPT

## 2022-05-04 PROCEDURE — 83690 ASSAY OF LIPASE: CPT

## 2022-05-04 PROCEDURE — 96374 THER/PROPH/DIAG INJ IV PUSH: CPT

## 2022-05-04 PROCEDURE — 81003 URINALYSIS AUTO W/O SCOPE: CPT

## 2022-05-04 PROCEDURE — 25010000002 ONDANSETRON PER 1 MG: Performed by: EMERGENCY MEDICINE

## 2022-05-04 PROCEDURE — 96375 TX/PRO/DX INJ NEW DRUG ADDON: CPT

## 2022-05-04 PROCEDURE — 74177 CT ABD & PELVIS W/CONTRAST: CPT

## 2022-05-04 RX ORDER — OXYCODONE HYDROCHLORIDE AND ACETAMINOPHEN 5; 325 MG/1; MG/1
1 TABLET ORAL EVERY 6 HOURS PRN
Qty: 12 TABLET | Refills: 0 | Status: SHIPPED | OUTPATIENT
Start: 2022-05-04 | End: 2022-06-21

## 2022-05-04 RX ORDER — CYCLOBENZAPRINE HCL 10 MG
10 TABLET ORAL 3 TIMES DAILY
Qty: 20 TABLET | Refills: 0 | Status: SHIPPED | OUTPATIENT
Start: 2022-05-04 | End: 2022-05-18 | Stop reason: DRUGHIGH

## 2022-05-04 RX ORDER — ONDANSETRON 2 MG/ML
4 INJECTION INTRAMUSCULAR; INTRAVENOUS ONCE
Status: COMPLETED | OUTPATIENT
Start: 2022-05-04 | End: 2022-05-04

## 2022-05-04 RX ORDER — SODIUM CHLORIDE 0.9 % (FLUSH) 0.9 %
10 SYRINGE (ML) INJECTION AS NEEDED
Status: DISCONTINUED | OUTPATIENT
Start: 2022-05-04 | End: 2022-05-04 | Stop reason: HOSPADM

## 2022-05-04 RX ADMIN — IOPAMIDOL 100 ML: 755 INJECTION, SOLUTION INTRAVENOUS at 13:47

## 2022-05-04 RX ADMIN — HYDROMORPHONE HYDROCHLORIDE 1 MG: 1 INJECTION, SOLUTION INTRAMUSCULAR; INTRAVENOUS; SUBCUTANEOUS at 13:28

## 2022-05-04 RX ADMIN — ONDANSETRON 4 MG: 2 INJECTION INTRAMUSCULAR; INTRAVENOUS at 13:28

## 2022-05-04 NOTE — ED PROVIDER NOTES
"Time: 1:09 PM EDT  Arrived by: private car  Chief Complaint: ABDOMINAL PAIN   History provided by: pt  History is limited by: N/A     History of Present Illness:  Patient is a 57 y.o. male that presents to the emergency department with LUQ ABDOMINAL PAIN that started two weeks ago after the pt states he had a hernia surgery. The pain is still present and has been constant. Nothing improves the pain and getting up makes the pain worse.     Pt denies nausea, emesis, and dysuria. He notes that he has had diarrhea and was told previously that his pain was due to constipation.       History provided by:  Patient  Abdominal Pain  Pain location:  LUQ  Pain quality comment:  \"pain\"  Pain radiates to:  Does not radiate  Pain severity:  Moderate  Onset quality:  Gradual  Duration:  2 weeks  Timing:  Constant  Chronicity:  New  Relieved by:  Nothing  Exacerbated by: sitting up.  Ineffective treatments:  None tried  Associated symptoms: diarrhea    Associated symptoms: no chest pain, no chills, no dysuria, no fever, no nausea, no shortness of breath and no vomiting        Similar Symptoms Previously: yes   Recently seen: Pt was seen in this ED on 3/31/22 left-sided abdominal pain.       Patient Care Team  Primary Care Provider: Karley Amor APRN    Past Medical History:     No Known Allergies  Past Medical History:   Diagnosis Date   • Chronic pain    • Claustrophobia    • Coronary artery disease     BLOCKAGE 2015, SEE'S DR DIEGO EVERY 2 YEARS, DENIED CP/SOB    • Essential (primary) hypertension    • GERD (gastroesophageal reflux disease)    • Hemochromatosis     ASYMPTOMATIC    • History of COVID-19    • Left upper quadrant pain    • Low back pain    • Lung nodule    • Mixed hyperlipidemia    • Obstructive sleep apnea (adult) (pediatric)    • Other testicular dysfunction    • Pain in right foot    • Pain in right shoulder    • Recurrent umbilical hernia    • Renal stone 03/2017   • Tracheostomy status ([facility])     R/T SLEEP " APNEA    • Type 2 diabetes mellitus (HCC)     BG RUNS 120-125 IN AM      Past Surgical History:   Procedure Laterality Date   • ACHILLES TENDON REPAIR Right 10/2018   • APPENDECTOMY     • CARDIAC CATHETERIZATION  11/03/2015    LEFT VENTRIULOGRAM, CORONARY ANGIOGRAM    • CHOLECYSTECTOMY  07/2013   • COLONOSCOPY  07/28/2014    NORMAL RESULT    • HERNIA REPAIR     • JOINT REPLACEMENT Bilateral     RIGHT KNEE 01/2016   • TONSILLECTOMY AND ADENOIDECTOMY     • TRACHEOSTOMY      SECONDARY TO SLEEP APNEA   • VENTRAL HERNIA REPAIR N/A 2/23/2022    Procedure: ROBOTIC UMBILICAL HERNIA REPAIR WITH MESH PLACEMENT;  Surgeon: Garrett Anderson MD;  Location: [facility] MAIN OR;  Service: Robotics - DaVinci;  Laterality: N/A;     Family History   Problem Relation Age of Onset   • Arrhythmia Mother    • Stroke Mother 73   • Diabetes type II Mother    • Heart attack Father    • Hypertension Brother    • Cerebral aneurysm Brother 38   • Diabetes type II Brother    • Coronary artery disease Other        Home Medications:  Prior to Admission medications    Medication Sig Start Date End Date Taking? Authorizing Provider   albuterol sulfate  (90 Base) MCG/ACT inhaler EVERY 4 HOURS AS NEEDED as needed for SHORTNESS OF BREATH 3/12/22   ProviderTabatha MD   apixaban (ELIQUIS) 5 MG tablet tablet Take 1 tablet by mouth 2 (Two) Times a Day. 4/12/22   Debbie Bazan MD   atorvastatin (LIPITOR) 80 MG tablet TAKE ONE TABLET BY MOUTH EVERY NIGHT AT BEDTIME 3/31/22   Karley Amor APRN   baclofen (LIORESAL) 10 MG tablet TAKE 1/2 TO 1 TABLET BY MOUTH AT BEDTIME FOR LOWER BACK PAIN/ MUSCLE SPASMS 12/26/21   Karley Amor APRN   cetirizine (zyrTEC) 5 MG tablet TAKE ONE TABLET BY MOUTH DAILY 1/11/22   Karley Amor APRN   cyclobenzaprine (FLEXERIL) 10 MG tablet Take 10 mg by mouth 3 (Three) Times a Day As Needed for Muscle Spasms.    ProviderTabatha MD   gabapentin (NEURONTIN) 300 MG capsule 4 (Four) Times a Day.    Provider,  MD Tabatha   glimepiride (AMARYL) 2 MG tablet Take 2 mg by mouth Every Morning Before Breakfast. INSTRUCTED PER ANESTHESIA STANDING ORDERS    ProviderTabatha MD   hydroCHLOROthiazide (HYDRODIURIL) 12.5 MG tablet Take 12.5 mg by mouth Daily.    ProviderTabatha MD   lisinopril (PRINIVIL,ZESTRIL) 10 MG tablet TAKE ONE TABLET BY MOUTH EVERY MORNING 8/13/21   Karley Amor APRN   metFORMIN ER (GLUCOPHAGE-XR) 500 MG 24 hr tablet Take 2 tablets by mouth 2 (Two) Times a Day With Meals. 3/13/22   Karley Amor APRN   oxyCODONE (ROXICODONE) 5 MG immediate release tablet Take 5 mg by mouth As Needed for Moderate Pain .    ProviderTabatha MD   Semaglutide, 1 MG/DOSE, (Ozempic, 1 MG/DOSE,) 4 MG/3ML solution pen-injector Inject 1 mg under the skin into the appropriate area as directed 1 (One) Time Per Week for 90 days. 3/16/22 6/14/22  Karley Amor APRN   sennosides-docusate (PERICOLACE) 8.6-50 MG per tablet Take 2 tablets by mouth 2 (Two) Times a Day. 3/22/22   Mesha Marie DO   traZODone (DESYREL) 50 MG tablet TAKE ONE TABLET BY MOUTH ONCE NIGHTLY 2/11/22   Karley Amor APRN        Social History:   Social History     Tobacco Use   • Smoking status: Never Smoker   • Smokeless tobacco: Never Used   Vaping Use   • Vaping Use: Never used   Substance Use Topics   • Alcohol use: Yes     Comment: SOCIALLY    • Drug use: Never     Recent travel: no     Review of Systems:  Review of Systems   Constitutional: Negative for chills, diaphoresis and fever.   HENT: Negative for ear discharge and nosebleeds.    Eyes: Negative for photophobia.   Respiratory: Negative for shortness of breath.    Cardiovascular: Negative for chest pain.   Gastrointestinal: Positive for abdominal pain and diarrhea. Negative for nausea and vomiting.   Genitourinary: Negative for dysuria.   Musculoskeletal: Negative for back pain and neck pain.   Skin: Negative for rash.   Neurological: Negative for headaches.   All other  "systems reviewed and are negative.       Physical Exam:  /70 (BP Location: Left arm, Patient Position: Sitting)   Pulse 84   Temp 98.2 °F (36.8 °C) (Oral)   Resp 24   Ht 162.6 cm (64\")   Wt 123 kg (271 lb 9.7 oz)   SpO2 94%   BMI 46.62 kg/m²     Physical Exam  Vitals and nursing note reviewed.   Constitutional:       General: He is not in acute distress.     Appearance: Normal appearance. He is not toxic-appearing.   HENT:      Head: Normocephalic and atraumatic.      Jaw: There is normal jaw occlusion.   Eyes:      General: Lids are normal.      Extraocular Movements: Extraocular movements intact.      Conjunctiva/sclera: Conjunctivae normal.      Pupils: Pupils are equal, round, and reactive to light.   Cardiovascular:      Rate and Rhythm: Normal rate and regular rhythm.      Pulses: Normal pulses.      Heart sounds: Normal heart sounds.   Pulmonary:      Effort: Pulmonary effort is normal. No respiratory distress.      Breath sounds: Normal breath sounds. No wheezing or rhonchi.   Abdominal:      General: Abdomen is flat.      Palpations: Abdomen is soft.      Tenderness: There is abdominal tenderness in the left lower quadrant. There is no guarding or rebound.   Musculoskeletal:         General: Normal range of motion.      Cervical back: Normal range of motion and neck supple.      Right lower leg: No edema.      Left lower leg: No edema.   Skin:     General: Skin is warm and dry.   Neurological:      Mental Status: He is alert and oriented to person, place, and time. Mental status is at baseline.   Psychiatric:         Mood and Affect: Mood normal.                Medications in the Emergency Department:  Medications   sodium chloride 0.9 % flush 10 mL (has no administration in time range)   ondansetron (ZOFRAN) injection 4 mg (4 mg Intravenous Given 5/4/22 1328)   HYDROmorphone (DILAUDID) injection 1 mg (1 mg Intravenous Given 5/4/22 1328)   iopamidol (ISOVUE-370) 76 % injection 100 mL (100 mL " Intravenous Given 5/4/22 1347)        Labs  Lab Results (last 24 hours)     Procedure Component Value Units Date/Time    CBC & Differential [[id number]]  (Abnormal) Collected: 05/04/22 1108    Specimen: Blood Updated: 05/04/22 1115    Narrative:      The following orders were created for panel order CBC & Differential.  Procedure                               Abnormality         Status                     ---------                               -----------         ------                     CBC Auto Differential[[id number]]        Abnormal            Final result                 Please view results for these tests on the individual orders.    Comprehensive Metabolic Panel [[id number]]  (Abnormal) Collected: 05/04/22 1108    Specimen: Blood Updated: 05/04/22 1145     Glucose 228 mg/dL      BUN 12 mg/dL      Creatinine 0.81 mg/dL      Sodium 136 mmol/L      Potassium 3.5 mmol/L      Chloride 101 mmol/L      CO2 23.1 mmol/L      Calcium 8.8 mg/dL      Total Protein 6.6 g/dL      Albumin 3.70 g/dL      ALT (SGPT) 16 U/L      AST (SGOT) 15 U/L      Alkaline Phosphatase 69 U/L      Total Bilirubin 0.7 mg/dL      Globulin 2.9 gm/dL      A/G Ratio 1.3 g/dL      BUN/Creatinine Ratio 14.8     Anion Gap 11.9 mmol/L      eGFR 103.5 mL/min/1.73      Comment: National Kidney Foundation and American Society of Nephrology (ASN) Task Force recommended calculation based on the Chronic Kidney Disease Epidemiology Collaboration (CKD-EPI) equation refit without adjustment for race.       Narrative:      GFR Normal >60  Chronic Kidney Disease <60  Kidney Failure <15      Lipase [[id number]]  (Normal) Collected: 05/04/22 1108    Specimen: Blood Updated: 05/04/22 1145     Lipase 25 U/L     Troponin [[id number]]  (Normal) Collected: 05/04/22 1108    Specimen: Blood Updated: 05/04/22 1145     Troponin T <0.010 ng/mL     Narrative:      Troponin T Reference Range:  <= 0.03 ng/mL-   Negative for AMI  >0.03 ng/mL-     Abnormal for myocardial  necrosis.  Clinicians would have to utilize clinical acumen, EKG, Troponin and serial changes to determine if it is an Acute Myocardial Infarction or myocardial injury due to an underlying chronic condition.       Results may be falsely decreased if patient taking Biotin.      CBC Auto Differential [[id number]]  (Abnormal) Collected: 05/04/22 1108    Specimen: Blood Updated: 05/04/22 1115     WBC 9.50 10*3/mm3      RBC 4.55 10*6/mm3      Hemoglobin 14.0 g/dL      Hematocrit 41.3 %      MCV 90.8 fL      MCH 30.8 pg      MCHC 33.9 g/dL      RDW 12.7 %      RDW-SD 42.0 fl      MPV 8.6 fL      Platelets 244 10*3/mm3      Neutrophil % 60.0 %      Lymphocyte % 23.6 %      Monocyte % 12.9 %      Eosinophil % 2.5 %      Basophil % 0.5 %      Immature Grans % 0.5 %      Neutrophils, Absolute 5.69 10*3/mm3      Lymphocytes, Absolute 2.24 10*3/mm3      Monocytes, Absolute 1.23 10*3/mm3      Eosinophils, Absolute 0.24 10*3/mm3      Basophils, Absolute 0.05 10*3/mm3      Immature Grans, Absolute 0.05 10*3/mm3      nRBC 0.0 /100 WBC     Urinalysis With Microscopic If Indicated (No Culture) - Urine, Clean Catch [[id number]]  (Abnormal) Collected: 05/04/22 1116    Specimen: Urine, Clean Catch Updated: 05/04/22 1135     Color, UA Yellow     Appearance, UA Clear     pH, UA <=5.0     Specific Gravity, UA 1.024     Glucose,  mg/dL (2+)     Ketones, UA Negative     Bilirubin, UA Negative     Blood, UA Negative     Protein, UA Negative     Leuk Esterase, UA Negative     Nitrite, UA Negative     Urobilinogen, UA 1.0 E.U./dL    Narrative:      Urine microscopic not indicated.           Imaging:  CT Abdomen Pelvis With Contrast    Result Date: 5/4/2022  PROCEDURE: CT ABDOMEN PELVIS W CONTRAST  COMPARISON: [facility], CT, CT ABDOMEN PELVIS W CONTRAST, 3/31/2022, 19:37.   INDICATIONS: Left-side abdominal pain  TECHNIQUE: After obtaining the patient's consent, CT images were created with non-ionic intravenous contrast  material.   PROTOCOL:   Standard imaging protocol performed    RADIATION:   DLP: 1273.7 mGy*cm   Automated exposure control was utilized to minimize radiation dose. CONTRAST: 100 cc Isovue 370 I.V.  FINDINGS:  No evidence of pneumonia or other acute findings in the base of the chest.  No evidence of pancreatitis or other acute process of the organs throughout the abdomen.  Normal appearance of the liver and spleen as well as the adrenal glands.  Cholecystectomy clips again noted.  No bowel obstruction.  No evidence of colitis or diverticulitis.  No ascites or free air.  No abscess.   No evidence of acute process in the pelvis.  Bladder appears within normal limits.  No acute or aggressive osseous findings.  The fluid collection in the anterior abdominal wall has decreased in size now measuring 2.6 by 1.4 cm previously 4.2 x 2.3 cm.  Stable postsurgical changes along the left inguinal canal location again noted       No evidence of acute process of the abdomen and pelvis.  No findings to explain acute left-sided abdomen pain.  The fluid collection in the midline anterior abdominal wall has decreased in size.     ANA URIAS MD       Electronically Signed and Approved By: ANA URIAS MD on 5/04/2022 at 14:11             XR Chest 1 View    Result Date: 5/4/2022  PROCEDURE: XR CHEST 1 VW  COMPARISON: [facility]DALENO HAUSER, CHEST PA/AP & LAT 2V, 4/30/2020, 10:39.  INDICATIONS: LEFT SIDED CHEST PAIN  FINDINGS:  No evidence of pneumonia, pulmonary edema or other acute pulmonary process.  No pneumothorax or pleural effusion.  No evidence of acute process of the cardiomediastinal structures.  Tracheostomy tube appears in good position        Negative portable chest x-ray.  No evidence of acute cardiopulmonary disease.       ANA URIAS MD       Electronically Signed and Approved By: ANA URIAS MD on 5/04/2022 at 12:39               Procedures:  Procedures    Progress                            Medical Decision  Making:  MDM  Number of Diagnoses or Management Options  Abdominal pain, unspecified abdominal location  Diagnosis management comments: The patient is resting comfortably and feels better, is alert and in no distress.  The patient´s CBC was reviewed and shows no abnormalities of critical concern. Of note, there is no anemia requiring a blood transfusion and the platelet count is acceptable.  The patient´s CMP was reviewed and shows no abnormalities of critical concern. Of note, the patient´s sodium and potassium are acceptable. The patient´s liver enzymes are unremarkable. The patient´s renal function (creatinine) is preserved. The patient has a normal anion gap.  Urinalysis is negative for bacteriuria.  CT scan abdomen pelvis is negative for acute intra-abdominal pathology.  Chest x-ray is negative for pneumonia.  Repeat examination is unremarkable and benign; in particular, there's no discomfort at McBurney's point and there is no pulsatile mass. The history, exam, diagnostic testing, and current condition does not suggest acute appendicitis, bowel obstruction, acute cholecystitis, bowel perforation, major gastrointestinal bleeding, severe diverticulitis, abdominal aortic aneurysm, mesenteric ischemia, volvulus, sepsis, or other significant pathology that warrants further testing, continued ED treatment, admission, for surgical evaluation at this point. The vital signs have been stable. The patient does not have uncontrollable pain, intractable vomiting, or other significant symptoms. The patient's condition is stable and appropriate for discharge from the emergency department.       Amount and/or Complexity of Data Reviewed  Clinical lab tests: reviewed  Tests in the radiology section of CPT®: reviewed  Independent visualization of images, tracings, or specimens: yes    Risk of Complications, Morbidity, and/or Mortality  Presenting problems: moderate  Management options: moderate    Patient Progress  Patient  progress: stable       Final diagnoses:   Abdominal pain, unspecified abdominal location        Disposition:  ED Disposition     ED Disposition   Discharge    Condition   Stable    Comment   --             Documentation assistance provided by Glenys Conti acting as scribe for Helen Collins MD. Information recorded by the scribe was done at my direction and has been verified and validated by me.        Glenys Conti  05/04/22 1317       Helen Collins MD  05/04/22 1438

## 2022-05-06 ENCOUNTER — TELEPHONE (OUTPATIENT)
Dept: ONCOLOGY | Facility: HOSPITAL | Age: 57
End: 2022-05-06

## 2022-05-06 DIAGNOSIS — I26.99 OTHER ACUTE PULMONARY EMBOLISM, UNSPECIFIED WHETHER ACUTE COR PULMONALE PRESENT: ICD-10-CM

## 2022-05-06 RX ORDER — SEMAGLUTIDE 1.34 MG/ML
INJECTION, SOLUTION SUBCUTANEOUS
Qty: 3 ML | Refills: 2 | Status: SHIPPED | OUTPATIENT
Start: 2022-05-06 | End: 2022-06-21 | Stop reason: SDUPTHER

## 2022-05-06 NOTE — TELEPHONE ENCOUNTER
Caller: Fabio Juárez    Relationship: Self    Best call back number: [phone]    Requested Prescriptions:   Requested Prescriptions     Pending Prescriptions Disp Refills   • apixaban (ELIQUIS) 5 MG tablet tablet 60 tablet 1     Sig: Take 1 tablet by mouth 2 (Two) Times a Day.        Pharmacy where request should be sent: [facility] [address] [place] [address] [place] [address]  - [phone]  - [phone]     Additional details provided by patient: PATIENT REQUESTING 90 DAY SUPPLY    Does the patient have less than a 3 day supply:  [] Yes  [x] No    Ros Aguirre Rep   05/06/22 09:11 EDT

## 2022-05-18 ENCOUNTER — OFFICE VISIT (OUTPATIENT)
Dept: FAMILY MEDICINE CLINIC | Age: 57
End: 2022-05-18

## 2022-05-18 VITALS
HEART RATE: 86 BPM | DIASTOLIC BLOOD PRESSURE: 76 MMHG | SYSTOLIC BLOOD PRESSURE: 121 MMHG | OXYGEN SATURATION: 93 % | HEIGHT: 64 IN | WEIGHT: 271.4 LBS | BODY MASS INDEX: 46.33 KG/M2

## 2022-05-18 DIAGNOSIS — S39.011S STRAIN OF ABDOMINAL MUSCLE, SEQUELA: Primary | ICD-10-CM

## 2022-05-18 PROCEDURE — 99214 OFFICE O/P EST MOD 30 MIN: CPT | Performed by: NURSE PRACTITIONER

## 2022-05-18 RX ORDER — NAPROXEN 500 MG/1
500 TABLET ORAL 2 TIMES DAILY WITH MEALS
Qty: 60 TABLET | Refills: 1 | Status: SHIPPED | OUTPATIENT
Start: 2022-05-18 | End: 2022-07-28

## 2022-05-18 RX ORDER — CYCLOBENZAPRINE HCL 5 MG
5 TABLET ORAL 2 TIMES DAILY PRN
COMMUNITY
Start: 2022-04-14 | End: 2022-06-21

## 2022-05-18 NOTE — PROGRESS NOTES
Assessment and Plan    Diagnoses and all orders for this visit:    1. Strain of abdominal muscle, sequela (Primary)  Comments:  given the assessment, appears to be abdominal wall strain - maybe muscle wall weakness/ damage after abdominal surgery.   Orders:  -     naproxen (Naprosyn) 500 MG tablet; Take 1 tablet by mouth 2 (Two) Times a Day With Meals.  Dispense: 60 tablet; Refill: 1  -     Ambulatory Referral to Physical Therapy Evaluate and treat; Heat    Discussed with Mauri that the abdominal binder if prolonged use may cause more harm than good is intended to help with short-term pain relief.  I would recommend that he get in with physical therapy to see if they can help improve some of the pain, he avoid strenuous activities to the abdominal wall until healed.  I do not see any concerning findings upon review of previous testing and labs that cause alarm for pancreatic involvement so we will defer further testing imaging at this time.  We may need to take him off work to avoid over strenuous activity until healed.    I spent 35 minutes caring for Fabio on this date of service. This time includes time spent by me in the following activities:reviewing tests, obtaining and/or reviewing a separately obtained history, performing a medically appropriate examination and/or evaluation , counseling and educating the patient/family/caregiver and documenting information in the medical record     Follow Up {Instructions Charge Capture  Follow-up Communications :23}  Return in about 8 weeks (around 7/13/2022).    Chief Complaint  Fabio Juárez presents to [facility] for Abdominal Pain (On going since he had hernia repair surgery. Wife is now worried he could have pancreatic cancer, would like to be checked for this.)    Subjective          Abdominal Pain: Patient complains of abdominal pain.   Onset was 3 months ago.  The pain is described as sharp, stabbing , burning, and is  "varying  in intensity.   Pain is located in the Left upper quadrant  with radiation to left back.  Aggravating factors: activity and any movement / bending over and standing / sitting for long periods of time .    Alleviating factors: sitting still .   Associated symptoms: none.    Treatments / evaluations to date include:  Tried muscle relaxers, pain medication   The patient denies anorexia, arthralgias, dysuria, hematochezia, hematuria, nausea, sweats and vomiting.  He is concerned that this may be his pancreas  He did have a CT abdomen on 5/4/22 which did not show any internal organ (including pancreas) concerns, he did have a lipase level as well on 5/4/22 which was normal.    Mauri reports that he feels that the pain is worse after he has worked for a couple of days - causing strain in the abdomen.  The pain is directly under one of his trochar incision sites           Review of Systems    Objective     Vitals:    05/18/22 1005   BP: 121/76   Pulse: 86   SpO2: 93%   Weight: 123 kg (271 lb 6.4 oz)   Height: 162.6 cm (64.02\")     Body mass index is 46.56 kg/m².     Physical Exam  Vitals reviewed.   Constitutional:       Appearance: Normal appearance. He is obese.   HENT:      Head: Normocephalic.   Eyes:      Pupils: Pupils are equal, round, and reactive to light.   Cardiovascular:      Rate and Rhythm: Normal rate and regular rhythm.      Heart sounds: No murmur heard.  Pulmonary:      Effort: Pulmonary effort is normal.      Breath sounds: Normal breath sounds.   Abdominal:      Tenderness: There is abdominal tenderness (with abdominal wall strain - not with palpation) in the left upper quadrant and left lower quadrant.       Musculoskeletal:         General: Normal range of motion.   Neurological:      Mental Status: He is alert.   Psychiatric:         Mood and Affect: Mood normal.         Behavior: Behavior normal.         Result Review :                    No Known Allergies   Past Medical History: "   Diagnosis Date   • Chronic pain    • Claustrophobia    • Coronary artery disease     BLOCKAGE 2015, SEE'S DR DIEGO EVERY 2 YEARS, DENIED CP/SOB    • Essential (primary) hypertension    • GERD (gastroesophageal reflux disease)    • Hemochromatosis     ASYMPTOMATIC    • History of COVID-19    • Left upper quadrant pain    • Low back pain    • Lung nodule    • Mixed hyperlipidemia    • Obstructive sleep apnea (adult) (pediatric)    • Other testicular dysfunction    • Pain in right foot    • Pain in right shoulder    • Recurrent umbilical hernia    • Renal stone 03/2017   • Tracheostomy status (HCC)     R/T SLEEP APNEA    • Type 2 diabetes mellitus (HCC)     BG RUNS 120-125 IN AM      Current Outpatient Medications   Medication Sig Dispense Refill   • albuterol sulfate  (90 Base) MCG/ACT inhaler EVERY 4 HOURS AS NEEDED as needed for SHORTNESS OF BREATH     • apixaban (ELIQUIS) 5 MG tablet tablet Take 1 tablet by mouth 2 (Two) Times a Day. 60 tablet 1   • atorvastatin (LIPITOR) 80 MG tablet TAKE ONE TABLET BY MOUTH EVERY NIGHT AT BEDTIME 90 tablet 0   • baclofen (LIORESAL) 10 MG tablet TAKE 1/2 TO 1 TABLET BY MOUTH AT BEDTIME FOR LOWER BACK PAIN/ MUSCLE SPASMS 90 tablet 1   • cetirizine (zyrTEC) 5 MG tablet TAKE ONE TABLET BY MOUTH DAILY 90 tablet 0   • cyclobenzaprine (FLEXERIL) 5 MG tablet Take 5 mg by mouth 2 (Two) Times a Day As Needed.     • gabapentin (NEURONTIN) 300 MG capsule 4 (Four) Times a Day.     • glimepiride (AMARYL) 2 MG tablet Take 2 mg by mouth Every Morning Before Breakfast. INSTRUCTED PER ANESTHESIA STANDING ORDERS     • hydroCHLOROthiazide (HYDRODIURIL) 12.5 MG tablet Take 12.5 mg by mouth Daily.     • lisinopril (PRINIVIL,ZESTRIL) 10 MG tablet TAKE ONE TABLET BY MOUTH EVERY MORNING 90 tablet 0   • metFORMIN ER (GLUCOPHAGE-XR) 500 MG 24 hr tablet Take 2 tablets by mouth 2 (Two) Times a Day With Meals. 360 tablet 0   • oxyCODONE (ROXICODONE) 5 MG immediate release tablet Take 5 mg by mouth  As Needed for Moderate Pain .     • oxyCODONE-acetaminophen (PERCOCET) 5-325 MG per tablet Take 1 tablet by mouth Every 6 (Six) Hours As Needed for Severe Pain . 12 tablet 0   • Ozempic, 1 MG/DOSE, 4 MG/3ML solution pen-injector DIAL AND INJECT UNDER THE SKIN 1 MG WEEKLY 3 mL 2   • sennosides-docusate (PERICOLACE) 8.6-50 MG per tablet Take 2 tablets by mouth 2 (Two) Times a Day. 60 tablet 0   • traZODone (DESYREL) 50 MG tablet TAKE ONE TABLET BY MOUTH ONCE NIGHTLY 90 tablet 0   • naproxen (Naprosyn) 500 MG tablet Take 1 tablet by mouth 2 (Two) Times a Day With Meals. 60 tablet 1     No current facility-administered medications for this visit.     Past Surgical History:   Procedure Laterality Date   • ACHILLES TENDON REPAIR Right 10/2018   • APPENDECTOMY     • CARDIAC CATHETERIZATION  11/03/2015    LEFT VENTRIULOGRAM, CORONARY ANGIOGRAM    • CHOLECYSTECTOMY  07/2013   • COLONOSCOPY  07/28/2014    NORMAL RESULT    • HERNIA REPAIR     • JOINT REPLACEMENT Bilateral     RIGHT KNEE 01/2016   • TONSILLECTOMY AND ADENOIDECTOMY     • TRACHEOSTOMY      SECONDARY TO SLEEP APNEA   • VENTRAL HERNIA REPAIR N/A 2/23/2022    Procedure: ROBOTIC UMBILICAL HERNIA REPAIR WITH MESH PLACEMENT;  Surgeon: Garrett Anderson MD;  Location: [facility];  Service: Robotics - [facility];  Laterality: N/A;      Social History     Tobacco Use   • Smoking status: Never Smoker   • Smokeless tobacco: Never Used   Vaping Use   • Vaping Use: Never used   Substance Use Topics   • Alcohol use: Yes     Comment: SOCIALLY    • Drug use: Never     Family History   Problem Relation Age of Onset   • Arrhythmia Mother    • Stroke Mother 73   • Diabetes type II Mother    • Heart attack Father    • Hypertension Brother    • Cerebral aneurysm Brother 38   • Diabetes type II Brother    • Coronary artery disease Other      Health Maintenance Due   Topic Date Due   • Pneumococcal Vaccine 0-64 (1 - PCV) 02/22/1971   • Hepatitis B (1 of 3 - Risk 3-dose series) Never  done   • ZOSTER VACCINE (1 of 2) Never done   • HEPATITIS C SCREENING  Never done   • DIABETIC FOOT EXAM  02/24/2021   • DIABETIC EYE EXAM  Never done   • LIPID PANEL  06/16/2021   • URINE MICROALBUMIN  08/01/2021   • ANNUAL PHYSICAL  01/22/2022      Immunization History   Administered Date(s) Administered   • COVID-19 (PFIZER) PURPLE CAP 02/24/2021, 03/17/2021, 12/09/2021   • FluLaval/Fluarix/Fluzone >6 12/09/2021   • Influenza Quad Vaccine (Inpatient) 01/09/2018   • Influenza TIV (IM) 01/08/2013   • Influenza, Unspecified 01/20/2021, 01/20/2021   • Pneumococcal, Unspecified 08/08/2016, 08/08/2016   • Td 03/26/2019, 03/26/2019

## 2022-05-19 ENCOUNTER — HOSPITAL ENCOUNTER (EMERGENCY)
Dept: HOSPITAL 49 - FER | Age: 57
LOS: 1 days | Discharge: HOME | End: 2022-05-20
Payer: COMMERCIAL

## 2022-05-19 DIAGNOSIS — I10: ICD-10-CM

## 2022-05-19 DIAGNOSIS — E11.9: ICD-10-CM

## 2022-05-19 DIAGNOSIS — R10.11: Primary | ICD-10-CM

## 2022-05-20 LAB
ALBUMIN SERPL-MCNC: 2.9 G/DL (ref 3.4–5)
ALKALINE PHOSHATASE: 66 U/L (ref 46–116)
ALT SERPL-CCNC: 22 U/L (ref 16–63)
AST: 16 U/L (ref 15–37)
BASOPHIL: 0.8 % (ref 0–2)
BILIRUBIN - TOTAL: 0.8 MG/DL (ref 0.2–1)
BUN SERPL-MCNC: 12 MG/DL (ref 7–18)
BUN/CREAT RATIO (CALC): 18.8 RATIO
CHLORIDE: 101 MMOL/L (ref 98–107)
CO2 (BICARBONATE): 27 MMOL/L (ref 21–32)
CREATININE: 0.64 MG/DL (ref 0.67–1.17)
EOSINOPHIL: 3.7 % (ref 0–5)
GLOBULIN (CALCULATION): 3.4 G/DL
GLUCOSE SERPL-MCNC: 161 MG/DL (ref 74–106)
HCT: 42.5 % (ref 42–52)
HGB BLD-MCNC: 14.3 G/DL (ref 13.2–18)
LIPASE: 110 U/L (ref 73–393)
LYMPHOCYTE: 35.2 % (ref 15–48)
MCH RBC QN AUTO: 30.5 PG (ref 25–31)
MCHC RBC AUTO-ENTMCNC: 33.6 G/DL (ref 32–36)
MCV: 90.6 FL (ref 78–100)
MONOCYTE: 14.9 % (ref 0–12)
MPV: 8.8 FL (ref 6–9.5)
NEUTROPHIL: 45 % (ref 41–80)
NRBC: 0
PLT: 256 K/UL (ref 150–400)
POTASSIUM: 3.7 MMOL/L (ref 3.5–5.1)
RBC MORPHOLOGY: NORMAL
RBC: 4.69 M/UL (ref 4.7–6)
RDW: 12.8 % (ref 11.5–14)
TOTAL PROTEIN: 6.3 G/DL (ref 6.4–8.2)
WBC: 7.3 K/UL (ref 4–10.5)

## 2022-05-22 DIAGNOSIS — G47.00 INSOMNIA, UNSPECIFIED TYPE: Chronic | ICD-10-CM

## 2022-05-23 RX ORDER — TRAZODONE HYDROCHLORIDE 50 MG/1
TABLET ORAL
Qty: 90 TABLET | Refills: 0 | Status: SHIPPED | OUTPATIENT
Start: 2022-05-23 | End: 2022-09-06

## 2022-06-09 DIAGNOSIS — E11.9 TYPE 2 DIABETES MELLITUS WITHOUT COMPLICATION, WITHOUT LONG-TERM CURRENT USE OF INSULIN: ICD-10-CM

## 2022-06-09 RX ORDER — METFORMIN HYDROCHLORIDE 500 MG/1
TABLET, EXTENDED RELEASE ORAL
Qty: 360 TABLET | Refills: 0 | Status: SHIPPED | OUTPATIENT
Start: 2022-06-09 | End: 2022-09-06

## 2022-06-14 RX ORDER — LISINOPRIL 10 MG/1
TABLET ORAL
Qty: 90 TABLET | Refills: 0 | Status: SHIPPED | OUTPATIENT
Start: 2022-06-14 | End: 2023-01-09

## 2022-06-21 ENCOUNTER — OFFICE VISIT (OUTPATIENT)
Dept: FAMILY MEDICINE CLINIC | Age: 57
End: 2022-06-21

## 2022-06-21 VITALS
TEMPERATURE: 98.1 F | HEIGHT: 62 IN | DIASTOLIC BLOOD PRESSURE: 75 MMHG | HEART RATE: 84 BPM | OXYGEN SATURATION: 93 % | SYSTOLIC BLOOD PRESSURE: 116 MMHG | BODY MASS INDEX: 50.79 KG/M2 | WEIGHT: 276 LBS

## 2022-06-21 DIAGNOSIS — L03.032 CELLULITIS OF TOE OF LEFT FOOT: ICD-10-CM

## 2022-06-21 DIAGNOSIS — Z23 ENCOUNTER FOR IMMUNIZATION: ICD-10-CM

## 2022-06-21 DIAGNOSIS — E11.9 TYPE 2 DIABETES MELLITUS WITHOUT COMPLICATION, WITHOUT LONG-TERM CURRENT USE OF INSULIN: ICD-10-CM

## 2022-06-21 DIAGNOSIS — E66.01 CLASS 3 SEVERE OBESITY DUE TO EXCESS CALORIES WITH SERIOUS COMORBIDITY AND BODY MASS INDEX (BMI) OF 50.0 TO 59.9 IN ADULT: ICD-10-CM

## 2022-06-21 DIAGNOSIS — Z13.6 SCREENING FOR CARDIOVASCULAR CONDITION: ICD-10-CM

## 2022-06-21 DIAGNOSIS — Z00.00 ROUTINE GENERAL MEDICAL EXAMINATION AT A HEALTH CARE FACILITY: Primary | ICD-10-CM

## 2022-06-21 DIAGNOSIS — Z11.59 SCREENING FOR VIRAL DISEASE: ICD-10-CM

## 2022-06-21 PROBLEM — T81.49XA WOUND INFECTION AFTER SURGERY: Status: RESOLVED | Noted: 2022-04-12 | Resolved: 2022-06-21

## 2022-06-21 PROBLEM — R06.00 DYSPNEA: Status: RESOLVED | Noted: 2022-03-18 | Resolved: 2022-06-21

## 2022-06-21 PROBLEM — R07.81 PLEURITIC CHEST PAIN: Status: RESOLVED | Noted: 2022-03-18 | Resolved: 2022-06-21

## 2022-06-21 PROBLEM — K42.9 RECURRENT UMBILICAL HERNIA: Status: RESOLVED | Noted: 2022-01-11 | Resolved: 2022-06-21

## 2022-06-21 PROCEDURE — 91305 COVID-19 (PFIZER) 12+ YRS: CPT | Performed by: NURSE PRACTITIONER

## 2022-06-21 PROCEDURE — 99396 PREV VISIT EST AGE 40-64: CPT | Performed by: NURSE PRACTITIONER

## 2022-06-21 PROCEDURE — 99213 OFFICE O/P EST LOW 20 MIN: CPT | Performed by: NURSE PRACTITIONER

## 2022-06-21 PROCEDURE — 0054A COVID-19 (PFIZER) 12+ YRS: CPT | Performed by: NURSE PRACTITIONER

## 2022-06-21 RX ORDER — SEMAGLUTIDE 1.34 MG/ML
1 INJECTION, SOLUTION SUBCUTANEOUS WEEKLY
Qty: 3 PEN | Refills: 0 | Status: SHIPPED | OUTPATIENT
Start: 2022-06-21 | End: 2022-07-28

## 2022-06-21 RX ORDER — DOXYCYCLINE 100 MG/1
100 CAPSULE ORAL 2 TIMES DAILY
Qty: 14 CAPSULE | Refills: 0 | Status: SHIPPED | OUTPATIENT
Start: 2022-06-21 | End: 2022-06-28

## 2022-06-21 NOTE — PROGRESS NOTES
Assessment and Plan    Diagnoses and all orders for this visit:    1. Routine general medical examination at a health care facility (Primary)  Comments:  Diet and exercise, annual wellness exam, discussed health maintenance recommendations    2. Type 2 diabetes mellitus without complication, without long-term current use of insulin ([facility])  Comments:  I will resend the Ozempic to [facility] I have advised him if not filled he will need to contact our office and we will reach out to the pharmacy  Orders:  -     Semaglutide, 1 MG/DOSE, (Ozempic, 1 MG/DOSE,) 4 MG/3ML solution pen-injector; Inject 1 mg under the skin into the appropriate area as directed 1 (One) Time Per Week.  Dispense: 3 pen; Refill: 0  -     MicroAlbumin, Urine, Random - Urine, Clean Catch; Future  -     Hemoglobin A1c; Future    3. Cellulitis of toe of left foot  Comments:  We will treat with antibiotic.  I have advised him to watch this area closely if this does not improve with treatment he will need to follow-up for an x-ray   Orders:  -     doxycycline (MONODOX) 100 MG capsule; Take 1 capsule by mouth 2 (Two) Times a Day for 7 days.  Dispense: 14 capsule; Refill: 0    4. Class 3 severe obesity due to excess calories with serious comorbidity and body mass index (BMI) of 50.0 to 59.9 in adult (HCC)  Comments:  Chronic conditions may improve with weight loss including diabetes, hypertension, back pain and neck pain, struct of sleep apnea    5. Encounter for immunization  -     COVID-19 Vaccine (Pfizer) Gray Cap    6. Screening for cardiovascular condition  -     Comprehensive metabolic panel; Future  -     Lipid panel; Future    7. Screening for viral disease  -     Hepatitis C antibody; Future        Follow Up   Return in about 3 months (around 9/21/2022).    Chief Complaint  Fabio Juárez presents to [facility] for Annual Exam (Pt is here for CPE and Left second toe, pt reports dropping a real heavy car ramp approx  150lbs last Tuesday 06/14/2022.)    Ivania Mccarthy is here today for annual exam.     Last annual exam was 2 years  Last eye exam: 2 years  PROVIDER:  n/a  Last dental exam:   Been some time    PROVIDER:  n/a    DECLINES the following health maintenance recommendations:   Hep B, herpes zoster (pending insurance)    Diet / exercise:   none     Patient's Body mass index is 50.48 kg/m². indicating that he is morbidly obese (BMI > 40 or > 35 with obesity - related health condition). Obesity-related health conditions include the following: obstructive sleep apnea, hypertension and diabetes mellitus. Obesity is unchanged. BMI is is above average; BMI management plan is completed. We discussed low calorie, low carb based diet program, portion control and increasing exercise..  Satisfied with weight?  no    Patient Care Team:  Karley Amor APRN as PCP - General (Nurse Practitioner)  Payton Marquez APRN as Nurse Practitioner (Nurse Practitioner)  Ortega Colin MD as Consulting Physician (Pulmonary Disease)  Faviola Ulloa MD (Pain Medicine)  Garrett Anderson MD as Consulting Physician (General Surgery)      Mauri also reports that he dropped truck ramp last Tuesday on his left second toe   Has been swelling / redness ever since that time he has been trying to watch and monitor but has noticed that there has been increasing redness around the base of his toenail.  He does still have his toenail but it does appear that there may be the loss of this toenail.    Regarding his diabetes, Mauri has not been taking his Ozempic as Gopi reported that they were unable to to fill this prescription.          Review of Systems   HENT: Negative for congestion, hearing loss and tinnitus.    Eyes: Positive for visual disturbance (reading ).   Respiratory: Negative for cough and shortness of breath.    Cardiovascular: Positive for leg swelling (just after working ). Negative for chest pain.   Gastrointestinal:  "Negative for abdominal pain, constipation and diarrhea.   Genitourinary: Negative for dysuria, frequency and nocturia.   Musculoskeletal: Positive for back pain and neck pain.   Skin: Negative for rash and skin lesions.   Neurological: Negative for dizziness.   Psychiatric/Behavioral: Negative for sleep disturbance. The patient is not nervous/anxious.        Objective     Vitals:    06/21/22 1528   BP: 116/75   BP Location: Left arm   Patient Position: Sitting   Cuff Size: Large Adult   Pulse: 84   Temp: 98.1 °F (36.7 °C)   TempSrc: Oral   SpO2: 93%   Weight: 125 kg (276 lb)   Height: 157.5 cm (62\")     Body mass index is 50.48 kg/m².     Physical Exam  Vitals reviewed.   Constitutional:       Appearance: Normal appearance. He is obese.   HENT:      Head: Normocephalic.      Mouth/Throat:      Mouth: Mucous membranes are moist.      Pharynx: Oropharynx is clear.   Eyes:      Conjunctiva/sclera: Conjunctivae normal.   Cardiovascular:      Rate and Rhythm: Normal rate and regular rhythm.      Pulses:           Dorsalis pedis pulses are 2+ on the right side and 2+ on the left side.      Heart sounds: No murmur heard.  Pulmonary:      Effort: Pulmonary effort is normal.      Breath sounds: Normal breath sounds.   Abdominal:      General: Bowel sounds are normal.      Tenderness: There is no abdominal tenderness.   Musculoskeletal:         General: Normal range of motion.      Cervical back: Normal range of motion.        Feet:    Feet:      Right foot:      Protective Sensation: 3 sites tested. 3 sites sensed.      Skin integrity: Skin integrity normal. No ulcer, blister, skin breakdown or callus.      Toenail Condition: Right toenails are normal.      Left foot:      Protective Sensation: 3 sites tested. 3 sites sensed.      Skin integrity: Skin breakdown and erythema present. No ulcer, blister or callus.      Toenail Condition: Left toenails are normal.      Comments:    Skin:     General: Skin is warm and dry. "   Neurological:      General: No focal deficit present.      Mental Status: He is alert. Mental status is at baseline.   Psychiatric:         Mood and Affect: Mood normal.         Behavior: Behavior normal.         Thought Content: Thought content normal.     Diabetic Foot Exam Performed and Monofilament Test Performed       Result Review :                    No Known Allergies   Past Medical History:   Diagnosis Date   • Chronic pain    • Claustrophobia    • Coronary artery disease     BLOCKAGE 2015, SEE'S DR DIEGO EVERY 2 YEARS, DENIED CP/SOB    • Essential (primary) hypertension    • GERD (gastroesophageal reflux disease)    • Hemochromatosis     ASYMPTOMATIC    • History of COVID-19    • Left upper quadrant pain    • Low back pain    • Lung nodule    • Mixed hyperlipidemia    • Obstructive sleep apnea (adult) (pediatric)    • Other testicular dysfunction    • Pain in right foot    • Pain in right shoulder    • Recurrent umbilical hernia    • Renal stone 03/2017   • Tracheostomy status (HCC)     R/T SLEEP APNEA    • Type 2 diabetes mellitus (HCC)     BG RUNS 120-125 IN AM      Current Outpatient Medications   Medication Sig Dispense Refill   • albuterol sulfate  (90 Base) MCG/ACT inhaler EVERY 4 HOURS AS NEEDED as needed for SHORTNESS OF BREATH     • atorvastatin (LIPITOR) 80 MG tablet TAKE ONE TABLET BY MOUTH EVERY NIGHT AT BEDTIME 90 tablet 0   • baclofen (LIORESAL) 10 MG tablet TAKE 1/2 TO 1 TABLET BY MOUTH AT BEDTIME FOR LOWER BACK PAIN/ MUSCLE SPASMS 90 tablet 1   • cetirizine (zyrTEC) 5 MG tablet TAKE ONE TABLET BY MOUTH DAILY 90 tablet 0   • gabapentin (NEURONTIN) 300 MG capsule 4 (Four) Times a Day.     • glimepiride (AMARYL) 2 MG tablet Take 2 mg by mouth Every Morning Before Breakfast. INSTRUCTED PER ANESTHESIA STANDING ORDERS     • hydroCHLOROthiazide (HYDRODIURIL) 12.5 MG tablet Take 12.5 mg by mouth Daily.     • lisinopril (PRINIVIL,ZESTRIL) 10 MG tablet TAKE ONE TABLET BY MOUTH EVERY  MORNING 90 tablet 0   • metFORMIN ER (GLUCOPHAGE-XR) 500 MG 24 hr tablet TAKE TWO TABLETS BY MOUTH TWICE A DAY WITH MEALS 360 tablet 0   • naproxen (Naprosyn) 500 MG tablet Take 1 tablet by mouth 2 (Two) Times a Day With Meals. 60 tablet 1   • oxyCODONE (ROXICODONE) 5 MG immediate release tablet Take 5 mg by mouth As Needed for Moderate Pain .     • Semaglutide, 1 MG/DOSE, (Ozempic, 1 MG/DOSE,) 4 MG/3ML solution pen-injector Inject 1 mg under the skin into the appropriate area as directed 1 (One) Time Per Week. 3 pen 0   • traZODone (DESYREL) 50 MG tablet TAKE ONE TABLET BY MOUTH ONCE NIGHTLY 90 tablet 0   • doxycycline (MONODOX) 100 MG capsule Take 1 capsule by mouth 2 (Two) Times a Day for 7 days. 14 capsule 0     No current facility-administered medications for this visit.     Past Surgical History:   Procedure Laterality Date   • ACHILLES TENDON REPAIR Right 10/2018   • APPENDECTOMY     • CARDIAC CATHETERIZATION  11/03/2015    LEFT VENTRIULOGRAM, CORONARY ANGIOGRAM    • CHOLECYSTECTOMY  07/2013   • COLONOSCOPY  07/28/2014    NORMAL RESULT    • HERNIA REPAIR     • JOINT REPLACEMENT Bilateral     RIGHT KNEE 01/2016   • TONSILLECTOMY AND ADENOIDECTOMY     • TRACHEOSTOMY      SECONDARY TO SLEEP APNEA   • VENTRAL HERNIA REPAIR N/A 2/23/2022    Procedure: ROBOTIC UMBILICAL HERNIA REPAIR WITH MESH PLACEMENT;  Surgeon: Garrett Anderson MD;  Location: [facility];  Service: Robotics - [facility];  Laterality: N/A;      Social History     Tobacco Use   • Smoking status: Never Smoker   • Smokeless tobacco: Never Used   Vaping Use   • Vaping Use: Never used   Substance Use Topics   • Alcohol use: Yes     Comment: SOCIALLY    • Drug use: Never     Family History   Problem Relation Age of Onset   • Arrhythmia Mother    • Stroke Mother 73   • Diabetes type II Mother    • Heart attack Father    • Hypertension Brother    • Cerebral aneurysm Brother 38   • Diabetes type II Brother    • Coronary artery disease Other      Health  Maintenance Due   Topic Date Due   • HEPATITIS C SCREENING  Never done   • LIPID PANEL  06/16/2021   • URINE MICROALBUMIN  08/01/2021      Immunization History   Administered Date(s) Administered   • COVID-19 (PFIZER) PURPLE CAP 02/24/2021, 03/17/2021, 12/09/2021   • Covid-19 (Pfizer) Gray Cap 06/21/2022   • FluLaval/Fluarix/Fluzone >6 12/09/2021   • Influenza Quad Vaccine (Inpatient) 01/09/2018   • Influenza TIV (IM) 01/08/2013   • Influenza, Unspecified 01/20/2021, 01/20/2021   • Pneumococcal, Unspecified 08/08/2016, 08/08/2016   • Td 03/26/2019, 03/26/2019

## 2022-06-22 ENCOUNTER — LAB (OUTPATIENT)
Dept: LAB | Facility: HOSPITAL | Age: 57
End: 2022-06-22

## 2022-06-22 DIAGNOSIS — Z11.59 SCREENING FOR VIRAL DISEASE: ICD-10-CM

## 2022-06-22 DIAGNOSIS — Z13.6 SCREENING FOR CARDIOVASCULAR CONDITION: ICD-10-CM

## 2022-06-22 DIAGNOSIS — E11.9 TYPE 2 DIABETES MELLITUS WITHOUT COMPLICATION, WITHOUT LONG-TERM CURRENT USE OF INSULIN: ICD-10-CM

## 2022-06-22 LAB
ALBUMIN SERPL-MCNC: 3.7 G/DL (ref 3.5–5.2)
ALBUMIN UR-MCNC: <1.2 MG/DL
ALBUMIN/GLOB SERPL: 1.5 G/DL
ALP SERPL-CCNC: 60 U/L (ref 39–117)
ALT SERPL W P-5'-P-CCNC: 26 U/L (ref 1–41)
ANION GAP SERPL CALCULATED.3IONS-SCNC: 16.9 MMOL/L (ref 5–15)
AST SERPL-CCNC: 24 U/L (ref 1–40)
BILIRUB SERPL-MCNC: 0.9 MG/DL (ref 0–1.2)
BUN SERPL-MCNC: 14 MG/DL (ref 6–20)
BUN/CREAT SERPL: 20 (ref 7–25)
CALCIUM SPEC-SCNC: 9.4 MG/DL (ref 8.6–10.5)
CHLORIDE SERPL-SCNC: 97 MMOL/L (ref 98–107)
CHOLEST SERPL-MCNC: 115 MG/DL (ref 0–200)
CO2 SERPL-SCNC: 26.1 MMOL/L (ref 22–29)
CREAT SERPL-MCNC: 0.7 MG/DL (ref 0.76–1.27)
EGFRCR SERPLBLD CKD-EPI 2021: 108.1 ML/MIN/1.73
GLOBULIN UR ELPH-MCNC: 2.5 GM/DL
GLUCOSE SERPL-MCNC: 126 MG/DL (ref 65–99)
HBA1C MFR BLD: 7.2 % (ref 4.8–5.6)
HCV AB SER DONR QL: NORMAL
HDLC SERPL-MCNC: 38 MG/DL (ref 40–60)
LDLC SERPL CALC-MCNC: 43 MG/DL (ref 0–100)
LDLC/HDLC SERPL: 0.91 {RATIO}
POTASSIUM SERPL-SCNC: 4.2 MMOL/L (ref 3.5–5.2)
PROT SERPL-MCNC: 6.2 G/DL (ref 6–8.5)
SODIUM SERPL-SCNC: 140 MMOL/L (ref 136–145)
TRIGL SERPL-MCNC: 213 MG/DL (ref 0–150)
VLDLC SERPL-MCNC: 34 MG/DL (ref 5–40)

## 2022-06-22 PROCEDURE — 86803 HEPATITIS C AB TEST: CPT

## 2022-06-22 PROCEDURE — 36415 COLL VENOUS BLD VENIPUNCTURE: CPT

## 2022-06-22 PROCEDURE — 82043 UR ALBUMIN QUANTITATIVE: CPT

## 2022-06-22 PROCEDURE — 80061 LIPID PANEL: CPT

## 2022-06-22 PROCEDURE — 80053 COMPREHEN METABOLIC PANEL: CPT

## 2022-06-22 PROCEDURE — 83036 HEMOGLOBIN GLYCOSYLATED A1C: CPT

## 2022-07-04 DIAGNOSIS — E78.00 HYPERCHOLESTEREMIA: ICD-10-CM

## 2022-07-05 RX ORDER — ATORVASTATIN CALCIUM 80 MG/1
TABLET, FILM COATED ORAL
Qty: 90 TABLET | Refills: 0 | Status: SHIPPED | OUTPATIENT
Start: 2022-07-05 | End: 2022-10-05

## 2022-07-05 RX ORDER — BACLOFEN 10 MG/1
TABLET ORAL
Qty: 90 TABLET | Refills: 1 | Status: SHIPPED | OUTPATIENT
Start: 2022-07-05 | End: 2023-01-10

## 2022-07-13 RX ORDER — CETIRIZINE HYDROCHLORIDE 5 MG/1
TABLET ORAL
Qty: 90 TABLET | Refills: 0 | Status: SHIPPED | OUTPATIENT
Start: 2022-07-13 | End: 2023-01-16

## 2022-07-21 ENCOUNTER — PRIOR AUTHORIZATION (OUTPATIENT)
Dept: FAMILY MEDICINE CLINIC | Age: 57
End: 2022-07-21

## 2022-07-21 NOTE — TELEPHONE ENCOUNTER
[facility]    Key: [id number]    Form  [employer] Electronic Prior Authorization Form (2017 [employer])

## 2022-07-28 ENCOUNTER — OFFICE VISIT (OUTPATIENT)
Dept: FAMILY MEDICINE CLINIC | Age: 57
End: 2022-07-28

## 2022-07-28 VITALS
TEMPERATURE: 97.5 F | HEIGHT: 62 IN | DIASTOLIC BLOOD PRESSURE: 80 MMHG | HEART RATE: 66 BPM | OXYGEN SATURATION: 94 % | SYSTOLIC BLOOD PRESSURE: 125 MMHG | BODY MASS INDEX: 49.87 KG/M2 | WEIGHT: 271 LBS

## 2022-07-28 DIAGNOSIS — S61.419A: Primary | ICD-10-CM

## 2022-07-28 DIAGNOSIS — Z91.09 ENVIRONMENTAL ALLERGIES: ICD-10-CM

## 2022-07-28 DIAGNOSIS — H60.313 ACUTE DIFFUSE OTITIS EXTERNA OF BOTH EARS: ICD-10-CM

## 2022-07-28 PROCEDURE — 99213 OFFICE O/P EST LOW 20 MIN: CPT | Performed by: NURSE PRACTITIONER

## 2022-07-28 RX ORDER — CIPROFLOXACIN AND DEXAMETHASONE 3; 1 MG/ML; MG/ML
4 SUSPENSION/ DROPS AURICULAR (OTIC) 2 TIMES DAILY
Qty: 10 ML | Refills: 0 | Status: SHIPPED | OUTPATIENT
Start: 2022-07-28 | End: 2022-08-29

## 2022-07-28 NOTE — ASSESSMENT & PLAN NOTE
Has been taking Zyrtec long-term and not as effective.  Change Zyrtec to either Claritin or Allegra over-the-counter and also restart Flonase or Nasacort no spray over-the-counter.  Follow-up if not improving.

## 2022-07-28 NOTE — ASSESSMENT & PLAN NOTE
He is to monitor for signs of infection.  Follow-up if any concerns.  Up-to-date on tetanus vaccination.  Topical mupirocin is sent to pharmacy.

## 2022-07-28 NOTE — PROGRESS NOTES
"Chief Complaint  Earache, Sore Throat (Patient states this only happens in the morning ), and Nasal Congestion (States this has been going on for 2 weeks )    Subjective  Patient is a 57-year-old male who is here today regarding sore throat in the morning only for the last 2 weeks along with increased nasal congestion.  He denies fever, body aches or concern for having COVID infection.  Takes Zyrtec 10 mg daily but does not seem to be as effective.  Also with bilateral ear pain.  States he is prone to getting ear infections.  Works in a [employer] environment and allergy symptoms have increased.    He received a small cut to the lower palm of the left hand at home yesterday.   last tetanus vaccine was received in 2019.  There are no current signs of drainage from this area or surrounding erythema.        Fabio Juárez presents to [facility]          Objective   Vital Signs:   Vitals:    07/28/22 1116   BP: 125/80   BP Location: Right arm   Patient Position: Sitting   Cuff Size: Large Adult   Pulse: 66   Temp: 97.5 °F (36.4 °C)   TempSrc: Oral   SpO2: 94%   Weight: 123 kg (271 lb)   Height: 157.5 cm (62\")      Body mass index is 49.57 kg/m².  Physical Exam  Vitals reviewed.   Constitutional:       General: He is not in acute distress.     Appearance: Normal appearance. He is well-developed.   HENT:      Right Ear: Tympanic membrane normal.      Left Ear: Tympanic membrane normal.      Ears:      Comments: Bilateral ear canals erythematous     Mouth/Throat:      Mouth: Mucous membranes are moist.      Pharynx: Oropharynx is clear. No oropharyngeal exudate or posterior oropharyngeal erythema.   Neck:      Comments: Tracheostomy intact  Cardiovascular:      Rate and Rhythm: Normal rate and regular rhythm.      Heart sounds: Normal heart sounds.   Pulmonary:      Effort: Pulmonary effort is normal.      Breath sounds: Normal breath sounds.   Musculoskeletal:      Right lower leg: No edema. "      Left lower leg: No edema.   Skin:     General: Skin is warm and dry.   Neurological:      General: No focal deficit present.      Mental Status: He is alert.   Psychiatric:         Attention and Perception: Attention normal.         Mood and Affect: Mood and affect normal.         Behavior: Behavior normal.          Result Review :                Assessment and Plan    Diagnoses and all orders for this visit:    1. Cut of hand, initial encounter (Primary)  Assessment & Plan:  He is to monitor for signs of infection.  Follow-up if any concerns.  Up-to-date on tetanus vaccination.  Topical mupirocin is sent to pharmacy.    Orders:  -     mupirocin (BACTROBAN) 2 % ointment; Apply 1 application topically to the appropriate area as directed 3 (Three) Times a Day for 7 days.  Dispense: 21 g; Refill: 0    2. Acute diffuse otitis externa of both ears  Assessment & Plan:  Keep ear canals dry.  May use hair dryer on a cool air setting to dry ears after washing hair.  Follow-up if not improving.    Orders:  -     ciprofloxacin-dexamethasone (Ciprodex) 0.3-0.1 % otic suspension; Administer 4 drops into both ears 2 (Two) Times a Day.  Dispense: 10 mL; Refill: 0    3. Environmental allergies  Assessment & Plan:  Has been taking Zyrtec long-term and not as effective.  Change Zyrtec to either Claritin or Allegra over-the-counter and also restart Flonase or Nasacort no spray over-the-counter.  Follow-up if not improving.        Follow Up    No follow-ups on file.  Patient was given instructions and counseling regarding his condition or for health maintenance advice. Please see specific information pulled into the AVS if appropriate.

## 2022-07-28 NOTE — ASSESSMENT & PLAN NOTE
Keep ear canals dry.  May use hair dryer on a cool air setting to dry ears after washing hair.  Follow-up if not improving.

## 2022-08-29 ENCOUNTER — HOSPITAL ENCOUNTER (OUTPATIENT)
Dept: GENERAL RADIOLOGY | Facility: HOSPITAL | Age: 57
Discharge: HOME | End: 2022-08-29

## 2022-08-29 ENCOUNTER — OFFICE VISIT (OUTPATIENT)
Dept: FAMILY MEDICINE CLINIC | Age: 57
End: 2022-08-29

## 2022-08-29 VITALS
WEIGHT: 272.4 LBS | OXYGEN SATURATION: 93 % | DIASTOLIC BLOOD PRESSURE: 72 MMHG | BODY MASS INDEX: 50.13 KG/M2 | HEART RATE: 79 BPM | SYSTOLIC BLOOD PRESSURE: 123 MMHG | TEMPERATURE: 97.9 F | HEIGHT: 62 IN

## 2022-08-29 DIAGNOSIS — Z93.0 TRACHEOSTOMY STATUS: ICD-10-CM

## 2022-08-29 DIAGNOSIS — H60.503 ACUTE OTITIS EXTERNA OF BOTH EARS, UNSPECIFIED TYPE: Primary | ICD-10-CM

## 2022-08-29 DIAGNOSIS — R05.9 COUGH: ICD-10-CM

## 2022-08-29 DIAGNOSIS — M25.471 ANKLE EDEMA, BILATERAL: ICD-10-CM

## 2022-08-29 DIAGNOSIS — Z91.09 ENVIRONMENTAL ALLERGIES: ICD-10-CM

## 2022-08-29 DIAGNOSIS — J34.89 SINUS PRESSURE: ICD-10-CM

## 2022-08-29 DIAGNOSIS — M25.472 ANKLE EDEMA, BILATERAL: ICD-10-CM

## 2022-08-29 PROCEDURE — 71046 X-RAY EXAM CHEST 2 VIEWS: CPT

## 2022-08-29 PROCEDURE — 99213 OFFICE O/P EST LOW 20 MIN: CPT

## 2022-08-29 PROCEDURE — 87428 SARSCOV & INF VIR A&B AG IA: CPT

## 2022-08-29 RX ORDER — OMEPRAZOLE 40 MG/1
40 CAPSULE, DELAYED RELEASE ORAL DAILY
COMMUNITY
End: 2022-10-06

## 2022-08-29 RX ORDER — CIPROFLOXACIN AND DEXAMETHASONE 3; 1 MG/ML; MG/ML
4 SUSPENSION/ DROPS AURICULAR (OTIC) 2 TIMES DAILY
Qty: 7.5 ML | Refills: 0 | Status: SHIPPED | OUTPATIENT
Start: 2022-08-29 | End: 2022-09-05

## 2022-08-29 RX ORDER — FLUTICASONE PROPIONATE 50 MCG
2 SPRAY, SUSPENSION (ML) NASAL DAILY
Qty: 18.2 ML | Refills: 0 | Status: CANCELLED | OUTPATIENT
Start: 2022-08-29

## 2022-08-29 NOTE — PROGRESS NOTES
"Chief Complaint  Leg Swelling (Both legs, X1 month) and Earache (Finished antibiotic, now worsening )    Subjective        Fabio Juárez presents to [facility]  History of Present Illness    Patient presents today with complaints of ear pain.  He reports this has been on and off for 3 years but has been worse lately.  He was here in July and seen by ONEIL Driscoll.  He was prescribed Ciprodex for otitis externa but he never picked up or used this medication.  He denies any ear discharge.  He is taking Zyrtec daily for allergies.  He does note that he wakes up in the morning with a sore throat sometimes, he has a trach.  He denies any cough, shortness of breath or wheezing.  He denies any chest pain, fever or chills.  He denies any concerns for COVID.  He also reports he noticed a little blood on his trach the other day when he took it out to clean it.    Patient does report some swelling to his ankles, generally after he works for a while.  His job entails him standing up for a long time.    Objective   Vital Signs:  /72 (BP Location: Left arm, Patient Position: Sitting, Cuff Size: Large Adult)   Pulse 79   Temp 97.9 °F (36.6 °C) (Oral)   Ht 157.5 cm (62\")   Wt 124 kg (272 lb 6.4 oz)   SpO2 93%   BMI 49.82 kg/m²   Estimated body mass index is 49.82 kg/m² as calculated from the following:    Height as of this encounter: 157.5 cm (62\").    Weight as of this encounter: 124 kg (272 lb 6.4 oz).      Physical Exam  Vitals and nursing note reviewed.   Constitutional:       General: He is not in acute distress.     Appearance: Normal appearance. He is obese. He is not ill-appearing.   HENT:      Head: Normocephalic and atraumatic.      Right Ear: External ear normal. A middle ear effusion is present.      Left Ear: External ear normal. A middle ear effusion is present.      Ears:      Comments: Bilateral ear canals erythematous.     Nose: Nose normal.      Right Sinus: " No maxillary sinus tenderness or frontal sinus tenderness.      Left Sinus: No maxillary sinus tenderness or frontal sinus tenderness.      Mouth/Throat:      Mouth: Mucous membranes are moist.      Pharynx: No posterior oropharyngeal erythema.   Eyes:      Pupils: Pupils are equal, round, and reactive to light.   Neck:      Comments: Trach in place, surrounding skin clean dry and intact  Cardiovascular:      Rate and Rhythm: Normal rate and regular rhythm.      Pulses:           Dorsalis pedis pulses are 2+ on the right side and 2+ on the left side.      Heart sounds: Normal heart sounds.   Pulmonary:      Effort: Pulmonary effort is normal. No accessory muscle usage or respiratory distress.      Breath sounds: Rhonchi (clears with cough) present.   Musculoskeletal:      Right ankle: Swelling (mild, nonpitting) present.      Left ankle: Swelling (Mild, nonpitting) present.   Lymphadenopathy:      Cervical: No cervical adenopathy.   Skin:     General: Skin is warm and dry.   Neurological:      General: No focal deficit present.      Mental Status: He is alert and oriented to person, place, and time.   Psychiatric:         Mood and Affect: Mood and affect normal.           Assessment and Plan   Diagnoses and all orders for this visit:    1. Acute otitis externa of both ears, unspecified type (Primary)  -     ciprofloxacin-dexamethasone (CIPRODEX) 0.3-0.1 % otic suspension; Administer 4 drops into both ears 2 (Two) Times a Day for 7 days.  Dispense: 7.5 mL; Refill: 0    2. Sinus pressure  -     POCT SARS-CoV-2 Antigen ALESSANDRA + Flu    3. Environmental allergies  Assessment & Plan:  Again advised that patient try taking Claritin or Allegra as opposed to Zyrtec since he has been on that for a very long time.  Continue with Flonase.      4. Cough  -     XR Chest PA & Lateral; Future    5. Tracheostomy status (HCC)  -     XR Chest PA & Lateral; Future    6. Ankle edema, bilateral          Negative rapid COVID, negative rapid  flu.  Given cough noted on exam, history of tracheostomy will obtain chest x-ray to rule out pneumonia or other concerns.  Advised patient on routine cleaning of tracheostomy.  Advised to monitor skin surrounding tracheostomy for any redness or swelling.  Ankle edema is symmetrical and very mild with no surrounding redness, pain. likely related to diet, being on his feet for work.  Advise low-salt diet, elevation. For any shortness of breath or chest pain, patient was instructed to proceed to ER.     Addendum: Chest x-ray negative for acute cardiopulmonary process. Continue with plan of care as discussed. Follow up in clinic for worsening symptoms.       Follow Up   Return in about 2 weeks (around 9/12/2022).  Patient was given instructions and counseling regarding his condition or for health maintenance advice. Please see specific information pulled into the AVS if appropriate.

## 2022-08-29 NOTE — ASSESSMENT & PLAN NOTE
Again advised that patient try taking Claritin or Allegra as opposed to Zyrtec since he has been on that for a very long time.  Continue with Flonase.

## 2022-09-05 DIAGNOSIS — G47.00 INSOMNIA, UNSPECIFIED TYPE: Chronic | ICD-10-CM

## 2022-09-05 DIAGNOSIS — E11.9 TYPE 2 DIABETES MELLITUS WITHOUT COMPLICATION, WITHOUT LONG-TERM CURRENT USE OF INSULIN: ICD-10-CM

## 2022-09-06 RX ORDER — METFORMIN HYDROCHLORIDE 500 MG/1
TABLET, EXTENDED RELEASE ORAL
Qty: 360 TABLET | Refills: 0 | Status: SHIPPED | OUTPATIENT
Start: 2022-09-06 | End: 2022-12-05

## 2022-09-06 RX ORDER — TRAZODONE HYDROCHLORIDE 50 MG/1
TABLET ORAL
Qty: 90 TABLET | Refills: 0 | Status: SHIPPED | OUTPATIENT
Start: 2022-09-06 | End: 2022-12-09

## 2022-09-09 ENCOUNTER — TELEPHONE (OUTPATIENT)
Dept: ONCOLOGY | Facility: HOSPITAL | Age: 57
End: 2022-09-09

## 2022-09-09 NOTE — TELEPHONE ENCOUNTER
Caller: [facility] [address], [place] - [address] [facility] - [phone]  - [phone]    Relationship: Pharmacy    Best call back number: [phone]    What is the best time to reach you: ANYTIME    Who are you requesting to speak with (clinical staff, provider,  specific staff member): DOCTOR, NURSE    What was the call regarding: [facility] CALLED THEY RECD A BLANK REFILL REQUEST FOR PT ON ELOQUIS?  HE WAS LAST SEEN BY   DR. MARIO JUARES.    CALL TO DISCUSS    Do you require a callback: YES

## 2022-09-12 ENCOUNTER — OFFICE VISIT (OUTPATIENT)
Dept: FAMILY MEDICINE CLINIC | Age: 57
End: 2022-09-12

## 2022-09-12 VITALS
HEIGHT: 62 IN | HEART RATE: 99 BPM | WEIGHT: 276 LBS | SYSTOLIC BLOOD PRESSURE: 115 MMHG | BODY MASS INDEX: 50.79 KG/M2 | OXYGEN SATURATION: 97 % | DIASTOLIC BLOOD PRESSURE: 58 MMHG

## 2022-09-12 DIAGNOSIS — E11.9 TYPE 2 DIABETES MELLITUS WITHOUT COMPLICATION, WITHOUT LONG-TERM CURRENT USE OF INSULIN: ICD-10-CM

## 2022-09-12 DIAGNOSIS — J06.9 UPPER RESPIRATORY TRACT INFECTION, UNSPECIFIED TYPE: Primary | ICD-10-CM

## 2022-09-12 PROCEDURE — 99213 OFFICE O/P EST LOW 20 MIN: CPT | Performed by: NURSE PRACTITIONER

## 2022-09-12 RX ORDER — DOXYCYCLINE HYCLATE 100 MG/1
100 CAPSULE ORAL 2 TIMES DAILY
Qty: 14 CAPSULE | Refills: 0 | Status: SHIPPED | OUTPATIENT
Start: 2022-09-12 | End: 2022-09-19

## 2022-09-12 RX ORDER — FEXOFENADINE HCL 180 MG/1
180 TABLET ORAL DAILY
Qty: 14 TABLET | Refills: 0 | Status: SHIPPED | OUTPATIENT
Start: 2022-09-12 | End: 2023-01-16

## 2022-09-12 NOTE — TELEPHONE ENCOUNTER
Caller: Tayo Juárez    Relationship: Self        What was the call regarding: ADVISED MESSAGE LEFT FROM LINCOLN ZHAO.     TAYO V/ZAIDA STATED HE IS NOT CURRENTLY TAKING THIS MEDICATION ANYMORE. .

## 2022-09-12 NOTE — PROGRESS NOTES
"Assessment and Plan   Diagnoses and all orders for this visit:    1. Upper respiratory tract infection, unspecified type (Primary)  Comments:  I will treat Mauri for an acute upper respiratory infection I will treat with an antibiotic given his current tracheostomy status.  Follow-up if persists or wo  Orders:  -     fexofenadine (Allegra Allergy) 180 MG tablet; Take 1 tablet by mouth Daily.  Dispense: 14 tablet; Refill: 0  -     doxycycline (VIBRAMYCIN) 100 MG capsule; Take 1 capsule by mouth 2 (Two) Times a Day for 7 days.  Dispense: 14 capsule; Refill: 0    2. Type 2 diabetes mellitus without complication, without long-term current use of insulin ([facility])  Comments:  Rise due for an eye exam and we will get a referral placed for local exam  Orders:  -     Ambulatory Referral for Diabetic Eye Exam-Ophthalmology                  Follow Up   Return if symptoms worsen or fail to improve.    Chief Complaint  Fabio Juárez presents to [facility] for Earache (Pt had apt on 9/8 for ear pain)    Subjective          History of Present Illness  Mauri is in today with continued complaints of ear pain.  He was in recently and was given some Ciprodex and reports that he did not pick this medication up as it was $200 at the pharmacy.  But he does continue to have pain in both ears.  He denies any drainage.  Still a lot of itching and pressure.  He does continue to have some bloody discharge from his tracheostomy while cleaning.  He denies any odor coming from his trach.  He is having frequent cough but no significant shortness of breath        Review of Systems    Objective     Vitals:    09/12/22 1557   BP: 115/58   BP Location: Right arm   Patient Position: Sitting   Cuff Size: Large Adult   Pulse: 99   SpO2: 97%   Weight: 125 kg (276 lb)   Height: 157.5 cm (62\")     Body mass index is 50.48 kg/m².     Physical Exam  Vitals reviewed.   Constitutional:       Appearance: Normal appearance. He " is ill-appearing.   HENT:      Head: Normocephalic.   Eyes:      Pupils: Pupils are equal, round, and reactive to light.   Cardiovascular:      Rate and Rhythm: Normal rate and regular rhythm.      Heart sounds: No murmur heard.  Pulmonary:      Effort: Pulmonary effort is normal.      Breath sounds: Normal breath sounds.   Musculoskeletal:         General: Normal range of motion.   Neurological:      Mental Status: He is alert.   Psychiatric:         Mood and Affect: Mood normal.         Behavior: Behavior normal.         Result Review                        No Known Allergies   Past Medical History:   Diagnosis Date   • Chronic pain    • Claustrophobia    • Coronary artery disease     BLOCKAGE 2015, SEE'S DR DIEGO EVERY 2 YEARS, DENIED CP/SOB    • Essential (primary) hypertension    • GERD (gastroesophageal reflux disease)    • Hemochromatosis     ASYMPTOMATIC    • History of COVID-19    • Left upper quadrant pain    • Low back pain    • Lung nodule    • Mixed hyperlipidemia    • Obstructive sleep apnea (adult) (pediatric)    • Other testicular dysfunction    • Pain in right foot    • Pain in right shoulder    • Recurrent umbilical hernia    • Renal stone 03/2017   • Tracheostomy status ([facility])     R/T SLEEP APNEA    • Type 2 diabetes mellitus (HCC)     BG RUNS 120-125 IN AM      Current Outpatient Medications   Medication Sig Dispense Refill   • albuterol sulfate  (90 Base) MCG/ACT inhaler EVERY 4 HOURS AS NEEDED as needed for SHORTNESS OF BREATH     • atorvastatin (LIPITOR) 80 MG tablet TAKE ONE TABLET BY MOUTH EVERY NIGHT AT BEDTIME 90 tablet 0   • baclofen (LIORESAL) 10 MG tablet TAKE 1/2 TO 1 TABLET BY MOUTH AT BEDTIME FOR LOWER BACK PAIN/ MUSCLE SPASMS 90 tablet 1   • cetirizine (zyrTEC) 5 MG tablet TAKE ONE TABLET BY MOUTH DAILY 90 tablet 0   • gabapentin (NEURONTIN) 300 MG capsule 4 (Four) Times a Day.     • glimepiride (AMARYL) 2 MG tablet Take 2 mg by mouth Every Morning Before Breakfast. 1.5 tab      • hydroCHLOROthiazide (HYDRODIURIL) 12.5 MG tablet Take 12.5 mg by mouth Daily.     • lisinopril (PRINIVIL,ZESTRIL) 10 MG tablet TAKE ONE TABLET BY MOUTH EVERY MORNING 90 tablet 0   • metFORMIN ER (GLUCOPHAGE-XR) 500 MG 24 hr tablet TAKE TWO TABLETS BY MOUTH TWICE A DAY WITH MEALS 360 tablet 0   • omeprazole (priLOSEC) 40 MG capsule Take 40 mg by mouth Daily.     • oxyCODONE (ROXICODONE) 5 MG immediate release tablet Take 5 mg by mouth As Needed for Moderate Pain .     • traZODone (DESYREL) 50 MG tablet TAKE ONE TABLET BY MOUTH ONCE NIGHTLY 90 tablet 0   • doxycycline (VIBRAMYCIN) 100 MG capsule Take 1 capsule by mouth 2 (Two) Times a Day for 7 days. 14 capsule 0   • fexofenadine (Allegra Allergy) 180 MG tablet Take 1 tablet by mouth Daily. 14 tablet 0     No current facility-administered medications for this visit.     Past Surgical History:   Procedure Laterality Date   • ACHILLES TENDON REPAIR Right 10/2018   • APPENDECTOMY     • CARDIAC CATHETERIZATION  11/03/2015    LEFT VENTRIULOGRAM, CORONARY ANGIOGRAM    • CHOLECYSTECTOMY  07/2013   • COLONOSCOPY  07/28/2014    NORMAL RESULT    • HERNIA REPAIR     • JOINT REPLACEMENT Bilateral     RIGHT KNEE 01/2016   • TONSILLECTOMY AND ADENOIDECTOMY     • TRACHEOSTOMY      SECONDARY TO SLEEP APNEA   • VENTRAL HERNIA REPAIR N/A 2/23/2022    Procedure: ROBOTIC UMBILICAL HERNIA REPAIR WITH MESH PLACEMENT;  Surgeon: Garrett Anderson MD;  Location: [facility];  Service: Robotics - [facility];  Laterality: N/A;      Health Maintenance Due   Topic Date Due   • DIABETIC EYE EXAM  06/21/2021      Immunization History   Administered Date(s) Administered   • COVID-19 (PFIZER) PURPLE CAP 02/24/2021, 03/17/2021, 12/09/2021   • Covid-19 (Pfizer) Gray Cap 06/21/2022   • FluLaval/Fluzone >6mos 12/09/2021   • Influenza Quad Vaccine (Inpatient) 01/09/2018   • Influenza TIV (IM) 01/08/2013   • Influenza, Unspecified 01/20/2021, 01/20/2021   • Pneumococcal, Unspecified 08/08/2016,  08/08/2016   • Td 03/26/2019, 03/26/2019

## 2022-09-12 NOTE — TELEPHONE ENCOUNTER
Message left for patient advising him that we have received a request from his pharmacy to refill his Eliquis.   And that per Dr Bazan's note he was  to continue the  Eliqius for 3 months onlyt. If he is still taking this medicine or feels that he needs it, he needs to see his PCP for reevaluation and refill requests. Left message to return call with any questions.

## 2022-09-26 ENCOUNTER — HOSPITAL ENCOUNTER (EMERGENCY)
Dept: HOSPITAL 49 - FER | Age: 57
Discharge: HOME | End: 2022-09-26
Payer: COMMERCIAL

## 2022-09-26 DIAGNOSIS — M19.011: ICD-10-CM

## 2022-09-26 DIAGNOSIS — M19.072: ICD-10-CM

## 2022-09-26 DIAGNOSIS — Z79.84: ICD-10-CM

## 2022-09-26 DIAGNOSIS — M75.101: Primary | ICD-10-CM

## 2022-09-26 DIAGNOSIS — M76.62: ICD-10-CM

## 2022-09-26 DIAGNOSIS — Z79.899: ICD-10-CM

## 2022-09-26 DIAGNOSIS — I10: ICD-10-CM

## 2022-09-26 DIAGNOSIS — E11.9: ICD-10-CM

## 2022-09-26 DIAGNOSIS — Z98.890: ICD-10-CM

## 2022-10-04 DIAGNOSIS — E78.00 HYPERCHOLESTEREMIA: ICD-10-CM

## 2022-10-05 RX ORDER — ATORVASTATIN CALCIUM 80 MG/1
TABLET, FILM COATED ORAL
Qty: 90 TABLET | Refills: 0 | Status: SHIPPED | OUTPATIENT
Start: 2022-10-05 | End: 2023-01-09

## 2022-10-06 RX ORDER — OMEPRAZOLE 40 MG/1
CAPSULE, DELAYED RELEASE ORAL
Qty: 90 CAPSULE | Refills: 0 | Status: SHIPPED | OUTPATIENT
Start: 2022-10-06 | End: 2023-01-04

## 2022-10-25 ENCOUNTER — OFFICE VISIT (OUTPATIENT)
Dept: FAMILY MEDICINE CLINIC | Age: 57
End: 2022-10-25

## 2022-10-25 VITALS
WEIGHT: 269 LBS | HEART RATE: 74 BPM | OXYGEN SATURATION: 95 % | SYSTOLIC BLOOD PRESSURE: 136 MMHG | BODY MASS INDEX: 49.5 KG/M2 | TEMPERATURE: 97.7 F | DIASTOLIC BLOOD PRESSURE: 90 MMHG | HEIGHT: 62 IN

## 2022-10-25 DIAGNOSIS — I83.812 VARICOSE VEINS OF LEFT LOWER EXTREMITY WITH PAIN: Primary | ICD-10-CM

## 2022-10-25 PROBLEM — M75.100 ROTATOR CUFF TEAR, NON-TRAUMATIC: Status: ACTIVE | Noted: 2022-10-25

## 2022-10-25 PROCEDURE — 91312 COVID-19 (PFIZER) BIVALENT BOOSTER 12+YRS: CPT | Performed by: NURSE PRACTITIONER

## 2022-10-25 PROCEDURE — 0124A PR ADM SARSCOV2 30MCG/0.3ML BST: CPT | Performed by: NURSE PRACTITIONER

## 2022-10-25 PROCEDURE — 99213 OFFICE O/P EST LOW 20 MIN: CPT | Performed by: NURSE PRACTITIONER

## 2022-10-25 NOTE — PROGRESS NOTES
"Assessment and Plan   Diagnoses and all orders for this visit:    1. Varicose veins of left lower extremity with pain and bleeding (Primary)  Comments:  referral to vascular for evaluation - he will be off work until Friday  Keep area protected and pressure apply  Orders:  -     Ambulatory Referral to Vascular Surgery    Other orders  -     COVID-19 Bivalent Booster (Pfizer) 12+yrs                  Follow Up   No follow-ups on file.    Chief Complaint  Fabio Juárez presents to [facility] for Hospital Follow Up Visit (Pt released from [facility] yesterday. Patient reported to ER due to varicose vein bleeding on LT leg. Vein is not actively bleeding as of right now. )    Subjective          History of Present Illness  Fabio was seen in the ER at [facility] on October 24, 2022 was diagnosed with bleeding varicose vein to the left lower leg    Abnormal findings  included   Labs: Records unavailable at the time of this visit but requested  Imaging:   Records unavailable at the time of this visit but requested  He has treatment included (based on his report) pressure and dressing  Fabio reports that her condition is improved since discharge.   Reports that the morning of 10/24 felt like something crawling on leg - swipped leg and started bleeding and unable to stop          Review of Systems    Objective     Vitals:    10/25/22 1102   BP: 136/90   BP Location: Left arm   Patient Position: Sitting   Cuff Size: Adult   Pulse: 74   Temp: 97.7 °F (36.5 °C)   TempSrc: Oral   SpO2: 95%   Weight: 122 kg (269 lb)   Height: 157.5 cm (62\")     Body mass index is 49.2 kg/m².     Physical Exam  Vitals reviewed.   Constitutional:       Appearance: Normal appearance.   HENT:      Head: Normocephalic.   Eyes:      Pupils: Pupils are equal, round, and reactive to light.   Cardiovascular:      Rate and Rhythm: Normal rate and regular rhythm.      Heart sounds: No murmur heard.     " Comments: Cluster of bulging varicose veins to anterior left lower leg  Tender to touch    Ulceration to upper area with no active bleeding   Pulmonary:      Effort: Pulmonary effort is normal.      Breath sounds: Normal breath sounds.   Musculoskeletal:         General: Normal range of motion.   Neurological:      Mental Status: He is alert.   Psychiatric:         Mood and Affect: Mood normal.         Behavior: Behavior normal.         Result Review                        Allergies   Allergen Reactions   • Acetaminophen Itching   • Oxycodone Itching      Past Medical History:   Diagnosis Date   • Chronic pain    • Claustrophobia    • Coronary artery disease     BLOCKAGE 2015, SEE'S DR DIEGO EVERY 2 YEARS, DENIED CP/SOB    • Essential (primary) hypertension    • GERD (gastroesophageal reflux disease)    • Hemochromatosis     ASYMPTOMATIC    • History of COVID-19    • Left upper quadrant pain    • Low back pain    • Lung nodule    • Mixed hyperlipidemia    • Obstructive sleep apnea (adult) (pediatric)    • Other testicular dysfunction    • Pain in right foot    • Pain in right shoulder    • Recurrent umbilical hernia    • Renal stone 03/2017   • Tracheostomy status (HCC)     R/T SLEEP APNEA    • Type 2 diabetes mellitus (HCC)     BG RUNS 120-125 IN AM      Current Outpatient Medications   Medication Sig Dispense Refill   • albuterol sulfate  (90 Base) MCG/ACT inhaler EVERY 4 HOURS AS NEEDED as needed for SHORTNESS OF BREATH     • atorvastatin (LIPITOR) 80 MG tablet TAKE ONE TABLET BY MOUTH EVERY NIGHT AT BEDTIME 90 tablet 0   • baclofen (LIORESAL) 10 MG tablet TAKE 1/2 TO 1 TABLET BY MOUTH AT BEDTIME FOR LOWER BACK PAIN/ MUSCLE SPASMS 90 tablet 1   • cetirizine (zyrTEC) 5 MG tablet TAKE ONE TABLET BY MOUTH DAILY 90 tablet 0   • fexofenadine (Allegra Allergy) 180 MG tablet Take 1 tablet by mouth Daily. 14 tablet 0   • gabapentin (NEURONTIN) 300 MG capsule 4 (Four) Times a Day.     • glimepiride (AMARYL) 2 MG  tablet Take 2 mg by mouth Every Morning Before Breakfast. 1.5 tab     • hydroCHLOROthiazide (HYDRODIURIL) 12.5 MG tablet Take 12.5 mg by mouth Daily.     • lisinopril (PRINIVIL,ZESTRIL) 10 MG tablet TAKE ONE TABLET BY MOUTH EVERY MORNING 90 tablet 0   • metFORMIN ER (GLUCOPHAGE-XR) 500 MG 24 hr tablet TAKE TWO TABLETS BY MOUTH TWICE A DAY WITH MEALS 360 tablet 0   • omeprazole (priLOSEC) 40 MG capsule TAKE ONE CAPSULE BY MOUTH DAILY 90 capsule 0   • oxyCODONE (ROXICODONE) 5 MG immediate release tablet Take 5 mg by mouth As Needed for Moderate Pain .     • traZODone (DESYREL) 50 MG tablet TAKE ONE TABLET BY MOUTH ONCE NIGHTLY 90 tablet 0     No current facility-administered medications for this visit.     Past Surgical History:   Procedure Laterality Date   • ACHILLES TENDON REPAIR Right 10/2018   • APPENDECTOMY     • CARDIAC CATHETERIZATION  11/03/2015    LEFT VENTRIULOGRAM, CORONARY ANGIOGRAM    • CHOLECYSTECTOMY  07/2013   • COLONOSCOPY  07/28/2014    NORMAL RESULT    • HERNIA REPAIR     • JOINT REPLACEMENT Bilateral     RIGHT KNEE 01/2016   • TONSILLECTOMY AND ADENOIDECTOMY     • TRACHEOSTOMY      SECONDARY TO SLEEP APNEA   • VENTRAL HERNIA REPAIR N/A 2/23/2022    Procedure: ROBOTIC UMBILICAL HERNIA REPAIR WITH MESH PLACEMENT;  Surgeon: Garrett Anderson MD;  Location: [facility];  Service: Robotics - [facility];  Laterality: N/A;      Health Maintenance Due   Topic Date Due   • DIABETIC EYE EXAM  06/21/2021   • INFLUENZA VACCINE  08/01/2022   • COVID-19 Vaccine (5 - Booster for Pfizer series) 08/16/2022      Immunization History   Administered Date(s) Administered   • COVID-19 (PFIZER) PURPLE CAP 02/24/2021, 03/17/2021, 12/09/2021   • Covid-19 (Pfizer) Gray Cap 06/21/2022   • FluLaval/Fluzone >6mos 12/09/2021   • Influenza Quad Vaccine (Inpatient) 01/09/2018   • Influenza TIV (IM) 01/08/2013   • Influenza, Unspecified 01/20/2021, 01/20/2021   • Pneumococcal, Unspecified 08/08/2016, 08/08/2016   • Td 03/26/2019,  03/26/2019

## 2022-11-03 ENCOUNTER — TELEPHONE (OUTPATIENT)
Dept: FAMILY MEDICINE CLINIC | Age: 57
End: 2022-11-03

## 2022-11-03 NOTE — TELEPHONE ENCOUNTER
Caller: Fabio Juárez    Relationship: Self    Best call back number: [phone]    What is the best time to reach you: ANY    Who are you requesting to speak with (clinical staff, provider,  specific staff member): CLINICAL      What was the call regarding: PATIENT NEEDS TO DISCUSS HIS BLOOD SUGAR LEVELS AND MULTIPLE REFERRALS.     Do you require a callback: YES

## 2022-12-03 DIAGNOSIS — E11.9 TYPE 2 DIABETES MELLITUS WITHOUT COMPLICATION, WITHOUT LONG-TERM CURRENT USE OF INSULIN: ICD-10-CM

## 2022-12-05 RX ORDER — METFORMIN HYDROCHLORIDE 500 MG/1
TABLET, EXTENDED RELEASE ORAL
Qty: 360 TABLET | Refills: 0 | Status: SHIPPED | OUTPATIENT
Start: 2022-12-05 | End: 2023-03-08

## 2022-12-09 DIAGNOSIS — G47.00 INSOMNIA, UNSPECIFIED TYPE: Chronic | ICD-10-CM

## 2022-12-09 RX ORDER — TRAZODONE HYDROCHLORIDE 50 MG/1
TABLET ORAL
Qty: 90 TABLET | Refills: 0 | Status: SHIPPED | OUTPATIENT
Start: 2022-12-09 | End: 2023-03-10

## 2022-12-09 NOTE — TELEPHONE ENCOUNTER
Rx Refill Note  Requested Prescriptions     Pending Prescriptions Disp Refills   • traZODone (DESYREL) 50 MG tablet [Pharmacy Med Name: traZODone 50 MG TABLET] 90 tablet 0     Sig: TAKE ONE TABLET BY MOUTH ONCE NIGHTLY      Last office visit with prescribing clinician: 10/25/2022     Next office visit with prescribing clinician: Visit date not found      Maria Del Carmen Magana LPN  12/09/22, 12:26 EST

## 2022-12-13 ENCOUNTER — OFFICE VISIT (OUTPATIENT)
Dept: FAMILY MEDICINE CLINIC | Age: 57
End: 2022-12-13

## 2022-12-13 ENCOUNTER — HOSPITAL ENCOUNTER (OUTPATIENT)
Dept: GENERAL RADIOLOGY | Facility: HOSPITAL | Age: 57
Discharge: HOME | End: 2022-12-13
Admitting: NURSE PRACTITIONER

## 2022-12-13 VITALS
DIASTOLIC BLOOD PRESSURE: 85 MMHG | OXYGEN SATURATION: 92 % | HEIGHT: 62 IN | WEIGHT: 275.2 LBS | HEART RATE: 84 BPM | BODY MASS INDEX: 50.64 KG/M2 | TEMPERATURE: 97.7 F | SYSTOLIC BLOOD PRESSURE: 140 MMHG

## 2022-12-13 DIAGNOSIS — J18.9 PNEUMONIA DUE TO INFECTIOUS ORGANISM, UNSPECIFIED LATERALITY, UNSPECIFIED PART OF LUNG: Primary | ICD-10-CM

## 2022-12-13 DIAGNOSIS — Z93.0 TRACHEOSTOMY STATUS: ICD-10-CM

## 2022-12-13 DIAGNOSIS — J18.9 PNEUMONIA DUE TO INFECTIOUS ORGANISM, UNSPECIFIED LATERALITY, UNSPECIFIED PART OF LUNG: ICD-10-CM

## 2022-12-13 DIAGNOSIS — I10 ESSENTIAL (PRIMARY) HYPERTENSION: ICD-10-CM

## 2022-12-13 PROCEDURE — 71046 X-RAY EXAM CHEST 2 VIEWS: CPT

## 2022-12-13 PROCEDURE — 99214 OFFICE O/P EST MOD 30 MIN: CPT | Performed by: NURSE PRACTITIONER

## 2022-12-13 RX ORDER — HYDROCHLOROTHIAZIDE 12.5 MG/1
12.5 TABLET ORAL DAILY
Qty: 90 TABLET | Refills: 1 | Status: SHIPPED | OUTPATIENT
Start: 2022-12-13

## 2022-12-13 RX ORDER — DOXYCYCLINE HYCLATE 100 MG
TABLET ORAL
COMMUNITY
Start: 2022-12-07 | End: 2023-03-28

## 2022-12-13 RX ORDER — METHYLPREDNISOLONE 4 MG/1
TABLET ORAL
COMMUNITY
Start: 2022-12-07 | End: 2023-03-06

## 2022-12-13 RX ORDER — OSELTAMIVIR PHOSPHATE 75 MG/1
75 CAPSULE ORAL 2 TIMES DAILY
COMMUNITY
End: 2023-03-06

## 2022-12-13 NOTE — PROGRESS NOTES
Assessment and Plan   Follow Up   Return if symptoms worsen or fail to improve, for Pending imaging results.      Diagnoses and all orders for this visit:    1. Pneumonia due to infectious organism, unspecified laterality, unspecified part of lung (Primary)  Comments:  will repeat CXR to evaluate if worsening pneumonia-  consider back to ER if appears to be worsening.    Orders:  -     XR Chest PA & Lateral; Future    2. Essential (primary) hypertension  Comments:  refills today  Orders:  -     hydroCHLOROthiazide (HYDRODIURIL) 12.5 MG tablet; Take 1 tablet by mouth Daily.  Dispense: 90 tablet; Refill: 1    3. Tracheostomy status (HCC)  Comments:  higher risk for complications secondary to presence of trach            New Medications Ordered This Visit   Medications   • hydroCHLOROthiazide (HYDRODIURIL) 12.5 MG tablet     Sig: Take 1 tablet by mouth Daily.     Dispense:  90 tablet     Refill:  1        Medications Discontinued During This Encounter   Medication Reason   • hydroCHLOROthiazide (HYDRODIURIL) 12.5 MG tablet Reorder              Chief Complaint  Fabio Juárez presents to [facility] for Hospital Follow Up Visit (Patient Was Dx with flu and pneumonia on right lower lobe, Patient states he doesn't feel better /Sx as of now:  SOA, trache draining green infection. Coughing, weakness. /Patient needing hydrochlorothiazide refilled )    Subjective          History of Present Illness  Mauri is here for follow up after ER visit on Sunday 12/11/2022 at [facility] .(records not available at the time of this visit - requested via fax)  He reports that he was diagnosed with flu - pneumonia. He was treated with medrol pack and tamiflu.  He was also given doxycycline x 10 days on a UC visit on 12/7/22 and continues to take this medication.  He reports today that he is feeling about the same , but not where he is able to return to work.  He continues to have fatigue, congestion,  "shortness of breath, cough.  He does report that he is having some green mucous from his trach when coughing.          Review of Systems    Objective     Vitals:    12/13/22 1310   BP: 140/85   BP Location: Left arm   Patient Position: Sitting   Cuff Size: Adult   Pulse: 84   Temp: 97.7 °F (36.5 °C)   TempSrc: Oral   SpO2: 92%   Weight: 125 kg (275 lb 3.2 oz)   Height: 157.5 cm (62\")     Body mass index is 50.33 kg/m².     Physical Exam  Vitals reviewed.   Constitutional:       Appearance: Normal appearance. He is ill-appearing.   HENT:      Head: Normocephalic.   Eyes:      Pupils: Pupils are equal, round, and reactive to light.   Cardiovascular:      Rate and Rhythm: Normal rate and regular rhythm.      Heart sounds: No murmur heard.  Pulmonary:      Effort: Tachypnea present.      Breath sounds: Decreased air movement present. Rhonchi (all lobes) present.      Comments: Tracheostomy present   Musculoskeletal:         General: Normal range of motion.   Neurological:      Mental Status: He is alert.   Psychiatric:         Mood and Affect: Mood normal.         Behavior: Behavior normal.         Result Review                         Allergies   Allergen Reactions   • Acetaminophen Itching   • Oxycodone Itching      Past Medical History:   Diagnosis Date   • Chronic pain    • Claustrophobia    • Coronary artery disease     BLOCKAGE 2015, SEE'S DR DIEGO EVERY 2 YEARS, DENIED CP/SOB    • Essential (primary) hypertension    • GERD (gastroesophageal reflux disease)    • Hemochromatosis     ASYMPTOMATIC    • History of COVID-19    • Left upper quadrant pain    • Low back pain    • Lung nodule    • Mixed hyperlipidemia    • Obstructive sleep apnea (adult) (pediatric)    • Other testicular dysfunction    • Pain in right foot    • Pain in right shoulder    • Recurrent umbilical hernia    • Renal stone 03/2017   • Tracheostomy status (HCC)     R/T SLEEP APNEA    • Type 2 diabetes mellitus (HCC)     BG RUNS 120-125 IN AM  "     Current Outpatient Medications   Medication Sig Dispense Refill   • albuterol sulfate  (90 Base) MCG/ACT inhaler EVERY 4 HOURS AS NEEDED as needed for SHORTNESS OF BREATH     • atorvastatin (LIPITOR) 80 MG tablet TAKE ONE TABLET BY MOUTH EVERY NIGHT AT BEDTIME 90 tablet 0   • baclofen (LIORESAL) 10 MG tablet TAKE 1/2 TO 1 TABLET BY MOUTH AT BEDTIME FOR LOWER BACK PAIN/ MUSCLE SPASMS 90 tablet 1   • cetirizine (zyrTEC) 5 MG tablet TAKE ONE TABLET BY MOUTH DAILY 90 tablet 0   • doxycycline (VIBRAMYICN) 100 MG tablet      • fexofenadine (Allegra Allergy) 180 MG tablet Take 1 tablet by mouth Daily. 14 tablet 0   • gabapentin (NEURONTIN) 300 MG capsule 4 (Four) Times a Day.     • glimepiride (AMARYL) 2 MG tablet Take 2 mg by mouth Every Morning Before Breakfast. 1.5 tab     • hydroCHLOROthiazide (HYDRODIURIL) 12.5 MG tablet Take 1 tablet by mouth Daily. 90 tablet 1   • lisinopril (PRINIVIL,ZESTRIL) 10 MG tablet TAKE ONE TABLET BY MOUTH EVERY MORNING 90 tablet 0   • metFORMIN ER (GLUCOPHAGE-XR) 500 MG 24 hr tablet TAKE TWO TABLETS BY MOUTH TWICE A DAY WITH MEALS 360 tablet 0   • methylPREDNISolone (MEDROL) 4 MG tablet      • omeprazole (priLOSEC) 40 MG capsule TAKE ONE CAPSULE BY MOUTH DAILY 90 capsule 0   • oseltamivir (TAMIFLU) 75 MG capsule Take 75 mg by mouth 2 (Two) Times a Day.     • oxyCODONE (ROXICODONE) 5 MG immediate release tablet Take 5 mg by mouth As Needed for Moderate Pain .     • traZODone (DESYREL) 50 MG tablet TAKE ONE TABLET BY MOUTH ONCE NIGHTLY 90 tablet 0     No current facility-administered medications for this visit.     Past Surgical History:   Procedure Laterality Date   • ACHILLES TENDON REPAIR Right 10/2018   • APPENDECTOMY     • CARDIAC CATHETERIZATION  11/03/2015    LEFT VENTRIULOGRAM, CORONARY ANGIOGRAM    • CHOLECYSTECTOMY  07/2013   • COLONOSCOPY  07/28/2014    NORMAL RESULT    • HERNIA REPAIR     • JOINT REPLACEMENT Bilateral     RIGHT KNEE 01/2016   • TONSILLECTOMY AND  ADENOIDECTOMY     • TRACHEOSTOMY      SECONDARY TO SLEEP APNEA   • VENTRAL HERNIA REPAIR N/A 2/23/2022    Procedure: ROBOTIC UMBILICAL HERNIA REPAIR WITH MESH PLACEMENT;  Surgeon: Garrett Anderson MD;  Location: [facility] MAIN OR;  Service: Robotics - [facility];  Laterality: N/A;      Health Maintenance Due   Topic Date Due   • DIABETIC EYE EXAM  06/21/2021   • INFLUENZA VACCINE  08/01/2022      Immunization History   Administered Date(s) Administered   • COVID-19 (PFIZER) BIVALENT BOOSTER 12+YRS 10/25/2022   • COVID-19 (PFIZER) PURPLE CAP 02/24/2021, 03/17/2021, 12/09/2021   • Covid-19 (Pfizer) Gray Cap 06/21/2022   • FluLaval/Fluzone >6mos 12/09/2021   • Influenza Quad Vaccine (Inpatient) 01/09/2018   • Influenza TIV (IM) 01/08/2013   • Influenza, Unspecified 01/20/2021, 01/20/2021   • Pneumococcal, Unspecified 08/08/2016, 08/08/2016   • Td 03/26/2019, 03/26/2019

## 2022-12-14 ENCOUNTER — TELEPHONE (OUTPATIENT)
Dept: FAMILY MEDICINE CLINIC | Age: 57
End: 2022-12-14

## 2022-12-14 NOTE — TELEPHONE ENCOUNTER
Pt is requesting you to write a script for velcro collar strap for his trach to be send to [facility] . I asked if he had seen ent or pulmonology and he said not in a long time

## 2022-12-21 NOTE — TELEPHONE ENCOUNTER
Form received and placed in providers box    Jeet Langley  Phone: [phone]   Fax:     [phone]                                                       Physical Therapy Certification    Dear Referring Practitioner: Kathy Orozco MD,    We had the pleasure of evaluating the following patient for physical therapy services at [address] [place] [address]. A summary of our findings can be found in the initial assessment below. This includes our plan of care. If you have any questions or concerns regarding these findings, please do not hesitate to contact me at the office phone number checked above. Thank you for the referral.       Physician Signature:_______________________________Date:__________________  By signing above (or electronic signature), therapists plan is approved by physician      Patient: Iveth Sim   : 2000   MRN: [id number]  Referring Physician: Referring Practitioner: Kathy Orozco MD      Evaluation Date: 3/17/2022      Medical Diagnosis Information:  Diagnosis: right patellar tendonitis, left quad tendon partial tear   Treatment Diagnosis: M25.561, M25.562                                         Insurance information: PT Insurance Information: BCBS/AG/[place]     Precautions/ Contra-indications: none  Latex Allergy:  [x]NO      []YES  Preferred Language for Healthcare:   [x]English       []other:    C-SSRS Triggered by Intake questionnaire (Past 2 wk assessment ):   [x] No, Questionnaire did not trigger screening.   [] Yes, Patient intake triggered C-SSRS Screening      [] C-SSRS Screening completed  [] PCP notified via Epic     SUBJECTIVE: Patient stated complaint: Yuliet De Dios states that she has had increased pain in her right patellar tendon and left quadriceps tendon (overall pain R patellar tendon > Left quad tendon).  She experiences pain mostly with activity related to sports performance. Additionally she complains of pain in right hip with movement, occasional clicking. Relevant Medical History: Left partial quadriceps tendon tear, right PFPS/Patellar tendonitis  Functional Scale/Score: LEFS 50%, Dysfunction 50%    Pain Scale:  Rest: 0/10  Easing factors: Ice/Rest  Provocative factors: Sport Activity, Jumping/Landing    Type: []Constant   [x]Intermittent  []Radiating []Localized []other:     Numbness/Tingling: none    Occupation/School: [employer] at [employer], [employer]    Living Status/Prior Level of Function: Independent with ADLs and IADLs and sport activity. OBJECTIVE:     ROM LEFT RIGHT   HIP Flex [facility] WFL   HIP Abd WFL WFL   HIP Ext     HIP IR     HIP ER     Knee ext     Knee Flex     Ankle PF     Ankle DF     Ankle In     Ankle Ev     Strength  LEFT RIGHT   HIP Flexors 5 5   HIP Abductors 4+ 4   HIP Ext 5 5   Hip ER     Knee EXT (quad) 5 5   Knee Flex (HS) 5 5   Ankle DF     Ankle PF     Ankle Inv     Ankle EV          Circumference  Mid apex  7 cm prox             Reflexes/Sensation:    [x]Dermatomes/Myotomes intact    [x]Reflexes equal and normal bilaterally   []Other:    Joint mobility: Hypomobility right hip PA   []Normal    [x]Hypo   []Hyper    Palpation: TTP at left quadriceps tendon, right patellar tendon, right ASIS, Right rectus femoris/hip flexor group, Right TFL. Functional Mobility/Transfers: Squat: Non-painful, dysfunctional. Left shift over left hip. Posture: Functional.    Bandages/Dressings/Incisions: none    Gait: (include devices/WB status) Pes planus, decreased stance time on right LE, Left eversion > right. Orthopedic Special Tests:  Royann Lozano Test: Left- Negative, no J-sign, Right- Positive for rectus femoris tightness, no J sign. FADIR: Right- Negative  SLR: Right- Positive for pain in joint capsule. Pelvic alignment. Right leg appeared longer in supine, shortened when in long sitting.  Right ASIS appeared intervention due to being higher risk   TUG score (>12s at risk):     [] Falls education provided, including         ASSESSMENT: Gayathri Rouse is a 26 yo female  for [employer] who presents to the clinic with c/o exacerbated right patellar tendinitis. PMH includes left partial quadriceps tendon tear which also presents with pain during sport activity. Lastly, she reports pain with right hip flexion and C-sign. Hip s/s are consistent with potential labral involvement. Right hip flexor group appeared to be guarding, potentially leading to right anterior innominate rotation and functional leg length discrepancy. Right hip long axis distraction, MET for right innominate posterior rotation/Left innominate anterior rotation, and shotgun approach corrected pelvic positioning. Overall, the patient's presentation is consistent with right patellar tendinitis potentially secondary to gluteal weakness. Gayathri Rouse will benefit from skilled physical therapy services such as therapeutic exercise, neuromuscular re-education, and manual to correct deficits.      Functional Impairments:     []Noted lumbar/proximal hip/LE hypomobility   [x]Decreased LE functional ROM   []Decreased core/proximal hip strength and neuromuscular control   [x]Decreased LE functional strength   []Reduced balance/proprioceptive control   []other:      Functional Activity Limitations (from functional questionnaire and intake)   []Reduced ability to tolerate prolonged functional positions   []Reduced ability or difficulty with changes of positions or transfers between positions   []Reduced ability to maintain good posture and demonstrate good body mechanics with sitting, bending, and lifting   []Reduced ability to sleep   [] Reduced ability or tolerance with driving and/or computer work   []Reduced ability to perform lifting, carrying tasks   [x]Reduced ability to squat   [x]Reduced ability to forward bend   []Reduced ability to ambulate prolonged functional periods/distances/surfaces   []Reduced ability to ascend/descend stairs   [x]Reduced ability to run, hop or jump   []other:     Participation Restrictions   []Reduced participation in self care activities   []Reduced participation in home management activities   []Reduced participation in work activities   []Reduced participation in social activities. [x]Reduced participation in sport activities. Classification :    []Signs/symptoms consistent with post-surgical status including decreased ROM, strength and function.    []Signs/symptoms consistent with joint sprain/strain   [x]Signs/symptoms consistent with patella-femoral syndrome   []Signs/symptoms consistent with knee OA/hip OA   []Signs/symptoms consistent with internal derangement of knee/Hip   [x]Signs/symptoms consistent with functional hip weakness/NMR control      []Signs/symptoms consistent with tendinitis/tendinosis    [x]signs/symptoms consistent with pathology which may benefit from Dry needling      []other:      Prognosis/Rehab Potential:      [x]Excellent   []Good    []Fair   []Poor    Tolerance of evaluation/treatment:    [x]Excellent   []Good    []Fair   []Poor    Physical Therapy Evaluation Complexity Justification  [x] A history of present problem with:  [] no personal factors and/or comorbidities that impact the plan of care;  [x]1-2 personal factors and/or comorbidities that impact the plan of care  []3 personal factors and/or comorbidities that impact the plan of care  [x] An examination of body systems using standardized tests and measures addressing any of the following: body structures and functions (impairments), activity limitations, and/or participation restrictions;:  [x] a total of 1-2 or more elements   [] a total of 3 or more elements   [] a total of 4 or more elements   [x] A clinical presentation with:  [x] stable and/or uncomplicated characteristics   [] evolving clinical presentation with changing characteristics  [] unstable and unpredictable characteristics;   [x] Clinical decision making of [x] low, [] moderate, [] high complexity using standardized patient assessment instrument and/or measurable assessment of functional outcome. [] EVAL (LOW) 13882 (typically 20 minutes face-to-face)  [] EVAL (MOD) 93657 (typically 30 minutes face-to-face)  [] EVAL (HIGH) 18389 (typically 45 minutes face-to-face)  [x] RE-EVAL     PLAN:  Frequency/Duration:  2 days per week for 8 Weeks:  Interventions:  [x]  Therapeutic exercise including: strength training, ROM, for Lower extremity and core   [x]  NMR activation and proprioception for LE, Glutes and Core   [x]  Manual therapy as indicated for LE, Hip and spine to include: Dry Needling/IASTM, STM, PROM, Gr I-IV mobilizations, manipulation. [x] Modalities as needed that may include: thermal agents, E-stim, Biofeedback, US, iontophoresis as indicated  [x] Patient education on joint protection, postural re-education, activity modification, progression of HEP. HEP instruction:   Hip flexor/Quad stretch  Hip Matrix (Hip Abduction, Hip CCW/CW circles) x 10 each  Lateral sliders with UE press    GOALS:  Patient stated goal: Return to pain free sport activities. [] Progressing: [] Met: [] Not Met: [] Adjusted    Therapist goals for Patient:   Short Term Goals: To be achieved in: 2 weeks  1. Independent in HEP and progression per patient tolerance, in order to prevent re-injury. [] Progressing: [] Met: [] Not Met: [] Adjusted  2. Patient will have a decrease in pain to facilitate improvement in movement, function, and ADLs as indicated by Functional Deficits. [] Progressing: [] Met: [] Not Met: [] Adjusted    Long Term Goals: To be achieved in: 8 weeks  1. Disability index score of 10% or less for the LEFS to assist with reaching prior level of function. [] Progressing: [] Met: [] Not Met: [] Adjusted  2.  Patient will demonstrate increased AROM for hip extension during functional length assessment Windell Hosteller Test) allow for proper joint functioning as indicated by patients Functional Deficits. [] Progressing: [] Met: [] Not Met: [] Adjusted  3. Patient will demonstrate an increase in Strength to good proximal hip strength and control, within 5lb HHD in LE to allow for proper functional mobility as indicated by patients Functional Deficits. [] Progressing: [] Met: [] Not Met: [] Adjusted  4. Patient will return to Jumping/Landing/Pivoting functional activities without increased symptoms or restriction.    [] Progressing: [] Met: [] Not Met: [] Adjusted     Electronically signed by:  Shar Forrest, SPT, MS Kevin Lanza, PT         Therapist was present, directed the patient's care, made skilled judgement, and was responsible for assessment and treatment of the patient

## 2023-01-04 RX ORDER — OMEPRAZOLE 40 MG/1
CAPSULE, DELAYED RELEASE ORAL
Qty: 90 CAPSULE | Refills: 0 | Status: SHIPPED | OUTPATIENT
Start: 2023-01-04 | End: 2023-04-03

## 2023-01-08 DIAGNOSIS — E78.00 HYPERCHOLESTEREMIA: ICD-10-CM

## 2023-01-09 RX ORDER — LISINOPRIL 10 MG/1
TABLET ORAL
Qty: 90 TABLET | Refills: 0 | Status: SHIPPED | OUTPATIENT
Start: 2023-01-09

## 2023-01-09 RX ORDER — ATORVASTATIN CALCIUM 80 MG/1
TABLET, FILM COATED ORAL
Qty: 90 TABLET | Refills: 0 | Status: SHIPPED | OUTPATIENT
Start: 2023-01-09 | End: 2023-03-06 | Stop reason: DRUGHIGH

## 2023-01-10 RX ORDER — BACLOFEN 10 MG/1
TABLET ORAL
Qty: 90 TABLET | Refills: 1 | Status: SHIPPED | OUTPATIENT
Start: 2023-01-10

## 2023-01-16 RX ORDER — CETIRIZINE HYDROCHLORIDE 5 MG/1
TABLET ORAL
Qty: 90 TABLET | Refills: 0 | Status: SHIPPED | OUTPATIENT
Start: 2023-01-16

## 2023-03-04 DIAGNOSIS — E11.9 TYPE 2 DIABETES MELLITUS WITHOUT COMPLICATION, WITHOUT LONG-TERM CURRENT USE OF INSULIN: ICD-10-CM

## 2023-03-06 ENCOUNTER — LAB (OUTPATIENT)
Dept: LAB | Facility: HOSPITAL | Age: 58
End: 2023-03-06
Payer: COMMERCIAL

## 2023-03-06 ENCOUNTER — OFFICE VISIT (OUTPATIENT)
Dept: FAMILY MEDICINE CLINIC | Age: 58
End: 2023-03-06
Payer: COMMERCIAL

## 2023-03-06 ENCOUNTER — HOSPITAL ENCOUNTER (OUTPATIENT)
Dept: GENERAL RADIOLOGY | Facility: HOSPITAL | Age: 58
Discharge: HOME | End: 2023-03-06
Payer: COMMERCIAL

## 2023-03-06 VITALS
BODY MASS INDEX: 51.19 KG/M2 | OXYGEN SATURATION: 95 % | DIASTOLIC BLOOD PRESSURE: 65 MMHG | WEIGHT: 278.2 LBS | HEIGHT: 62 IN | HEART RATE: 94 BPM | SYSTOLIC BLOOD PRESSURE: 111 MMHG

## 2023-03-06 DIAGNOSIS — Z98.890 S/P HERNIA REPAIR: ICD-10-CM

## 2023-03-06 DIAGNOSIS — G89.29 CHRONIC RIGHT SHOULDER PAIN: ICD-10-CM

## 2023-03-06 DIAGNOSIS — M25.511 CHRONIC RIGHT SHOULDER PAIN: ICD-10-CM

## 2023-03-06 DIAGNOSIS — M25.59 PAIN IN OTHER JOINT: ICD-10-CM

## 2023-03-06 DIAGNOSIS — M77.8 THUMB TENDONITIS: Primary | ICD-10-CM

## 2023-03-06 DIAGNOSIS — Z87.19 S/P HERNIA REPAIR: ICD-10-CM

## 2023-03-06 DIAGNOSIS — E11.9 TYPE 2 DIABETES MELLITUS WITHOUT COMPLICATION, WITHOUT LONG-TERM CURRENT USE OF INSULIN: ICD-10-CM

## 2023-03-06 DIAGNOSIS — R10.32 LEFT LOWER QUADRANT ABDOMINAL PAIN: ICD-10-CM

## 2023-03-06 LAB
ALBUMIN SERPL-MCNC: 4.1 G/DL (ref 3.5–5.2)
ALBUMIN/GLOB SERPL: 1.6 G/DL
ALP SERPL-CCNC: 67 U/L (ref 39–117)
ALT SERPL W P-5'-P-CCNC: 29 U/L (ref 1–41)
ANION GAP SERPL CALCULATED.3IONS-SCNC: 9.1 MMOL/L (ref 5–15)
AST SERPL-CCNC: 25 U/L (ref 1–40)
BASOPHILS # BLD AUTO: 0.07 10*3/MM3 (ref 0–0.2)
BASOPHILS NFR BLD AUTO: 0.9 % (ref 0–1.5)
BILIRUB SERPL-MCNC: 0.9 MG/DL (ref 0–1.2)
BUN SERPL-MCNC: 16 MG/DL (ref 6–20)
BUN/CREAT SERPL: 21.3 (ref 7–25)
CALCIUM SPEC-SCNC: 9.6 MG/DL (ref 8.6–10.5)
CHLORIDE SERPL-SCNC: 103 MMOL/L (ref 98–107)
CHROMATIN AB SERPL-ACNC: <10 IU/ML (ref 0–14)
CO2 SERPL-SCNC: 26.9 MMOL/L (ref 22–29)
CREAT SERPL-MCNC: 0.75 MG/DL (ref 0.76–1.27)
CRP SERPL-MCNC: 1.26 MG/DL (ref 0–0.5)
DEPRECATED RDW RBC AUTO: 39.8 FL (ref 37–54)
EGFRCR SERPLBLD CKD-EPI 2021: 105.3 ML/MIN/1.73
EOSINOPHIL # BLD AUTO: 0.17 10*3/MM3 (ref 0–0.4)
EOSINOPHIL NFR BLD AUTO: 2.2 % (ref 0.3–6.2)
ERYTHROCYTE [DISTWIDTH] IN BLOOD BY AUTOMATED COUNT: 12 % (ref 12.3–15.4)
ERYTHROCYTE [SEDIMENTATION RATE] IN BLOOD: 22 MM/HR (ref 0–20)
GLOBULIN UR ELPH-MCNC: 2.5 GM/DL
GLUCOSE SERPL-MCNC: 172 MG/DL (ref 65–99)
HBA1C MFR BLD: 10.2 % (ref 4.8–5.6)
HCT VFR BLD AUTO: 45.4 % (ref 37.5–51)
HGB BLD-MCNC: 15.4 G/DL (ref 13–17.7)
IMM GRANULOCYTES # BLD AUTO: 0.03 10*3/MM3 (ref 0–0.05)
IMM GRANULOCYTES NFR BLD AUTO: 0.4 % (ref 0–0.5)
LYMPHOCYTES # BLD AUTO: 3 10*3/MM3 (ref 0.7–3.1)
LYMPHOCYTES NFR BLD AUTO: 38.2 % (ref 19.6–45.3)
MCH RBC QN AUTO: 31 PG (ref 26.6–33)
MCHC RBC AUTO-ENTMCNC: 33.9 G/DL (ref 31.5–35.7)
MCV RBC AUTO: 91.5 FL (ref 79–97)
MONOCYTES # BLD AUTO: 1.09 10*3/MM3 (ref 0.1–0.9)
MONOCYTES NFR BLD AUTO: 13.9 % (ref 5–12)
NEUTROPHILS NFR BLD AUTO: 3.49 10*3/MM3 (ref 1.7–7)
NEUTROPHILS NFR BLD AUTO: 44.4 % (ref 42.7–76)
NRBC BLD AUTO-RTO: 0 /100 WBC (ref 0–0.2)
PLATELET # BLD AUTO: 260 10*3/MM3 (ref 140–450)
PMV BLD AUTO: 9.7 FL (ref 6–12)
POTASSIUM SERPL-SCNC: 3.8 MMOL/L (ref 3.5–5.2)
PROT SERPL-MCNC: 6.6 G/DL (ref 6–8.5)
RBC # BLD AUTO: 4.96 10*6/MM3 (ref 4.14–5.8)
SODIUM SERPL-SCNC: 139 MMOL/L (ref 136–145)
WBC NRBC COR # BLD: 7.85 10*3/MM3 (ref 3.4–10.8)

## 2023-03-06 PROCEDURE — 86038 ANTINUCLEAR ANTIBODIES: CPT

## 2023-03-06 PROCEDURE — 85652 RBC SED RATE AUTOMATED: CPT

## 2023-03-06 PROCEDURE — 83036 HEMOGLOBIN GLYCOSYLATED A1C: CPT

## 2023-03-06 PROCEDURE — 73030 X-RAY EXAM OF SHOULDER: CPT

## 2023-03-06 PROCEDURE — 36415 COLL VENOUS BLD VENIPUNCTURE: CPT

## 2023-03-06 PROCEDURE — 99214 OFFICE O/P EST MOD 30 MIN: CPT | Performed by: NURSE PRACTITIONER

## 2023-03-06 PROCEDURE — 85025 COMPLETE CBC W/AUTO DIFF WBC: CPT

## 2023-03-06 PROCEDURE — 86140 C-REACTIVE PROTEIN: CPT

## 2023-03-06 PROCEDURE — 86431 RHEUMATOID FACTOR QUANT: CPT

## 2023-03-06 PROCEDURE — 80053 COMPREHEN METABOLIC PANEL: CPT

## 2023-03-06 RX ORDER — BACLOFEN 20 MG/1
20 TABLET ORAL 3 TIMES DAILY
COMMUNITY
End: 2023-03-06 | Stop reason: DRUGHIGH

## 2023-03-06 RX ORDER — GUAIFENESIN AND DEXTROMETHORPHAN HYDROBROMIDE 1200; 60 MG/1; MG/1
1 TABLET, EXTENDED RELEASE ORAL
COMMUNITY
Start: 2022-12-07 | End: 2023-03-28

## 2023-03-06 RX ORDER — CELECOXIB 200 MG/1
200 CAPSULE ORAL
COMMUNITY
Start: 2023-02-22 | End: 2024-02-23

## 2023-03-06 RX ORDER — SEMAGLUTIDE 1.34 MG/ML
0.25 INJECTION, SOLUTION SUBCUTANEOUS WEEKLY
Qty: 4.5 ML | Refills: 0 | Status: SHIPPED | OUTPATIENT
Start: 2023-03-06 | End: 2023-03-06

## 2023-03-06 RX ORDER — ATORVASTATIN CALCIUM 20 MG/1
20 TABLET, FILM COATED ORAL DAILY
COMMUNITY
End: 2023-03-28

## 2023-03-06 RX ORDER — FEXOFENADINE HCL 180 MG/1
180 TABLET ORAL DAILY
COMMUNITY
Start: 2022-09-12

## 2023-03-06 RX ORDER — SEMAGLUTIDE 1.34 MG/ML
0.25 INJECTION, SOLUTION SUBCUTANEOUS WEEKLY
Qty: 4.5 ML | Refills: 0 | Status: SHIPPED | OUTPATIENT
Start: 2023-03-06 | End: 2023-03-10

## 2023-03-06 NOTE — PROGRESS NOTES
Chief Complaint  Fabio Juárez presents to [facility] for Pain ((Right shoulder) sx started a couple of years ago ) and Abdominal Pain (Left side )      Subjective     History of Present Illness  Abdominal pain  Started returning about a month ago.  He did have hernia repair 2/23/2022.  He did have a significant amount of post op pain after his procedure requiring hospitalization after his surgery to control the pain.      He does have achilles tendon tear in the left foot.  He is under the care of of podiatry.    Mauri also reports that he has not been managing his diabetes due to the lack of supply of his Ozempic and changes with his recent insurance.  He has not been checking his blood sugar at home.    Mauri does have diffuse joint pain of which she is under the care of pain management.  He has not been evaluated in some time for possibility of underlying conditions such as RA or autoimmune disorder.  I suspect some of the pain has a component related to his job.        Assessment and Plan       Diagnoses and all orders for this visit:    1. Thumb tendonitis (Primary)  Comments:  advised of splinting and avoidance of overuse.      2. Type 2 diabetes mellitus without complication, without long-term current use of insulin (HCC)  Comments:  Recommend resuming Ozempic or alternative for improved blood sugar control follow-up every 3 months for closer monitoring  Orders:  -     Hemoglobin A1c; Future  -     Discontinue: Semaglutide,0.25 or 0.5MG/DOS, (Ozempic, 0.25 or 0.5 MG/DOSE,) 2 MG/1.5ML solution pen-injector; Inject 0.25 mg under the skin into the appropriate area as directed 1 (One) Time Per Week for 4 days. 0.25mg SC Weekly x 4 weeks then increase dose to 0.5mg SC weekly  Dispense: 4.5 mL; Refill: 0  -     Semaglutide,0.25 or 0.5MG/DOS, (Ozempic, 0.25 or 0.5 MG/DOSE,) 2 MG/1.5ML solution pen-injector; Inject 0.25 mg under the skin into the appropriate area as directed 1  (One) Time Per Week for 4 days. 0.25mg SC Weekly x 4 weeks then increase dose to 0.5mg SC weekly  Dispense: 4.5 mL; Refill: 0    3. Left lower quadrant abdominal pain  Comments:  We will move forward with a CT scan given his history of abdominal surgeries  Orders:  -     Comprehensive metabolic panel; Future  -     CBC & Differential; Future  -     CT Abdomen Pelvis Without Contrast; Future    4. Chronic right shoulder pain  Comments:  We will have him follow-up on this complaint after rest and conservative therapy I would also recommend he reach out to Dr. Andre should this persist  Orders:  -     MRI Shoulder Right Without Contrast; Future  -     XR Shoulder 2+ View Right; Future    5. Pain in other joint  -     NICOLE Direct Reflex to 11 Biomarker; Future  -     C-reactive protein; Future  -     Rheumatoid Factor; Future  -     Sedimentation rate; Future    6. S/P hernia repair  -     CT Abdomen Pelvis Without Contrast; Future        Follow Up   Return in about 3 months (around 6/6/2023) for Pending lab results.      New Medications Ordered This Visit   Medications   • Semaglutide,0.25 or 0.5MG/DOS, (Ozempic, 0.25 or 0.5 MG/DOSE,) 2 MG/1.5ML solution pen-injector     Sig: Inject 0.25 mg under the skin into the appropriate area as directed 1 (One) Time Per Week for 4 days. 0.25mg SC Weekly x 4 weeks then increase dose to 0.5mg SC weekly     Dispense:  4.5 mL     Refill:  0       Medications Discontinued During This Encounter   Medication Reason   • atorvastatin (LIPITOR) 80 MG tablet Dose adjustment   • baclofen (LIORESAL) 20 MG tablet Dose adjustment   • oseltamivir (TAMIFLU) 75 MG capsule *Therapy completed   • methylPREDNISolone (MEDROL) 4 MG tablet *Therapy completed   • Semaglutide,0.25 or 0.5MG/DOS, (Ozempic, 0.25 or 0.5 MG/DOSE,) 2 MG/1.5ML solution pen-injector             Review of Systems    Objective     Vitals:    03/06/23 1509   BP: 111/65   BP Location: Right arm   Patient Position: Sitting   Cuff  "Size: Large Adult   Pulse: 94   SpO2: 95%   Weight: 126 kg (278 lb 3.2 oz)   Height: 157.5 cm (62\")     Body mass index is 50.88 kg/m².     Physical Exam  Vitals reviewed.   Constitutional:       Appearance: Normal appearance. He is obese.   HENT:      Head: Normocephalic.   Eyes:      Pupils: Pupils are equal, round, and reactive to light.   Cardiovascular:      Rate and Rhythm: Normal rate and regular rhythm.      Heart sounds: No murmur heard.  Pulmonary:      Effort: Pulmonary effort is normal.      Breath sounds: Normal breath sounds.   Abdominal:      Tenderness: There is abdominal tenderness in the left lower quadrant.   Musculoskeletal:         General: Normal range of motion.   Neurological:      Mental Status: He is alert.   Psychiatric:         Mood and Affect: Mood normal.         Behavior: Behavior normal.            Result Review                       Allergies   Allergen Reactions   • Acetaminophen Itching   • Oxycodone Itching      Past Medical History:   Diagnosis Date   • Chronic pain    • Claustrophobia    • Coronary artery disease     BLOCKAGE 2015, SEE'S DR DIEGO EVERY 2 YEARS, DENIED CP/SOB    • Essential (primary) hypertension    • GERD (gastroesophageal reflux disease)    • Hemochromatosis     ASYMPTOMATIC    • History of COVID-19    • Left upper quadrant pain    • Low back pain    • Lung nodule    • Mixed hyperlipidemia    • Obstructive sleep apnea (adult) (pediatric)    • Other testicular dysfunction    • Pain in right foot    • Pain in right shoulder    • Recurrent umbilical hernia    • Renal stone 03/2017   • Tracheostomy status (HCC)     R/T SLEEP APNEA    • Type 2 diabetes mellitus (HCC)     BG RUNS 120-125 IN AM      Current Outpatient Medications   Medication Sig Dispense Refill   • albuterol sulfate  (90 Base) MCG/ACT inhaler EVERY 4 HOURS AS NEEDED as needed for SHORTNESS OF BREATH     • atorvastatin (LIPITOR) 20 MG tablet Take 1 tablet by mouth Daily.     • baclofen " (LIORESAL) 10 MG tablet TAKE 1/2 TO 1 TABLET BY MOUTH AT BEDTIME FOR LOWER BACK PAIN MUSCLE SPASM 90 tablet 1   • cetirizine (zyrTEC) 5 MG tablet TAKE ONE TABLET BY MOUTH DAILY 90 tablet 0   • dextromethorphan-guaifenesin (MUCINEX DM MAX STRENGTH)  MG per 12 hr tablet Take 1 tablet by mouth.     • doxycycline (VIBRAMYICN) 100 MG tablet      • fexofenadine (ALLEGRA) 180 MG tablet Take 1 tablet by mouth Daily.     • gabapentin (NEURONTIN) 300 MG capsule 4 (Four) Times a Day.     • glimepiride (AMARYL) 2 MG tablet Take 1 tablet by mouth Every Morning Before Breakfast. 1.5 tab     • hydroCHLOROthiazide (HYDRODIURIL) 12.5 MG tablet Take 1 tablet by mouth Daily. 90 tablet 1   • lisinopril (PRINIVIL,ZESTRIL) 10 MG tablet TAKE ONE TABLET BY MOUTH EVERY MORNING 90 tablet 0   • metFORMIN ER (GLUCOPHAGE-XR) 500 MG 24 hr tablet TAKE TWO TABLETS BY MOUTH TWICE A DAY WITH MEALS 360 tablet 0   • omeprazole (priLOSEC) 40 MG capsule TAKE ONE CAPSULE BY MOUTH DAILY 90 capsule 0   • oxyCODONE (ROXICODONE) 5 MG immediate release tablet Take 1 tablet by mouth As Needed for Moderate Pain.     • celecoxib (CeleBREX) 200 MG capsule Take 1 capsule by mouth.     • traZODone (DESYREL) 50 MG tablet TAKE ONE TABLET BY MOUTH ONCE NIGHTLY 90 tablet 0     No current facility-administered medications for this visit.     Past Surgical History:   Procedure Laterality Date   • ACHILLES TENDON REPAIR Right 10/2018   • APPENDECTOMY     • CARDIAC CATHETERIZATION  11/03/2015    LEFT VENTRIULOGRAM, CORONARY ANGIOGRAM    • CHOLECYSTECTOMY  07/2013   • COLONOSCOPY  07/28/2014    NORMAL RESULT    • HERNIA REPAIR     • JOINT REPLACEMENT Bilateral     RIGHT KNEE 01/2016   • TONSILLECTOMY AND ADENOIDECTOMY     • TRACHEOSTOMY      SECONDARY TO SLEEP APNEA   • VENTRAL HERNIA REPAIR N/A 2/23/2022    Procedure: ROBOTIC UMBILICAL HERNIA REPAIR WITH MESH PLACEMENT;  Surgeon: Garrett Anderson MD;  Location: [facility] MA[facility] OR;  Service: Robotics - [facility];   Laterality: N/A;      Health Maintenance Due   Topic Date Due   • DIABETIC EYE EXAM  06/21/2021   • INFLUENZA VACCINE  08/01/2022      Immunization History   Administered Date(s) Administered   • COVID-19 (PFIZER) BIVALENT BOOSTER 12+YRS 10/25/2022   • COVID-19 (PFIZER) PURPLE CAP 02/24/2021, 03/17/2021, 12/09/2021   • Covid-19 (Pfizer) Gray Cap 06/21/2022   • FluLaval/Fluzone >6mos 12/09/2021   • Influenza Quad Vaccine (Inpatient) 01/09/2018   • Influenza TIV (IM) 01/08/2013   • Influenza, Unspecified 01/20/2021, 01/20/2021   • Pneumococcal, Unspecified 08/08/2016, 08/08/2016   • Td 03/26/2019, 03/26/2019           EMR Dragon/Transcription disclaimer:  This encounter note may contain some electronic transcription/translation of spoken language to printed text. The electronic translation of spoken language may permit erroneous, or at times, nonsensical words or phrases to be inadvertently transcribed; Although I have reviewed the note for such errors, some may still exist.        Karley Amor, APRN

## 2023-03-08 LAB — ANA SER QL: NEGATIVE

## 2023-03-08 RX ORDER — METFORMIN HYDROCHLORIDE 500 MG/1
TABLET, EXTENDED RELEASE ORAL
Qty: 360 TABLET | Refills: 0 | Status: SHIPPED | OUTPATIENT
Start: 2023-03-08

## 2023-03-09 DIAGNOSIS — G47.00 INSOMNIA, UNSPECIFIED TYPE: Chronic | ICD-10-CM

## 2023-03-10 RX ORDER — TRAZODONE HYDROCHLORIDE 50 MG/1
TABLET ORAL
Qty: 90 TABLET | Refills: 0 | Status: SHIPPED | OUTPATIENT
Start: 2023-03-10

## 2023-03-16 ENCOUNTER — TELEPHONE (OUTPATIENT)
Dept: FAMILY MEDICINE CLINIC | Age: 58
End: 2023-03-16

## 2023-03-16 NOTE — TELEPHONE ENCOUNTER
Caller: Fabio Juárez    Relationship: Self    Best call back number: [phone]    What medication are you requesting: PATIENT STATED WHATEVER PIERRE ZAVALA RECOMMENDS FOR AN MRI BECAUSE THE PATIENT STATED HE IS CLAUSTROPHOBIC    What are your current symptoms: N/A    How long have you been experiencing symptoms: N/A    Have you had these symptoms before:    [x] Yes  [] No    Have you been treated for these symptoms before:   [x] Yes  [] No    If a prescription is needed, what is your preferred pharmacy and phone number: [facility] [id number] - [place], [place] - [address] [facility] - [phone]  - [phone] FX     Additional notes:PATIENT STATED THAT HE IS HAVING AN MRI ON 3.21.23 AND NEEDS SOMETHING TO HELP HIM BECAUSE HE IS CLAUSTROPHOBIC. PLEASE CALL THE PATIENT WHEN THIS IS SENT TO THE PHARMACY OR IF THERE ARE ANY QUESTIONS.

## 2023-03-20 ENCOUNTER — TELEPHONE (OUTPATIENT)
Dept: FAMILY MEDICINE CLINIC | Age: 58
End: 2023-03-20
Payer: COMMERCIAL

## 2023-03-20 DIAGNOSIS — F40.240 CLAUSTROPHOBIA: Primary | ICD-10-CM

## 2023-03-21 ENCOUNTER — HOSPITAL ENCOUNTER (OUTPATIENT)
Dept: CT IMAGING | Facility: HOSPITAL | Age: 58
Discharge: HOME | End: 2023-03-21
Payer: COMMERCIAL

## 2023-03-21 ENCOUNTER — HOSPITAL ENCOUNTER (OUTPATIENT)
Dept: MRI IMAGING | Facility: HOSPITAL | Age: 58
Discharge: HOME | End: 2023-03-21
Payer: COMMERCIAL

## 2023-03-21 DIAGNOSIS — Z87.19 S/P HERNIA REPAIR: ICD-10-CM

## 2023-03-21 DIAGNOSIS — G89.29 CHRONIC RIGHT SHOULDER PAIN: ICD-10-CM

## 2023-03-21 DIAGNOSIS — Z98.890 S/P HERNIA REPAIR: ICD-10-CM

## 2023-03-21 DIAGNOSIS — M25.511 CHRONIC RIGHT SHOULDER PAIN: ICD-10-CM

## 2023-03-21 DIAGNOSIS — R10.32 LEFT LOWER QUADRANT ABDOMINAL PAIN: ICD-10-CM

## 2023-03-21 PROCEDURE — 74176 CT ABD & PELVIS W/O CONTRAST: CPT

## 2023-03-21 RX ORDER — DIAZEPAM 5 MG/1
5 TABLET ORAL ONCE
Qty: 1 TABLET | Refills: 0 | Status: SHIPPED | OUTPATIENT
Start: 2023-03-21 | End: 2023-03-21

## 2023-03-23 ENCOUNTER — TELEPHONE (OUTPATIENT)
Dept: FAMILY MEDICINE CLINIC | Age: 58
End: 2023-03-23
Payer: COMMERCIAL

## 2023-03-23 NOTE — TELEPHONE ENCOUNTER
Called pt re overdue MRI of shoulder ordered on 3.6.23 after he cancelled appt. States he needs different medication for anxiety to be able to complete. PCP notified.

## 2023-03-24 DIAGNOSIS — F40.240 CLAUSTROPHOBIA: Primary | ICD-10-CM

## 2023-03-24 RX ORDER — ALPRAZOLAM 0.5 MG/1
.5-1 TABLET ORAL ONCE
Qty: 2 TABLET | Refills: 0 | Status: SHIPPED | OUTPATIENT
Start: 2023-03-24 | End: 2023-03-24

## 2023-03-28 ENCOUNTER — OFFICE VISIT (OUTPATIENT)
Dept: FAMILY MEDICINE CLINIC | Age: 58
End: 2023-03-28
Payer: COMMERCIAL

## 2023-03-28 VITALS
TEMPERATURE: 97.5 F | BODY MASS INDEX: 49.87 KG/M2 | HEIGHT: 62 IN | DIASTOLIC BLOOD PRESSURE: 78 MMHG | SYSTOLIC BLOOD PRESSURE: 118 MMHG | HEART RATE: 79 BPM | OXYGEN SATURATION: 95 % | WEIGHT: 271 LBS

## 2023-03-28 DIAGNOSIS — R30.0 BURNING WITH URINATION: Primary | ICD-10-CM

## 2023-03-28 PROBLEM — M48.062 SPINAL STENOSIS OF LUMBAR REGION WITH NEUROGENIC CLAUDICATION: Status: ACTIVE | Noted: 2022-12-07

## 2023-03-28 PROBLEM — M47.816 LUMBAR FACET ARTHROPATHY: Status: ACTIVE | Noted: 2022-11-02

## 2023-03-28 PROBLEM — S86.012A PARTIAL ACHILLES TENDON TEAR, LEFT, INITIAL ENCOUNTER: Status: ACTIVE | Noted: 2022-11-14

## 2023-03-28 LAB
BACTERIA UR QL AUTO: ABNORMAL /HPF
BILIRUB UR QL STRIP: NEGATIVE
CLARITY UR: CLEAR
COLOR UR: ABNORMAL
GLUCOSE UR STRIP-MCNC: ABNORMAL MG/DL
HGB UR QL STRIP.AUTO: NEGATIVE
KETONES UR QL STRIP: NEGATIVE
LEUKOCYTE ESTERASE UR QL STRIP.AUTO: NEGATIVE
MUCOUS THREADS URNS QL MICRO: ABNORMAL /HPF
NITRITE UR QL STRIP: NEGATIVE
PH UR STRIP.AUTO: <=5 [PH] (ref 5–8)
PROT UR QL STRIP: ABNORMAL
RBC # UR STRIP: ABNORMAL /HPF
REF LAB TEST METHOD: ABNORMAL
SP GR UR STRIP: >=1.03 (ref 1–1.03)
SQUAMOUS #/AREA URNS HPF: ABNORMAL /HPF
UROBILINOGEN UR QL STRIP: ABNORMAL
WBC # UR STRIP: ABNORMAL /HPF

## 2023-03-28 PROCEDURE — 87086 URINE CULTURE/COLONY COUNT: CPT

## 2023-03-28 PROCEDURE — 81001 URINALYSIS AUTO W/SCOPE: CPT

## 2023-03-28 RX ORDER — LISINOPRIL 10 MG/1
10 TABLET ORAL DAILY
COMMUNITY
End: 2023-03-28

## 2023-03-28 RX ORDER — ATORVASTATIN CALCIUM 80 MG/1
80 TABLET, FILM COATED ORAL DAILY
COMMUNITY
Start: 2022-10-06

## 2023-03-28 RX ORDER — ALPRAZOLAM 0.5 MG/1
TABLET ORAL
COMMUNITY
Start: 2023-03-24

## 2023-03-28 RX ORDER — SEMAGLUTIDE 1.34 MG/ML
INJECTION, SOLUTION SUBCUTANEOUS
COMMUNITY
Start: 2023-03-08

## 2023-03-28 RX ORDER — DIAZEPAM 5 MG/1
TABLET ORAL
COMMUNITY
Start: 2023-03-21

## 2023-03-28 RX ORDER — CETIRIZINE HYDROCHLORIDE 5 MG/1
5 TABLET ORAL DAILY
COMMUNITY
Start: 2023-01-17 | End: 2023-03-28 | Stop reason: SDUPTHER

## 2023-03-28 RX ORDER — BACLOFEN 10 MG/1
10 TABLET ORAL DAILY
COMMUNITY
End: 2023-03-28 | Stop reason: SDUPTHER

## 2023-03-28 NOTE — PROGRESS NOTES
Subjective     CHIEF COMPLAINT    Chief Complaint   Patient presents with   • Difficulty Urinating     X 1 day     History of Present Illness  Patient is a 58-year-old male who presents to the clinic today with complaints of dysuria and urgency.  Symptoms began yesterday.  Denies any fever or chills.  Denies any nausea, vomiting.  He does not have any frequency, hematuria.  He denies any penile complaints or scrotal pain or swelling.  Denies any concerns for STDs.  Does have a history of kidney stones but he was able to pass them on his own.  He does not remember when his last kidney stone was.      Review of Systems   Constitutional: Negative for chills and fever.   Gastrointestinal: Negative for nausea and vomiting.   Genitourinary: Positive for dysuria and urgency. Negative for flank pain, frequency, hematuria, penile discharge, penile pain, penile swelling, scrotal swelling and testicular pain.   Musculoskeletal: Negative for back pain.     Allergies   Allergen Reactions   • Acetaminophen Itching   • Oxycodone Itching       Current Outpatient Medications on File Prior to Visit   Medication Sig Dispense Refill   • albuterol sulfate  (90 Base) MCG/ACT inhaler EVERY 4 HOURS AS NEEDED as needed for SHORTNESS OF BREATH     • ALPRAZolam (XANAX) 0.5 MG tablet      • atorvastatin (LIPITOR) 80 MG tablet Take 1 tablet by mouth Daily.     • celecoxib (CeleBREX) 200 MG capsule Take 1 capsule by mouth.     • cetirizine (zyrTEC) 5 MG tablet TAKE ONE TABLET BY MOUTH DAILY 90 tablet 0   • cetirizine (zyrTEC) 5 MG tablet Take 1 tablet by mouth Daily.     • diazePAM (VALIUM) 5 MG tablet      • gabapentin (NEURONTIN) 300 MG capsule 4 (Four) Times a Day.     • glimepiride (AMARYL) 2 MG tablet Take 1 tablet by mouth Every Morning Before Breakfast. 1.5 tab     • hydroCHLOROthiazide (HYDRODIURIL) 12.5 MG tablet Take 1 tablet by mouth Daily. 90 tablet 1   • lisinopril (PRINIVIL,ZESTRIL) 10 MG tablet TAKE ONE TABLET BY MOUTH  "EVERY MORNING 90 tablet 0   • metFORMIN ER (GLUCOPHAGE-XR) 500 MG 24 hr tablet TAKE TWO TABLETS BY MOUTH TWICE A DAY WITH MEALS 360 tablet 0   • oxyCODONE (ROXICODONE) 5 MG immediate release tablet Take 1 tablet by mouth As Needed for Moderate Pain.     • Semaglutide,0.25 or 0.5MG/DOS, (Ozempic, 0.25 or 0.5 MG/DOSE,) 2 MG/1.5ML solution pen-injector Inject  under the skin into the appropriate area as directed.     • traZODone (DESYREL) 50 MG tablet TAKE ONE TABLET BY MOUTH ONCE NIGHTLY 90 tablet 0   • [DISCONTINUED] doxycycline (VIBRAMYICN) 100 MG tablet      • baclofen (LIORESAL) 10 MG tablet TAKE 1/2 TO 1 TABLET BY MOUTH AT BEDTIME FOR LOWER BACK PAIN MUSCLE SPASM 90 tablet 1   • baclofen (LIORESAL) 10 MG tablet Take 1 tablet by mouth Daily.     • fexofenadine (ALLEGRA) 180 MG tablet Take 1 tablet by mouth Daily.     • lisinopril (PRINIVIL,ZESTRIL) 10 MG tablet Take 1 tablet by mouth Daily.     • metFORMIN (GLUCOPHAGE) 500 MG tablet Take 1 tablet by mouth.     • omeprazole (priLOSEC) 40 MG capsule TAKE ONE CAPSULE BY MOUTH DAILY 90 capsule 0   • [DISCONTINUED] atorvastatin (LIPITOR) 20 MG tablet Take 1 tablet by mouth Daily.     • [DISCONTINUED] dextromethorphan-guaifenesin (MUCINEX DM MAX STRENGTH)  MG per 12 hr tablet Take 1 tablet by mouth.       No current facility-administered medications on file prior to visit.     /78 (BP Location: Right arm, Patient Position: Sitting, Cuff Size: Large Adult)   Pulse 79   Temp 97.5 °F (36.4 °C) (Oral)   Ht 157.5 cm (62.01\")   Wt 123 kg (271 lb)   SpO2 95%   BMI 49.55 kg/m²     Objective     Physical Exam  Vitals and nursing note reviewed. Exam conducted with a chaperone present (Mary Ann SAAVEDRA MA).   Constitutional:       General: He is not in acute distress.     Appearance: Normal appearance. He is not ill-appearing.   HENT:      Head: Normocephalic.   Eyes:      Extraocular Movements: Extraocular movements intact.      Pupils: Pupils are equal, round, and " reactive to light.   Neck:      Comments: Tracheostomy present  Cardiovascular:      Rate and Rhythm: Normal rate and regular rhythm.      Heart sounds: Normal heart sounds. No murmur heard.  Pulmonary:      Effort: Pulmonary effort is normal. No accessory muscle usage or respiratory distress.      Breath sounds: Normal breath sounds. No wheezing or rhonchi.   Abdominal:      General: Bowel sounds are normal. There is no distension.      Palpations: Abdomen is soft.      Tenderness: There is no abdominal tenderness. There is no right CVA tenderness, left CVA tenderness, guarding or rebound.   Genitourinary:     Prostate: Normal. Not tender.   Skin:     General: Skin is warm and dry.   Neurological:      General: No focal deficit present.      Mental Status: He is alert and oriented to person, place, and time.   Psychiatric:         Mood and Affect: Mood and affect normal.         Behavior: Behavior normal.          Lab Results (last 24 hours)     Procedure Component Value Units Date/Time    Urinalysis With Microscopic - Urine, Clean Catch [[id number]]  (Abnormal) Collected: 03/28/23 1120    Specimen: Urine, Clean Catch Updated: 03/28/23 1154    Narrative:      The following orders were created for panel order Urinalysis With Microscopic - Urine, Clean Catch.  Procedure                               Abnormality         Status                     ---------                               -----------         ------                     Urinalysis without micro...[[id number]]  Abnormal            Final result               Urinalysis, Microscopic ...[[id number]]  Abnormal            Final result                 Please view results for these tests on the individual orders.    Urine Culture - Urine, Urine, Clean Catch [[id number]] Collected: 03/28/23 1120    Specimen: Urine, Clean Catch Updated: 03/28/23 1122    Urinalysis without microscopic (no culture) - Urine, Clean Catch [[id number]]  (Abnormal) Collected: 03/28/23 1120     Specimen: Urine, Clean Catch Updated: 03/28/23 1135     Color, UA Dark Yellow     Appearance, UA Clear     pH, UA <=5.0     Specific Gravity, UA >=1.030     Glucose,  mg/dL (1+)     Ketones, UA Negative     Bilirubin, UA Negative     Blood, UA Negative     Protein, UA Trace     Leuk Esterase, UA Negative     Nitrite, UA Negative     Urobilinogen, UA 0.2 E.U./dL    Urinalysis, Microscopic Only - Urine, Clean Catch [[id number]]  (Abnormal) Collected: 03/28/23 1120    Specimen: Urine, Clean Catch Updated: 03/28/23 1154     RBC, UA 0-2 /HPF      WBC, UA None Seen /HPF      Bacteria, UA None Seen /HPF      Squamous Epithelial Cells, UA 0-2 /HPF      Mucus, UA Moderate/2+ /HPF      Methodology Manual Light Microscopy           Diagnoses and all orders for this visit:    1. Burning with urination (Primary)  -     Urinalysis With Microscopic - Urine, Clean Catch  -     Urine Culture - Urine, Urine, Clean Catch    Urine does not look concerning for UTI at this time.  No concerning findings related to prostate exam.  Will await culture result, patient agreeable. Increase fluid intake, avoid bladder irritants.  Return to clinic if symptoms worsen or do not improve.  Proceed to ER for any severe symptoms including fever, chills, nausea or vomiting, back pain or flank pain. Patient voiced understanding of all instructions and had no further questions at this time.     Follow up:  Return if symptoms worsen or fail to improve.  Patient was given instructions and counseling regarding his condition or for health maintenance advice. Please see specific information pulled into the AVS if appropriate.

## 2023-03-30 LAB — BACTERIA SPEC AEROBE CULT: NO GROWTH

## 2023-03-31 ENCOUNTER — HOSPITAL ENCOUNTER (OUTPATIENT)
Dept: MRI IMAGING | Facility: HOSPITAL | Age: 58
Discharge: HOME | End: 2023-03-31
Admitting: NURSE PRACTITIONER
Payer: COMMERCIAL

## 2023-03-31 PROCEDURE — 73221 MRI JOINT UPR EXTREM W/O DYE: CPT

## 2023-04-03 RX ORDER — OMEPRAZOLE 40 MG/1
CAPSULE, DELAYED RELEASE ORAL
Qty: 90 CAPSULE | Refills: 0 | Status: SHIPPED | OUTPATIENT
Start: 2023-04-03

## 2023-04-07 DIAGNOSIS — G89.29 CHRONIC RIGHT SHOULDER PAIN: Primary | ICD-10-CM

## 2023-04-07 DIAGNOSIS — M25.511 CHRONIC RIGHT SHOULDER PAIN: Primary | ICD-10-CM

## 2023-04-07 DIAGNOSIS — R93.6 ABNORMAL MRI, SHOULDER: ICD-10-CM

## 2023-04-10 RX ORDER — LISINOPRIL 10 MG/1
TABLET ORAL
Qty: 90 TABLET | Refills: 0 | Status: SHIPPED | OUTPATIENT
Start: 2023-04-10

## 2023-04-13 RX ORDER — ATORVASTATIN CALCIUM 80 MG/1
TABLET, FILM COATED ORAL
Qty: 90 TABLET | Refills: 0 | Status: SHIPPED | OUTPATIENT
Start: 2023-04-13

## 2023-04-17 ENCOUNTER — PREP FOR SURGERY (OUTPATIENT)
Dept: OTHER | Facility: HOSPITAL | Age: 58
End: 2023-04-17
Payer: COMMERCIAL

## 2023-04-17 ENCOUNTER — OFFICE VISIT (OUTPATIENT)
Dept: ORTHOPEDIC SURGERY | Facility: CLINIC | Age: 58
End: 2023-04-17
Payer: COMMERCIAL

## 2023-04-17 VITALS — BODY MASS INDEX: 51.16 KG/M2 | WEIGHT: 278 LBS | HEIGHT: 62 IN | OXYGEN SATURATION: 95 %

## 2023-04-17 DIAGNOSIS — M19.019 OSTEOARTHRITIS OF AC (ACROMIOCLAVICULAR) JOINT: Primary | ICD-10-CM

## 2023-04-17 DIAGNOSIS — M76.62 LEFT ACHILLES TENDINITIS: Primary | ICD-10-CM

## 2023-04-17 DIAGNOSIS — M75.101 TEAR OF RIGHT ROTATOR CUFF, UNSPECIFIED TEAR EXTENT, UNSPECIFIED WHETHER TRAUMATIC: ICD-10-CM

## 2023-04-17 DIAGNOSIS — M75.21 BICEPS TENDONITIS ON RIGHT: ICD-10-CM

## 2023-04-17 RX ORDER — CEFAZOLIN SODIUM 2 G/100ML
2 INJECTION, SOLUTION INTRAVENOUS ONCE
OUTPATIENT
Start: 2023-04-17 | End: 2023-04-17

## 2023-04-17 RX ORDER — CEFAZOLIN SODIUM IN 0.9 % NACL 3 G/100 ML
3 INTRAVENOUS SOLUTION, PIGGYBACK (ML) INTRAVENOUS ONCE
OUTPATIENT
Start: 2023-04-17 | End: 2023-04-17

## 2023-04-17 NOTE — PROGRESS NOTES
"Chief Complaint  Pain and Initial Evaluation of the Right Shoulder    Subjective          Fabio Juárez presents to [facility] for   History of Present Illness    The patient presents here today for evaluation of the right shoulder. He reports right shoulder pain. He reports an aching and burning pain. He reports it keeps him up at night. He has had pain for about 9 months. He reports he lifts a lot at work. He has no other complaints. He has had 2 injections and a round of therapy in the past without relief.     Allergies   Allergen Reactions   • Acetaminophen Itching   • Oxycodone Itching        Social History     Socioeconomic History   • Marital status:    • Number of children: 2   Tobacco Use   • Smoking status: Never   • Smokeless tobacco: Never   Vaping Use   • Vaping Use: Never used   Substance and Sexual Activity   • Alcohol use: Yes     Comment: SOCIALLY    • Drug use: Never   • Sexual activity: Yes     Partners: Female     Comment: Wife        I reviewed the patient's chief complaint, history of present illness, review of systems, past medical history, surgical history, family history, social history, medications, and allergy list.     REVIEW OF SYSTEMS    Constitutional: Denies fevers, chills, weight loss  Cardiovascular: Denies chest pain, shortness of breath  Skin: Denies rashes, acute skin changes  Neurologic: Denies headache, loss of consciousness  MSK: Right shoulder pain      Objective   Vital Signs:   Ht 157.5 cm (62\")   Wt 126 kg (278 lb)   SpO2 95%   BMI 50.85 kg/m²     Body mass index is 50.85 kg/m².    Physical Exam    General: Alert. No acute distress.   Right shoulder- Sensation to light touch median, radial, ulnar nerve. Positive AIN, PIN, ulnar nerve motor function. Positive pulses. Tender to the biceps. Tender to the acromioclavicular joint. Forward elevation 130, passive 180. External Rotation 45. Internal rotation to back pocket. 5/5 rotator " cuff testing, no pain. Positive impingement. Pain with speeds. Pain with cross arm adduction     Procedures    Imaging Results (Most Recent)     None                   Assessment and Plan        CT Abdomen Pelvis Without Contrast    Result Date: 3/21/2023  Narrative: PROCEDURE: CT ABDOMEN PELVIS WO CONTRAST  COMPARISON: [facility], CT, CT ABDOMEN PELVIS W CONTRAST, 3/31/2022, 19:37.  [facility], CT, CT ABDOMEN PELVIS W CONTRAST, 5/04/2022, 13:50.  INDICATIONS: left lower quadrant abdominal pain  TECHNIQUE: CT images were created without intravenous contrast.   PROTOCOL:   Standard imaging protocol performed    RADIATION:   DLP: 1477.7 mGy*cm   Automated exposure control was utilized to minimize radiation dose.  FINDINGS:  Included lung bases are clear.  The liver is lower in attenuation than the spleen, consistent with diffuse hepatic steatosis.  Gallbladder is surgically absent.  Pancreas, spleen, adrenal glands, and kidneys appear unremarkable for noncontrast CT.  No evidence of nephroureterolithiasis or hydronephrosis.  No urinary bladder calcifications.  Urinary bladder is incompletely distended.  No abnormal small bowel distention is seen.  There are a few scattered colonic diverticula without CT evidence of diverticulitis.  Prostate gland is not enlarged.  There is stable postoperative change in the left anterior pelvis/inguinal canal, presumably from hernia repair.  No significant free fluid or lymphadenopathy is seen.  There are atherosclerotic vascular calcifications.  No acute osseous abnormality is identified.  There is severe facet arthropathy in the lower lumbar spine.      Impression:   No acute abdominal or pelvic abnormality is identified on noncontrast CT.  Diffuse hepatic steatosis.  Please see above for additional stable findings.     SUSANA GRAFF MD       Electronically Signed and Approved By: SUSANA GRAFF MD on 3/21/2023 at 15:53             MRI Shoulder Right  Without Contrast    Result Date: 4/3/2023  Narrative: PROCEDURE: MRI SHOULDER RIGHT WO CONTRAST  COMPARISON: [facility] KALLI, MAGDALENO, XR SHOULDER 2+ VW RIGHT, 3/06/2023, 16:31.  INDICATIONS: PAIN, ROTATOR CUFF TEAR X 1 MONTH WITH NUMBNESS AND TINGLING IN LT HAND      TECHNIQUE: A variety of imaging planes and parameters were utilized for visualization of suspected pathology.  Images were performed without contrast.   FINDINGS:  The examination is markedly limited by motion artifact.  By report of the technologist, the patient is extremely claustrophobic and was in pain.  No overt fracture or malalignment is identified.  Moderate acromioclavicular osteoarthritis is noted.  A small AC joint effusion is noted.  There is a 0.9 cm x 0.7 cm tear in the distal infra spinatus tendon at the footprint.  It is difficult to ascertain secondary to patient motion whether this remains intrasubstance or extends to the articular surface.  There is a suspected small tear in the distal supraspinatus tendon at the footprint favored to remain intrasubstance.  It measures 0.4 cm x 0.4 cm.  The rotator cuff otherwise appears grossly unremarkable.  No significant muscle body atrophy is seen.  There is separation of the anterosuperior labrum from the underlying glenoid likely representing a sub labral foramen.  No overt labral tear is identified.  No significant glenohumeral joint effusion is noted.  Cartilage in the joint is not well evaluated secondary to motion.  No overt defect is seen.  No loose body is evident.       Impression:   1. Significantly limited evaluation secondary to patient motion. 2. Moderate acromioclavicular osteoarthritis 3. Small tears in the distal supraspinatus and infra spinatus tendons, possibly intrasubstance versus extending to the articular surface. 4. If felt clinically indicated, findings could be further evaluated with an arthrogram CT.      Fabio Henao M.D.       Electronically Signed and Approved  By: Fabio Henao M.D. on 4/03/2023 at 10:55                Diagnoses and all orders for this visit:    1. Osteoarthritis of AC (acromioclavicular) joint (Primary)    2. Tear of right rotator cuff, unspecified tear extent, unspecified whether traumatic        Discussed the treatment options with the patient, operative vs non-operative.   I reviewed the images with the patient. Discussed the risks and benefits of a Right shoulder arthroscopic rotator cuff debridement vs repair, SAD/ DCE with possible biceps tenodesis. The patient expressed understanding and wished to proceed.          Discussed surgery., Risks/benefits discussed with patient including, but not limited to: infection, bleeding, neurovascular damage, re-rupture, aesthetic deformity, need for further surgery, and death., Discussed with patient the implant type being used during surgery and patient understands., Surgery pamphlet given. and Call or return if worsening symptoms.    Scribed for Phu Lebron MD by Ernestine Moctezuma  04/17/2023   08:43 EDT         Follow Up       2 weeks postoperatively.      Patient was given instructions and counseling regarding his condition or for health maintenance advice. Please see specific information pulled into the AVS if appropriate.       I have personally performed the services described in this document as scribed by the above individual and it is both accurate and complete.     Phu Lebron MD  04/17/23  08:45 EDT

## 2023-04-17 NOTE — H&P (VIEW-ONLY)
"Chief Complaint  Pain and Initial Evaluation of the Right Shoulder    Subjective          Fabio Juárez presents to [facility] for   History of Present Illness    The patient presents here today for evaluation of the right shoulder. He reports right shoulder pain. He reports an aching and burning pain. He reports it keeps him up at night. He has had pain for about 9 months. He reports he lifts a lot at work. He has no other complaints. He has had 2 injections and a round of therapy in the past without relief.     Allergies   Allergen Reactions   • Acetaminophen Itching   • Oxycodone Itching        Social History     Socioeconomic History   • Marital status:    • Number of children: 2   Tobacco Use   • Smoking status: Never   • Smokeless tobacco: Never   Vaping Use   • Vaping Use: Never used   Substance and Sexual Activity   • Alcohol use: Yes     Comment: SOCIALLY    • Drug use: Never   • Sexual activity: Yes     Partners: Female     Comment: Wife        I reviewed the patient's chief complaint, history of present illness, review of systems, past medical history, surgical history, family history, social history, medications, and allergy list.     REVIEW OF SYSTEMS    Constitutional: Denies fevers, chills, weight loss  Cardiovascular: Denies chest pain, shortness of breath  Skin: Denies rashes, acute skin changes  Neurologic: Denies headache, loss of consciousness  MSK: Right shoulder pain      Objective   Vital Signs:   Ht 157.5 cm (62\")   Wt 126 kg (278 lb)   SpO2 95%   BMI 50.85 kg/m²     Body mass index is 50.85 kg/m².    Physical Exam    General: Alert. No acute distress.   Right shoulder- Sensation to light touch median, radial, ulnar nerve. Positive AIN, PIN, ulnar nerve motor function. Positive pulses. Tender to the biceps. Tender to the acromioclavicular joint. Forward elevation 130, passive 180. External Rotation 45. Internal rotation to back pocket. 5/5 rotator " cuff testing, no pain. Positive impingement. Pain with speeds. Pain with cross arm adduction     Procedures    Imaging Results (Most Recent)     None                   Assessment and Plan        CT Abdomen Pelvis Without Contrast    Result Date: 3/21/2023  Narrative: PROCEDURE: CT ABDOMEN PELVIS WO CONTRAST  COMPARISON: [facility], CT, CT ABDOMEN PELVIS W CONTRAST, 3/31/2022, 19:37.  [facility], CT, CT ABDOMEN PELVIS W CONTRAST, 5/04/2022, 13:50.  INDICATIONS: left lower quadrant abdominal pain  TECHNIQUE: CT images were created without intravenous contrast.   PROTOCOL:   Standard imaging protocol performed    RADIATION:   DLP: 1477.7 mGy*cm   Automated exposure control was utilized to minimize radiation dose.  FINDINGS:  Included lung bases are clear.  The liver is lower in attenuation than the spleen, consistent with diffuse hepatic steatosis.  Gallbladder is surgically absent.  Pancreas, spleen, adrenal glands, and kidneys appear unremarkable for noncontrast CT.  No evidence of nephroureterolithiasis or hydronephrosis.  No urinary bladder calcifications.  Urinary bladder is incompletely distended.  No abnormal small bowel distention is seen.  There are a few scattered colonic diverticula without CT evidence of diverticulitis.  Prostate gland is not enlarged.  There is stable postoperative change in the left anterior pelvis/inguinal canal, presumably from hernia repair.  No significant free fluid or lymphadenopathy is seen.  There are atherosclerotic vascular calcifications.  No acute osseous abnormality is identified.  There is severe facet arthropathy in the lower lumbar spine.      Impression:   No acute abdominal or pelvic abnormality is identified on noncontrast CT.  Diffuse hepatic steatosis.  Please see above for additional stable findings.     SUSANA GRAFF MD       Electronically Signed and Approved By: SUSANA GRAFF MD on 3/21/2023 at 15:53             MRI Shoulder Right  Without Contrast    Result Date: 4/3/2023  Narrative: PROCEDURE: MRI SHOULDER RIGHT WO CONTRAST  COMPARISON: [facility] KALLI, MAGDALENO, XR SHOULDER 2+ VW RIGHT, 3/06/2023, 16:31.  INDICATIONS: PAIN, ROTATOR CUFF TEAR X 1 MONTH WITH NUMBNESS AND TINGLING IN LT HAND      TECHNIQUE: A variety of imaging planes and parameters were utilized for visualization of suspected pathology.  Images were performed without contrast.   FINDINGS:  The examination is markedly limited by motion artifact.  By report of the technologist, the patient is extremely claustrophobic and was in pain.  No overt fracture or malalignment is identified.  Moderate acromioclavicular osteoarthritis is noted.  A small AC joint effusion is noted.  There is a 0.9 cm x 0.7 cm tear in the distal infra spinatus tendon at the footprint.  It is difficult to ascertain secondary to patient motion whether this remains intrasubstance or extends to the articular surface.  There is a suspected small tear in the distal supraspinatus tendon at the footprint favored to remain intrasubstance.  It measures 0.4 cm x 0.4 cm.  The rotator cuff otherwise appears grossly unremarkable.  No significant muscle body atrophy is seen.  There is separation of the anterosuperior labrum from the underlying glenoid likely representing a sub labral foramen.  No overt labral tear is identified.  No significant glenohumeral joint effusion is noted.  Cartilage in the joint is not well evaluated secondary to motion.  No overt defect is seen.  No loose body is evident.       Impression:   1. Significantly limited evaluation secondary to patient motion. 2. Moderate acromioclavicular osteoarthritis 3. Small tears in the distal supraspinatus and infra spinatus tendons, possibly intrasubstance versus extending to the articular surface. 4. If felt clinically indicated, findings could be further evaluated with an arthrogram CT.      Fabio Henao M.D.       Electronically Signed and Approved  By: Fabio Henao M.D. on 4/03/2023 at 10:55                Diagnoses and all orders for this visit:    1. Osteoarthritis of AC (acromioclavicular) joint (Primary)    2. Tear of right rotator cuff, unspecified tear extent, unspecified whether traumatic        Discussed the treatment options with the patient, operative vs non-operative.   I reviewed the images with the patient. Discussed the risks and benefits of a Right shoulder arthroscopic rotator cuff debridement vs repair, SAD/ DCE with possible biceps tenodesis. The patient expressed understanding and wished to proceed.          Discussed surgery., Risks/benefits discussed with patient including, but not limited to: infection, bleeding, neurovascular damage, re-rupture, aesthetic deformity, need for further surgery, and death., Discussed with patient the implant type being used during surgery and patient understands., Surgery pamphlet given. and Call or return if worsening symptoms.    Scribed for Phu Lebron MD by Ernestine Moctezuma  04/17/2023   08:43 EDT         Follow Up       2 weeks postoperatively.      Patient was given instructions and counseling regarding his condition or for health maintenance advice. Please see specific information pulled into the AVS if appropriate.       I have personally performed the services described in this document as scribed by the above individual and it is both accurate and complete.     Phu Lebron MD  04/17/23  08:45 EDT

## 2023-04-19 ENCOUNTER — PATIENT ROUNDING (BHMG ONLY) (OUTPATIENT)
Dept: ORTHOPEDIC SURGERY | Facility: CLINIC | Age: 58
End: 2023-04-19
Payer: COMMERCIAL

## 2023-04-19 NOTE — PROGRESS NOTES
A [employer] message has been sent to the patient for PATIENT ROUNDING with [facility].

## 2023-05-02 ENCOUNTER — TELEPHONE (OUTPATIENT)
Dept: ORTHOPEDIC SURGERY | Facility: CLINIC | Age: 58
End: 2023-05-02
Payer: COMMERCIAL

## 2023-05-02 NOTE — TELEPHONE ENCOUNTER
Caller: TAYO    Relationship: SELF    Best call back number: [phone]      What was the call regarding: PATIENT IS CALLING STATING DELILAH NEVER RECEIVED HIS [facility] PAPER WORK AND IS WANTING TO KNOW IF IT CAN BE RE FAXED.     FAX NUMBER: [phone]    Do you require a callback: YES

## 2023-05-09 ENCOUNTER — HOSPITAL ENCOUNTER (OUTPATIENT)
Facility: HOSPITAL | Age: 58
Setting detail: HOSPITAL OUTPATIENT SURGERY
Discharge: HOME | End: 2023-05-09
Attending: STUDENT IN AN ORGANIZED HEALTH CARE EDUCATION/TRAINING PROGRAM | Admitting: STUDENT IN AN ORGANIZED HEALTH CARE EDUCATION/TRAINING PROGRAM
Payer: COMMERCIAL

## 2023-05-09 ENCOUNTER — ANESTHESIA (OUTPATIENT)
Dept: PERIOP | Facility: HOSPITAL | Age: 58
End: 2023-05-09
Payer: COMMERCIAL

## 2023-05-09 ENCOUNTER — ANESTHESIA EVENT (OUTPATIENT)
Dept: PERIOP | Facility: HOSPITAL | Age: 58
End: 2023-05-09
Payer: COMMERCIAL

## 2023-05-09 VITALS
TEMPERATURE: 96.8 F | RESPIRATION RATE: 19 BRPM | DIASTOLIC BLOOD PRESSURE: 79 MMHG | HEART RATE: 82 BPM | HEIGHT: 65 IN | SYSTOLIC BLOOD PRESSURE: 109 MMHG | OXYGEN SATURATION: 90 % | WEIGHT: 270.73 LBS | BODY MASS INDEX: 45.11 KG/M2

## 2023-05-09 DIAGNOSIS — M75.21 BICEPS TENDONITIS ON RIGHT: ICD-10-CM

## 2023-05-09 DIAGNOSIS — M75.101 TEAR OF RIGHT ROTATOR CUFF, UNSPECIFIED TEAR EXTENT, UNSPECIFIED WHETHER TRAUMATIC: ICD-10-CM

## 2023-05-09 DIAGNOSIS — M75.111 NONTRAUMATIC INCOMPLETE TEAR OF RIGHT ROTATOR CUFF: ICD-10-CM

## 2023-05-09 DIAGNOSIS — M19.019 OSTEOARTHRITIS OF AC (ACROMIOCLAVICULAR) JOINT: Primary | ICD-10-CM

## 2023-05-09 LAB
ANION GAP SERPL CALCULATED.3IONS-SCNC: 8.4 MMOL/L (ref 5–15)
BUN SERPL-MCNC: 12 MG/DL (ref 6–20)
BUN/CREAT SERPL: 18.2 (ref 7–25)
CALCIUM SPEC-SCNC: 8.8 MG/DL (ref 8.6–10.5)
CHLORIDE SERPL-SCNC: 103 MMOL/L (ref 98–107)
CO2 SERPL-SCNC: 25.6 MMOL/L (ref 22–29)
CREAT SERPL-MCNC: 0.66 MG/DL (ref 0.76–1.27)
EGFRCR SERPLBLD CKD-EPI 2021: 109.4 ML/MIN/1.73
GLUCOSE BLDC GLUCOMTR-MCNC: 225 MG/DL (ref 70–99)
GLUCOSE SERPL-MCNC: 229 MG/DL (ref 65–99)
POTASSIUM SERPL-SCNC: 3.9 MMOL/L (ref 3.5–5.2)
QT INTERVAL: 377 MS
SODIUM SERPL-SCNC: 137 MMOL/L (ref 136–145)

## 2023-05-09 PROCEDURE — C1713 ANCHOR/SCREW BN/BN,TIS/BN: HCPCS | Performed by: STUDENT IN AN ORGANIZED HEALTH CARE EDUCATION/TRAINING PROGRAM

## 2023-05-09 PROCEDURE — 93005 ELECTROCARDIOGRAM TRACING: CPT | Performed by: ANESTHESIOLOGY

## 2023-05-09 PROCEDURE — 29828 SHO ARTHRS SRG BICP TENODSIS: CPT | Performed by: PHYSICIAN ASSISTANT

## 2023-05-09 PROCEDURE — 25010000002 CEFAZOLIN PER 500 MG: Performed by: STUDENT IN AN ORGANIZED HEALTH CARE EDUCATION/TRAINING PROGRAM

## 2023-05-09 PROCEDURE — 82948 REAGENT STRIP/BLOOD GLUCOSE: CPT

## 2023-05-09 PROCEDURE — 25010000002 PROPOFOL 10 MG/ML EMULSION: Performed by: NURSE ANESTHETIST, CERTIFIED REGISTERED

## 2023-05-09 PROCEDURE — 25010000002 MIDAZOLAM PER 1MG: Performed by: ANESTHESIOLOGY

## 2023-05-09 PROCEDURE — 25010000002 ROPIVACAINE PER 1 MG: Performed by: ANESTHESIOLOGY

## 2023-05-09 PROCEDURE — 25010000002 HYDROMORPHONE 1 MG/ML SOLUTION: Performed by: NURSE ANESTHETIST, CERTIFIED REGISTERED

## 2023-05-09 PROCEDURE — 80048 BASIC METABOLIC PNL TOTAL CA: CPT | Performed by: ANESTHESIOLOGY

## 2023-05-09 PROCEDURE — 29827 SHO ARTHRS SRG RT8TR CUF RPR: CPT | Performed by: PHYSICIAN ASSISTANT

## 2023-05-09 PROCEDURE — 25010000002 ONDANSETRON PER 1 MG: Performed by: NURSE ANESTHETIST, CERTIFIED REGISTERED

## 2023-05-09 PROCEDURE — 25010000002 FENTANYL CITRATE (PF) 50 MCG/ML SOLUTION: Performed by: NURSE ANESTHETIST, CERTIFIED REGISTERED

## 2023-05-09 PROCEDURE — 25010000002 PROCHLORPERAZINE 10 MG/2ML SOLUTION: Performed by: ANESTHESIOLOGY

## 2023-05-09 PROCEDURE — 29827 SHO ARTHRS SRG RT8TR CUF RPR: CPT | Performed by: STUDENT IN AN ORGANIZED HEALTH CARE EDUCATION/TRAINING PROGRAM

## 2023-05-09 PROCEDURE — L3670 SO ACRO/CLAV CAN WEB PRE OTS: HCPCS | Performed by: STUDENT IN AN ORGANIZED HEALTH CARE EDUCATION/TRAINING PROGRAM

## 2023-05-09 PROCEDURE — 29828 SHO ARTHRS SRG BICP TENODSIS: CPT | Performed by: STUDENT IN AN ORGANIZED HEALTH CARE EDUCATION/TRAINING PROGRAM

## 2023-05-09 PROCEDURE — 25010000002 KETOROLAC TROMETHAMINE PER 15 MG: Performed by: ANESTHESIOLOGY

## 2023-05-09 PROCEDURE — 25010000002 DEXAMETHASONE PER 1 MG: Performed by: ANESTHESIOLOGY

## 2023-05-09 DEVICE — SUT FIBERLINK BR PB NO2 W/CLSDLP 1.5IN: Type: IMPLANTABLE DEVICE | Site: SHOULDER | Status: FUNCTIONAL

## 2023-05-09 DEVICE — SUT/ANCH BIOCOMP SWIVELOCK/C 4.75X19.1MM: Type: IMPLANTABLE DEVICE | Site: SHOULDER | Status: FUNCTIONAL

## 2023-05-09 DEVICE — SUT/ANCH FIBERTAK/RC/2.6 SFT SP 1.7MM/FIBERTAPE/LP BLU: Type: IMPLANTABLE DEVICE | Site: SHOULDER | Status: FUNCTIONAL

## 2023-05-09 RX ORDER — MIDAZOLAM HYDROCHLORIDE 2 MG/2ML
2 INJECTION, SOLUTION INTRAMUSCULAR; INTRAVENOUS
Status: DISCONTINUED | OUTPATIENT
Start: 2023-05-09 | End: 2023-05-09 | Stop reason: HOSPADM

## 2023-05-09 RX ORDER — SODIUM CHLORIDE, SODIUM LACTATE, POTASSIUM CHLORIDE, CALCIUM CHLORIDE 600; 310; 30; 20 MG/100ML; MG/100ML; MG/100ML; MG/100ML
9 INJECTION, SOLUTION INTRAVENOUS CONTINUOUS PRN
Status: DISCONTINUED | OUTPATIENT
Start: 2023-05-09 | End: 2023-05-10 | Stop reason: HOSPADM

## 2023-05-09 RX ORDER — PROPOFOL 10 MG/ML
VIAL (ML) INTRAVENOUS AS NEEDED
Status: DISCONTINUED | OUTPATIENT
Start: 2023-05-09 | End: 2023-05-09 | Stop reason: SURG

## 2023-05-09 RX ORDER — ROPIVACAINE HYDROCHLORIDE 5 MG/ML
INJECTION, SOLUTION EPIDURAL; INFILTRATION; PERINEURAL AS NEEDED
Status: DISCONTINUED | OUTPATIENT
Start: 2023-05-09 | End: 2023-05-09 | Stop reason: SURG

## 2023-05-09 RX ORDER — ACETAMINOPHEN 500 MG
1000 TABLET ORAL ONCE
Status: COMPLETED | OUTPATIENT
Start: 2023-05-09 | End: 2023-05-09

## 2023-05-09 RX ORDER — ONDANSETRON 2 MG/ML
4 INJECTION INTRAMUSCULAR; INTRAVENOUS ONCE AS NEEDED
Status: DISCONTINUED | OUTPATIENT
Start: 2023-05-09 | End: 2023-05-10 | Stop reason: HOSPADM

## 2023-05-09 RX ORDER — CEFAZOLIN SODIUM 2 G/100ML
2 INJECTION, SOLUTION INTRAVENOUS ONCE
Status: DISCONTINUED | OUTPATIENT
Start: 2023-05-09 | End: 2023-05-09

## 2023-05-09 RX ORDER — SODIUM CHLORIDE 0.9 % (FLUSH) 0.9 %
10 SYRINGE (ML) INJECTION EVERY 12 HOURS SCHEDULED
Status: DISCONTINUED | OUTPATIENT
Start: 2023-05-09 | End: 2023-05-09 | Stop reason: HOSPADM

## 2023-05-09 RX ORDER — LIDOCAINE HYDROCHLORIDE 20 MG/ML
INJECTION, SOLUTION EPIDURAL; INFILTRATION; INTRACAUDAL; PERINEURAL AS NEEDED
Status: DISCONTINUED | OUTPATIENT
Start: 2023-05-09 | End: 2023-05-09 | Stop reason: SURG

## 2023-05-09 RX ORDER — IPRATROPIUM BROMIDE AND ALBUTEROL SULFATE 2.5; .5 MG/3ML; MG/3ML
3 SOLUTION RESPIRATORY (INHALATION)
Status: DISCONTINUED | OUTPATIENT
Start: 2023-05-09 | End: 2023-05-10 | Stop reason: HOSPADM

## 2023-05-09 RX ORDER — OXYCODONE HYDROCHLORIDE 5 MG/1
5 TABLET ORAL EVERY 6 HOURS PRN
Qty: 30 TABLET | Refills: 0 | Status: SHIPPED | OUTPATIENT
Start: 2023-05-09

## 2023-05-09 RX ORDER — DEXAMETHASONE SODIUM PHOSPHATE 4 MG/ML
INJECTION, SOLUTION INTRA-ARTICULAR; INTRALESIONAL; INTRAMUSCULAR; INTRAVENOUS; SOFT TISSUE AS NEEDED
Status: DISCONTINUED | OUTPATIENT
Start: 2023-05-09 | End: 2023-05-09 | Stop reason: SURG

## 2023-05-09 RX ORDER — HYDROCODONE BITARTRATE AND ACETAMINOPHEN 5; 325 MG/1; MG/1
1 TABLET ORAL ONCE AS NEEDED
Status: DISCONTINUED | OUTPATIENT
Start: 2023-05-09 | End: 2023-05-10 | Stop reason: HOSPADM

## 2023-05-09 RX ORDER — PROMETHAZINE HYDROCHLORIDE 12.5 MG/1
25 TABLET ORAL ONCE AS NEEDED
Status: DISCONTINUED | OUTPATIENT
Start: 2023-05-09 | End: 2023-05-10 | Stop reason: HOSPADM

## 2023-05-09 RX ORDER — PROCHLORPERAZINE EDISYLATE 5 MG/ML
5 INJECTION INTRAMUSCULAR; INTRAVENOUS
Status: DISCONTINUED | OUTPATIENT
Start: 2023-05-09 | End: 2023-05-10 | Stop reason: HOSPADM

## 2023-05-09 RX ORDER — ONDANSETRON 4 MG/1
4 TABLET, FILM COATED ORAL EVERY 8 HOURS PRN
Qty: 30 TABLET | Refills: 0 | Status: SHIPPED | OUTPATIENT
Start: 2023-05-09

## 2023-05-09 RX ORDER — KETOROLAC TROMETHAMINE 30 MG/ML
30 INJECTION, SOLUTION INTRAMUSCULAR; INTRAVENOUS ONCE
Status: COMPLETED | OUTPATIENT
Start: 2023-05-09 | End: 2023-05-09

## 2023-05-09 RX ORDER — MEPERIDINE HYDROCHLORIDE 25 MG/ML
12.5 INJECTION INTRAMUSCULAR; INTRAVENOUS; SUBCUTANEOUS
Status: DISCONTINUED | OUTPATIENT
Start: 2023-05-09 | End: 2023-05-10 | Stop reason: HOSPADM

## 2023-05-09 RX ORDER — DOCUSATE SODIUM 100 MG/1
100 CAPSULE, LIQUID FILLED ORAL 2 TIMES DAILY PRN
Qty: 20 CAPSULE | Refills: 0 | Status: SHIPPED | OUTPATIENT
Start: 2023-05-09

## 2023-05-09 RX ORDER — SODIUM CHLORIDE 0.9 % (FLUSH) 0.9 %
10 SYRINGE (ML) INJECTION AS NEEDED
Status: DISCONTINUED | OUTPATIENT
Start: 2023-05-09 | End: 2023-05-09 | Stop reason: HOSPADM

## 2023-05-09 RX ORDER — ONDANSETRON 2 MG/ML
INJECTION INTRAMUSCULAR; INTRAVENOUS AS NEEDED
Status: DISCONTINUED | OUTPATIENT
Start: 2023-05-09 | End: 2023-05-09 | Stop reason: SURG

## 2023-05-09 RX ORDER — FENTANYL CITRATE 50 UG/ML
INJECTION, SOLUTION INTRAMUSCULAR; INTRAVENOUS AS NEEDED
Status: DISCONTINUED | OUTPATIENT
Start: 2023-05-09 | End: 2023-05-09 | Stop reason: SURG

## 2023-05-09 RX ORDER — SODIUM CHLORIDE 9 MG/ML
40 INJECTION, SOLUTION INTRAVENOUS AS NEEDED
Status: DISCONTINUED | OUTPATIENT
Start: 2023-05-09 | End: 2023-05-09 | Stop reason: HOSPADM

## 2023-05-09 RX ORDER — CEFAZOLIN SODIUM IN 0.9 % NACL 3 G/100 ML
3 INTRAVENOUS SOLUTION, PIGGYBACK (ML) INTRAVENOUS ONCE
Status: COMPLETED | OUTPATIENT
Start: 2023-05-09 | End: 2023-05-09

## 2023-05-09 RX ORDER — ROCURONIUM BROMIDE 10 MG/ML
INJECTION, SOLUTION INTRAVENOUS AS NEEDED
Status: DISCONTINUED | OUTPATIENT
Start: 2023-05-09 | End: 2023-05-09 | Stop reason: SURG

## 2023-05-09 RX ORDER — OXYCODONE HYDROCHLORIDE AND ACETAMINOPHEN 5; 325 MG/1; MG/1
1 TABLET ORAL EVERY 4 HOURS PRN
Qty: 30 TABLET | Refills: 0 | Status: SHIPPED | OUTPATIENT
Start: 2023-05-09 | End: 2023-05-09 | Stop reason: HOSPADM

## 2023-05-09 RX ORDER — NALOXONE HYDROCHLORIDE 4 MG/.1ML
SPRAY NASAL
Qty: 2 EACH | Refills: 0 | Status: SHIPPED | OUTPATIENT
Start: 2023-05-09

## 2023-05-09 RX ORDER — PROMETHAZINE HYDROCHLORIDE 25 MG/1
25 SUPPOSITORY RECTAL ONCE AS NEEDED
Status: DISCONTINUED | OUTPATIENT
Start: 2023-05-09 | End: 2023-05-10 | Stop reason: HOSPADM

## 2023-05-09 RX ADMIN — KETOROLAC TROMETHAMINE 30 MG: 30 INJECTION, SOLUTION INTRAMUSCULAR; INTRAVENOUS at 15:08

## 2023-05-09 RX ADMIN — Medication 3 G: at 11:41

## 2023-05-09 RX ADMIN — ACETAMINOPHEN 1000 MG: 500 TABLET ORAL at 10:28

## 2023-05-09 RX ADMIN — DEXAMETHASONE SODIUM PHOSPHATE 4 MG: 4 INJECTION INTRA-ARTICULAR; INTRALESIONAL; INTRAMUSCULAR; INTRAVENOUS; SOFT TISSUE at 11:02

## 2023-05-09 RX ADMIN — FENTANYL CITRATE 50 MCG: 50 INJECTION, SOLUTION INTRAMUSCULAR; INTRAVENOUS at 13:17

## 2023-05-09 RX ADMIN — SODIUM CHLORIDE, POTASSIUM CHLORIDE, SODIUM LACTATE AND CALCIUM CHLORIDE 9 ML/HR: 600; 310; 30; 20 INJECTION, SOLUTION INTRAVENOUS at 13:56

## 2023-05-09 RX ADMIN — ROCURONIUM BROMIDE 20 MG: 10 INJECTION, SOLUTION INTRAVENOUS at 12:24

## 2023-05-09 RX ADMIN — HYDROMORPHONE HYDROCHLORIDE 0.5 MG: 1 INJECTION, SOLUTION INTRAMUSCULAR; INTRAVENOUS; SUBCUTANEOUS at 14:14

## 2023-05-09 RX ADMIN — SODIUM CHLORIDE, POTASSIUM CHLORIDE, SODIUM LACTATE AND CALCIUM CHLORIDE 9 ML/HR: 600; 310; 30; 20 INJECTION, SOLUTION INTRAVENOUS at 10:29

## 2023-05-09 RX ADMIN — LIDOCAINE HYDROCHLORIDE 40 MG: 20 INJECTION, SOLUTION EPIDURAL; INFILTRATION; INTRACAUDAL; PERINEURAL at 11:35

## 2023-05-09 RX ADMIN — HYDROMORPHONE HYDROCHLORIDE 0.25 MG: 1 INJECTION, SOLUTION INTRAMUSCULAR; INTRAVENOUS; SUBCUTANEOUS at 14:54

## 2023-05-09 RX ADMIN — ROPIVACAINE HYDROCHLORIDE 10 ML: 5 INJECTION, SOLUTION EPIDURAL; INFILTRATION; PERINEURAL at 11:04

## 2023-05-09 RX ADMIN — FENTANYL CITRATE 100 MCG: 50 INJECTION, SOLUTION INTRAMUSCULAR; INTRAVENOUS at 11:35

## 2023-05-09 RX ADMIN — MIDAZOLAM HYDROCHLORIDE 2 MG: 1 INJECTION, SOLUTION INTRAMUSCULAR; INTRAVENOUS at 10:55

## 2023-05-09 RX ADMIN — PROPOFOL 200 MG: 10 INJECTION, EMULSION INTRAVENOUS at 11:35

## 2023-05-09 RX ADMIN — ONDANSETRON 4 MG: 2 INJECTION INTRAMUSCULAR; INTRAVENOUS at 13:16

## 2023-05-09 RX ADMIN — ROCURONIUM BROMIDE 30 MG: 10 INJECTION, SOLUTION INTRAVENOUS at 11:53

## 2023-05-09 RX ADMIN — ONDANSETRON 4 MG: 2 INJECTION INTRAMUSCULAR; INTRAVENOUS at 13:55

## 2023-05-09 RX ADMIN — ROPIVACAINE HYDROCHLORIDE 20 ML: 5 INJECTION, SOLUTION EPIDURAL; INFILTRATION; PERINEURAL at 11:02

## 2023-05-09 RX ADMIN — PROCHLORPERAZINE EDISYLATE 5 MG: 5 INJECTION, SOLUTION INTRAMUSCULAR; INTRAVENOUS at 14:35

## 2023-05-09 NOTE — OP NOTE
SHOULDER ARTHROSCOPY WITH ROTATOR CUFF REPAIR  Procedure Report    Patient Name:  Fabio Juárez  YOB: 1965    Date of Surgery:  5/9/2023     Indications: Fabio is a 58-year-old male with a long history of right shoulder pain.  He attempted nonoperative management with Aleve.  We discussed additional treatment options.  We discussed both operative and nonoperative measures.  We discussed the risk, benefits, and occasions, and alternatives to right shoulder arthroscopy with rotator cuff repair, biceps tenotomy versus tenodesis, subacromial decompression and possible distal clavicle excision.  He elected to proceed with surgery and consent was obtained.    Pre-op Diagnosis:   Osteoarthritis of AC (acromioclavicular) joint [M19.019]  Tear of right rotator cuff, unspecified tear extent, unspecified whether traumatic [M75.101]  Biceps tendonitis on right [M75.21]       Post-Op Diagnosis Codes:     * Osteoarthritis of AC (acromioclavicular) joint [M19.019]     * Tear of right rotator cuff, unspecified tear extent, unspecified whether traumatic [M75.101]     * Biceps tendonitis on right [M75.21]    Procedure/CPT® Codes:      Procedure(s):  SHOULDER ARTHROSCOPY WITH BICEPS TENODESIS, ROTATOR CUFF REPAIR, AND SUBACROMIAL DECOMPRESSION    Staff:  Surgeon(s):  Phu Lebron MD    Assistant: Susie Finn PA-C    Anesthesia: General with Block    Estimated Blood Loss: 30 mL    Implants:    Implant Name Type Inv. Item Serial No.  Lot No. LRB No. Used Action   SUT FIBERLINK BR PB NO2 W/CLSDLP 1.5IN - [id number] Implant SUT FIBERLINK BR PB NO2 W/CLSDLP 1.5IN  [id number] Right 1 Implanted   SUT/ANCH BIOCOMP SWIVELOCK/C 4.75X19.1MM - [id number] Implant SUT/ANCH BIOCOMP SWIVELOCK/C 4.75X19.1MM  [employer] [id number] Right 1 Implanted   SUT/ANCH FIBERTAK/RC/2.6 SFT SP 1.7MM/FIBERTAPE/LP PATRIZIA - [id number] Implant SUT/ANCH FIBERTAK/RC/2.6 SFT SP 1.7MM/FIBERTAPE/LP PATRIZIA  [employer] [id number] Right 1 Implanted    SUT/ANCH FIBERTAK/RC/2.6 SFT SP 1.7MM/TIGERTAPE/LP WHT/BLK - [id number] Implant SUT/ANCH FIBERTAK/RC/2.6 SFT SP 1.7MM/TIGERTAPE/LP WHT/BLK  [employer] [id number] Right 1 Implanted   SUT/ANCH BIOCOMP SWIVELOCK/C 4.75X19.1MM - [id number] Implant SUT/ANCH BIOCOMP SWIVELOCK/C 4.75X19.1MM  [employer] [id number] Right 2 Implanted       Specimen:          None        Findings: Degenerative SLAP tear, grade 2 chondrosis glenohumeral joint, rotator cuff tendinopathy, full-thickness tear supraspinatus tendon, subacromial enthesophyte    Complications: None    Description of Procedure: The patient was met in the preoperative holding area and the operative extremity was marked.  A preoperative peripheral nerve block was performed by the anesthesia team.  The patient was transported the operating room and laid supine on the operating room table.  General anesthesia was induced.  2 g of preoperative Ancef were administered.  The patient was then carefully placed in the modified beachchair position with all extremities well-padded.  An arm holding device was used during the case.  The right upper extremity was prepped and draped in the usual sterile fashion.  A timeout was taken to ensure the appropriate patient, procedure, and procedural site.  All were in agreement.  I began by marking the superficial landmarks over the shoulder.  A vertical incision was made for a posterior viewing portal and the arthroscope was inserted into the glenohumeral joint.  A diagnostic arthroscopy was performed.  Grade 2 chondrosis was present in the glenohumeral joint.  The subscapularis tendon was intact.  There were degenerative changes at the superior labrum.  The biceps tendon was intact.  The undersurface of the rotator cuff appeared tendinopathic but no obvious full-thickness tears were seen.  A vertical incision was made for an anterior working portal after needle localization.  I inserted the arthroscopic shaver.  The interval tissue was debrided.   The biceps anchor was probed and was unstable.  I elected to perform a biceps tenodesis.  I utilized the loop and tack technique.  A link suture was passed around and through the biceps tendon.  The biceps was then freed from the superior labrum using the arthroscopic scissor.  I then punched and inserted a swivel lock anchor high in the bicipital groove while tensioning the suture.  This was completed without complication.  The superior labrum was then abraded back to a stable border. The arthroscope was then removed from the glenohumeral joint and inserted into the subacromial space.  A vertical incision for a lateral working portal was created after needle localization.  A subacromial bursectomy was performed.  I probed the rotator cuff and a full-thickness tear of the supraspinatus tendon was identified.   The footprint was cleared of soft tissue utilizing the shaver and cautery device.  I then planned my repair.  I elected for a speed bridge repair using the fiber tack anchors.  2 medial row anchors were inserted through stab incisions just off the acromial margin.  The sutures were then passed through the anterior and posterior margins of the tear using the scorpion device.  I moved laterally and cleared the soft tissue over the lateral margin of the tuberosity for my lateral row anchors.  I then punched and inserted a 2 swivel lock anchors laterally while tensioning my medial row and a standard speed bridge configuration.  I was satisfied with the reduction of the rotator cuff tear.  No implant complications were noted.  I then utilized the arthroscopic shaver to perform a subacromial decompression, elevating the anterior enthesophyte on the acromion.  This was carried back to the level of the AC joint.  Minimal arthrosis was present at the AC joint and the distal clavicle was left intact.   At this point I was satisfied with my work in the shoulder.  All arthroscopic instrumentation was removed.  Wounds were  closed with 3-0 nylon in interrupted fashion.  Wounds were dressed with Xeroform, 4 x 4, ABD, and tape.  The patient was placed into an abduction sling.  She woke from anesthesia without complication and was transferred to the recovery room in stable condition.  All surgical counts were correct.    Assistant: Susie Finn PA-C  was responsible for performing the following activities: Retraction, Suction, Irrigation, Suturing, Closing and Placing Dressing and their skilled assistance was necessary for the success of this case.    Phu Lebron MD     Date: 5/9/2023  Time: 13:24 EDT

## 2023-05-09 NOTE — DISCHARGE INSTRUCTIONS
DISCHARGE INSTRUCTIONS  Post-Operative  Arthroscopic Shoulder Surgery      For your surgery you had:  General anesthesia (you may have a sore throat for the first 24 hours)  You received an anesthesia medication today that can cause hormonal forms of birth control to be ineffective. You should use a different form of birth control (to prevent pregnancy) for 7 days.   IV sedation.  Local anesthesia  Monitored anesthesia care  You may experience dizziness, drowsiness, or light-headedness for several hours following surgery/procedure.  Do not stay alone today or tonight.  Limit your activity for 24 hours.  Resume your diet slowly.  Follow whatever special dietary instructions you may have been given by your doctor.  You should not drive or operate machinery or drink alcohol for 24 hours or while you are taking pain medication.  You should not sign legally binding documents.  Post-Operative Day #1 is counted as the 1st day after surgery.  [] If you had a regional block, you will not have control of your arm for up to 12 hours.  Protect the arm with a sling or follow your physician's specific instructions.  Post-Operative Day #2  Remove your dressing.  Under the dressing you will either have sutures or staples.  Change dressing once or twice daily.  Place a dry dressing or band-aids over the small incisions.  Prior to dressing change, wash your hands.  Post-Operative Day #4  You may shower.  During the shower, you may let the water hit the incision and roll down but do not scrub or place any soap on the incision.  Do not soak it.  Pat it dry and do not put any ointments on the incision unless directed by surgeon. Then replace the dry dressing.    General Instructions  [] Use ice pack to shoulder for 72 hours.  This can help with reducing swelling and pain relief.  Apply 20 minutes on and 20 minutes off.  Never place ice directly on skin.  Avoid getting dressing wet.  The Cold Therapy System can help reduce swelling and  decrease pain.  Utilize device for 30-60 minutes per session, with 30-60 minute breaks in between sessions.  It is recommended to use, as directed, for the first 72 hours after surgery until bedtime.  After 72 hours, continue using the device as needed until your follow-up appointment with your physician.  Never place directly on skin.  Please refer to the instruction sheet given.  Keep arm elevated with sling to decrease swelling and minimize throbbing pain.  The sling will provide support for your shoulder.  It is important to remove the sling 3-5 times daily to work on range-of-motion of the elbow, wrist and hand.  You will receive a prescription for physical therapy, unless otherwise instructed by physician.  After most shoulder surgeries, it is permissible to start exercises by slowing trying to crawl your fingers up a wall until your arm is parallel to the floor.  While doing this support the affected arm at the elbow or closer to the shoulder.  You may also lean over and let the arm and hand do small or large circles or write the alphabet with your hanging arm to keep it loose.  It is normal to have some pain with these gentle exercises.  The exercises help reduce swelling and pain overall and help to avoid a stiff shoulder post-operatively.  It is okay to come out of the sling for showering or for certain activities of daily living but avoid any lifting or carrying with the affected arm until instructed by the physician especially if you have had a repair of the rotator cuff or some type of reconstructive procedure.  It is okay to come out of the sling and support the arm with a pillow if you remain sedentary.  In general, sling use is preferred following a repair or reconstruction for 4 weeks.  If you have had a SAD (sub acromial decompression), continue sling use more liberally because there was not a repair or reconstruction that could be harmed.          DISCHARGE INSTRUCTIONS  Post-Operative    Arthroscopic Shoulder Surgery      Exercise fingers for 10 minutes every hour while awake.  The physician will typically inform the family and the patient whether or not a repair or reconstruction could be harmed.  If you have had a rotator cuff repair or reconstructive procedure, do not let the arm drop down suddenly from an elevated position down to your side despite improvements made in pain and over the 3 months following repair and reconstruction.  It generally takes 8-12 weeks before the repair or reconstruction does not rely on sutures and anchors that are placed for the repair.  You are generally given a prescription for a narcotic medication.  Take as prescribed.  You may use over-the-counter anti-inflammatory medications such as Motrin or Aleve for additional pain or fever reduction if not allergic.  If you are taking the pain medication prescribed, do not take Tylenol too unless you consult with the pharmacist. The pain medications generally have Tylenol in them and too much Tylenol can be harmful.  Sometimes it is recommended to take an anti-inflammatory in between doses of prescription pain medication if additional pain relief is needed.  Consult with your physician or your pharmacist before taking over-the-counter pain medications/anti-inflammatories.  It is not uncommon to have a fever post-operatively especially in the first 24-48 hours.  Anti-inflammatories can be used for fever reduction also.  It is normal to have some redness or irritation around skin sutures or staples, however, if you have any expanding areas of redness with a persistent fever and increasing pain notify the physician as soon as possible.  The incidence of blood clots is low following arthroscopic procedures, however, if you notice any increasing pain or swelling in your calves or legs, contact the physician as soon as possible.   It is difficult to sleep in the customary fashion following a shoulder surgery.  It is usually  necessary to sleep with the shoulder above the level of the heart such as in a recliner, couch or with the head of the bed elevated.  This should slowly improve over the weeks following shoulder surgery.  If unable to urinate 6 to 8 hours after surgery or urinating frequently in small amounts, notify the physician or go to the nearest Emergency Room.  SPECIAL INSTRUCTIONS:        NOTIFY YOUR PHYSICIAN IF YOU EXPERIENCE:  Numbness of fingers.  Inability to move fingers.  Extreme coldness, paleness or blue dis-coloration of fingers.  Any pain other than from the incision, such as swelling of the arm that blocks circulation of fingers.  Follow verbal instructions from your doctor.  Medications per physician instructions as indicated on Discharge Medication Information Sheet.  You should see  ______________________for follow-up care  on     Phone number: __________________________________  Missing your appointment/follow-up could lead to serious complications  If you have an emergency and cannot reach your doctor, go to an Emergency Room nearest your home.      [facility]  Postop Instructions  Outpatient Shoulder Surgery    DIET  Begin with clear liquids and light foods (jello, soups, etc).  Progress to your normal diet if you are not nauseated.  Take pain medicine with food - crackers, bread, etc.    WOUND CARE  Maintain your operative dressing, loosen bandage if swelling or tingling of the elbow, wrist, or hand occurs.  It is normal for the shoulder to bleed and swell from the surgery site.  If blood soaks onto the bandage, do not become alarmed; reinforce with additional dressing.  If blood saturates more than 2 bandages, call [facility].  Remove surgical dressing on the 2nd post-operative day. If minimal drainage is present, apply bandaids over incisions.  Do not use antibiotic ointment.  To avoid infection, keep surgical incisions clean and dry.  You may shower on the 2nd day but do not  submerge.    MEDICATIONS  Pain medication is injected into the wound and shoulder joint during surgery.  This will wear off within 8 to 12 hours.  The anesthesiologist's regional nerve block will usually wear off in 18 to 24 hours.  Aleve 500mg 2 times per day may be taken for pain if you do not have a history of bleeding or ulcers.  Some patients will require narcotic pain medication for a short period of time.  This can be taken as per directions on the bottle.  Potential narcotic side effects include: constipation, nausea, vomiting, sleepiness.  If nausea and vomiting continue for more than 12-24 hours, contact the office to have your medication changed.  Do not drive a car or operate machinery while taking the prescription pain medication.  Increase vegetables, whole grains, and water intake to decrease risk of constipation related to pain medications.  Drink a full glass of water with every dose of medication (prescription or over the counter) and take with food.    ACTIVITY  When sleeping or resting, inclined positions (ie recliner chair) and a pillow under the forearm for support may provide better comfort.  Do not engage in activities which increase pain/swelling (lifting or any repetitive above shoulder-level activities) over the first 7-10 days following surgery or activities where you bring your arm away from your body.  Avoid long periods of sitting (without arm supported) or long distance traveling for 2 weeks.  No driving or operating heavy machinery until you are off all prescription pain medications.  You may return to sedentary work or school 1-3 days after surgery, if pain is tolerable with sling.  SLING  Repairs and Reconstructions:  For the first two weeks, you must wear sling/immobilizer at all times except when doing exercises.  When around people in close proximity or in inclement weather, you should wear sling for protection.    ICE THERAPY  Begin icing immediately after surgery using ice  compression machine or cold packs.    EXERCISE   Begin shoulder exercises the following day after surgery unless otherwise instructed by the surgeon.  Pendulums, Saws, and Table Slides; 3 x   You may also begin elbow, wrist, and hand range of motion on the first post-operative day about 2-3 times per day.  Formal physical therapy, if needed, will begin 2 - 6 weeks after surgery.      Saws                             Pendulums                 Table Slides          EMERGENCIES  Contact [facility] if any of the following are present:  Painful swelling or numbness, tingling, color change, or coolness in the wrist or hand  Unrelenting pain  Fever over 101 (It is normal to have a low grade fever for the first day or two following surgery.)   Redness or worsening pain around incisions  Continuous drainage or bleeding from incision (a small amount of drainage is expected)  Difficulty breathing, wheezing  Excessive nausea/vomiting causing inability to keep anything down for 12-24hours  Inability to urinate    WHAT TO EXPECT AT YOUR FIRST POST OPERATIVE VISIT  Suture/stitches will be removed and new bandage applied if needed.  Follow up X-rays may be taken

## 2023-05-09 NOTE — ANESTHESIA POSTPROCEDURE EVALUATION
Patient: Fabio Juárez    Procedure Summary     Date: 05/09/23 Room / Location: [facility] OR  / [facility]    Anesthesia Start: 1131 Anesthesia Stop: 1340    Procedure: SHOULDER ARTHROSCOPY WITH BICEPS TENODESIS, ROTATOR CUFF REPAIR, AND SUBACROMIAL DECOMPRESSION (Right: Shoulder) Diagnosis:       Osteoarthritis of AC (acromioclavicular) joint      Tear of right rotator cuff, unspecified tear extent, unspecified whether traumatic      Biceps tendonitis on right      (Osteoarthritis of AC (acromioclavicular) joint [M19.019])      (Tear of right rotator cuff, unspecified tear extent, unspecified whether traumatic [M75.101])      (Biceps tendonitis on right [M75.21])    Surgeons: Phu Lebron MD Provider: Antonio Mesa CRNA    Anesthesia Type: general with block ASA Status: 3          Anesthesia Type: general with block    Vitals  Vitals Value Taken Time   /76 05/09/23 1509   Temp 36 °C (96.8 °F) 05/09/23 1338   Pulse 72 05/09/23 1514   Resp 19 05/09/23 1350   SpO2 88 % 05/09/23 1514   Vitals shown include unvalidated device data.        Post Anesthesia Care and Evaluation    Patient location during evaluation: PACU  Patient participation: complete - patient participated  Level of consciousness: awake and alert  Pain management: adequate    Airway patency: patent  Anesthetic complications: No anesthetic complications  PONV Status: none  Cardiovascular status: acceptable  Respiratory status: acceptable  Hydration status: acceptable    Comments: An Anesthesiologist personally participated in the most demanding procedures (including induction and emergence if applicable) of the anesthesia plan, and monitored the course of anesthesia administration at frequent intervals and remained physically present and available for immediate diagnosis and treatment of emergencies.

## 2023-05-09 NOTE — INTERVAL H&P NOTE
H&P reviewed. The patient was examined and there are no changes to the H&P.     I have seen and examined the above patient and agree with the plan.     Cardiac: Intact peripheral pulses  Pulmonary: Nonlabored respirations on room air.   Right upper extremity: Skin intact.  Distal neurovascular intact.      We reviewed the risk, benefits, indications, and alternatives to right shoulder arthroscopy as noted above.  The patient elected to proceed with surgery and consent was obtained.    Electronically signed by Phu Lebron MD, 05/09/23, 9:20 AM EDT.

## 2023-05-09 NOTE — ANESTHESIA PREPROCEDURE EVALUATION
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    Plan discussed with CRNA.        CODE STATUS:

## 2023-05-09 NOTE — ANESTHESIA PROCEDURE NOTES
Peripheral Block    Pre-sedation assessment completed: 5/9/2023 10:55 AM    Patient reassessed immediately prior to procedure    Patient location during procedure: pre-op  Start time: 5/9/2023 10:55 AM  Stop time: 5/9/2023 11:04 AM  Reason for block: at surgeon's request and post-op pain management  Performed by  Anesthesiologist: Bobby Guillermo MD  Preanesthetic Checklist  Completed: patient identified, IV checked, site marked, risks and benefits discussed, surgical consent, monitors and equipment checked, pre-op evaluation and timeout performed  Prep:  Pt Position: sitting  Prep: ChloraPrep  Patient monitoring: blood pressure monitoring, continuous pulse oximetry and EKG  Procedure    Sedation: yes  Performed under: local infiltration  Guidance:ultrasound guided    ULTRASOUND INTERPRETATION.  Using ultrasound guidance a 20 G gauge needle was placed in close proximity to the nerve, at which point, under ultrasound guidance anesthetic was injected in the area of the nerve and spread of the anesthesia was seen on ultrasound in close proximity thereto.  There were no abnormalities seen on ultrasound; a digital image was taken; and the patient tolerated the procedure with no complications. Images:still images obtained, printed/placed on chart    Laterality:right  Block Type:supraclavicular  Injection Technique:single-shot  Needle Type:echogenic  Resistance on Injection: none          Medications  Comment:20 ml ropivacaine - supraclavicular  10 ml ropivacaine - subcutaneous infiltration to posterior cervical plexus    Post Assessment  Injection Assessment: negative aspiration for heme, no paresthesia on injection and incremental injection  Patient Tolerance:comfortable throughout block  Complications:no

## 2023-05-22 ENCOUNTER — OFFICE VISIT (OUTPATIENT)
Dept: ORTHOPEDIC SURGERY | Facility: CLINIC | Age: 58
End: 2023-05-22
Payer: COMMERCIAL

## 2023-05-22 VITALS — BODY MASS INDEX: 44.98 KG/M2 | WEIGHT: 270 LBS | HEIGHT: 65 IN

## 2023-05-22 DIAGNOSIS — Z98.890 S/P RIGHT ROTATOR CUFF REPAIR: Primary | ICD-10-CM

## 2023-05-22 DIAGNOSIS — M67.813 BICEPS TENDONOSIS OF RIGHT SHOULDER: ICD-10-CM

## 2023-05-22 NOTE — PROGRESS NOTES
"Chief Complaint  Follow-up of the Right Shoulder    Subjective          Fabio Juárez presents to [facility]   History of Present Illness    Fabio Juárez presents today for a follow up of his right shoulder. Patient is 2 weeks postop right shoulder arthroscopy with rotator cuff repair, biceps tenodesis, and subacromial decompression performed by Dr. Lebron on 5/9/2023.  Today, patient states that he is doing okay.  He reports a lot of pain for which she is taking his medications as needed.  He denies numbness or tingling.  He has continued with his sling.  He has been performing gentle shoulder range of motion exercises.  He affirms some stiffness to elbow and wrist.  Denies complications, redness, or drainage from his incision sites.  Denies fever or chills.      Allergies   Allergen Reactions   • Percocet [Oxycodone-Acetaminophen] Itching     Pt can take oxycodone by itself and tylenol by itself just fine but the combo make him itch         Social History     Socioeconomic History   • Marital status:    • Number of children: 2   Tobacco Use   • Smoking status: Never   • Smokeless tobacco: Never   Vaping Use   • Vaping Use: Never used   Substance and Sexual Activity   • Alcohol use: Yes     Comment: SOCIALLY    • Drug use: Never   • Sexual activity: Yes     Partners: Female     Comment: Wife        I reviewed the patient's chief complaint, history of present illness, review of systems, past medical history, surgical history, family history, social history, medications, and allergy list.     REVIEW OF SYSTEMS    Constitutional: Denies fevers, chills, weight loss  Cardiovascular: Denies chest pain, shortness of breath  Skin: Denies rashes, acute skin changes  Neurologic: Denies headache, loss of consciousness  MSK: Right shoulder pain.       Objective   Vital Signs:   Ht 165.1 cm (65\")   Wt 122 kg (270 lb)   BMI 44.93 kg/m²     Body mass index is 44.93 kg/m².    Physical " Exam    General: Alert, no acute distress  Right upper extremity: Sutures removed today without complications. Arthroscopy portals are well healing without active drainage or redness noted. No concerning signs of infection.  Resolving bruising .  Elbow range of motion intact with associated stiffness.  Active wrist and finger range of motion. Thumb opposition intact. Palmar abduction of the thumb intact. Sensation intact to the axillary, median, radial, and ulnar nerve distributions. Palpable radial pulse.       Procedures    Imaging Results (Most Recent)     None               Assessment and Plan    Diagnoses and all orders for this visit:    1. S/P right rotator cuff repair (Primary)  -     Ambulatory Referral to Physical Therapy Evaluate and treat, POST OP, Ortho    2. Biceps tendonosis of right shoulder  -     Ambulatory Referral to Physical Therapy Evaluate and treat, POST OP, Ortho        Fabio J Franck presents today 2 weeks postop right shoulder arthroscopy with rotator cuff repair, biceps tenodesis, and subacromial decompression on 5/9/2023 performed by Dr. Lebron. Sutures removed today without complications.  Arthroscopy portals are well-healing.  No active redness or drainage noted. No concerning signs of infection. Incision site care was reviewed today. Patient instructed not to soak or submerge incision. Do not apply any lotions, creams, or ointments to the incision at this time.  We discussed concerning signs and symptoms regarding the incision site.  Patient understood and agreed. Patient denies fever or chills. Patient will continue full time sling wear for a minimum of 6 weeks total. We discussed continuing gentle shoulder range of motion exercises. Patient instructed to avoid lifting, pushing, or pulling with the right upper extremity. Patient understood and agreed. Formal physical therapy was ordered today.  We discussed the importance of weaning from narcotic medications.  Patient expressed  understanding.    Patient will follow up in 4 weeks for reevaluation. No new x-rays needed at next visit.       Call or return if symptoms worsen or patient has any concerns.         Follow Up   Return in about 4 weeks (around 6/19/2023).  Patient was given instructions and counseling regarding his condition or for health maintenance advice. Please see specific information pulled into the AVS if appropriate.     Susie Finn PA-C  05/22/23  13:26 EDT

## 2023-05-30 ENCOUNTER — OFFICE VISIT (OUTPATIENT)
Dept: FAMILY MEDICINE CLINIC | Age: 58
End: 2023-05-30

## 2023-05-30 VITALS
BODY MASS INDEX: 44.65 KG/M2 | SYSTOLIC BLOOD PRESSURE: 114 MMHG | WEIGHT: 268 LBS | DIASTOLIC BLOOD PRESSURE: 76 MMHG | HEART RATE: 90 BPM | OXYGEN SATURATION: 93 % | HEIGHT: 65 IN

## 2023-05-30 DIAGNOSIS — I10 ESSENTIAL (PRIMARY) HYPERTENSION: ICD-10-CM

## 2023-05-30 DIAGNOSIS — R60.0 LOCALIZED EDEMA: Primary | ICD-10-CM

## 2023-05-30 RX ORDER — HYDROCHLOROTHIAZIDE 12.5 MG/1
12.5 TABLET ORAL DAILY
Qty: 90 TABLET | Refills: 1 | Status: SHIPPED | OUTPATIENT
Start: 2023-05-30

## 2023-05-30 NOTE — PROGRESS NOTES
"Chief Complaint  Fabio Juárez presents to [facility] for Edema (Patient complains of swelling in legs, ankles, & feet since 5/9/23 when he had surgery on right shoulder )      Subjective     History of Present Illness  Paras is in today with complaints of bilateral lower extremity swelling.  He reports that this has been going on since his shoulder surgery on May 9 with Dr. Lebron.  He reports that the swelling does improve with elevation.  He has been less active since his surgery.  He denies any chest pain or shortness of breath.  He denies any redness warmth or pain in his lower extremities.  He is taking his hydrochlorothiazide as prescribed.        Assessment and Plan       Diagnoses and all orders for this visit:    1. Localized edema (Primary)  Comments:  Suspect secondary to his recent surgery and activity and possible fluids he received during his surgery.  Advised to double up on HCTZ x3 days and elevate    2. Essential (primary) hypertension  Comments:  refills today well-controlled  Orders:  -     hydroCHLOROthiazide (HYDRODIURIL) 12.5 MG tablet; Take 1 tablet by mouth Daily.  Dispense: 90 tablet; Refill: 1        Follow Up   Return if symptoms worsen or fail to improve.      New Medications Ordered This Visit   Medications   • hydroCHLOROthiazide (HYDRODIURIL) 12.5 MG tablet     Sig: Take 1 tablet by mouth Daily.     Dispense:  90 tablet     Refill:  1       Medications Discontinued During This Encounter   Medication Reason   • hydroCHLOROthiazide (HYDRODIURIL) 12.5 MG tablet Reorder            Review of Systems    Objective     Vitals:    05/30/23 1210   BP: 114/76   BP Location: Left arm   Patient Position: Sitting   Cuff Size: Large Adult   Pulse: 90   SpO2: 93%   Weight: 122 kg (268 lb)   Height: 165.1 cm (65\")     Body mass index is 44.6 kg/m².     Physical Exam  Vitals reviewed.   Constitutional:       Appearance: Normal appearance.   HENT:      Head: " Normocephalic.   Eyes:      Pupils: Pupils are equal, round, and reactive to light.   Cardiovascular:      Rate and Rhythm: Normal rate and regular rhythm.      Heart sounds: No murmur heard.  Pulmonary:      Effort: Pulmonary effort is normal.      Breath sounds: Normal breath sounds.   Musculoskeletal:         General: Normal range of motion.      Right lower le+ Edema present.      Left lower le+ Edema present.      Comments: Right arm in sling   Neurological:      Mental Status: He is alert.   Psychiatric:         Mood and Affect: Mood normal.         Behavior: Behavior normal.            Result Review                       Allergies   Allergen Reactions   • Percocet [Oxycodone-Acetaminophen] Itching     Pt can take oxycodone by itself and tylenol by itself just fine but the combo make him itch       Past Medical History:   Diagnosis Date   • Chronic pain    • Claustrophobia    • Essential (primary) hypertension    • GERD (gastroesophageal reflux disease)    • Hemochromatosis     ASYMPTOMATIC    • History of COVID-19    • Left upper quadrant pain    • Low back pain    • Lung nodule    • Mixed hyperlipidemia    • Obstructive sleep apnea (adult) (pediatric)    • Other testicular dysfunction    • Pain in right foot    • Pain in right shoulder    • Recurrent umbilical hernia    • Renal stone 2017   • Tracheostomy status     R/T SLEEP APNEA - WAS FOLLOWED BY DR. PRICE - JAIR O.VJustin 2018 F/U PRN   • Type 2 diabetes mellitus     BG RUNS 120-125 IN AM      Current Outpatient Medications   Medication Sig Dispense Refill   • albuterol sulfate  (90 Base) MCG/ACT inhaler EVERY 4 HOURS AS NEEDED as needed for SHORTNESS OF BREATH     • atorvastatin (LIPITOR) 80 MG tablet TAKE ONE TABLET BY MOUTH EVERY NIGHT AT BEDTIME 90 tablet 0   • baclofen (LIORESAL) 10 MG tablet TAKE 1/2 TO 1 TABLET BY MOUTH AT BEDTIME FOR LOWER BACK PAIN MUSCLE SPASM 90 tablet 1   • celecoxib (CeleBREX) 200 MG capsule Take 1 capsule by  mouth.     • cetirizine (zyrTEC) 5 MG tablet TAKE ONE TABLET BY MOUTH DAILY 90 tablet 0   • docusate sodium (COLACE) 100 MG capsule Take 1 capsule by mouth 2 (Two) Times a Day As Needed for Constipation. 20 capsule 0   • fexofenadine (ALLEGRA) 180 MG tablet Take 1 tablet by mouth Daily.     • gabapentin (NEURONTIN) 300 MG capsule 1 capsule 2 (Two) Times a Day.     • glimepiride (AMARYL) 2 MG tablet Take 1 tablet by mouth Every Morning Before Breakfast. 1.5 tab     • hydroCHLOROthiazide (HYDRODIURIL) 12.5 MG tablet Take 1 tablet by mouth Daily. 90 tablet 1   • lisinopril (PRINIVIL,ZESTRIL) 10 MG tablet TAKE ONE TABLET BY MOUTH EVERY MORNING 90 tablet 0   • metFORMIN ER (GLUCOPHAGE-XR) 500 MG 24 hr tablet TAKE TWO TABLETS BY MOUTH TWICE A DAY WITH MEALS 360 tablet 0   • omeprazole (priLOSEC) 40 MG capsule TAKE ONE CAPSULE BY MOUTH DAILY 90 capsule 0   • oxyCODONE (ROXICODONE) 5 MG immediate release tablet Take 1 tablet by mouth Every 6 (Six) Hours As Needed for Moderate Pain. 30 tablet 0   • Semaglutide,0.25 or 0.5MG/DOS, (Ozempic, 0.25 or 0.5 MG/DOSE,) 2 MG/1.5ML solution pen-injector Inject 0.25 mg under the skin into the appropriate area as directed 1 (One) Time Per Week. Last dose 04/26/23     • traZODone (DESYREL) 50 MG tablet TAKE ONE TABLET BY MOUTH ONCE NIGHTLY 90 tablet 0   • naloxone (NARCAN) 4 MG/0.1ML nasal spray Call 911. Don't prime. [place] in 1 nostril for overdose. Repeat in 2-3 minutes in other nostril if no or minimal breathing/responsiveness. (Patient not taking: Reported on 5/30/2023) 2 each 0   • ondansetron (Zofran) 4 MG tablet Take 1 tablet by mouth Every 8 (Eight) Hours As Needed for Nausea or Vomiting. (Patient not taking: Reported on 5/22/2023) 30 tablet 0     No current facility-administered medications for this visit.     Past Surgical History:   Procedure Laterality Date   • ACHILLES TENDON REPAIR Right 10/2018   • APPENDECTOMY     • CARDIAC CATHETERIZATION  11/03/2015    LEFT  VENTRIULOGRAM, CORONARY ANGIOGRAM    • CARPAL TUNNEL RELEASE Right    • CHOLECYSTECTOMY  07/2013   • COLONOSCOPY  07/28/2014    NORMAL RESULT    • HERNIA REPAIR     • JOINT REPLACEMENT Bilateral     RIGHT KNEE 01/2016   • SHOULDER ARTHROSCOPY W/ ROTATOR CUFF REPAIR Right 5/9/2023    Procedure: SHOULDER ARTHROSCOPY WITH BICEPS TENODESIS, ROTATOR CUFF REPAIR, AND SUBACROMIAL DECOMPRESSION;  Surgeon: Phu Lebron MD;  [place] [facility] [place] [facility] [place]  Service: [place]opedics;  Laterality: Right;   • TONSILLECTOMY AND ADENOIDECTOMY     • TRACHEOSTOMY      SECONDARY TO SLEEP APNEA   • VENTRAL HERNIA REPAIR N/A 02/23/2022    Procedure: ROBOTIC UMBILICAL HERNIA REPAIR WITH MESH PLACEMENT;  Surgeon: Garrett Anderson MD;  Location: [facility] MAIN OR;  Service: Robotics - DaVinci;  Laterality: N/A;      Health Maintenance Due   Topic Date Due   • DIABETIC EYE EXAM  06/21/2021      Immunization History   Administered Date(s) Administered   • COVID-19 (PFIZER) BIVALENT 12+YRS 10/25/2022   • COVID-19 (PFIZER) Purple Cap Monovalent 02/24/2021, 03/17/2021, 12/09/2021   • Covid-19 (Pfizer) Gray Cap Monovalent 06/21/2022   • FluLaval/Fluzone >6mos 12/09/2021   • Influenza Quad Vaccine (Inpatient) 01/09/2018   • Influenza TIV (IM) 01/08/2013   • Influenza, Unspecified 01/20/2021   • Pneumococcal, Unspecified 08/08/2016, 08/08/2016   • Td 03/26/2019, 03/26/2019         Part of this note may be an electronic transcription/translation of spoken language to printed   text using the Dragon Dictation System.      Karley Amor, APRN

## 2023-06-01 ENCOUNTER — TREATMENT (OUTPATIENT)
Dept: PHYSICAL THERAPY | Facility: CLINIC | Age: 58
End: 2023-06-01

## 2023-06-01 DIAGNOSIS — M25.511 ACUTE PAIN OF RIGHT SHOULDER: ICD-10-CM

## 2023-06-01 DIAGNOSIS — R29.898 WEAKNESS OF RIGHT ARM: ICD-10-CM

## 2023-06-01 DIAGNOSIS — Z98.890 S/P RIGHT ROTATOR CUFF REPAIR: Primary | ICD-10-CM

## 2023-06-01 DIAGNOSIS — M25.611 DECREASED ROM OF RIGHT SHOULDER: ICD-10-CM

## 2023-06-01 NOTE — PROGRESS NOTES
Physical Therapy Initial Evaluation and Plan of Care      Patient: Fabio Juárez   : 1965  Diagnosis/ICD-10 Code:  S/P right rotator cuff repair [Z98.890]  Referring practitioner: Susie Finn PA-C  Date of Initial Visit: 2023  Today's Date: 2023  Patient seen for 1 sessions         Visit Diagnoses:    ICD-10-CM ICD-9-CM   1. S/P right rotator cuff repair  Z98.890 V45.89   2. Acute pain of right shoulder  M25.511 719.41   3. Decreased ROM of right shoulder  M25.611 719.51   4. Weakness of right arm  R29.898 729.89         Subjective Evaluation    History of Present Illness  Date of surgery: 2023  Mechanism of injury: Pt is S/P RIGHT SHOULDER ROTATOR CUFF REPAIR, BICEPS TENODESIS, AND SUBACROMIAL DECOMPRESSION on 23.    He was discharged home same day, went back to the ER on 5/10/23 to get a pain shot.  He had a follow up on 23 with ortho PA.  He is to wear his sling full time sling for 6 weeks.  He was referred for OPPT.  Patient will follow up in 4 weeks with ortho office.  No picking up, pushing, pulling for 3 months.     He is taking pain medication 1-2x day.  Pain is 8/10 today.     He has occasional tingling in the RUE, stops at the elbow, intermittent.    He does a lot of lifting - records, CDs, etc, but currently off work.        Pain  Current pain ratin  Quality: stabbing, shooting.  Relieving factors: ice and medications    Social Support  Lives in: one-story house  Lives with: spouse and young children    Hand dominance: right    Treatments  Previous treatment: physical therapy  Patient Goals  Patient goals for therapy: decreased pain, increased motion, increased strength, independence with ADLs/IADLs, return to sport/leisure activities, return to work and decreased edema             Objective           General Comments     Shoulder Comments   Posture: rounded shoulders    R shoulder PROM:  Flexion: 90 degrees   External rotation: 20 degrees     R shoulder MMT - not  tested due to surgical precautions    Radicular symptoms:  None noted    Tenderness: biceps, anterior/superior shoulder    Incision: healing, one incision has healed, other 3 incisions have steri strips in place           Assessment & Plan     Assessment  Impairments: abnormal muscle firing, abnormal or restricted ROM, activity intolerance, impaired physical strength, lacks appropriate home exercise program, pain with function and safety issue  Functional Limitations: carrying objects, lifting, sleeping, pulling, pushing, uncomfortable because of pain, reaching behind back, reaching overhead and unable to perform repetitive tasks  Assessment details: Pt presents with limitations, noted below, that impede his ability to open jars, work, perform ADLs, recreational activities, sleeping, washing his back, reaching outside ROSE. The skills of a therapist will be required to safely and effectively implement the following treatment plan to restore maximal level of function.        Goals  Plan Goals: SHOULDER  PROBLEMS:     1. The patient has limited ROM of the right shoulder.    LTG 1: 12 weeks:  The patient will demonstrate 160 degrees of right shoulder flexion, 160 degrees of shoulder abduction, 60 degrees of shoulder external rotation, and 60 degrees of shoulder internal rotation to allow the patient to reach into upper kitchen cabinets and manipulate clothing behind the back with greater ease.    STATUS:  New   STG 1a: 4 weeks:  The patient will demonstrate 120 degrees of right shoulder flexion, 120 degrees of shoulder abduction, 45 degrees of shoulder external rotation, and 45 degrees of shoulder internal rotation.    STATUS:  New   TREATMENT: Manual therapy, therapeutic exercise, home exercise instruction, and modalities as needed to include: electrical stimulation, ultrasound, moist heat, and ice.    2. The patient has limited strength of the right shoulder.   LTG 2: 12 weeks:  The patient will demonstrate 4+ /5  strength for right shoulder flexion, abduction, external rotation, and internal rotation in order to demonstrate improved shoulder stability.    STATUS:  New   STG 2a: 4 weeks:  The patient will demonstrate 4- /5 strength for right shoulder flexion, abduction, external rotation, and internal rotation.    STATUS:  New   STG2b:  4 weeks:  The patient will be independent with home exercises.     STATUS:  New   TREATMENT: Manual therapy, therapeutic exercise, home exercise instruction, and modalities as needed to include: electrical stimulation, ultrasound, moist heat, and ice.     3. The patient complains of pain to the right shoulder.   LTG 3: 12 weeks:  The patient will report a pain rating of 2 /10 or better in order to improve sleep quality and tolerance to performance of activities of daily living.    STATUS:  New   STG 3a: 4 weeks:  The patient will report a pain rating of 4 /10 or better.     STATUS:  New   TREATMENT: Manual therapy, therapeutic exercise, home exercise instruction, and modalities as needed to include: electrical stimulation, ultrasound, moist heat, and ice.    4. Carrying, Moving, and Handling Objects Functional Limitation     LTG 4: 12 weeks:  The patient will demonstrate 50 % limitation by achieving a score of on the QuickDASH.    STATUS:  New   STG 4a: 4 weeks:  The patient will demonstrate 70 % limitation by achieving a score of on the QuickDASH.      STATUS:  New   TREATMENT:  Manual therapy, therapeutic exercise, home exercise instruction, and modalities as needed to include: moist heat, electrical stimulation, and ultrasound.      Plan  Therapy options: will be seen for skilled therapy services  Planned modality interventions: cryotherapy, dry needling, electrical stimulation/Russian stimulation, TENS, thermotherapy (hydrocollator packs) and ultrasound  Planned therapy interventions: manual therapy, flexibility, functional ROM exercises, home exercise program, therapeutic activities,  stretching, strengthening, soft tissue mobilization, postural training, neuromuscular re-education, body mechanics training, joint mobilization, ADL retraining and fine motor coordination training  Frequency: 2x week  Duration in weeks: 12  Treatment plan discussed with: patient  Plan details: Review HEP, update as needed.    Progress with R shoulder ROM, decrease pain levels, improve strength, improve scapular stabilization, postural awareness, increased stamina, decreased tightness, improved ROM/flexibility, improve fine motor and coordination, education as needed.            History # of Personal Factors and/or Comorbidities: MODERATE (1-2)  Examination of Body System(s): # of elements: LOW (1-2)  Clinical Presentation: STABLE   Clinical Decision Making: LOW     Timed:  Manual Therapy:    5     mins  83834;  Therapeutic Exercise:    5     mins  58009;     Neuromuscular Zhou:        mins  90966;    Therapeutic Activity:          mins  79428;     Gait Training:           mins  06963;     Ultrasound:          mins  57225;    Canalith Repos           ___  mins  74388      Untimed:  Electrical Stimulation:        mins  51350 ( );  Mechanical Traction:         mins  95416;   Dry Needling:                    mins self pay  Low Eval:                      16     mins  29238;  Medium Eval:                     mins  41520;   High Eval:                          mins  83550       Timed Treatment:   10   mins   Total Treatment:     31   mins    PT SIGNATURE: Marni Giraldo PT, DPT  [place] License: [id number]  Electronically signed by Marni Giraldo PT, 06/01/23, 8:18 AM EDT      Initial Certification    Certification Period: 6/1/2023 thru 8/29/2023  I certify that the therapy services are furnished while this patient is under my care.  The services outlined above are required by this patient, and will be reviewed every 90 days.     PHYSICIAN: Susie Finn PA-C  NPI: [id number]            PHYSICIAN PRINT NAME:  ______________________________________________      PHYSICIAN SIGNATURE: ______________________________________________         DATE:________________________________        Please sign and return via fax to [phone].  Thank you, [facility].

## 2023-06-02 ENCOUNTER — OFFICE VISIT (OUTPATIENT)
Dept: PODIATRY | Facility: CLINIC | Age: 58
End: 2023-06-02

## 2023-06-02 VITALS
DIASTOLIC BLOOD PRESSURE: 81 MMHG | WEIGHT: 264 LBS | HEART RATE: 84 BPM | OXYGEN SATURATION: 93 % | HEIGHT: 65 IN | BODY MASS INDEX: 43.99 KG/M2 | SYSTOLIC BLOOD PRESSURE: 119 MMHG | TEMPERATURE: 98 F

## 2023-06-02 DIAGNOSIS — S86.012D ACHILLES TENDON TEAR, LEFT, SUBSEQUENT ENCOUNTER: Primary | ICD-10-CM

## 2023-06-02 DIAGNOSIS — M76.62 ACHILLES TENDINITIS OF LEFT LOWER EXTREMITY: ICD-10-CM

## 2023-06-02 DIAGNOSIS — M79.672 LEFT FOOT PAIN: ICD-10-CM

## 2023-06-02 NOTE — PROGRESS NOTES
[facility] - PODIATRY    Today's Date: 06/02/23    Patient Name: Fabio Juárez  MRN: [id number]  CSN: [id number]  PCP: Karley Amor APRN,   Referring Provider: Phu Lebron MD    SUBJECTIVE     Chief Complaint   Patient presents with   • Left Ankle - Establish Care     Left achilles tendonitis     HPI: Fabio Juárez, a 58 y.o.male, presents to clinic.    Patient is a 58-year-old male presenting with left Achilles tendinitis.  Patient states this is a chronic issue.  Its been going on for years.  He had a similar problem on his right foot that they had surgery on and it fixed this problem.  He recently had rotator cuff surgery and is currently healing from this.    Past Medical History:   Diagnosis Date   • Chronic pain    • Claustrophobia    • Essential (primary) hypertension    • GERD (gastroesophageal reflux disease)    • Hemochromatosis     ASYMPTOMATIC    • History of COVID-19    • Left upper quadrant pain    • Low back pain    • Lung nodule    • Mixed hyperlipidemia    • Obstructive sleep apnea (adult) (pediatric)    • Other testicular dysfunction    • Pain in right foot    • Pain in right shoulder    • Recurrent umbilical hernia    • Renal stone 03/2017   • Tracheostomy status     R/T SLEEP APNEA - WAS FOLLOWED BY DR. PRICE - JAIR O.V. 2018 F/U PRN   • Type 2 diabetes mellitus     BG RUNS 120-125 IN AM      Past Surgical History:   Procedure Laterality Date   • ACHILLES TENDON REPAIR Right 10/2018   • APPENDECTOMY     • CARDIAC CATHETERIZATION  11/03/2015    LEFT VENTRIULOGRAM, CORONARY ANGIOGRAM    • CARPAL TUNNEL RELEASE Right    • CHOLECYSTECTOMY  07/2013   • COLONOSCOPY  07/28/2014    NORMAL RESULT    • HERNIA REPAIR     • JOINT REPLACEMENT Bilateral     RIGHT KNEE 01/2016   • SHOULDER ARTHROSCOPY W/ ROTATOR CUFF REPAIR Right 5/9/2023    Procedure: SHOULDER ARTHROSCOPY WITH BICEPS TENODESIS, ROTATOR CUFF REPAIR, AND SUBACROMIAL DECOMPRESSION;  Surgeon: Phu Lebron MD;   Location: [facility] OR [facility];  Service: Orthopedics;  Laterality: Right;   • TONSILLECTOMY AND ADENOIDECTOMY     • TRACHEOSTOMY      SECONDARY TO SLEEP APNEA   • VENTRAL HERNIA REPAIR N/A 02/23/2022    Procedure: ROBOTIC UMBILICAL HERNIA REPAIR WITH MESH PLACEMENT;  Surgeon: Garrett Anderson MD;  Location: [facility] MAIN OR;  Service: Robotics - DaVinci;  Laterality: N/A;     Family History   Problem Relation Age of Onset   • Arrhythmia Mother    • Stroke Mother 73   • Diabetes type II Mother    • Heart attack Father    • Hypertension Brother    • Cerebral aneurysm Brother 38   • Diabetes type II Brother    • Coronary artery disease Other      Social History     Socioeconomic History   • Marital status:    • Number of children: 2   Tobacco Use   • Smoking status: Never   • Smokeless tobacco: Never   Vaping Use   • Vaping Use: Never used   Substance and Sexual Activity   • Alcohol use: Yes     Comment: SOCIALLY    • Drug use: Never   • Sexual activity: Yes     Partners: Female     Comment: Wife     Allergies   Allergen Reactions   • Percocet [Oxycodone-Acetaminophen] Itching     Pt can take oxycodone by itself and tylenol by itself just fine but the combo make him itch      Current Outpatient Medications   Medication Sig Dispense Refill   • albuterol sulfate  (90 Base) MCG/ACT inhaler EVERY 4 HOURS AS NEEDED as needed for SHORTNESS OF BREATH     • atorvastatin (LIPITOR) 80 MG tablet TAKE ONE TABLET BY MOUTH EVERY NIGHT AT BEDTIME 90 tablet 0   • baclofen (LIORESAL) 10 MG tablet TAKE 1/2 TO 1 TABLET BY MOUTH AT BEDTIME FOR LOWER BACK PAIN MUSCLE SPASM 90 tablet 1   • celecoxib (CeleBREX) 200 MG capsule Take 1 capsule by mouth.     • cetirizine (zyrTEC) 5 MG tablet TAKE ONE TABLET BY MOUTH DAILY 90 tablet 0   • docusate sodium (COLACE) 100 MG capsule Take 1 capsule by mouth 2 (Two) Times a Day As Needed for Constipation. 20 capsule 0   • fexofenadine (ALLEGRA) 180 MG tablet Take 1 tablet by mouth Daily.     •  gabapentin (NEURONTIN) 300 MG capsule 1 capsule 2 (Two) Times a Day.     • glimepiride (AMARYL) 2 MG tablet Take 1 tablet by mouth Every Morning Before Breakfast. 1.5 tab     • hydroCHLOROthiazide (HYDRODIURIL) 12.5 MG tablet Take 1 tablet by mouth Daily. 90 tablet 1   • lisinopril (PRINIVIL,ZESTRIL) 10 MG tablet TAKE ONE TABLET BY MOUTH EVERY MORNING 90 tablet 0   • metFORMIN ER (GLUCOPHAGE-XR) 500 MG 24 hr tablet TAKE TWO TABLETS BY MOUTH TWICE A DAY WITH MEALS 360 tablet 0   • omeprazole (priLOSEC) 40 MG capsule TAKE ONE CAPSULE BY MOUTH DAILY 90 capsule 0   • oxyCODONE (ROXICODONE) 5 MG immediate release tablet Take 1 tablet by mouth Every 6 (Six) Hours As Needed for Moderate Pain. 30 tablet 0   • Semaglutide,0.25 or 0.5MG/DOS, (Ozempic, 0.25 or 0.5 MG/DOSE,) 2 MG/1.5ML solution pen-injector Inject 0.25 mg under the skin into the appropriate area as directed 1 (One) Time Per Week. Last dose 04/26/23     • traZODone (DESYREL) 50 MG tablet TAKE ONE TABLET BY MOUTH ONCE NIGHTLY 90 tablet 0   • naloxone (NARCAN) 4 MG/0.1ML nasal spray Call 911. Don't prime. [place] in 1 nostril for overdose. Repeat in 2-3 minutes in other nostril if no or minimal breathing/responsiveness. (Patient not taking: Reported on 5/30/2023) 2 each 0   • ondansetron (Zofran) 4 MG tablet Take 1 tablet by mouth Every 8 (Eight) Hours As Needed for Nausea or Vomiting. (Patient not taking: Reported on 5/22/2023) 30 tablet 0     No current facility-administered medications for this visit.     Review of Systems   Musculoskeletal:        Left ankle pain   All other systems reviewed and are negative.      OBJECTIVE     Vitals:    06/02/23 0900   BP: 119/81   Pulse: 84   Temp: 98 °F (36.7 °C)   SpO2: 93%       WBC   Date Value Ref Range Status   03/06/2023 7.85 3.40 - 10.80 10*3/mm3 Final   01/27/2021 6.89 4.80 - 10.80 10*3/uL Final     RBC   Date Value Ref Range Status   03/06/2023 4.96 4.14 - 5.80 10*6/mm3 Final     Hemoglobin   Date Value Ref Range  Status   03/06/2023 15.4 13.0 - 17.7 g/dL Final     Hematocrit   Date Value Ref Range Status   03/06/2023 45.4 37.5 - 51.0 % Final     MCV   Date Value Ref Range Status   03/06/2023 91.5 79.0 - 97.0 fL Final     MCH   Date Value Ref Range Status   03/06/2023 31.0 26.6 - 33.0 pg Final     MCHC   Date Value Ref Range Status   03/06/2023 33.9 31.5 - 35.7 g/dL Final     RDW   Date Value Ref Range Status   03/06/2023 12.0 (L) 12.3 - 15.4 % Final     RDW-SD   Date Value Ref Range Status   03/06/2023 39.8 37.0 - 54.0 fl Final     MPV   Date Value Ref Range Status   03/06/2023 9.7 6.0 - 12.0 fL Final     Platelets   Date Value Ref Range Status   03/06/2023 260 140 - 450 10*3/mm3 Final     Neutrophil %   Date Value Ref Range Status   03/06/2023 44.4 42.7 - 76.0 % Final     Lymphocyte %   Date Value Ref Range Status   03/06/2023 38.2 19.6 - 45.3 % Final     Monocyte %   Date Value Ref Range Status   03/06/2023 13.9 (H) 5.0 - 12.0 % Final     Eosinophil %   Date Value Ref Range Status   03/06/2023 2.2 0.3 - 6.2 % Final     Basophil %   Date Value Ref Range Status   03/06/2023 0.9 0.0 - 1.5 % Final     Immature Grans %   Date Value Ref Range Status   03/06/2023 0.4 0.0 - 0.5 % Final     Neutrophils, Absolute   Date Value Ref Range Status   03/06/2023 3.49 1.70 - 7.00 10*3/mm3 Final     Lymphocytes, Absolute   Date Value Ref Range Status   03/06/2023 3.00 0.70 - 3.10 10*3/mm3 Final     Monocytes, Absolute   Date Value Ref Range Status   03/06/2023 1.09 (H) 0.10 - 0.90 10*3/mm3 Final     Eosinophils, Absolute   Date Value Ref Range Status   03/06/2023 0.17 0.00 - 0.40 10*3/mm3 Final     Basophils, Absolute   Date Value Ref Range Status   03/06/2023 0.07 0.00 - 0.20 10*3/mm3 Final     Immature Grans, Absolute   Date Value Ref Range Status   03/06/2023 0.03 0.00 - 0.05 10*3/mm3 Final     nRBC   Date Value Ref Range Status   03/06/2023 0.0 0.0 - 0.2 /100 WBC Final         Lab Results   Component Value Date    GLUCOSE 229 (H)  05/09/2023    BUN 12 05/09/2023    CREATININE 0.66 (L) 05/09/2023    EGFRIFNONA 97 02/23/2022    EGFRIFAFRI 117 05/17/2018    BCR 18.2 05/09/2023    K 3.9 05/09/2023    CO2 25.6 05/09/2023    CALCIUM 8.8 05/09/2023    ALBUMIN 4.1 03/06/2023    LABIL2 1.4 01/27/2021    BILIRUBIN Negative 04/16/2018    AST 25 03/06/2023    ALT 29 03/06/2023       Patient seen in no apparent distress.      PHYSICAL EXAM:     Foot/Ankle Exam    GENERAL  Appearance:  appears stated age  Orientation:  AAOx3  Affect:  appropriate  Gait:  unimpaired  Assistance:  independent  Right shoe gear: casual shoe  Left shoe gear: casual shoe    VASCULAR     Right Foot Vascularity   Normal vascular exam    Dorsalis pedis:  2+  Posterior tibial:  2+  Skin temperature:  warm  Edema grading:  None  CFT:  < 3 seconds  Pedal hair growth:  Present  Varicosities:  none     Left Foot Vascularity   Normal vascular exam    Dorsalis pedis:  2+  Posterior tibial:  2+  Skin temperature:  warm  Edema grading:  None  CFT:  < 3 seconds  Pedal hair growth:  Present  Varicosities:  none     NEUROLOGIC     Right Foot Neurologic   Normal sensation    Light touch sensation: normal  Vibratory sensation: normal  Hot/Cold sensation: normal  Protective Sensation using Hamilton-Ambreen Monofilament:   Sites intact: 10  Sites tested: 10     Left Foot Neurologic   Normal sensation    Light touch sensation: normal  Vibratory sensation: normal  Hot/Cold sensation:  normal  Protective Sensation using Hamilton-Ambreen Monofilament:   Sites intact: 10  Sites tested: 10    MUSCULOSKELETAL     Left Foot Musculoskeletal   Tenderness:  (Insertion of Achilles, exostosis posterior Achilles)    MUSCLE STRENGTH     Right Foot Muscle Strength   Foot dorsiflexion:  4  Foot plantar flexion:  4  Foot inversion:  4  Foot eversion:  4     Left Foot Muscle Strength   Foot dorsiflexion:  4  Foot plantar flexion:  4  Foot inversion:  4  Foot eversion:  4    RANGE OF MOTION     Right Foot Range of  Motion   Foot and ankle ROM within normal limits       Left Foot Range of Motion   Foot and ankle ROM within normal limits      DERMATOLOGIC      Right Foot Dermatologic   Skin  Right foot skin is intact.      Left Foot Dermatologic   Skin  Left foot skin is intact.       RADIOLOGY:      Peripheral Block    Result Date: 5/9/2023  Narrative: Bobby Guillermo MD     5/9/2023 11:12 AM Peripheral Block Pre-sedation assessment completed: 5/9/2023 10:55 AM Patient reassessed immediately prior to procedure Patient location during procedure: pre-op Start time: 5/9/2023 10:55 AM Stop time: 5/9/2023 11:04 AM Reason for block: at surgeon's request and post-op pain management Performed by Anesthesiologist: Bobby Guillermo MD Preanesthetic Checklist Completed: patient identified, IV checked, site marked, risks and benefits discussed, surgical consent, monitors and equipment checked, pre-op evaluation and timeout performed Prep: Pt Position: sitting Prep: ChloraPrep Patient monitoring: blood pressure monitoring, continuous pulse oximetry and EKG Procedure Sedation: yes Performed under: local infiltration Guidance:ultrasound guided ULTRASOUND INTERPRETATION.  Using ultrasound guidance a 20 G gauge needle was placed in close proximity to the nerve, at which point, under ultrasound guidance anesthetic was injected in the area of the nerve and spread of the anesthesia was seen on ultrasound in close proximity thereto.  There were no abnormalities seen on ultrasound; a digital image was taken; and the patient tolerated the procedure with no complications. Images:still images obtained, printed/placed on chart Laterality:right Block Type:supraclavicular Injection Technique:single-shot Needle Type:echogenic Resistance on Injection: none Medications Comment:20 ml ropivacaine - supraclavicular 10 ml ropivacaine - subcutaneous infiltration to posterior cervical plexus Post Assessment Injection Assessment: negative aspiration for heme, no  paresthesia on injection and incremental injection Patient Tolerance:comfortable throughout block Complications:no       ASSESSMENT/PLAN     Diagnoses and all orders for this visit:    1. Achilles tendon tear, left, subsequent encounter (Primary)  -     MRI Ankle Left Without Contrast; Future    2. Left foot pain    3. Achilles tendinitis of left lower extremity      Patient to begin stretching exercises and icing in the evening as tolerated. Discussed compression therapy and resting the extremity.  Anti-inflammatory medication to begin taking if okay by PCP.    Comprehensive lower extremity examination and evaluation was performed.    Discussed findings and treatment plan including risks, benefits, and treatment options with patient in detail. Patient agreed with treatment plan.    Medications and allergies reviewed.  Reviewed available lab values along with other pertinent labs.  These were discussed with the patient.    An After Visit Summary was printed and given to the patient at discharge, including (if requested) any available informative/educational handouts regarding diagnosis, treatment, or medications. All questions were answered to patient/family satisfaction. Should symptoms fail to improve or worsen they agree to call or return to clinic or to go to the Emergency Department. Discussed the importance of following up with any needed screening tests/labs/specialist appointments and any requested follow-up recommended by me today. Importance of maintaining follow-up discussed and patient accepts that missed appointments can delay diagnosis and potentially lead to worsening of conditions.    Return in about 3 months (around 9/2/2023)., or sooner if acute issues arise.    This document has been electronically signed by Clifton Marques DPM on June 2, 2023 11:23 EDT

## 2023-06-03 DIAGNOSIS — E11.9 TYPE 2 DIABETES MELLITUS WITHOUT COMPLICATION, WITHOUT LONG-TERM CURRENT USE OF INSULIN: ICD-10-CM

## 2023-06-05 RX ORDER — METFORMIN HYDROCHLORIDE 500 MG/1
TABLET, EXTENDED RELEASE ORAL
Qty: 360 TABLET | Refills: 0 | Status: SHIPPED | OUTPATIENT
Start: 2023-06-05

## 2023-06-07 ENCOUNTER — TREATMENT (OUTPATIENT)
Dept: PHYSICAL THERAPY | Facility: CLINIC | Age: 58
End: 2023-06-07
Payer: COMMERCIAL

## 2023-06-07 DIAGNOSIS — M25.511 ACUTE PAIN OF RIGHT SHOULDER: ICD-10-CM

## 2023-06-07 DIAGNOSIS — M25.611 DECREASED ROM OF RIGHT SHOULDER: ICD-10-CM

## 2023-06-07 DIAGNOSIS — Z98.890 S/P RIGHT ROTATOR CUFF REPAIR: Primary | ICD-10-CM

## 2023-06-07 DIAGNOSIS — R29.898 WEAKNESS OF RIGHT ARM: ICD-10-CM

## 2023-06-07 NOTE — PROGRESS NOTES
Physical Therapy Daily Treatment Note    Patient: Fabio Juárez   : 1965  Diagnosis/ICD-10 Code:  S/P right rotator cuff repair [Z98.890]  Referring practitioner: Susie Finn PA-C  Date of Initial Visit: Type: THERAPY  Noted: 2023  Today's Date: 2023  Patient seen for 2 sessions             Subjective     Patient reports pain in right shoulder at time of arrival is 10/10 pain. Patient states pain was reduced to 4/10 pain after manual therapy.     Objective     See Exercise, Manual, and Modality Logs for complete treatment.     Assessment & Plan     Assessment  Impairments: abnormal muscle firing, abnormal or restricted ROM, activity intolerance, impaired physical strength, lacks appropriate home exercise program, pain with function and safety issue  Functional Limitations: carrying objects, lifting, sleeping, pulling, pushing, uncomfortable because of pain, reaching behind back, reaching overhead and unable to perform repetitive tasks  Assessment details: Patient reports compliance with HEP and was given passive flexion stretches to add to his HEP. Patient was able to archive approximately 90 degrees of passive abduction and flexion. Pt would benefit from skilled PT to address Range of Motion  and Strength deficits, pain management and any concerns with ADLs.         Goals  Plan Goals: SHOULDER  PROBLEMS:     1. The patient has limited ROM of the right shoulder.    LTG 1: 12 weeks:  The patient will demonstrate 160 degrees of right shoulder flexion, 160 degrees of shoulder abduction, 60 degrees of shoulder external rotation, and 60 degrees of shoulder internal rotation to allow the patient to reach into upper kitchen cabinets and manipulate clothing behind the back with greater ease.    STATUS:  New   STG 1a: 4 weeks:  The patient will demonstrate 120 degrees of right shoulder flexion, 120 degrees of shoulder abduction, 45 degrees of shoulder external rotation, and 45 degrees of shoulder  internal rotation.    STATUS:  New   TREATMENT: Manual therapy, therapeutic exercise, home exercise instruction, and modalities as needed to include: electrical stimulation, ultrasound, moist heat, and ice.    2. The patient has limited strength of the right shoulder.   LTG 2: 12 weeks:  The patient will demonstrate 4+ /5 strength for right shoulder flexion, abduction, external rotation, and internal rotation in order to demonstrate improved shoulder stability.    STATUS:  New   STG 2a: 4 weeks:  The patient will demonstrate 4- /5 strength for right shoulder flexion, abduction, external rotation, and internal rotation.    STATUS:  New   STG2b:  4 weeks:  The patient will be independent with home exercises.     STATUS:  New   TREATMENT: Manual therapy, therapeutic exercise, home exercise instruction, and modalities as needed to include: electrical stimulation, ultrasound, moist heat, and ice.     3. The patient complains of pain to the right shoulder.   LTG 3: 12 weeks:  The patient will report a pain rating of 2 /10 or better in order to improve sleep quality and tolerance to performance of activities of daily living.    STATUS:  New   STG 3a: 4 weeks:  The patient will report a pain rating of 4 /10 or better.     STATUS:  New   TREATMENT: Manual therapy, therapeutic exercise, home exercise instruction, and modalities as needed to include: electrical stimulation, ultrasound, moist heat, and ice.    4. Carrying, Moving, and Handling Objects Functional Limitation     LTG 4: 12 weeks:  The patient will demonstrate 50 % limitation by achieving a score of on the QuickDASH.    STATUS:  New   STG 4a: 4 weeks:  The patient will demonstrate 70 % limitation by achieving a score of on the QuickDASH.      STATUS:  New   TREATMENT:  Manual therapy, therapeutic exercise, home exercise instruction, and modalities as needed to include: moist heat, electrical stimulation, and ultrasound.      Plan  Therapy options: will be seen for  skilled therapy services  Planned modality interventions: cryotherapy, dry needling, electrical stimulation/Russian stimulation, TENS, thermotherapy (hydrocollator packs) and ultrasound  Planned therapy interventions: manual therapy, flexibility, functional ROM exercises, home exercise program, therapeutic activities, stretching, strengthening, soft tissue mobilization, postural training, neuromuscular re-education, body mechanics training, joint mobilization, ADL retraining and fine motor coordination training  Frequency: 2x week  Duration in weeks: 12  Treatment plan discussed with: patient  Plan details: Review HEP, update as needed.    Progress with R shoulder ROM, decrease pain levels, improve strength, improve scapular stabilization, postural awareness, increased stamina, decreased tightness, improved ROM/flexibility, improve fine motor and coordination, education as needed.        Progress per Plan of Care       Timed:  Manual Therapy:    18     mins  18725;  Therapeutic Exercise:    8     mins  52853;     Neuromuscular Zhou:    0    mins  31168;    Therapeutic Activity:     4     mins  11261;     Gait Trainin     mins  74016;    Aquatic Therapy:     0     mins  15912;       Untimed:  Electrical Stimulation:    0     mins  38471 ( );  Mechanical Traction:    0     mins  47993;       Timed Treatment:   30   mins   Total Treatment:     30   mins      Electronically signed:   Ibeth Champagne PTA  Physical Therapist Assistant  [facility] PRATT License #: [id number]

## 2023-06-13 ENCOUNTER — LAB (OUTPATIENT)
Dept: LAB | Facility: HOSPITAL | Age: 58
End: 2023-06-13
Payer: COMMERCIAL

## 2023-06-13 ENCOUNTER — OFFICE VISIT (OUTPATIENT)
Dept: FAMILY MEDICINE CLINIC | Age: 58
End: 2023-06-13
Payer: COMMERCIAL

## 2023-06-13 VITALS
HEIGHT: 65 IN | WEIGHT: 264.2 LBS | HEART RATE: 86 BPM | DIASTOLIC BLOOD PRESSURE: 88 MMHG | BODY MASS INDEX: 44.02 KG/M2 | OXYGEN SATURATION: 94 % | TEMPERATURE: 97.5 F | SYSTOLIC BLOOD PRESSURE: 142 MMHG

## 2023-06-13 DIAGNOSIS — G47.00 INSOMNIA, UNSPECIFIED TYPE: Chronic | ICD-10-CM

## 2023-06-13 DIAGNOSIS — E11.9 TYPE 2 DIABETES MELLITUS WITHOUT COMPLICATION, WITHOUT LONG-TERM CURRENT USE OF INSULIN: Primary | ICD-10-CM

## 2023-06-13 DIAGNOSIS — I10 ESSENTIAL (PRIMARY) HYPERTENSION: ICD-10-CM

## 2023-06-13 DIAGNOSIS — E11.9 TYPE 2 DIABETES MELLITUS WITHOUT COMPLICATION, WITHOUT LONG-TERM CURRENT USE OF INSULIN: ICD-10-CM

## 2023-06-13 LAB
ALBUMIN SERPL-MCNC: 3.9 G/DL (ref 3.5–5.2)
ALBUMIN/GLOB SERPL: 1.4 G/DL
ALP SERPL-CCNC: 71 U/L (ref 39–117)
ALT SERPL W P-5'-P-CCNC: 25 U/L (ref 1–41)
ANION GAP SERPL CALCULATED.3IONS-SCNC: 8 MMOL/L (ref 5–15)
AST SERPL-CCNC: 24 U/L (ref 1–40)
BILIRUB SERPL-MCNC: 1 MG/DL (ref 0–1.2)
BUN SERPL-MCNC: 13 MG/DL (ref 6–20)
BUN/CREAT SERPL: 17.3 (ref 7–25)
CALCIUM SPEC-SCNC: 9.2 MG/DL (ref 8.6–10.5)
CHLORIDE SERPL-SCNC: 99 MMOL/L (ref 98–107)
CO2 SERPL-SCNC: 30 MMOL/L (ref 22–29)
CREAT SERPL-MCNC: 0.75 MG/DL (ref 0.76–1.27)
EGFRCR SERPLBLD CKD-EPI 2021: 105.3 ML/MIN/1.73
GLOBULIN UR ELPH-MCNC: 2.8 GM/DL
GLUCOSE SERPL-MCNC: 269 MG/DL (ref 65–99)
HBA1C MFR BLD: 9.8 % (ref 4.8–5.6)
POTASSIUM SERPL-SCNC: 3.9 MMOL/L (ref 3.5–5.2)
PROT SERPL-MCNC: 6.7 G/DL (ref 6–8.5)
SODIUM SERPL-SCNC: 137 MMOL/L (ref 136–145)

## 2023-06-13 PROCEDURE — 36415 COLL VENOUS BLD VENIPUNCTURE: CPT

## 2023-06-13 PROCEDURE — 80053 COMPREHEN METABOLIC PANEL: CPT

## 2023-06-13 PROCEDURE — 83036 HEMOGLOBIN GLYCOSYLATED A1C: CPT

## 2023-06-13 RX ORDER — SEMAGLUTIDE 1.34 MG/ML
0.5 INJECTION, SOLUTION SUBCUTANEOUS WEEKLY
Qty: 6 ML | Refills: 0 | Status: SHIPPED | OUTPATIENT
Start: 2023-06-13

## 2023-06-13 RX ORDER — TRAZODONE HYDROCHLORIDE 50 MG/1
50 TABLET ORAL NIGHTLY
Qty: 90 TABLET | Refills: 1 | Status: SHIPPED | OUTPATIENT
Start: 2023-06-13

## 2023-06-14 ENCOUNTER — TREATMENT (OUTPATIENT)
Dept: PHYSICAL THERAPY | Facility: CLINIC | Age: 58
End: 2023-06-14
Payer: COMMERCIAL

## 2023-06-14 DIAGNOSIS — M25.511 ACUTE PAIN OF RIGHT SHOULDER: ICD-10-CM

## 2023-06-14 DIAGNOSIS — R29.898 WEAKNESS OF RIGHT ARM: ICD-10-CM

## 2023-06-14 DIAGNOSIS — M25.611 DECREASED ROM OF RIGHT SHOULDER: ICD-10-CM

## 2023-06-14 DIAGNOSIS — Z98.890 S/P RIGHT ROTATOR CUFF REPAIR: Primary | ICD-10-CM

## 2023-06-14 NOTE — PROGRESS NOTES
Physical Therapy Daily Treatment Note                      [facility] [address] [place] [address]    Patient: Fabio Juárez   : 1965  Diagnosis/ICD-10 Code:  S/P right rotator cuff repair [Z98.890]  Referring practitioner: Susie Finn PA-C  Date of Initial Visit: Type: THERAPY  Noted: 2023  Today's Date: 2023  Patient seen for 3 sessions           Subjective   The patient reported that his shoulder is hurting more today than at his last visit. He accidentally jerked his shoulder when reaching for something earlier in the week.    Objective   See Exercise, Manual, and Modality Logs for complete treatment.     Assessment/Plan  The patient demonstrated good tolerance to all interventions today. His ability to perform AAROM exercise was improved following manual therapy. Overall, his passive ROM is progressing well. Continue with current plan of care.       Timed:  Manual Therapy:    20     mins  71178;  Therapeutic Exercise:    18     mins  58542;     Neuromuscular Zhou:   0    mins  09051;    Therapeutic Activity:     0     mins  03367;     Gait Trainin     mins  18123;     Aquatics                         0      mins  71674    Un-timed:  Mechanical Traction      0     mins  94647  Dry Needling     0     mins self-pay  Electrical Stimulation:    0     mins  27193 ( );      Timed Treatment:   38   mins   Total Treatment:     38   mins    Modesto Nova PT  Physical Therapist    Electronically signed 2023    [place] License: PT - [id number]

## 2023-06-17 NOTE — PROGRESS NOTES
Chief Complaint  Fabio Juárez presents to [facility] for Hypertension (6 mo. F/U ), Diabetes, Insomnia, and Edema (Swelling in both legs )      Subjective     History of Present Illness  Diabetes type:  Type 2  Current diabetic medications include metformin, Ozempic (semaglutide), and glimepiride (Amaryl)    Diabetic Review of Systems:  Medication compliance: compliant most of the time  Diabetic diet compliance: noncompliant much of the time  Blood sugar lo-190's and 200-250's  Is He on ACE inhibitor or angiotensin II receptor blocker and a statin? Lisinopril    atorvastatin (lipitor)    A1C Last 3 Results        3/6/2023    16:30 2023    16:25   HGBA1C Last 3 Results   Hemoglobin A1C 10.20  9.80         Fabio presents for follow up on hypertension.  Compliance with medication : lisinopril (generic) and hydrochlorothiazide as prescribed   Check of BP at home reported as well controlled.  No new concerns or problems to report.  Today's blood pressure is elevated however generally it is well controlled    Mauri also needs refills on his trazodone for his insomnia no complaints or changes requested with this medication      Assessment and Plan       Diagnoses and all orders for this visit:    1. Type 2 diabetes mellitus without complication, without long-term current use of insulin (Primary)  Comments:  Increase dose of Ozempic continue other meds diet and exercise recommended as tolerated  Orders:  -     Hemoglobin A1c; Future  -     Semaglutide,0.25 or 0.5MG/DOS, (Ozempic, 0.25 or 0.5 MG/DOSE,) 2 MG/1.5ML solution pen-injector; Inject 0.5 mg under the skin into the appropriate area as directed 1 (One) Time Per Week. Last dose 23  Dispense: 6 mL; Refill: 0    2. Essential (primary) hypertension  Comments:  Not well controlled recommend checking at home follow-up if remains elevated at today's level  Orders:  -     Comprehensive metabolic panel; Future    3.  "Insomnia, unspecified type  Comments:  Current treatment  Orders:  -     traZODone (DESYREL) 50 MG tablet; Take 1 tablet by mouth Every Night.  Dispense: 90 tablet; Refill: 1        Follow Up   Return in about 3 months (around 9/13/2023) for Recheck.      New Medications Ordered This Visit   Medications    Semaglutide,0.25 or 0.5MG/DOS, (Ozempic, 0.25 or 0.5 MG/DOSE,) 2 MG/1.5ML solution pen-injector     Sig: Inject 0.5 mg under the skin into the appropriate area as directed 1 (One) Time Per Week. Last dose 04/26/23     Dispense:  6 mL     Refill:  0    traZODone (DESYREL) 50 MG tablet     Sig: Take 1 tablet by mouth Every Night.     Dispense:  90 tablet     Refill:  1       Medications Discontinued During This Encounter   Medication Reason    traZODone (DESYREL) 50 MG tablet Reorder    Semaglutide,0.25 or 0.5MG/DOS, (Ozempic, 0.25 or 0.5 MG/DOSE,) 2 MG/1.5ML solution pen-injector Reorder            Review of Systems    Objective     Vitals:    06/13/23 1547 06/13/23 1611   BP: (!) 142/122 142/88   BP Location: Right arm Left arm   Patient Position: Sitting    Cuff Size: Adult    Pulse: 86    Temp: 97.5 °F (36.4 °C)    TempSrc: Oral    SpO2: 94%    Weight: 120 kg (264 lb 3.2 oz)    Height: 165.1 cm (65\")      Body mass index is 43.97 kg/m².     Physical Exam  Constitutional:       Appearance: Normal appearance.   HENT:      Head: Normocephalic.   Pulmonary:      Effort: Pulmonary effort is normal.   Musculoskeletal:      Right shoulder: Decreased range of motion.      Cervical back: Normal range of motion.      Comments: Right shoulder with sling in place   Neurological:      Mental Status: He is alert and oriented to person, place, and time.   Psychiatric:         Mood and Affect: Mood normal.         Behavior: Behavior normal.          Result Review                       No Known Allergies   Past Medical History:   Diagnosis Date    Chronic pain     Claustrophobia     Essential (primary) hypertension     GERD " (gastroesophageal reflux disease)     Hemochromatosis     ASYMPTOMATIC     History of COVID-19     Left upper quadrant pain     Low back pain     Lung nodule     Mixed hyperlipidemia     Obstructive sleep apnea (adult) (pediatric)     Other testicular dysfunction     Pain in right foot     Pain in right shoulder     Recurrent umbilical hernia     Renal stone 03/2017    Tracheostomy status     R/T SLEEP APNEA - WAS FOLLOWED BY DR. PRICE - LAST O.V. 2018 F/U PRN    Type 2 diabetes mellitus     BG RUNS 120-125 IN AM      Current Outpatient Medications   Medication Sig Dispense Refill    albuterol sulfate  (90 Base) MCG/ACT inhaler EVERY 4 HOURS AS NEEDED as needed for SHORTNESS OF BREATH      atorvastatin (LIPITOR) 80 MG tablet TAKE ONE TABLET BY MOUTH EVERY NIGHT AT BEDTIME 90 tablet 0    baclofen (LIORESAL) 10 MG tablet TAKE 1/2 TO 1 TABLET BY MOUTH AT BEDTIME FOR LOWER BACK PAIN MUSCLE SPASM 90 tablet 1    celecoxib (CeleBREX) 200 MG capsule Take 1 capsule by mouth Daily.      cetirizine (zyrTEC) 5 MG tablet TAKE ONE TABLET BY MOUTH DAILY 90 tablet 0    docusate sodium (COLACE) 100 MG capsule Take 1 capsule by mouth 2 (Two) Times a Day As Needed for Constipation. 20 capsule 0    fexofenadine (ALLEGRA) 180 MG tablet Take 1 tablet by mouth Daily.      gabapentin (NEURONTIN) 300 MG capsule 1 capsule 2 (Two) Times a Day.      glimepiride (AMARYL) 2 MG tablet Take 1 tablet by mouth Every Morning Before Breakfast. 1.5 tab      hydroCHLOROthiazide (HYDRODIURIL) 12.5 MG tablet Take 1 tablet by mouth Daily. 90 tablet 1    lisinopril (PRINIVIL,ZESTRIL) 10 MG tablet TAKE ONE TABLET BY MOUTH EVERY MORNING 90 tablet 0    metFORMIN ER (GLUCOPHAGE-XR) 500 MG 24 hr tablet TAKE TWO TABLETS BY MOUTH TWICE A DAY WITH MEALS 360 tablet 0    naloxone (NARCAN) 4 MG/0.1ML nasal spray Call 911. Don't prime. Drift in 1 nostril for overdose. Repeat in 2-3 minutes in other nostril if no or minimal breathing/responsiveness. 2 each 0     omeprazole (priLOSEC) 40 MG capsule TAKE ONE CAPSULE BY MOUTH DAILY 90 capsule 0    ondansetron (Zofran) 4 MG tablet Take 1 tablet by mouth Every 8 (Eight) Hours As Needed for Nausea or Vomiting. 30 tablet 0    oxyCODONE (ROXICODONE) 5 MG immediate release tablet Take 1 tablet by mouth Every 6 (Six) Hours As Needed for Moderate Pain. 30 tablet 0    Semaglutide,0.25 or 0.5MG/DOS, (Ozempic, 0.25 or 0.5 MG/DOSE,) 2 MG/1.5ML solution pen-injector Inject 0.5 mg under the skin into the appropriate area as directed 1 (One) Time Per Week. Last dose 04/26/23 6 mL 0    traZODone (DESYREL) 50 MG tablet Take 1 tablet by mouth Every Night. 90 tablet 1     No current facility-administered medications for this visit.     Past Surgical History:   Procedure Laterality Date    ACHILLES TENDON REPAIR Right 10/2018    APPENDECTOMY      CARDIAC CATHETERIZATION  11/03/2015    LEFT VENTRIULOGRAM, CORONARY ANGIOGRAM     CARPAL TUNNEL RELEASE Right     CHOLECYSTECTOMY  07/2013    COLONOSCOPY  07/28/2014    NORMAL RESULT     HERNIA REPAIR      JOINT REPLACEMENT Bilateral     RIGHT KNEE 01/2016    SHOULDER ARTHROSCOPY W/ ROTATOR CUFF REPAIR Right 5/9/2023    Procedure: SHOULDER ARTHROSCOPY WITH BICEPS TENODESIS, ROTATOR CUFF REPAIR, AND SUBACROMIAL DECOMPRESSION;  Surgeon: hPu Lebron MD;  Location: [facility];  Service: Orthopedics;  Laterality: Right;    TONSILLECTOMY AND ADENOIDECTOMY      TRACHEOSTOMY      SECONDARY TO SLEEP APNEA    VENTRAL HERNIA REPAIR N/A 02/23/2022    Procedure: ROBOTIC UMBILICAL HERNIA REPAIR WITH MESH PLACEMENT;  Surgeon: Garrett Anderson MD;  Location: [facility] MAIN OR;  Service: Robotics - DaVinci;  Laterality: N/A;      Health Maintenance Due   Topic Date Due    DIABETIC EYE EXAM  06/21/2021      Immunization History   Administered Date(s) Administered    COVID-19 (PFIZER) BIVALENT 12+YRS 10/25/2022    COVID-19 (PFIZER) Purple Cap Monovalent 02/24/2021, 03/17/2021, 12/09/2021    Covid-19 (Pfizer) Gray Cap  Monovalent 06/21/2022    FluLaval/Fluzone >6mos 12/09/2021    Influenza Quad Vaccine (Inpatient) 01/09/2018    Influenza TIV (IM) 01/08/2013    Influenza, Unspecified 01/20/2021    Pneumococcal, Unspecified 08/08/2016, 08/08/2016    Td 03/26/2019, 03/26/2019         Part of this note may be an electronic transcription/translation of spoken language to printed   text using the Dragon Dictation System.      Karley Amor, APRN

## 2023-06-19 ENCOUNTER — OFFICE VISIT (OUTPATIENT)
Dept: ORTHOPEDIC SURGERY | Facility: CLINIC | Age: 58
End: 2023-06-19
Payer: COMMERCIAL

## 2023-06-19 VITALS — WEIGHT: 264.55 LBS | BODY MASS INDEX: 44.08 KG/M2 | HEART RATE: 91 BPM | HEIGHT: 65 IN | OXYGEN SATURATION: 95 %

## 2023-06-19 DIAGNOSIS — M67.813 BICEPS TENDONOSIS OF RIGHT SHOULDER: ICD-10-CM

## 2023-06-19 DIAGNOSIS — Z98.890 S/P RIGHT ROTATOR CUFF REPAIR: Primary | ICD-10-CM

## 2023-06-19 PROCEDURE — 99024 POSTOP FOLLOW-UP VISIT: CPT | Performed by: PHYSICIAN ASSISTANT

## 2023-06-19 NOTE — PROGRESS NOTES
"Chief Complaint  Pain and Follow-up of the Right Shoulder    Subjective          Fabio Juárez presents to [facility]   History of Present Illness    Fabio Juárez presents today for a follow-up of his right shoulder.  Patient states he is postoperative rotator cuff repair and biceps tenodesis performed on 5/9/2023.  Today, patient states that he is doing fine.  He has been attending physical therapy and noticing improvements.  He has continued with his sling.  He has been taking his pain medication as needed.  Patient has been doing his home exercises.  Denies new injuries.  Denies numbness or tingling.      No Known Allergies     Social History     Socioeconomic History   • Marital status:    • Number of children: 2   Tobacco Use   • Smoking status: Never   • Smokeless tobacco: Never   Vaping Use   • Vaping Use: Never used   Substance and Sexual Activity   • Alcohol use: Yes     Comment: SOCIALLY    • Drug use: Never   • Sexual activity: Yes     Partners: Female     Comment: Wife        I reviewed the patient's chief complaint, history of present illness, review of systems, past medical history, surgical history, family history, social history, medications, and allergy list.     REVIEW OF SYSTEMS    Constitutional: Denies fevers, chills, weight loss  Cardiovascular: Denies chest pain, shortness of breath  Skin: Denies rashes, acute skin changes  Neurologic: Denies headache, loss of consciousness  MSK: Right shoulder pain      Objective   Vital Signs:   Pulse 91   Ht 165.1 cm (65\")   Wt 120 kg (264 lb 8.8 oz)   SpO2 95%   BMI 44.02 kg/m²     Body mass index is 44.02 kg/m².    Physical Exam    General: Alert. No acute distress.   Right upper extremity: Incisions are well-healed.  No concerning signs for infection.  No pain with gentle glenohumeral joint range of motion.  Full elbow extension.  Stiffness with elbow flexion.  Forearm soft.  Active finger and wrist range " of motion.  Thumb opposition intact.  Palmar abduction of thumb intact.  Sensation intact to axillary, median, radial, and ulnar nerve distributions.  Palpable radial pulse.    Procedures    Imaging Results (Most Recent)     None                 Assessment and Plan    Diagnoses and all orders for this visit:    1. S/P right rotator cuff repair (Primary)    2. Biceps tendonosis of right shoulder        Fabio Juárez presents today 6 weeks postop right shoulder arthroscopy with rotator cuff repair and bicep tenodesis performed on 5/9/2023.  Incisions are well-healed with no concerning signs for infection.  Patient is instructed to continue with formal physical therapy as well as home exercises.  Continue to progress with range of motion.  We discussed not performing strengthening exercises until close to 3 months.  Patient expressed understanding.  Okay to gradually discontinue sling as tolerated.  Continue to avoid any heavy lifting, pushing or pulling with the right upper extremity.  Work note written today.      Patient will follow up in 6 weeks for reevaluation.       Call or return if symptoms worsen or patient has any concerns.       Follow Up   Return in about 6 weeks (around 7/31/2023).  Patient was given instructions and counseling regarding his condition or for health maintenance advice. Please see specific information pulled into the AVS if appropriate.     Susie Finn PA-C  06/19/23  13:28 EDT

## 2023-06-28 ENCOUNTER — TELEPHONE (OUTPATIENT)
Dept: PHYSICAL THERAPY | Facility: CLINIC | Age: 58
End: 2023-06-28

## 2023-07-06 ENCOUNTER — TELEPHONE (OUTPATIENT)
Dept: ORTHOPEDIC SURGERY | Facility: CLINIC | Age: 58
End: 2023-07-06

## 2023-07-06 NOTE — TELEPHONE ENCOUNTER
Provider: REGIS  Caller: TAYO  Relationship to Patient: SELF  Pharmacy:   Phone Number: [phone]  Reason for Call: PT CALLING TO SPEAK TO EITHER CHIQUI STACY OR AN ASSISTANT OF HERS - PT STATES HIS WORK IS GOING TO FIRE HIM UNLESS HE COMES BACK TO WORK BUT HE DOESN'T KNOW IF HE WOULD BE RELEASED YET OR NOT

## 2023-07-27 ENCOUNTER — TREATMENT (OUTPATIENT)
Dept: PHYSICAL THERAPY | Facility: CLINIC | Age: 58
End: 2023-07-27
Payer: COMMERCIAL

## 2023-07-27 DIAGNOSIS — M25.511 ACUTE PAIN OF RIGHT SHOULDER: ICD-10-CM

## 2023-07-27 DIAGNOSIS — M25.611 DECREASED ROM OF RIGHT SHOULDER: ICD-10-CM

## 2023-07-27 DIAGNOSIS — R29.898 WEAKNESS OF RIGHT ARM: ICD-10-CM

## 2023-07-27 DIAGNOSIS — Z98.890 S/P RIGHT ROTATOR CUFF REPAIR: Primary | ICD-10-CM

## 2023-07-27 NOTE — PROGRESS NOTES
Re-Assessment / Re-Certification        Patient: Fabio Juárez   : 1965  Diagnosis/ICD-10 Code:  S/P right rotator cuff repair [Z98.890]  Referring practitioner: Susie Finn PA-C  Date of Initial Visit: Type: THERAPY  Noted: 2023  Today's Date: 2023  Patient seen for 9 sessions      Subjective:     Visit Diagnoses:    ICD-10-CM ICD-9-CM   1. S/P right rotator cuff repair  Z98.890 V45.89   2. Acute pain of right shoulder  M25.511 719.41   3. Decreased ROM of right shoulder  M25.611 719.51   4. Weakness of right arm  R29.898 729.89       Clinical Progress: improved  Home Program Compliance: Yes  Treatment has included: therapeutic exercise, neuromuscular re-education, manual therapy, and therapeutic activity      Subjective Evaluation    History of Present Illness  Mechanism of injury: Patient's pain today is 5/10.    Overall, patient feels that PT has been helpful for him. He notes an improvement of 90% since starting therapy.    Patient has noted improvements with ROM in all directions.  He has custody of a baby at this time, she is about 25 lbs, he can tell she is heavy on the arm.      Patient is still having difficulty with washing his back, sleeping still has achiness, and still not getting full ROM overhead, swelling in the hand.    Returns back to the doctor 23.  He will find out at that appt if he is released to go back to work.            Objective           General Comments     Shoulder Comments   R shoulder AROM:  Flexion: 135 degrees  External rotation: 45 degrees   Internal rotation: R back pocket, tightness and pain  Abduction: 140 degrees    R shoulder MMT:   Flexion 4-/5  Extension 4+/5  IR/ER 4/5  Abduction 4-/5    Radicular symptoms:  None noted    Tenderness: biceps, anterior/superior shoulder    Incisions have healed    Swelling in R hands/fingers     Assessment & Plan     Assessment    Assessment details: Mr. Juárez has progressed well with therapy.  His AROM is  improving in all directions, IR behind the back and end range forwards flexion continue to give him the most difficulty.  Eccentric movements tend to be the hardest motions due to limited control. He was able to simulate moving boxes, 10 lbs, today similar to work without too much difficulty, some soreness/fatigue.  He goes back on Monday to the doctor, he will find out if he will be released for work that day.        Goals  Plan Goals: SHOULDER  PROBLEMS:     1. The patient has limited ROM of the right shoulder.    LTG 1: 12 weeks:  The patient will demonstrate 160 degrees of right shoulder flexion, 160 degrees of shoulder abduction, 60 degrees of shoulder external rotation, and 60 degrees of shoulder internal rotation to allow the patient to reach into upper kitchen cabinets and manipulate clothing behind the back with greater ease.    STATUS:  Progressing   STG 1a: 4 weeks:  The patient will demonstrate 120 degrees of right shoulder flexion, 120 degrees of shoulder abduction, 45 degrees of shoulder external rotation, and 45 degrees of shoulder internal rotation.    STATUS:  MET   TREATMENT: Manual therapy, therapeutic exercise, home exercise instruction, and modalities as needed to include: electrical stimulation, ultrasound, moist heat, and ice.    2. The patient has limited strength of the right shoulder.   LTG 2: 12 weeks:  The patient will demonstrate 4+ /5 strength for right shoulder flexion, abduction, external rotation, and internal rotation in order to demonstrate improved shoulder stability.    STATUS:  Progressing   STG 2a: 4 weeks:  The patient will demonstrate 4- /5 strength for right shoulder flexion, abduction, external rotation, and internal rotation.    STATUS:  MET   STG2b:  4 weeks:  The patient will be independent with home exercises.     STATUS:  MET   TREATMENT: Manual therapy, therapeutic exercise, home exercise instruction, and modalities as needed to include: electrical stimulation,  ultrasound, moist heat, and ice.     3. The patient complains of pain to the right shoulder.   LTG 3: 12 weeks:  The patient will report a pain rating of 2 /10 or better in order to improve sleep quality and tolerance to performance of activities of daily living.    STATUS:  Progressing   STG 3a: 4 weeks:  The patient will report a pain rating of 4 /10 or better.     STATUS:  Progressing   TREATMENT: Manual therapy, therapeutic exercise, home exercise instruction, and modalities as needed to include: electrical stimulation, ultrasound, moist heat, and ice.    4. Carrying, Moving, and Handling Objects Functional Limitation     LTG 4: 12 weeks:  The patient will demonstrate 50 % limitation by achieving a score of on the QuickDASH.    STATUS:  MET   STG 4a: 4 weeks:  The patient will demonstrate 70 % limitation by achieving a score of on the QuickDASH.      STATUS:  MET   TREATMENT:  Manual therapy, therapeutic exercise, home exercise instruction, and modalities as needed to include: moist heat, electrical stimulation, and ultrasound.      Plan  Therapy options: will be seen for skilled therapy services  Planned modality interventions: cryotherapy, dry needling, electrical stimulation/Russian stimulation, TENS, thermotherapy (hydrocollator packs) and ultrasound  Planned therapy interventions: manual therapy, flexibility, functional ROM exercises, home exercise program, therapeutic activities, stretching, strengthening, soft tissue mobilization, postural training, neuromuscular re-education, body mechanics training, joint mobilization, ADL retraining and fine motor coordination training  Frequency: 2x week  Duration in weeks: 8  Treatment plan discussed with: patient  Plan details: Plan to discharge after appt on Monday with the doctor, if he doesn't get released, we will add more appts as needed.         Progress toward previous goals: Partially Met          Recommendations: Continue as planned    Timed:  Manual  Therapy:         mins  29789;  Therapeutic Exercise:    14     mins  73198;     Neuromuscular Zhou:    10    mins  06962;    Therapeutic Activity:     8     mins  73948;     Gait Training:           mins  62575;     Ultrasound:          mins  87583;    Canalith Repos           ___  mins  87638      Untimed:  Electrical Stimulation:         mins  67120 ( );  Mechanical Traction:         mins  60306;   Dry Needling:                     mins self pay  Re-Eval:                           mins  49009         Timed Treatment:   34   mins   Total Treatment:     36   mins      PT SIGNATURE: Marni Giraldo PT, DPT  [place] License: [id number]       Certification Period: 7/27/2023 thru 10/24/2023  I certify that the therapy services are furnished while this patient is under my care.  The services outlined above are required by this patient, and will be reviewed every 90 days.     PHYSICIAN: Susie Finn PA-C  NPI: [id number]      PHYSICIAN PRINT NAME: ______________________________________________      PHYSICIAN SIGNATURE: ______________________________________________         DATE:________________________________        Please sign and return via fax to [phone].  Thank you, [facility].

## 2023-07-31 ENCOUNTER — OFFICE VISIT (OUTPATIENT)
Dept: ORTHOPEDIC SURGERY | Facility: CLINIC | Age: 58
End: 2023-07-31
Payer: COMMERCIAL

## 2023-07-31 VITALS — BODY MASS INDEX: 43.99 KG/M2 | WEIGHT: 264 LBS | HEIGHT: 65 IN

## 2023-07-31 DIAGNOSIS — Z98.890 S/P RIGHT ROTATOR CUFF REPAIR: Primary | ICD-10-CM

## 2023-07-31 DIAGNOSIS — M67.813 BICEPS TENDONOSIS OF RIGHT SHOULDER: ICD-10-CM

## 2023-08-02 ENCOUNTER — TELEPHONE (OUTPATIENT)
Dept: ORTHOPEDIC SURGERY | Facility: CLINIC | Age: 58
End: 2023-08-02

## 2023-08-02 NOTE — TELEPHONE ENCOUNTER
Caller: PEE FAUSTIN    Relationship to patient: Emergency Contact    Best call back number: [phone]    Patient is needing: UNABLE TO WT NO ANSWER. PATIENT CALLING BACK ABOUT WORK NOTE

## 2023-08-02 NOTE — TELEPHONE ENCOUNTER
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

## 2023-08-04 ENCOUNTER — TREATMENT (OUTPATIENT)
Dept: PHYSICAL THERAPY | Facility: CLINIC | Age: 58
End: 2023-08-04
Payer: COMMERCIAL

## 2023-08-04 DIAGNOSIS — R29.898 WEAKNESS OF RIGHT ARM: ICD-10-CM

## 2023-08-04 DIAGNOSIS — Z98.890 S/P RIGHT ROTATOR CUFF REPAIR: Primary | ICD-10-CM

## 2023-08-04 DIAGNOSIS — M25.611 DECREASED ROM OF RIGHT SHOULDER: ICD-10-CM

## 2023-08-04 DIAGNOSIS — M25.511 ACUTE PAIN OF RIGHT SHOULDER: ICD-10-CM

## 2023-08-09 ENCOUNTER — TREATMENT (OUTPATIENT)
Dept: PHYSICAL THERAPY | Facility: CLINIC | Age: 58
End: 2023-08-09
Payer: COMMERCIAL

## 2023-08-09 DIAGNOSIS — R29.898 WEAKNESS OF RIGHT ARM: ICD-10-CM

## 2023-08-09 DIAGNOSIS — Z98.890 S/P RIGHT ROTATOR CUFF REPAIR: Primary | ICD-10-CM

## 2023-08-09 DIAGNOSIS — M25.511 ACUTE PAIN OF RIGHT SHOULDER: ICD-10-CM

## 2023-08-09 DIAGNOSIS — M25.611 DECREASED ROM OF RIGHT SHOULDER: ICD-10-CM

## 2023-08-09 NOTE — PROGRESS NOTES
" Physical Therapy Daily Treatment Note    Patient: Fabio Juárez   : 1965  Diagnosis/ICD-10 Code:  S/P right rotator cuff repair [Z98.890]  Referring practitioner: Susie Finn PA-C  Date of Initial Visit: Type: THERAPY  Noted: 2023  Today's Date: 2023  Patient seen for 11 sessions             Subjective Evaluation    History of Present Illness    Subjective comment: Patient reports \"it seems like everything went backwards today.\" Patient states he went back to work on Monday and his shoulder feels really tired from working. Patient reports 7/10 pain at time of arrival located in his right shoulder. Patient states he has not been working his full 8 hours \"and its probably a good thing because I don't know if I could make it that long.\" Fabio states he has pain management appointment tomorrow and so he will be able to rest his shoulder more tomorrow.     Objective     See Exercise, Manual, and Modality Logs for complete treatment.     Assessment & Plan     Assessment    Assessment details: Fabio still experiencing increased right shoulder pain, especially since starting back at work. Pt had some increased discomfort with exercise. Patient states his pain was reduced to 2/10 pain following e-stim and ice treatment. Pt required reduced weight with wall slides with lift off and with bent T's Pt would benefit from skilled PT to address Range of Motion and Strength deficits, pain management and any concerns with ADLs.     Goals  Plan Goals: SHOULDER  PROBLEMS:     1. The patient has limited ROM of the right shoulder.    LTG 1: 12 weeks:  The patient will demonstrate 160 degrees of right shoulder flexion, 160 degrees of shoulder abduction, 60 degrees of shoulder external rotation, and 60 degrees of shoulder internal rotation to allow the patient to reach into upper kitchen cabinets and manipulate clothing behind the back with greater ease.    STATUS:  Progressing   STG 1a: 4 weeks:  The patient " will demonstrate 120 degrees of right shoulder flexion, 120 degrees of shoulder abduction, 45 degrees of shoulder external rotation, and 45 degrees of shoulder internal rotation.    STATUS:  MET   TREATMENT: Manual therapy, therapeutic exercise, home exercise instruction, and modalities as needed to include: electrical stimulation, ultrasound, moist heat, and ice.    2. The patient has limited strength of the right shoulder.   LTG 2: 12 weeks:  The patient will demonstrate 4+ /5 strength for right shoulder flexion, abduction, external rotation, and internal rotation in order to demonstrate improved shoulder stability.    STATUS:  Progressing   STG 2a: 4 weeks:  The patient will demonstrate 4- /5 strength for right shoulder flexion, abduction, external rotation, and internal rotation.    STATUS:  MET   STG2b:  4 weeks:  The patient will be independent with home exercises.     STATUS:  MET   TREATMENT: Manual therapy, therapeutic exercise, home exercise instruction, and modalities as needed to include: electrical stimulation, ultrasound, moist heat, and ice.     3. The patient complains of pain to the right shoulder.   LTG 3: 12 weeks:  The patient will report a pain rating of 2 /10 or better in order to improve sleep quality and tolerance to performance of activities of daily living.    STATUS:  Progressing   STG 3a: 4 weeks:  The patient will report a pain rating of 4 /10 or better.     STATUS:  Progressing   TREATMENT: Manual therapy, therapeutic exercise, home exercise instruction, and modalities as needed to include: electrical stimulation, ultrasound, moist heat, and ice.    4. Carrying, Moving, and Handling Objects Functional Limitation     LTG 4: 12 weeks:  The patient will demonstrate 50 % limitation by achieving a score of on the QuickDASH.    STATUS:  MET   STG 4a: 4 weeks:  The patient will demonstrate 70 % limitation by achieving a score of on the QuickDASH.      STATUS:  MET   TREATMENT:  Manual therapy,  therapeutic exercise, home exercise instruction, and modalities as needed to include: moist heat, electrical stimulation, and ultrasound.      Plan  Therapy options: will be seen for skilled therapy services  Planned modality interventions: cryotherapy, dry needling, electrical stimulation/Russian stimulation, TENS, thermotherapy (hydrocollator packs) and ultrasound  Planned therapy interventions: manual therapy, flexibility, functional ROM exercises, home exercise program, therapeutic activities, stretching, strengthening, soft tissue mobilization, postural training, neuromuscular re-education, body mechanics training, joint mobilization, ADL retraining and fine motor coordination training  Frequency: 2x week  Duration in weeks: 8  Treatment plan discussed with: patient  Plan details: Plan to discharge after appt on Monday with the doctor, if he doesn't get released, we will add more appts as needed.       Progress per Plan of Care      Timed:  Manual Therapy:    8     mins  04090;  Therapeutic Exercise:    17     mins  94671;     Neuromuscular Zhou:    0    mins  12282;    Therapeutic Activity:     13     mins  66685;     Gait Trainin     mins  30833;    Aquatic Therapy:     0     mins  38671;       Untimed:  Electrical Stimulation:    15     mins  31087 ( );  Mechanical Traction:    0     mins  70441;       Timed Treatment:   38   mins   Total Treatment:     53   mins      Electronically signed:   Ibeth Champagne PTA  Physical Therapist Assistant  Abdoulaye PRATT License #: [id number]

## 2023-08-16 ENCOUNTER — TELEPHONE (OUTPATIENT)
Dept: PHYSICAL THERAPY | Facility: CLINIC | Age: 58
End: 2023-08-16

## 2023-08-16 NOTE — TELEPHONE ENCOUNTER
Caller: Fabio Juárez    Relationship: Self       What was the call regarding: IS OUT OF TOWN

## 2023-08-22 NOTE — TELEPHONE ENCOUNTER
Caller: PEE FASUTIN    Relationship: Emergency Contact    Best call back number: [phone]    What form or medical record are you requesting: RELEASE BACK TO WORK     Who is requesting this form or medical record from you: EMPLOYER    How would you like to receive the form or medical records (pick-up, mail, fax):   If fax, what is the fax number: [phone]      Timeframe paperwork needed: ASAP    Additional notes: PATIENT NEEDS THE WORK RELEASE REFAXED TO [employer] THEY NEVER GOT THE FIRST ONE.   WT FAILED NO ANSWER. PATIENT IS WANTING TO RETURN TO WORK TOMORROW

## 2023-08-23 ENCOUNTER — TELEPHONE (OUTPATIENT)
Dept: PHYSICAL THERAPY | Facility: OTHER | Age: 58
End: 2023-08-23
Payer: COMMERCIAL

## 2023-08-23 NOTE — TELEPHONE ENCOUNTER
Caller: Fabio Juárez    Relationship: Self    What was the call regarding: PATIENT CANCELLED APPT TODAY DUE TO COVID EXPOSURE

## 2023-09-08 ENCOUNTER — TREATMENT (OUTPATIENT)
Dept: PHYSICAL THERAPY | Facility: CLINIC | Age: 58
End: 2023-09-08
Payer: COMMERCIAL

## 2023-09-08 DIAGNOSIS — Z98.890 S/P RIGHT ROTATOR CUFF REPAIR: Primary | ICD-10-CM

## 2023-09-08 DIAGNOSIS — M25.611 DECREASED ROM OF RIGHT SHOULDER: ICD-10-CM

## 2023-09-08 DIAGNOSIS — M25.511 ACUTE PAIN OF RIGHT SHOULDER: ICD-10-CM

## 2023-09-08 DIAGNOSIS — R29.898 WEAKNESS OF RIGHT ARM: ICD-10-CM

## 2023-09-08 NOTE — PROGRESS NOTES
Re-Assessment / Re-Certification        Patient: Fabio Juárez   : 1965  Diagnosis/ICD-10 Code:  S/P right rotator cuff repair [Z98.890]  Referring practitioner: Susie Finn PA-C  Date of Initial Visit: Type: THERAPY  Noted: 2023  Today's Date: 2023  Patient seen for 12 sessions      Subjective:     Visit Diagnoses:    ICD-10-CM ICD-9-CM   1. S/P right rotator cuff repair  Z98.890 V45.89   2. Acute pain of right shoulder  M25.511 719.41   3. Decreased ROM of right shoulder  M25.611 719.51   4. Weakness of right arm  R29.898 729.89       Clinical Progress: improved  Home Program Compliance: Yes  Treatment has included: therapeutic exercise, neuromuscular re-education, manual therapy, and therapeutic activity      Subjective Evaluation    History of Present Illness  Mechanism of injury: Patient's pain today is 3/10, most of the pain is at night.     Overall, patient feels that PT has been helpful for him. He notes an improvement of 30% since starting therapy.  He has gotten used to using his L arm.  He does everything with his L arm now.     Patient is still having difficulty with ROM and pain. He gets to about 100 degrees R shoulder AROM flexion, the arm starts jumping and having pain, feels like the estim is on there.     He started back to work .  Returns back to the doctor 23.           Objective           General Comments     Shoulder Comments   R shoulder AROM:  Flexion: 135 degrees  External rotation: 20 degrees   Internal rotation: R back pocket, tightness and pain  Abduction: 110 degrees    R shoulder MMT:   Flexion 4-/5  Extension 4+/5  IR/ER 4/5  Abduction 4-/5    Radicular symptoms:  None noted    Tenderness: biceps, anterior/superior shoulder, deltoid       Assessment & Plan       Assessment  Assessment details: Mauri started back to work 23, he is on full duty.  He has not been to therapy due to COVID exposure for a week and COVID diagnosis for another week.  His  ROM has decreased from last reassessment and strength has not improved significantly since last reassessment.  He did note 90% better last reassessment, but today he feels like he is only 30% better.  He is having more pain with shoulder flexion and movement outside ROSE.  Tingling/estim like feeling is present in the RUE about 5-6x day, normally at work.  He goes back to the doctor on Monday, 9/11/23.    He is going to talk with the doctor on Monday, call therapy back after the appt to see if she wants him to continue with therapy.    Goals  Plan Goals: SHOULDER  PROBLEMS:     1. The patient has limited ROM of the right shoulder.    LTG 1: 12 weeks:  The patient will demonstrate 160 degrees of right shoulder flexion, 160 degrees of shoulder abduction, 60 degrees of shoulder external rotation, and 60 degrees of shoulder internal rotation to allow the patient to reach into upper kitchen cabinets and manipulate clothing behind the back with greater ease.    STATUS:  Progressing   STG 1a: 4 weeks:  The patient will demonstrate 120 degrees of right shoulder flexion, 120 degrees of shoulder abduction, 45 degrees of shoulder external rotation, and 45 degrees of shoulder internal rotation.    STATUS:  MET   TREATMENT: Manual therapy, therapeutic exercise, home exercise instruction, and modalities as needed to include: electrical stimulation, ultrasound, moist heat, and ice.    2. The patient has limited strength of the right shoulder.   LTG 2: 12 weeks:  The patient will demonstrate 4+ /5 strength for right shoulder flexion, abduction, external rotation, and internal rotation in order to demonstrate improved shoulder stability.    STATUS:  Progressing   STG 2a: 4 weeks:  The patient will demonstrate 4- /5 strength for right shoulder flexion, abduction, external rotation, and internal rotation.    STATUS:  MET   STG2b:  4 weeks:  The patient will be independent with home exercises.     STATUS:  MET   TREATMENT: Manual  therapy, therapeutic exercise, home exercise instruction, and modalities as needed to include: electrical stimulation, ultrasound, moist heat, and ice.     3. The patient complains of pain to the right shoulder.   LTG 3: 12 weeks:  The patient will report a pain rating of 2 /10 or better in order to improve sleep quality and tolerance to performance of activities of daily living.    STATUS:  Progressing   STG 3a: 4 weeks:  The patient will report a pain rating of 4 /10 or better.     STATUS:  Progressing   TREATMENT: Manual therapy, therapeutic exercise, home exercise instruction, and modalities as needed to include: electrical stimulation, ultrasound, moist heat, and ice.    4. Carrying, Moving, and Handling Objects Functional Limitation     LTG 4: 12 weeks:  The patient will demonstrate 50 % limitation by achieving a score of on the QuickDASH.    STATUS:  MET   STG 4a: 4 weeks:  The patient will demonstrate 70 % limitation by achieving a score of on the QuickDASH.      STATUS:  MET   TREATMENT:  Manual therapy, therapeutic exercise, home exercise instruction, and modalities as needed to include: moist heat, electrical stimulation, and ultrasound.      Plan  Therapy options: will be seen for skilled therapy services  Planned modality interventions: cryotherapy, dry needling, electrical stimulation/Russian stimulation, TENS, thermotherapy (hydrocollator packs) and ultrasound  Planned therapy interventions: manual therapy, flexibility, functional ROM exercises, home exercise program, therapeutic activities, stretching, strengthening, soft tissue mobilization, postural training, neuromuscular re-education, body mechanics training, joint mobilization, ADL retraining and fine motor coordination training  Frequency: 2x week  Duration in weeks: 12  Treatment plan discussed with: patient  Plan details: See what the doctor says on 9/11/23, pt will call back to make more appts, as needed.        Progress toward previous  goals: Partially Met    Timeframe: 3 months  Prognosis to achieve goals: fair    Timed:  Manual Therapy:         mins  00732;  Therapeutic Exercise:    14     mins  11576;     Neuromuscular Zhou:    12    mins  02149;    Therapeutic Activity:     15     mins  96823;     Gait Training:           mins  93685;     Ultrasound:          mins  53848;    Canalith Repos           ___  mins  49532      Untimed:  Electrical Stimulation:         mins  76788 ( );  Mechanical Traction:         mins  36152;   Dry Needling:                     mins self pay  Re-Eval:                           mins  68236         Timed Treatment:   41   mins   Total Treatment:     45   mins          PT SIGNATURE: Marni Giraldo PT, DPT  [place] License: [id number]      90 Day Recertification      Certification Period: 9/8/2023 thru 12/6/2023  I certify that the therapy services are furnished while this patient is under my care.  The services outlined above are required by this patient, and will be reviewed every 90 days.     PHYSICIAN: Susie Finn PA-C  NPI: [id number]      PHYSICIAN PRINT NAME: ______________________________________________      PHYSICIAN SIGNATURE: ______________________________________________         DATE:________________________________        Please sign and return via fax to [phone].  Thank you, [facility].

## 2023-09-09 DIAGNOSIS — E11.9 TYPE 2 DIABETES MELLITUS WITHOUT COMPLICATION, WITHOUT LONG-TERM CURRENT USE OF INSULIN: ICD-10-CM

## 2023-09-11 ENCOUNTER — OFFICE VISIT (OUTPATIENT)
Dept: ORTHOPEDIC SURGERY | Facility: CLINIC | Age: 58
End: 2023-09-11
Payer: COMMERCIAL

## 2023-09-11 VITALS
WEIGHT: 270 LBS | BODY MASS INDEX: 46.1 KG/M2 | SYSTOLIC BLOOD PRESSURE: 117 MMHG | OXYGEN SATURATION: 94 % | HEIGHT: 64 IN | HEART RATE: 80 BPM | DIASTOLIC BLOOD PRESSURE: 70 MMHG

## 2023-09-11 DIAGNOSIS — Z98.890 S/P RIGHT ROTATOR CUFF REPAIR: Primary | ICD-10-CM

## 2023-09-11 DIAGNOSIS — M67.813 BICEPS TENDONOSIS OF RIGHT SHOULDER: ICD-10-CM

## 2023-09-11 NOTE — PROGRESS NOTES
"Chief Complaint  Follow-up and Pain of the Right Shoulder    Subjective          Fabio Juárez presents to [facility]   History of Present Illness    Fabio Juárez presents today for a follow-up of his right shoulder.  Patient is postop right rotator cuff repair and biceps tenodesis performed on 5/9/2023.  Today, patient states that he is doing okay.  He continues to experience pain along the bicipital area.  He states that he has been slowly improving.  He is continue with physical therapy and noted mild improvements in range of motion.  He has noted some improvements in his strength.  He states that work is going well.  He denies new injuries.      No Known Allergies     Social History     Socioeconomic History    Marital status:     Number of children: 2   Tobacco Use    Smoking status: Never    Smokeless tobacco: Never   Vaping Use    Vaping Use: Never used   Substance and Sexual Activity    Alcohol use: Yes     Comment: SOCIALLY     Drug use: Never    Sexual activity: Yes     Partners: Female     Comment: Wife        I reviewed the patient's chief complaint, history of present illness, review of systems, past medical history, surgical history, family history, social history, medications, and allergy list.     REVIEW OF SYSTEMS    Constitutional: Denies fevers, chills, weight loss  Cardiovascular: Denies chest pain, shortness of breath  Skin: Denies rashes, acute skin changes  Neurologic: Denies headache, loss of consciousness  MSK: Right shoulder pain      Objective   Vital Signs:   /70   Pulse 80   Ht 162.6 cm (64\")   Wt 122 kg (270 lb)   SpO2 94%   BMI 46.35 kg/m²     Body mass index is 46.35 kg/m².    Physical Exam    General: Alert. No acute distress.   Right upper extremity: Arthroscopy portals are well-healed.  Tenderness to palpation over the bicipital groove.  Active forward elevation 160.  Passive forward elevation 170.  External rotation 30.  " Internal rotation to lateral hip.  5 out of 5 rotator cuff testing.  Elbow range of motion intact.  Forearm soft.  Active finger and wrist range of motion.  Thumb opposition intact.  Palmar abduction of the thumb intact.  Sensation intact axillary, median, radial, and ulnar nerve distributions.  Palpable radial pulse.    Procedures    Imaging Results (Most Recent)       None                   Assessment and Plan    Diagnoses and all orders for this visit:    1. S/P right rotator cuff repair (Primary)    2. Biceps tendonosis of right shoulder    Other orders  -     Diclofenac Sodium (VOLTAREN) 1 % gel gel; Apply 4 g topically to the appropriate area as directed 4 (Four) Times a Day.  Dispense: 100 g; Refill: 0        Fabio Juárez presents today postop right rotator cuff repair and biceps tenodesis performed on 5/9/2023.  Patient instructed to continue with formal physical therapy as well as home exercises.  We discussed the importance of continuing to improve his range of motion as well as strengthening exercises.  Continue to gradually increase activities as tolerated.      Patient will follow up in 6 weeks for reevaluation.        Call or return if symptoms worsen or patient has any concerns.       Follow Up   Return in about 6 weeks (around 10/23/2023).  Patient was given instructions and counseling regarding his condition or for health maintenance advice. Please see specific information pulled into the AVS if appropriate.     Susie Finn PA-C  09/11/23  12:46 EDT

## 2023-09-12 RX ORDER — SEMAGLUTIDE 0.68 MG/ML
INJECTION, SOLUTION SUBCUTANEOUS
Qty: 9 ML | Refills: 0 | Status: SHIPPED | OUTPATIENT
Start: 2023-09-12

## 2023-10-09 RX ORDER — OMEPRAZOLE 40 MG/1
40 CAPSULE, DELAYED RELEASE ORAL DAILY
Qty: 90 CAPSULE | Refills: 0 | Status: SHIPPED | OUTPATIENT
Start: 2023-10-09

## 2023-10-09 RX ORDER — LISINOPRIL 10 MG/1
TABLET ORAL
Qty: 90 TABLET | Refills: 0 | Status: SHIPPED | OUTPATIENT
Start: 2023-10-09

## 2023-10-09 RX ORDER — ATORVASTATIN CALCIUM 80 MG/1
TABLET, FILM COATED ORAL
Qty: 90 TABLET | Refills: 0 | Status: SHIPPED | OUTPATIENT
Start: 2023-10-09

## 2023-10-23 ENCOUNTER — OFFICE VISIT (OUTPATIENT)
Dept: ORTHOPEDIC SURGERY | Facility: CLINIC | Age: 58
End: 2023-10-23
Payer: COMMERCIAL

## 2023-10-23 VITALS — HEIGHT: 64 IN | WEIGHT: 270 LBS | BODY MASS INDEX: 46.1 KG/M2

## 2023-10-23 DIAGNOSIS — M67.813 BICEPS TENDONOSIS OF RIGHT SHOULDER: ICD-10-CM

## 2023-10-23 DIAGNOSIS — Z98.890 S/P RIGHT ROTATOR CUFF REPAIR: Primary | ICD-10-CM

## 2023-10-23 PROCEDURE — 99213 OFFICE O/P EST LOW 20 MIN: CPT | Performed by: PHYSICIAN ASSISTANT

## 2023-10-23 NOTE — PROGRESS NOTES
"Chief Complaint  Pain and Follow-up of the Right Shoulder    Subjective          Fabio Juárez presents to [facility]   History of Present Illness    Fabio Jáurez presents today for a follow-up of his right shoulder.  Patient is postop right rotator cuff repair and biceps tenodesis performed on 5/9/2023.  Today, patient states that he is doing well.  He finished formal physical therapy.  He has noted improvements in both his range of motion and his strength.  He states that he still has some soreness to the bicipital area.  He has been using the Voltaren gel.  He reports no new injuries to his right shoulder.      No Known Allergies     Social History     Socioeconomic History    Marital status:     Number of children: 2   Tobacco Use    Smoking status: Never    Smokeless tobacco: Never   Vaping Use    Vaping Use: Never used   Substance and Sexual Activity    Alcohol use: Yes     Comment: SOCIALLY     Drug use: Never    Sexual activity: Yes     Partners: Female     Comment: Wife        I reviewed the patient's chief complaint, history of present illness, review of systems, past medical history, surgical history, family history, social history, medications, and allergy list.     REVIEW OF SYSTEMS    Constitutional: Denies fevers, chills, weight loss  Cardiovascular: Denies chest pain, shortness of breath  Skin: Denies rashes, acute skin changes  Neurologic: Denies headache, loss of consciousness  MSK: Right shoulder pain      Objective   Vital Signs:   Ht 162.6 cm (64\")   Wt 122 kg (270 lb)   BMI 46.35 kg/m²     Body mass index is 46.35 kg/m².    Physical Exam    General: Alert. No acute distress.   Right upper extremity: Tenderness to palpation over the bicipital groove.  Active forward elevation 170.  External rotation 30.  Internal rotation to the lateral hip.  5 out of 5 rotator cuff testing.  Elbow range of motion intact.  Forearm soft.  Active finger and wrist range " of motion.  Thumb opposition intact.  Palmar abduction of the thumb intact.  Sensation intact axillary, median,, and ulnar nerve distributions.  Palpable radial pulse.    Procedures    Imaging Results (Most Recent)       None                   Assessment and Plan    Diagnoses and all orders for this visit:    1. S/P right rotator cuff repair (Primary)    2. Biceps tendonosis of right shoulder        Fabio Juárez presents today status post right rotator cuff repair and biceps tenodesis performed on 5/29/2023.  Patient directed to continue with his home exercises for both range of motion and strength.  Continue to gradually increase activities as tolerated.  Continue with Voltaren gel as needed.  Work note written today.      Patient will follow up in 6 weeks for reevaluation.        Call or return if symptoms worsen or patient has any concerns.       Follow Up   Return in about 6 weeks (around 12/4/2023).  Patient was given instructions and counseling regarding his condition or for health maintenance advice. Please see specific information pulled into the AVS if appropriate.     Susie Finn PA-C  10/23/23  11:00 EDT

## 2023-11-08 ENCOUNTER — OFFICE VISIT (OUTPATIENT)
Dept: FAMILY MEDICINE CLINIC | Age: 58
End: 2023-11-08
Payer: COMMERCIAL

## 2023-11-08 ENCOUNTER — OFFICE VISIT (OUTPATIENT)
Dept: ORTHOPEDIC SURGERY | Facility: CLINIC | Age: 58
End: 2023-11-08
Payer: COMMERCIAL

## 2023-11-08 ENCOUNTER — LAB (OUTPATIENT)
Dept: LAB | Facility: HOSPITAL | Age: 58
End: 2023-11-08
Payer: COMMERCIAL

## 2023-11-08 VITALS
SYSTOLIC BLOOD PRESSURE: 116 MMHG | DIASTOLIC BLOOD PRESSURE: 71 MMHG | HEART RATE: 78 BPM | WEIGHT: 256.6 LBS | BODY MASS INDEX: 43.81 KG/M2 | TEMPERATURE: 97.8 F | OXYGEN SATURATION: 96 % | HEIGHT: 64 IN

## 2023-11-08 VITALS
WEIGHT: 256 LBS | HEIGHT: 64 IN | SYSTOLIC BLOOD PRESSURE: 131 MMHG | DIASTOLIC BLOOD PRESSURE: 75 MMHG | BODY MASS INDEX: 43.71 KG/M2 | OXYGEN SATURATION: 93 % | HEART RATE: 88 BPM

## 2023-11-08 DIAGNOSIS — M19.019 OSTEOARTHRITIS OF AC (ACROMIOCLAVICULAR) JOINT: ICD-10-CM

## 2023-11-08 DIAGNOSIS — M75.101 TEAR OF RIGHT ROTATOR CUFF, UNSPECIFIED TEAR EXTENT, UNSPECIFIED WHETHER TRAUMATIC: ICD-10-CM

## 2023-11-08 DIAGNOSIS — E11.9 TYPE 2 DIABETES MELLITUS WITHOUT COMPLICATION, WITHOUT LONG-TERM CURRENT USE OF INSULIN: Primary | ICD-10-CM

## 2023-11-08 DIAGNOSIS — E11.9 TYPE 2 DIABETES MELLITUS WITHOUT COMPLICATION, WITHOUT LONG-TERM CURRENT USE OF INSULIN: ICD-10-CM

## 2023-11-08 DIAGNOSIS — Z96.651 PAIN DUE TO TOTAL RIGHT KNEE REPLACEMENT, INITIAL ENCOUNTER: ICD-10-CM

## 2023-11-08 DIAGNOSIS — G89.29 CHRONIC PAIN OF RIGHT KNEE: ICD-10-CM

## 2023-11-08 DIAGNOSIS — M25.561 RIGHT KNEE PAIN, UNSPECIFIED CHRONICITY: ICD-10-CM

## 2023-11-08 DIAGNOSIS — M75.21 BICEPS TENDONITIS ON RIGHT: ICD-10-CM

## 2023-11-08 DIAGNOSIS — T84.84XA PAIN DUE TO TOTAL RIGHT KNEE REPLACEMENT, INITIAL ENCOUNTER: ICD-10-CM

## 2023-11-08 DIAGNOSIS — M25.561 CHRONIC PAIN OF RIGHT KNEE: ICD-10-CM

## 2023-11-08 DIAGNOSIS — Z23 ENCOUNTER FOR IMMUNIZATION: ICD-10-CM

## 2023-11-08 DIAGNOSIS — Z13.6 SCREENING FOR CARDIOVASCULAR CONDITION: ICD-10-CM

## 2023-11-08 DIAGNOSIS — M25.561 RIGHT KNEE PAIN, UNSPECIFIED CHRONICITY: Primary | ICD-10-CM

## 2023-11-08 LAB
ALBUMIN SERPL-MCNC: 3.5 G/DL (ref 3.5–5.2)
ALBUMIN UR-MCNC: <1.2 MG/DL
ALBUMIN/GLOB SERPL: 1.2 G/DL
ALP SERPL-CCNC: 58 U/L (ref 39–117)
ALT SERPL W P-5'-P-CCNC: 17 U/L (ref 1–41)
ANION GAP SERPL CALCULATED.3IONS-SCNC: 11.9 MMOL/L (ref 5–15)
AST SERPL-CCNC: 17 U/L (ref 1–40)
BASOPHILS # BLD AUTO: 0.05 10*3/MM3 (ref 0–0.2)
BASOPHILS NFR BLD AUTO: 0.7 % (ref 0–1.5)
BILIRUB SERPL-MCNC: 0.5 MG/DL (ref 0–1.2)
BILIRUB UR QL STRIP: NEGATIVE
BUN SERPL-MCNC: 18 MG/DL (ref 6–20)
BUN/CREAT SERPL: 21.7 (ref 7–25)
CALCIUM SPEC-SCNC: 9.1 MG/DL (ref 8.6–10.5)
CHLORIDE SERPL-SCNC: 103 MMOL/L (ref 98–107)
CHOLEST SERPL-MCNC: 104 MG/DL (ref 0–200)
CLARITY UR: CLEAR
CO2 SERPL-SCNC: 24.1 MMOL/L (ref 22–29)
COLOR UR: YELLOW
CREAT SERPL-MCNC: 0.83 MG/DL (ref 0.76–1.27)
CREAT UR-MCNC: 66.7 MG/DL
CRP SERPL-MCNC: 3.03 MG/DL (ref 0–0.5)
DEPRECATED RDW RBC AUTO: 41 FL (ref 37–54)
EGFRCR SERPLBLD CKD-EPI 2021: 102.1 ML/MIN/1.73
EOSINOPHIL # BLD AUTO: 0.16 10*3/MM3 (ref 0–0.4)
EOSINOPHIL NFR BLD AUTO: 2.2 % (ref 0.3–6.2)
ERYTHROCYTE [DISTWIDTH] IN BLOOD BY AUTOMATED COUNT: 11.9 % (ref 12.3–15.4)
ERYTHROCYTE [SEDIMENTATION RATE] IN BLOOD: 18 MM/HR (ref 0–20)
GLOBULIN UR ELPH-MCNC: 2.9 GM/DL
GLUCOSE SERPL-MCNC: 287 MG/DL (ref 65–99)
GLUCOSE UR STRIP-MCNC: ABNORMAL MG/DL
HBA1C MFR BLD: 7.7 % (ref 4.8–5.6)
HCT VFR BLD AUTO: 43 % (ref 37.5–51)
HDLC SERPL-MCNC: 34 MG/DL (ref 40–60)
HGB BLD-MCNC: 14.1 G/DL (ref 13–17.7)
HGB UR QL STRIP.AUTO: NEGATIVE
IMM GRANULOCYTES # BLD AUTO: 0.01 10*3/MM3 (ref 0–0.05)
IMM GRANULOCYTES NFR BLD AUTO: 0.1 % (ref 0–0.5)
KETONES UR QL STRIP: NEGATIVE
LDLC SERPL CALC-MCNC: 46 MG/DL (ref 0–100)
LDLC/HDLC SERPL: 1.28 {RATIO}
LEUKOCYTE ESTERASE UR QL STRIP.AUTO: NEGATIVE
LYMPHOCYTES # BLD AUTO: 2.27 10*3/MM3 (ref 0.7–3.1)
LYMPHOCYTES NFR BLD AUTO: 31.1 % (ref 19.6–45.3)
MCH RBC QN AUTO: 30 PG (ref 26.6–33)
MCHC RBC AUTO-ENTMCNC: 32.8 G/DL (ref 31.5–35.7)
MCV RBC AUTO: 91.5 FL (ref 79–97)
MICROALBUMIN/CREAT UR: NORMAL MG/G{CREAT}
MONOCYTES # BLD AUTO: 0.7 10*3/MM3 (ref 0.1–0.9)
MONOCYTES NFR BLD AUTO: 9.6 % (ref 5–12)
NEUTROPHILS NFR BLD AUTO: 4.12 10*3/MM3 (ref 1.7–7)
NEUTROPHILS NFR BLD AUTO: 56.3 % (ref 42.7–76)
NITRITE UR QL STRIP: NEGATIVE
PH UR STRIP.AUTO: <=5 [PH] (ref 5–8)
PLATELET # BLD AUTO: 280 10*3/MM3 (ref 140–450)
PMV BLD AUTO: 8.3 FL (ref 6–12)
POTASSIUM SERPL-SCNC: 4.2 MMOL/L (ref 3.5–5.2)
PROT SERPL-MCNC: 6.4 G/DL (ref 6–8.5)
PROT UR QL STRIP: NEGATIVE
RBC # BLD AUTO: 4.7 10*6/MM3 (ref 4.14–5.8)
SODIUM SERPL-SCNC: 139 MMOL/L (ref 136–145)
SP GR UR STRIP: 1.02 (ref 1–1.03)
TRIGL SERPL-MCNC: 133 MG/DL (ref 0–150)
UROBILINOGEN UR QL STRIP: ABNORMAL
VLDLC SERPL-MCNC: 24 MG/DL (ref 5–40)
WBC NRBC COR # BLD: 7.31 10*3/MM3 (ref 3.4–10.8)

## 2023-11-08 PROCEDURE — 81003 URINALYSIS AUTO W/O SCOPE: CPT

## 2023-11-08 PROCEDURE — 36415 COLL VENOUS BLD VENIPUNCTURE: CPT

## 2023-11-08 PROCEDURE — 82043 UR ALBUMIN QUANTITATIVE: CPT

## 2023-11-08 PROCEDURE — 86140 C-REACTIVE PROTEIN: CPT

## 2023-11-08 PROCEDURE — 85025 COMPLETE CBC W/AUTO DIFF WBC: CPT

## 2023-11-08 PROCEDURE — 80053 COMPREHEN METABOLIC PANEL: CPT

## 2023-11-08 PROCEDURE — 80061 LIPID PANEL: CPT

## 2023-11-08 PROCEDURE — 83036 HEMOGLOBIN GLYCOSYLATED A1C: CPT

## 2023-11-08 PROCEDURE — 85652 RBC SED RATE AUTOMATED: CPT

## 2023-11-08 PROCEDURE — 82570 ASSAY OF URINE CREATININE: CPT

## 2023-11-08 NOTE — PROGRESS NOTES
Chief Complaint  Fabio Juárez presents to [facility] for Hypertension (Follow up ), Insomnia, and knee surgery ( RT knee pain , total knee replacement previously )      Subjective     History of Present Illness    Diabetes type:  Type 2    A1C Last 3 Results          3/6/2023    16:30 2023    16:25   HGBA1C Last 3 Results   Hemoglobin A1C 10.20  9.80          Current diabetic medications include metformin and Ozempic (semaglutide)  0.5mg weekly     Diabetic Review of Systems:  Medication compliance: compliant all of the time  Diabetic diet compliance: noncompliant much of the time  Blood sugar lo-130's  Any hypoglycemic episodes? - No        Your patient is overdue for a Diabetic Eye Exam           Is He on ACE inhibitor or angiotensin II receptor blocker and a statin? Lisinopril    atorvastatin (lipitor)    Right knee has been having problems over the past few months.  This is a long standing problem but now is scheduled for evaluation for possible knee replacement.  He did go to ER yesterday and was told need to have this looked at .  He is scheduled with Dr. Lebron today for evaluation.  He is currently wearing a brace He reports that at work, he has been having to go up and down stairs over the past few weeks and has really caused more swelling.     Fabio presents today for follow up on hyperlipidemia.  Previous values:   Lab Results   Component Value Date    CHOL 115 2022    CHLPL 96 (L) 2021    TRIG 213 (H) 2022    HDL 38 (L) 2022    LDL 43 2022    ;    Current CVD 10yr risk is The ASCVD Risk score (Cynthia CID, et al., 2019) failed to calculate for the following reasons:    The valid total cholesterol range is 130 to 320 mg/dL ;    Fabio reports compliant with medication which is atorvastatin (lipitor)    No side effects reported from this medication .   No new concerns to discuss today.    Fabio presents for follow up on  "hypertension.  Compliance with medication : lisinopril (generic) and hydrochlorothiazide as prescribed   Check of BP at home reported as well controlled.  No new concerns or problems to report.        He is scheduled for a wellness next month         Assessment and Plan       Diagnoses and all orders for this visit:    1. Type 2 diabetes mellitus without complication, without long-term current use of insulin (Primary)  Comments:  labs recommended for ongoing monitoring  last A1C not well controlled  Orders:  -     Hemoglobin A1c; Future  -     Comprehensive metabolic panel; Future  -     Microalbumin / Creatinine Urine Ratio - Urine, Clean Catch; Future    2. Screening for cardiovascular condition  -     Lipid panel; Future    3. Encounter for immunization  -     Fluzone >6 Months ([id number])    4. Chronic pain of right knee  Comments:  follow up with Dr. Lebron as scheduled            Follow Up   Return for Next scheduled follow up.      No orders of the defined types were placed in this encounter.      Medications Discontinued During This Encounter   Medication Reason    docusate sodium (COLACE) 100 MG capsule *Therapy completed    fexofenadine (ALLEGRA) 180 MG tablet *Therapy completed    ondansetron (Zofran) 4 MG tablet *Therapy completed          Review of Systems    Objective     Vitals:    11/08/23 0808   BP: 116/71   BP Location: Left arm   Patient Position: Sitting   Cuff Size: Adult   Pulse: 78   Temp: 97.8 °F (36.6 °C)   TempSrc: Oral   SpO2: 96%   Weight: 116 kg (256 lb 9.6 oz)   Height: 162.6 cm (64\")     Body mass index is 44.05 kg/m².          Physical Exam  Vitals reviewed.   Constitutional:       Appearance: Normal appearance.   HENT:      Head: Normocephalic.   Eyes:      Pupils: Pupils are equal, round, and reactive to light.   Cardiovascular:      Rate and Rhythm: Normal rate and regular rhythm.      Heart sounds: No murmur heard.  Pulmonary:      Effort: Pulmonary effort is normal.      Breath " sounds: Normal breath sounds.   Musculoskeletal:         General: Normal range of motion.      Comments: Right knee with brace in place   Neurological:      Mental Status: He is alert.   Psychiatric:         Mood and Affect: Mood normal.         Behavior: Behavior normal.            Result Review               No Known Allergies   Past Medical History:   Diagnosis Date    Chronic pain     Claustrophobia     Essential (primary) hypertension     GERD (gastroesophageal reflux disease)     Hemochromatosis     ASYMPTOMATIC     History of COVID-19     Left upper quadrant pain     Low back pain     Lung nodule     Mixed hyperlipidemia     Obstructive sleep apnea (adult) (pediatric)     Other testicular dysfunction     Pain in right foot     Pain in right shoulder     Recurrent umbilical hernia     Renal stone 03/2017    Tracheostomy status     R/T SLEEP APNEA - WAS FOLLOWED BY DR. PRICE - LAST O.V. 2018 F/U PRN    Type 2 diabetes mellitus     BG RUNS 120-125 IN AM      Current Outpatient Medications   Medication Sig Dispense Refill    albuterol sulfate  (90 Base) MCG/ACT inhaler EVERY 4 HOURS AS NEEDED as needed for SHORTNESS OF BREATH      atorvastatin (LIPITOR) 80 MG tablet TAKE ONE TABLET BY MOUTH EVERY NIGHT AT BEDTIME 90 tablet 0    baclofen (LIORESAL) 10 MG tablet TAKE 1/2 TO 1 TABLET BY MOUTH EVERY NIGHT AT BEDTIME FOR LOWER BACK PAIN/MUSCLE SPASM 90 tablet 1    celecoxib (CeleBREX) 200 MG capsule Take 1 capsule by mouth Daily.      cetirizine (zyrTEC) 5 MG tablet TAKE ONE TABLET BY MOUTH DAILY 90 tablet 0    Diclofenac Sodium (VOLTAREN) 1 % gel gel Apply 4 g topically to the appropriate area as directed 4 (Four) Times a Day. 100 g 0    gabapentin (NEURONTIN) 300 MG capsule 1 capsule 2 (Two) Times a Day.      glimepiride (AMARYL) 2 MG tablet Take 1 tablet by mouth Every Morning Before Breakfast. 1.5 tab      hydroCHLOROthiazide (HYDRODIURIL) 12.5 MG tablet Take 1 tablet by mouth Daily. 90 tablet 1     lisinopril (PRINIVIL,ZESTRIL) 10 MG tablet TAKE ONE TABLET BY MOUTH EVERY MORNING 90 tablet 0    metFORMIN ER (GLUCOPHAGE-XR) 500 MG 24 hr tablet TAKE TWO TABLETS BY MOUTH TWICE A DAY WITH MEALS 360 tablet 0    naloxone (NARCAN) 4 MG/0.1ML nasal spray Call 911. Don't prime. Baker in 1 nostril for overdose. Repeat in 2-3 minutes in other nostril if no or minimal breathing/responsiveness. 2 each 0    omeprazole (priLOSEC) 40 MG capsule TAKE 1 CAPSULE BY MOUTH DAILY 90 capsule 0    oxyCODONE (ROXICODONE) 5 MG immediate release tablet Take 1 tablet by mouth Every 6 (Six) Hours As Needed for Moderate Pain. (Patient not taking: Reported on 11/8/2023) 30 tablet 0    Ozempic, 0.25 or 0.5 MG/DOSE, 2 MG/3ML solution pen-injector INJECT 0.5MG UNDER THE SKIN INTO THE APPROPRIATE AREA AS DIRECTED ONCE WEEKLY 9 mL 0    traZODone (DESYREL) 50 MG tablet Take 1 tablet by mouth Every Night. 90 tablet 1     No current facility-administered medications for this visit.     Past Surgical History:   Procedure Laterality Date    ACHILLES TENDON REPAIR Right 10/2018    APPENDECTOMY      CARDIAC CATHETERIZATION  11/03/2015    LEFT VENTRIULOGRAM, CORONARY ANGIOGRAM     CARPAL TUNNEL RELEASE Right     CHOLECYSTECTOMY  07/2013    COLONOSCOPY  07/28/2014    NORMAL RESULT     HERNIA REPAIR      JOINT REPLACEMENT Bilateral     RIGHT KNEE 01/2016    SHOULDER ARTHROSCOPY W/ ROTATOR CUFF REPAIR Right 5/9/2023    Procedure: SHOULDER ARTHROSCOPY WITH BICEPS TENODESIS, ROTATOR CUFF REPAIR, AND SUBACROMIAL DECOMPRESSION;  Surgeon: Phu Lebron MD;  [place] [facility] [place] [facility]  Service: Orthopedics;  Laterality: Right;    TONSILLECTOMY AND ADENOIDECTOMY      TRACHEOSTOMY      SECONDARY TO SLEEP APNEA    VENTRAL HERNIA REPAIR N/A 02/23/2022    Procedure: ROBOTIC UMBILICAL HERNIA REPAIR WITH MESH PLACEMENT;  Surgeon: Garrett Anderson MD;  Location: [facility] OR;  Service: Robotics - DaVinci;  Laterality: N/A;      Health Maintenance Due   Topic Date Due     Hepatitis B (1 of 3 - 3-dose series) Never done    ZOSTER VACCINE (1 of 2) Never done    ANNUAL PHYSICAL  06/21/2023    DIABETIC FOOT EXAM  06/21/2023    LIPID PANEL  06/22/2023    URINE MICROALBUMIN  06/22/2023    COVID-19 Vaccine (6 - 2023-24 season) 09/01/2023      Immunization History   Administered Date(s) Administered    COVID-19 (PFIZER) BIVALENT 12+YRS 10/25/2022    COVID-19 (PFIZER) Purple Cap Monovalent 02/24/2021, 03/17/2021, 12/09/2021    Covid-19 (Pfizer) Gray Cap Monovalent 06/21/2022    Fluzone (or Fluarix & Flulaval for VFC) >6mos 12/09/2021, 11/08/2023    Influenza Quad Vaccine (Inpatient) 01/09/2018    Influenza TIV (IM) 01/08/2013    Influenza, Unspecified 01/20/2021    Pneumococcal, Unspecified 08/08/2016, 08/08/2016    Td (TDVAX) 03/26/2019, 03/26/2019         Part of this note may be an electronic transcription/translation of spoken language to printed   text using the Dragon Dictation System.      Karley Amor APRN

## 2023-11-08 NOTE — LETTER
November 8, 2023     Patient: Fabio Juárez   YOB: 1965   Date of Visit: 11/8/2023       To Whom It May Concern:    It is my medical opinion that Fabio Juárez may return to work on 11/8/2023 .           Sincerely,        ONEIL Fox    CC:   No Recipients

## 2023-11-08 NOTE — PROGRESS NOTES
"Chief Complaint  Pain and Initial Evaluation of the Right Knee    Subjective          Fabio Juárez presents to [facility] for   History of Present Illness    The patient presents here today for evaluation of the right knee. The patient has a history of a right knee replacement by Dr. Morin. He reports last Monday he had to do a lot of steps at work and started getting swelling. He denies fever or chills. He is wearing a knee brace.     No Known Allergies     Social History     Socioeconomic History    Marital status:     Number of children: 2   Tobacco Use    Smoking status: Never    Smokeless tobacco: Never   Vaping Use    Vaping Use: Never used   Substance and Sexual Activity    Alcohol use: Yes     Comment: SOCIALLY     Drug use: Never    Sexual activity: Yes     Partners: Female     Comment: Wife        I reviewed the patient's chief complaint, history of present illness, review of systems, past medical history, surgical history, family history, social history, medications, and allergy list.     REVIEW OF SYSTEMS    Constitutional: Denies fevers, chills, weight loss  Cardiovascular: Denies chest pain, shortness of breath  Skin: Denies rashes, acute skin changes  Neurologic: Denies headache, loss of consciousness  MSK: Right knee pain      Objective   Vital Signs:   /75   Pulse 88   Ht 162.6 cm (64\")   Wt 116 kg (256 lb)   SpO2 93%   BMI 43.94 kg/m²     Body mass index is 43.94 kg/m².    Physical Exam    General: Alert. No acute distress.   Right knee- mild warmth. No effusion. Lacks 5 of extension. Well healed midline Total Knee Arthroplasty incision. Flexion 90. Stable to varus/valgus stress. Positive EHL, FHL, GS and TA. Sensation intact to all 5 nerves of the foot. Positive pulses.     Procedures    Imaging Results (Most Recent)       Procedure Component Value Units Date/Time    XR Knee 3 View Right [[id number]] Resulted: 11/08/23 1027     Updated: 11/08/23 " 1028    Narrative:      Indications: Right knee pain    Views: Weightbearing AP, lateral, sunrise right knee    Findings: Cemented cruciate sacrificing total knee arthroplasty implants   in place.  Lateral calcified/bony fragments are seen.  There are lucencies   along the anterior, posterior, medial, and lateral peripheries of the   tibial component without obvious loosening.  Patella tracks with lateral   tilt on the sunrise view.  No fractures noted.    Comparative Data: No comparative data available                     Assessment and Plan        XR Knee 3 View Right    Result Date: 11/8/2023  Narrative: Indications: Right knee pain Views: Weightbearing AP, lateral, sunrise right knee Findings: Cemented cruciate sacrificing total knee arthroplasty implants in place.  Lateral calcified/bony fragments are seen.  There are lucencies along the anterior, posterior, medial, and lateral peripheries of the tibial component without obvious loosening.  Patella tracks with lateral tilt on the sunrise view.  No fractures noted. Comparative Data: No comparative data available    XR KNEE 4 VIEWS RIGHT Non-Weight Bearing    Result Date: 11/4/2023  Narrative: 4 VIEW RIGHT KNEE HISTORY: Worsening knee pain for 6 days. FINDINGS:  Four views show postoperative changes from knee arthroplasty. No acute fractures identified. Multiple soft tissue calcifications are noted . No foreign body is identified.    Impression: Postoperative changes from knee arthroplasty without acute bony abnormality identified. Images reviewed, interpreted, and dictated by Ross De La Rosa MD      Diagnoses and all orders for this visit:    1. Right knee pain, unspecified chronicity (Primary)  -     XR Knee 3 View Right  -     C-reactive Protein; Future  -     Sedimentation Rate; Future  -     CBC & Differential; Future    2. Pain due to total right knee replacement, initial encounter  -     CT knee right wo contrast; Future  -     C-reactive Protein;  Future  -     Sedimentation Rate; Future  -     CBC & Differential; Future        Discussed the treatment plan with the patient.  I reviewed the x-rays that were obtained today with the patient. Plan for CT Scan to further evaluate the knee replacement. The patient expressed understanding and wished to proceed. Order for lab work given today. Knee brace given today.       Call or return if worsening symptoms.    Scribed for Phu Lebron MD by Ernestine Moctezuma  11/08/2023   09:32 EST         Follow Up       CT results    Patient was given instructions and counseling regarding his condition or for health maintenance advice. Please see specific information pulled into the AVS if appropriate.       I have personally performed the services described in this document as scribed by the above individual and it is both accurate and complete.     Phu Lebron MD  11/08/23  10:36 EST

## 2023-11-10 ENCOUNTER — TELEPHONE (OUTPATIENT)
Dept: FAMILY MEDICINE CLINIC | Age: 58
End: 2023-11-10

## 2023-11-10 NOTE — TELEPHONE ENCOUNTER
Caller: Fabio Juárez    Relationship to patient: Self    Best call back number: [phone]    Patient is needing: PATIENT CALLED IN RETURNING A CALL TO NURSE BARNES. PATIENT BELIEVES IT MAY BE REGARDING LAB RESULTS. PATIENT WOULD LIKE A CALL BACK AFTER 2:30 PLEASE. HE IS CURRENTLY AT WORK.

## 2023-11-17 ENCOUNTER — HOSPITAL ENCOUNTER (OUTPATIENT)
Dept: CT IMAGING | Facility: HOSPITAL | Age: 58
Discharge: HOME | End: 2023-11-17
Admitting: STUDENT IN AN ORGANIZED HEALTH CARE EDUCATION/TRAINING PROGRAM
Payer: COMMERCIAL

## 2023-11-17 DIAGNOSIS — Z96.651 PAIN DUE TO TOTAL RIGHT KNEE REPLACEMENT, INITIAL ENCOUNTER: ICD-10-CM

## 2023-11-17 DIAGNOSIS — T84.84XA PAIN DUE TO TOTAL RIGHT KNEE REPLACEMENT, INITIAL ENCOUNTER: ICD-10-CM

## 2023-11-17 PROCEDURE — 73700 CT LOWER EXTREMITY W/O DYE: CPT

## 2023-11-30 DIAGNOSIS — E11.9 TYPE 2 DIABETES MELLITUS WITHOUT COMPLICATION, WITHOUT LONG-TERM CURRENT USE OF INSULIN: ICD-10-CM

## 2023-12-01 RX ORDER — SEMAGLUTIDE 0.68 MG/ML
INJECTION, SOLUTION SUBCUTANEOUS
Qty: 9 ML | Refills: 0 | Status: SHIPPED | OUTPATIENT
Start: 2023-12-01

## 2023-12-03 NOTE — PROGRESS NOTES
"Chief Complaint  Follow-up of the Right Shoulder    Subjective          Fabio Juárez presents to [facility]   History of Present Illness    Fabio Juárez presents today for a follow-up of his right shoulder.  Patient is status post right rotator cuff repair and biceps tenodesis performed on 5/29/2023.  Today, patient states that he is doing pretty well.  He states that he has good shoulder range of motion and strength.  He is able to do most activities without complications.  He has continued using Voltaren gel.  He does describe some soreness to the bicipital area and underneath his armpit at times.      No Known Allergies     Social History     Socioeconomic History    Marital status:     Number of children: 2   Tobacco Use    Smoking status: Never    Smokeless tobacco: Never   Vaping Use    Vaping Use: Never used   Substance and Sexual Activity    Alcohol use: Yes     Comment: SOCIALLY     Drug use: Never    Sexual activity: Yes     Partners: Female     Comment: Wife        I reviewed the patient's chief complaint, history of present illness, review of systems, past medical history, surgical history, family history, social history, medications, and allergy list.     REVIEW OF SYSTEMS    Constitutional: Denies fevers, chills, weight loss  Cardiovascular: Denies chest pain, shortness of breath  Skin: Denies rashes, acute skin changes  Neurologic: Denies headache, loss of consciousness  MSK: Right shoulder pain      Objective   Vital Signs:   Ht 162.6 cm (64\")   Wt 116 kg (256 lb)   BMI 43.94 kg/m²     Body mass index is 43.94 kg/m².    Physical Exam    General: Alert. No acute distress.   Right upper extremity: There is the bicipital groove.  No other areas of tenderness to the shoulder.  Active forward ovation 175.  External rotation 20.  Internal rotation to the lateral hip.  5 out of 5 rotator cuff testing.  Elbow range of motion intact.  Forearm soft.  Active finger " and wrist range of motion.  Thumb opposition intact.  Palmar abduction of the thumb intact.  Sensation intact axillary, median, radial, and ulnar nerve distributions.  Palpable radial pulse.    Procedures    Imaging Results (Most Recent)       None                   Assessment and Plan    Diagnoses and all orders for this visit:    1. S/P right rotator cuff repair (Primary)    2. Biceps tendonosis of right shoulder        Fabio Juárez presents today status post right rotator cuff repair and biceps tenodesis performed on 5/29/2023.  Patient is instructed to continue with his home exercises for both range of motion and strength.  Continue to gradually progress with activities as tolerated.  Work note written today.      Patient will follow up as needed.      Call or return if symptoms worsen or patient has any concerns.       Follow Up   Return if symptoms worsen or fail to improve.  Patient was given instructions and counseling regarding his condition or for health maintenance advice. Please see specific information pulled into the AVS if appropriate.     Susie Finn PA-C  12/04/23  16:06 EST

## 2023-12-04 ENCOUNTER — OFFICE VISIT (OUTPATIENT)
Dept: ORTHOPEDIC SURGERY | Facility: CLINIC | Age: 58
End: 2023-12-04
Payer: COMMERCIAL

## 2023-12-04 VITALS — BODY MASS INDEX: 43.71 KG/M2 | WEIGHT: 256 LBS | HEIGHT: 64 IN

## 2023-12-04 DIAGNOSIS — Z98.890 S/P RIGHT ROTATOR CUFF REPAIR: Primary | ICD-10-CM

## 2023-12-04 DIAGNOSIS — M67.813 BICEPS TENDONOSIS OF RIGHT SHOULDER: ICD-10-CM

## 2023-12-04 DIAGNOSIS — G47.00 INSOMNIA, UNSPECIFIED TYPE: Chronic | ICD-10-CM

## 2023-12-04 PROCEDURE — 99213 OFFICE O/P EST LOW 20 MIN: CPT | Performed by: PHYSICIAN ASSISTANT

## 2023-12-04 RX ORDER — TRAZODONE HYDROCHLORIDE 50 MG/1
50 TABLET ORAL NIGHTLY
Qty: 90 TABLET | Refills: 0 | Status: SHIPPED | OUTPATIENT
Start: 2023-12-04

## 2023-12-13 ENCOUNTER — OFFICE VISIT (OUTPATIENT)
Dept: FAMILY MEDICINE CLINIC | Age: 58
End: 2023-12-13
Payer: COMMERCIAL

## 2023-12-13 VITALS
DIASTOLIC BLOOD PRESSURE: 82 MMHG | SYSTOLIC BLOOD PRESSURE: 111 MMHG | OXYGEN SATURATION: 98 % | HEART RATE: 86 BPM | BODY MASS INDEX: 44.42 KG/M2 | WEIGHT: 260.2 LBS | HEIGHT: 64 IN | TEMPERATURE: 97.8 F

## 2023-12-13 DIAGNOSIS — Z91.09 ENVIRONMENTAL ALLERGIES: ICD-10-CM

## 2023-12-13 DIAGNOSIS — G89.29 CHRONIC LOW BACK PAIN WITH SCIATICA, SCIATICA LATERALITY UNSPECIFIED, UNSPECIFIED BACK PAIN LATERALITY: ICD-10-CM

## 2023-12-13 DIAGNOSIS — G89.29 CHRONIC NECK PAIN: ICD-10-CM

## 2023-12-13 DIAGNOSIS — M54.40 CHRONIC LOW BACK PAIN WITH SCIATICA, SCIATICA LATERALITY UNSPECIFIED, UNSPECIFIED BACK PAIN LATERALITY: ICD-10-CM

## 2023-12-13 DIAGNOSIS — E78.00 HYPERCHOLESTEREMIA: ICD-10-CM

## 2023-12-13 DIAGNOSIS — K21.9 GASTROESOPHAGEAL REFLUX DISEASE, UNSPECIFIED WHETHER ESOPHAGITIS PRESENT: ICD-10-CM

## 2023-12-13 DIAGNOSIS — Z93.0 TRACHEOSTOMY STATUS: ICD-10-CM

## 2023-12-13 DIAGNOSIS — G47.00 INSOMNIA, UNSPECIFIED TYPE: Chronic | ICD-10-CM

## 2023-12-13 DIAGNOSIS — Z00.00 ROUTINE GENERAL MEDICAL EXAMINATION AT A HEALTH CARE FACILITY: Primary | ICD-10-CM

## 2023-12-13 DIAGNOSIS — Z23 ENCOUNTER FOR IMMUNIZATION: ICD-10-CM

## 2023-12-13 DIAGNOSIS — E11.9 TYPE 2 DIABETES MELLITUS WITHOUT COMPLICATION, WITHOUT LONG-TERM CURRENT USE OF INSULIN: ICD-10-CM

## 2023-12-13 DIAGNOSIS — M54.2 CHRONIC NECK PAIN: ICD-10-CM

## 2023-12-13 DIAGNOSIS — I10 ESSENTIAL (PRIMARY) HYPERTENSION: ICD-10-CM

## 2023-12-13 RX ORDER — HYDROCHLOROTHIAZIDE 12.5 MG/1
12.5 TABLET ORAL DAILY
Qty: 90 TABLET | Refills: 1 | Status: SHIPPED | OUTPATIENT
Start: 2023-12-13

## 2023-12-13 RX ORDER — ALBUTEROL SULFATE 90 UG/1
2 AEROSOL, METERED RESPIRATORY (INHALATION) EVERY 4 HOURS PRN
Qty: 18 G | Refills: 1 | Status: SHIPPED | OUTPATIENT
Start: 2023-12-13

## 2023-12-13 RX ORDER — OMEPRAZOLE 40 MG/1
40 CAPSULE, DELAYED RELEASE ORAL DAILY
Qty: 90 CAPSULE | Refills: 1 | Status: SHIPPED | OUTPATIENT
Start: 2023-12-13

## 2023-12-13 RX ORDER — CETIRIZINE HYDROCHLORIDE 5 MG/1
5 TABLET ORAL DAILY
Qty: 90 TABLET | Refills: 3 | Status: SHIPPED | OUTPATIENT
Start: 2023-12-13

## 2023-12-13 RX ORDER — BACLOFEN 10 MG/1
5-10 TABLET ORAL NIGHTLY PRN
Qty: 90 TABLET | Refills: 1 | Status: SHIPPED | OUTPATIENT
Start: 2023-12-13

## 2023-12-13 RX ORDER — TRAZODONE HYDROCHLORIDE 50 MG/1
50-100 TABLET ORAL NIGHTLY
Qty: 90 TABLET | Refills: 1 | Status: SHIPPED | OUTPATIENT
Start: 2023-12-13

## 2023-12-13 RX ORDER — GLIMEPIRIDE 2 MG/1
2 TABLET ORAL
Qty: 90 TABLET | Refills: 1 | Status: SHIPPED | OUTPATIENT
Start: 2023-12-13

## 2023-12-13 RX ORDER — SEMAGLUTIDE 1.34 MG/ML
1 INJECTION, SOLUTION SUBCUTANEOUS WEEKLY
Qty: 9 ML | Refills: 1 | Status: SHIPPED | OUTPATIENT
Start: 2023-12-13

## 2023-12-13 RX ORDER — ATORVASTATIN CALCIUM 80 MG/1
80 TABLET, FILM COATED ORAL
Qty: 90 TABLET | Refills: 1 | Status: SHIPPED | OUTPATIENT
Start: 2023-12-13

## 2023-12-13 RX ORDER — LISINOPRIL 10 MG/1
10 TABLET ORAL EVERY MORNING
Qty: 90 TABLET | Refills: 1 | Status: SHIPPED | OUTPATIENT
Start: 2023-12-13

## 2023-12-13 RX ORDER — METFORMIN HYDROCHLORIDE 500 MG/1
1000 TABLET, EXTENDED RELEASE ORAL 2 TIMES DAILY WITH MEALS
Qty: 360 TABLET | Refills: 1 | Status: SHIPPED | OUTPATIENT
Start: 2023-12-13

## 2023-12-13 NOTE — PROGRESS NOTES
Chief Complaint  Fabio Juárez presents to [facility] for Annual Exam; Type 2 diabetes mellitus without complication, without long (6 month follow up ); and Hypertension      Subjective     History of Present Illness  Fabio is here today for annual exam.   Last annual exam was    year(s)  Last eye exam: 1 year  PROVIDER:  [facility]   Last dental exam:    years    PROVIDER:   n/a    Diet / exercise:   No special diet or specific exercise program  Patient's Body mass index is 44.66 kg/m². indicating that he is obese (BMI >30)  Satisfied with weight?  no    Patient Care Team:  Karley Amor APRN as PCP - General (Nurse Practitioner)  Payton Marquez APRN as Nurse Practitioner (Nurse Practitioner)  Faviola Ulloa MD (Pain Medicine)  Garrett Anderson MD as Consulting Physician (General Surgery)     The ASCVD Risk score (Cynthia CID, et al., 2019) failed to calculate for the following reasons:    The valid total cholesterol range is 130 to 320 mg/dL  Diabetes type:  Type 2    A1C Last 3 Results          3/6/2023    16:30 2023    16:25 2023    11:06   HGBA1C Last 3 Results   Hemoglobin A1C 10.20  9.80  7.70      Microalbumin          2023    11:06   Microalbumin   Microalbumin, Urine <1.2        Current diabetic medications include metformin and Ozempic (semaglutide), glimepiride     Diabetic Review of Systems:  Medication compliance: noncompliant some of the time  Diabetic diet compliance: noncompliant much of the time  Blood sugar lo-130's  Any hypoglycemic episodes? - No    Is He on ACE inhibitor or angiotensin II receptor blocker and a statin? Lisinopril    atorvastatin (lipitor)    Fabio presents for follow up on hypertension.  Compliance with medication : lisinopril (generic) and hydrochlorothiazide as prescribed   Check of BP at home reported as well controlled.  No new concerns or problems to report.     He will be following up with Dr. Lebron for  his right knee.  Recently had CT knee for evaluation of possible concerns of hardware failure.  His last right knee replacement 2016 with Dr. Morin              Assessment and Plan     -advised of a well balanced diet and exercise as tolerated  -advised of annual wellness exams are recommended  -discussed health care maintenance and gaps in care and orders have been placed as necessary and as willing by the patient.     Diagnoses and all orders for this visit:    1. Routine general medical examination at a health care facility (Primary)    2. Type 2 diabetes mellitus without complication, without long-term current use of insulin  Comments:  increase ozempic to 1 mg, follow up in 3 months for A1C  Orders:  -     Semaglutide, 1 MG/DOSE, (Ozempic, 1 MG/DOSE,) 2 MG/1.5ML solution pen-injector; Inject 1 mg under the skin into the appropriate area as directed 1 (One) Time Per Week.  Dispense: 9 mL; Refill: 1  -     metFORMIN ER (GLUCOPHAGE-XR) 500 MG 24 hr tablet; Take 2 tablets by mouth 2 (Two) Times a Day With Meals.  Dispense: 360 tablet; Refill: 1  -     atorvastatin (LIPITOR) 80 MG tablet; Take 1 tablet by mouth every night at bedtime.  Dispense: 90 tablet; Refill: 1  -     glimepiride (AMARYL) 2 MG tablet; Take 1 tablet by mouth Every Morning Before Breakfast.  Dispense: 90 tablet; Refill: 1  -     lisinopril (PRINIVIL,ZESTRIL) 10 MG tablet; Take 1 tablet by mouth Every Morning.  Dispense: 90 tablet; Refill: 1    3. Essential (primary) hypertension  Comments:  refills today well-controlled  Orders:  -     hydroCHLOROthiazide (HYDRODIURIL) 12.5 MG tablet; Take 1 tablet by mouth Daily.  Dispense: 90 tablet; Refill: 1  -     lisinopril (PRINIVIL,ZESTRIL) 10 MG tablet; Take 1 tablet by mouth Every Morning.  Dispense: 90 tablet; Refill: 1    4. Insomnia, unspecified type  Comments:  increase trazodone to 1-2 tabs PRN  Orders:  -     traZODone (DESYREL) 50 MG tablet; Take 1-2 tablets by mouth Every Night.  Dispense: 90  tablet; Refill: 1    5. Hypercholesteremia  Comments:  continue current treatment follow up in 6 months  Orders:  -     atorvastatin (LIPITOR) 80 MG tablet; Take 1 tablet by mouth every night at bedtime.  Dispense: 90 tablet; Refill: 1    6. Tracheostomy status  -     albuterol sulfate  (90 Base) MCG/ACT inhaler; Inhale 2 puffs Every 4 (Four) Hours As Needed for Wheezing.  Dispense: 18 g; Refill: 1    7. Chronic neck pain  -     baclofen (LIORESAL) 10 MG tablet; Take 0.5-1 tablets by mouth At Night As Needed for Muscle Spasms.  Dispense: 90 tablet; Refill: 1    8. Chronic low back pain with sciatica, sciatica laterality unspecified, unspecified back pain laterality  -     baclofen (LIORESAL) 10 MG tablet; Take 0.5-1 tablets by mouth At Night As Needed for Muscle Spasms.  Dispense: 90 tablet; Refill: 1    9. Environmental allergies  -     cetirizine (zyrTEC) 5 MG tablet; Take 1 tablet by mouth Daily.  Dispense: 90 tablet; Refill: 3    10. Gastroesophageal reflux disease, unspecified whether esophagitis present  -     omeprazole (priLOSEC) 40 MG capsule; Take 1 capsule by mouth Daily.  Dispense: 90 capsule; Refill: 1    11. Encounter for immunization  -     COVID-19 F23 (Pfizer) 12yrs+ (COMIRNATY)             Follow Up   Return in about 6 months (around 6/13/2024) for Recheck.    Future Appointments   Date Time Provider Department Center   12/15/2023 11:00 AM Phu Lebron MD [facility] ORS [facility] None   6/12/2024  1:30 PM Karley Amor APRN [facility] PC [facility]         New Medications Ordered This Visit   Medications    Semaglutide, 1 MG/DOSE, (Ozempic, 1 MG/DOSE,) 2 MG/1.5ML solution pen-injector     Sig: Inject 1 mg under the skin into the appropriate area as directed 1 (One) Time Per Week.     Dispense:  9 mL     Refill:  1    hydroCHLOROthiazide (HYDRODIURIL) 12.5 MG tablet     Sig: Take 1 tablet by mouth Daily.     Dispense:  90 tablet     Refill:  1    traZODone (DESYREL) 50 MG tablet     Sig: Take 1-2 tablets by  mouth Every Night.     Dispense:  90 tablet     Refill:  1    metFORMIN ER (GLUCOPHAGE-XR) 500 MG 24 hr tablet     Sig: Take 2 tablets by mouth 2 (Two) Times a Day With Meals.     Dispense:  360 tablet     Refill:  1    atorvastatin (LIPITOR) 80 MG tablet     Sig: Take 1 tablet by mouth every night at bedtime.     Dispense:  90 tablet     Refill:  1    albuterol sulfate  (90 Base) MCG/ACT inhaler     Sig: Inhale 2 puffs Every 4 (Four) Hours As Needed for Wheezing.     Dispense:  18 g     Refill:  1    baclofen (LIORESAL) 10 MG tablet     Sig: Take 0.5-1 tablets by mouth At Night As Needed for Muscle Spasms.     Dispense:  90 tablet     Refill:  1    cetirizine (zyrTEC) 5 MG tablet     Sig: Take 1 tablet by mouth Daily.     Dispense:  90 tablet     Refill:  3    glimepiride (AMARYL) 2 MG tablet     Sig: Take 1 tablet by mouth Every Morning Before Breakfast.     Dispense:  90 tablet     Refill:  1    lisinopril (PRINIVIL,ZESTRIL) 10 MG tablet     Sig: Take 1 tablet by mouth Every Morning.     Dispense:  90 tablet     Refill:  1    omeprazole (priLOSEC) 40 MG capsule     Sig: Take 1 capsule by mouth Daily.     Dispense:  90 capsule     Refill:  1       Medications Discontinued During This Encounter   Medication Reason    Ozempic, 0.25 or 0.5 MG/DOSE, 2 MG/3ML solution pen-injector Dose adjustment    celecoxib (CeleBREX) 200 MG capsule Non-compliance    glimepiride (AMARYL) 2 MG tablet Reorder    albuterol sulfate  (90 Base) MCG/ACT inhaler Reorder    cetirizine (zyrTEC) 5 MG tablet Reorder    hydroCHLOROthiazide (HYDRODIURIL) 12.5 MG tablet Reorder    metFORMIN ER (GLUCOPHAGE-XR) 500 MG 24 hr tablet Reorder    baclofen (LIORESAL) 10 MG tablet Reorder    lisinopril (PRINIVIL,ZESTRIL) 10 MG tablet Reorder    atorvastatin (LIPITOR) 80 MG tablet Reorder    omeprazole (priLOSEC) 40 MG capsule Reorder    traZODone (DESYREL) 50 MG tablet Reorder         Review of Systems    Objective     Vitals:    12/13/23 1502  "  BP: 111/82   BP Location: Left arm   Patient Position: Sitting   Cuff Size: Large Adult   Pulse: 86   Temp: 97.8 °F (36.6 °C)   TempSrc: Temporal   SpO2: 98%   Weight: 118 kg (260 lb 3.2 oz)   Height: 162.6 cm (64\")     Body mass index is 44.66 kg/m².            Physical Exam  Vitals reviewed.   Constitutional:       Appearance: Normal appearance. He is obese.   HENT:      Head: Normocephalic.      Mouth/Throat:      Mouth: Mucous membranes are moist.      Pharynx: Oropharynx is clear.   Eyes:      Conjunctiva/sclera: Conjunctivae normal.      Pupils: Pupils are equal, round, and reactive to light.   Neck:      Trachea: Tracheostomy (capped/ no s/s concern) present.   Cardiovascular:      Rate and Rhythm: Normal rate and regular rhythm.      Heart sounds: No murmur heard.  Pulmonary:      Effort: Pulmonary effort is normal.      Breath sounds: Normal breath sounds.   Abdominal:      General: Bowel sounds are normal.      Tenderness: There is no abdominal tenderness.   Musculoskeletal:         General: Normal range of motion.      Cervical back: Normal range of motion.   Skin:     General: Skin is warm and dry.   Neurological:      General: No focal deficit present.      Mental Status: He is alert. Mental status is at baseline.   Psychiatric:         Mood and Affect: Mood normal.         Behavior: Behavior normal.         Thought Content: Thought content normal.            Result Review                   No Known Allergies   Past Medical History:   Diagnosis Date    Chronic pain     Claustrophobia     Essential (primary) hypertension     GERD (gastroesophageal reflux disease)     Hemochromatosis     ASYMPTOMATIC     History of COVID-19     Left upper quadrant pain     Low back pain     Lung nodule     Mixed hyperlipidemia     Obstructive sleep apnea (adult) (pediatric)     Other testicular dysfunction     Pain in right foot     Pain in right shoulder     Recurrent umbilical hernia     Renal stone 03/2017    " Tracheostomy status     R/T SLEEP APNEA - WAS FOLLOWED BY DR. PRICE - LAST O.V. 2018 F/U PRN    Type 2 diabetes mellitus     BG RUNS 120-125 IN AM      Current Outpatient Medications   Medication Sig Dispense Refill    albuterol sulfate  (90 Base) MCG/ACT inhaler Inhale 2 puffs Every 4 (Four) Hours As Needed for Wheezing. 18 g 1    atorvastatin (LIPITOR) 80 MG tablet Take 1 tablet by mouth every night at bedtime. 90 tablet 1    baclofen (LIORESAL) 10 MG tablet Take 0.5-1 tablets by mouth At Night As Needed for Muscle Spasms. 90 tablet 1    cetirizine (zyrTEC) 5 MG tablet Take 1 tablet by mouth Daily. 90 tablet 3    Diclofenac Sodium (VOLTAREN) 1 % gel gel Apply 4 g topically to the appropriate area as directed 4 (Four) Times a Day. 100 g 0    gabapentin (NEURONTIN) 300 MG capsule 1 capsule 2 (Two) Times a Day.      glimepiride (AMARYL) 2 MG tablet Take 1 tablet by mouth Every Morning Before Breakfast. 90 tablet 1    hydroCHLOROthiazide (HYDRODIURIL) 12.5 MG tablet Take 1 tablet by mouth Daily. 90 tablet 1    lisinopril (PRINIVIL,ZESTRIL) 10 MG tablet Take 1 tablet by mouth Every Morning. 90 tablet 1    metFORMIN ER (GLUCOPHAGE-XR) 500 MG 24 hr tablet Take 2 tablets by mouth 2 (Two) Times a Day With Meals. 360 tablet 1    naloxone (NARCAN) 4 MG/0.1ML nasal spray Call 911. Don't prime. [place] in 1 nostril for overdose. Repeat in 2-3 minutes in other nostril if no or minimal breathing/responsiveness. 2 each 0    omeprazole (priLOSEC) 40 MG capsule Take 1 capsule by mouth Daily. 90 capsule 1    oxyCODONE (ROXICODONE) 5 MG immediate release tablet Take 1 tablet by mouth Every 6 (Six) Hours As Needed for Moderate Pain. 30 tablet 0    traZODone (DESYREL) 50 MG tablet Take 1-2 tablets by mouth Every Night. 90 tablet 1    Semaglutide, 1 MG/DOSE, (Ozempic, 1 MG/DOSE,) 2 MG/1.5ML solution pen-injector Inject 1 mg under the skin into the appropriate area as directed 1 (One) Time Per Week. 9 mL 1     No current  facility-administered medications for this visit.     Past Surgical History:   Procedure Laterality Date    ACHILLES TENDON REPAIR Right 10/2018    APPENDECTOMY      CARDIAC CATHETERIZATION  11/03/2015    LEFT VENTRIULOGRAM, CORONARY ANGIOGRAM     CARPAL TUNNEL RELEASE Right     CHOLECYSTECTOMY  07/2013    COLONOSCOPY  07/28/2014    NORMAL RESULT     HERNIA REPAIR      JOINT REPLACEMENT Bilateral     RIGHT KNEE 01/2016    SHOULDER ARTHROSCOPY W/ ROTATOR CUFF REPAIR Right 5/9/2023    Procedure: SHOULDER ARTHROSCOPY WITH BICEPS TENODESIS, ROTATOR CUFF REPAIR, AND SUBACROMIAL DECOMPRESSION;  Surgeon: Phu Lebron MD;  Location: [facility] OR [facility];  Service: Orthopedics;  Laterality: Right;    TONSILLECTOMY AND ADENOIDECTOMY      TRACHEOSTOMY      SECONDARY TO SLEEP APNEA    VENTRAL HERNIA REPAIR N/A 02/23/2022    Procedure: ROBOTIC UMBILICAL HERNIA REPAIR WITH MESH PLACEMENT;  Surgeon: Garrett Anderson MD;  Location: [facility] MAIN OR;  Service: Robotics - DaVinci;  Laterality: N/A;      There are no preventive care reminders to display for this patient.     Immunization History   Administered Date(s) Administered    COVID-19 (PFIZER) BIVALENT 12+YRS 10/25/2022    COVID-19 (PFIZER) Purple Cap Monovalent 02/24/2021, 03/17/2021, 12/09/2021    COVID-19 F23 (PFIZER) 12YRS+ (COMIRNATY) 12/13/2023    Covid-19 (Pfizer) Gray Cap Monovalent 06/21/2022    Fluzone (or Fluarix & Flulaval for VFC) >6mos 12/09/2021, 11/08/2023    Influenza Quad Vaccine (Inpatient) 01/09/2018    Influenza TIV (IM) 01/08/2013    Influenza, Unspecified 01/20/2021    Pneumococcal, Unspecified 08/08/2016, 08/08/2016    Td (TDVAX) 03/26/2019, 03/26/2019         Part of this note may be an electronic transcription/translation of spoken language to printed   text using the Dragon Dictation System.      Karley Amor APRN

## 2023-12-15 ENCOUNTER — OFFICE VISIT (OUTPATIENT)
Dept: ORTHOPEDIC SURGERY | Facility: CLINIC | Age: 58
End: 2023-12-15
Payer: COMMERCIAL

## 2023-12-15 VITALS
HEART RATE: 103 BPM | WEIGHT: 260 LBS | SYSTOLIC BLOOD PRESSURE: 109 MMHG | BODY MASS INDEX: 44.39 KG/M2 | HEIGHT: 64 IN | DIASTOLIC BLOOD PRESSURE: 68 MMHG | OXYGEN SATURATION: 93 %

## 2023-12-15 DIAGNOSIS — T84.032A LOOSENING OF PROSTHESIS OF RIGHT TOTAL KNEE REPLACEMENT, INITIAL ENCOUNTER: ICD-10-CM

## 2023-12-15 DIAGNOSIS — M25.561 RIGHT KNEE PAIN, UNSPECIFIED CHRONICITY: Primary | ICD-10-CM

## 2023-12-15 NOTE — PROGRESS NOTES
"Chief Complaint  Follow-up and Pain of the Right Knee    Subjective          Fabio Juárez presents to [facility] for   History of Present Illness    Fabio returns today for follow-up of his right knee after obtaining a CT scan.  To review he had a knee replacement performed by Dr. Morin in the past.  He has intermittent episodes of swelling and pain.  He feels mechanical symptoms that time.  He has a vague history of being told of an infection in the knee but no surgical management was performed.  Denies any redness.  He denies any current swelling.  No fevers or chills.  He is an active smoker.    No Known Allergies     Social History     Socioeconomic History    Marital status:     Number of children: 2   Tobacco Use    Smoking status: Never    Smokeless tobacco: Never   Vaping Use    Vaping Use: Never used   Substance and Sexual Activity    Alcohol use: Yes     Comment: SOCIALLY     Drug use: Never    Sexual activity: Yes     Partners: Female     Comment: Wife        I reviewed the patient's chief complaint, history of present illness, review of systems, past medical history, surgical history, family history, social history, medications, and allergy list.     REVIEW OF SYSTEMS    Constitutional: Denies fevers, chills, weight loss  Cardiovascular: Denies chest pain, shortness of breath  Skin: Denies rashes, acute skin changes  Neurologic: Denies headache, loss of consciousness  MSK: Right knee pain      Objective   Vital Signs:   /68   Pulse 103   Ht 162.6 cm (64\")   Wt 118 kg (260 lb)   SpO2 93%   BMI 44.63 kg/m²     Body mass index is 44.63 kg/m².    Physical Exam    General: Alert. No acute distress.   Right lower extremity: No warmth.  No effusion.  Well-healed midline total knee arthroplasty incision.  Extensor mechanism intact.  Lacks 5 degrees of extension.  Flexion to 90.  Stable to varus and valgus stress.  Distal neurovascular intact.  Calf is " nontender.    Procedures    Imaging Results (Most Recent)       None                     Assessment and Plan        XR spine cervical 2 or 3 views    Result Date: 12/12/2023  Narrative: CERVICAL SPINE     HISTORY: Neck pain, cervical radiculopathy. FINDINGS:  A four view exam demonstrates no cervical spine fracture or subluxation. Loss of the normal curvature is probably chronic. Anterior osteophytes at C2-C3, C5-C6, and C6-C7. Spinous processes are intact. No prevertebral soft tissue swelling. Lung apices are clear.     Impression: Mild degenerative changes. Images reviewed, interpreted, and dictated by Danielito Virgen DO    CT knee right wo contrast    Result Date: 11/20/2023  Narrative: PROCEDURE: CT KNEE RIGHT WO CONTRAST  COMPARISON: [facility] MAGDALENO MAHAN, KNEE 3 VIEWS RT, 7/16/2012, 14:15.  [facility] MAGDALENO Doshi, XR KNEE 3 VW RIGHT, 11/08/2023, 9:44.  INDICATIONS: RIGHT Knee replacement, loosening suspected  TECHNIQUE: After obtaining the patient's consent, multi-planar CT images of the extremity were created without non-ionic intravenous contrast.   PROTOCOL:   Standard imaging protocol performed    RADIATION:   DLP: 418.6 mGy*cm   MA and/or KV was adjusted to minimize radiation dose.     FINDINGS:  Status post knee arthroplasty.  Artifact from hardware components limits evaluation of surrounding structures.  There is a small knee effusion.  There are some thin peripheral calcifications in the suprapatellar recess.  There also some ossifications in the soft tissues surrounding the knee, likely representing heterotopic ossifications.  There is enthesopathy at the patella and tibial tubercle.  There is some thickening of the quadriceps and patellar tendons with small calcifications in the quadriceps tendon.  These are suspected to be related to postoperative changes.  No high-grade tendon defect is visualized on this limited evaluation.  There is atherosclerosis of the visualized lower extremity arteries.   No significant Baker's cyst is identified.  No definitive fracture.  No definite acute malalignment.  Hardware components appear to be in satisfactory positions.  There is some marginal lucency surrounding the tibial component.  This primarily measures up to 2-3 mm with some larger foci of lucency near the base of the tibial stem.  There does not appear to be significant lucency surrounding the femoral component.  No significant patellar component is seen.      Impression:   1. Status post knee arthroplasty with artifact from hardware limiting evaluation. 2. Marginal lucency along the tibial component as above.  Developing hardware loosening cannot be excluded. 3. No definite acute osseous abnormality. 4. Small knee effusion. 5. Calcific atherosclerosis.     ALEXANDER TELLEZ MD       Electronically Signed and Approved By: ALEXANDER TELLEZ MD on 11/20/2023 at 10:25               Diagnoses and all orders for this visit:    1. Right knee pain, unspecified chronicity (Primary)  -     Ambulatory Referral to Orthopedic Surgery    2. Loosening of prosthesis of right total knee replacement, initial encounter        We reviewed his CT scan.  There is a lucency around his tibial prosthesis on the CT scan.  We discussed referral to a joint specialist for further evaluation including infectious workup.  He expressed understanding.  A referral to the [facility] joint specialist team was provided today.  He will follow-up with me as needed.      Call or return if worsening symptoms.    Scribed for Phu Lebron MD by Velia Melendez  12/15/2023   10:48 EST         Follow Up       As needed    Patient was given instructions and counseling regarding his condition or for health maintenance advice. Please see specific information pulled into the AVS if appropriate.       I have personally performed the services described in this document as scribed by the above individual and it is both accurate and complete.     Phu Lebron  MD  12/15/23  11:19 EST

## 2024-01-04 DIAGNOSIS — K21.9 GASTROESOPHAGEAL REFLUX DISEASE, UNSPECIFIED WHETHER ESOPHAGITIS PRESENT: ICD-10-CM

## 2024-01-04 DIAGNOSIS — E11.9 TYPE 2 DIABETES MELLITUS WITHOUT COMPLICATION, WITHOUT LONG-TERM CURRENT USE OF INSULIN: ICD-10-CM

## 2024-01-04 DIAGNOSIS — I10 ESSENTIAL (PRIMARY) HYPERTENSION: ICD-10-CM

## 2024-01-04 DIAGNOSIS — E78.00 HYPERCHOLESTEREMIA: ICD-10-CM

## 2024-01-04 RX ORDER — ATORVASTATIN CALCIUM 80 MG/1
80 TABLET, FILM COATED ORAL
Qty: 90 TABLET | Refills: 1 | OUTPATIENT
Start: 2024-01-04

## 2024-01-04 RX ORDER — LISINOPRIL 10 MG/1
10 TABLET ORAL EVERY MORNING
Qty: 90 TABLET | Refills: 1 | OUTPATIENT
Start: 2024-01-04

## 2024-01-04 RX ORDER — OMEPRAZOLE 40 MG/1
40 CAPSULE, DELAYED RELEASE ORAL DAILY
Qty: 90 CAPSULE | Refills: 1 | OUTPATIENT
Start: 2024-01-04

## 2024-01-21 DIAGNOSIS — E78.00 HYPERCHOLESTEREMIA: ICD-10-CM

## 2024-01-21 DIAGNOSIS — E11.9 TYPE 2 DIABETES MELLITUS WITHOUT COMPLICATION, WITHOUT LONG-TERM CURRENT USE OF INSULIN: ICD-10-CM

## 2024-01-22 RX ORDER — ATORVASTATIN CALCIUM 80 MG/1
80 TABLET, FILM COATED ORAL
Qty: 90 TABLET | Refills: 1 | OUTPATIENT
Start: 2024-01-22

## 2024-02-11 DIAGNOSIS — I10 ESSENTIAL (PRIMARY) HYPERTENSION: ICD-10-CM

## 2024-02-12 RX ORDER — HYDROCHLOROTHIAZIDE 12.5 MG/1
12.5 TABLET ORAL DAILY
Qty: 90 TABLET | Refills: 1 | Status: SHIPPED | OUTPATIENT
Start: 2024-02-12

## 2024-03-05 NOTE — PROGRESS NOTES
Chief Complaint  Post-op    Subjective          Fabio Juárez presents to [facility]  History of Present Illness    Fabio Juárez is a 57 y.o. male  who presents today for a postoperative visit.     Patient is here for a follow-up after a robotic repair of a recurrent umbilical hernia.  He is pretty sore but otherwise is doing fine.    Past History:  Medical History: has a past medical history of Chronic pain, Claustrophobia, Coronary artery disease, Essential (primary) hypertension, GERD (gastroesophageal reflux disease), Hemochromatosis, History of COVID-19, Left upper quadrant pain, Low back pain, Lung nodule, Mixed hyperlipidemia, Obstructive sleep apnea (adult) (pediatric), Other testicular dysfunction, Pain in right foot, Pain in right shoulder, Recurrent umbilical hernia, Renal stone (03/2017), Tracheostomy status ([facility]), and Type 2 diabetes mellitus ([facility]).   Surgical History: has a past surgical history that includes Cholecystectomy (07/2013); Joint replacement (Bilateral); Appendectomy; Tonsillectomy and adenoidectomy; Tracheostomy tube placement; Achilles tendon repair (Right, 10/2018); Colonoscopy (07/28/2014); Hernia repair; Cardiac catheterization (11/03/2015); and Ventral hernia repair (N/A, 2/23/2022).   Family History: family history includes Arrhythmia in his mother; Cerebral aneurysm (age of onset: 38) in his brother; Coronary artery disease in an other family member; Diabetes type II in his brother and mother; Heart attack in his father; Hypertension in his brother; Stroke (age of onset: 73) in his mother.   Social History: reports that he has never smoked. He has never used smokeless tobacco. He reports current alcohol use. He reports that he does not use drugs.  Allergies: Patient has no known allergies.       Current Outpatient Medications:   •  atorvastatin (LIPITOR) 80 MG tablet, TAKE ONE TABLET BY MOUTH EVERY NIGHT AT BEDTIME, Disp: 90 tablet, Rfl: 0  •  FMLA forms received. [employer] message sent for Auth/YASH. Logged for processing.    " baclofen (LIORESAL) 10 MG tablet, TAKE 1/2 TO 1 TABLET BY MOUTH AT BEDTIME FOR LOWER BACK PAIN/ MUSCLE SPASMS, Disp: 90 tablet, Rfl: 1  •  cetirizine (zyrTEC) 5 MG tablet, TAKE ONE TABLET BY MOUTH DAILY, Disp: 90 tablet, Rfl: 0  •  gabapentin (NEURONTIN) 300 MG capsule, gabapentin 300 mg oral capsule take 1 capsule (300 mg) by oral route 3 times per day   Suspended, Disp: , Rfl:   •  glimepiride (AMARYL) 2 MG tablet, Take 2 mg by mouth Every Morning Before Breakfast. INSTRUCTED PER ANESTHESIA STANDING ORDERS, Disp: , Rfl:   •  hydroCHLOROthiazide (HYDRODIURIL) 12.5 MG tablet, Take 12.5 mg by mouth Daily., Disp: , Rfl:   •  lisinopril (PRINIVIL,ZESTRIL) 10 MG tablet, TAKE ONE TABLET BY MOUTH EVERY MORNING, Disp: 90 tablet, Rfl: 0  •  oxyCODONE (ROXICODONE) 15 MG immediate release tablet, Take 15 mg by mouth As Needed for Moderate Pain ., Disp: , Rfl:   •  Semaglutide, 1 MG/DOSE, (Ozempic, 1 MG/DOSE,) 4 MG/3ML solution pen-injector, Inject 1 mg under the skin into the appropriate area as directed 1 (One) Time Per Week. Please schedule a follow up appt before next refill (Patient taking differently: Inject 1 mg under the skin into the appropriate area as directed 1 (One) Time Per Week. Please schedule a follow up appt before next refill INSTRUCTED PER ANESTHESIA STANDING ORDERS), Disp: 9 mL, Rfl: 0  •  sulfamethoxazole-trimethoprim (Bactrim DS) 800-160 MG per tablet, Take 1 tablet by mouth 2 (Two) Times a Day for 10 days., Disp: 20 tablet, Rfl: 0  •  traZODone (DESYREL) 50 MG tablet, TAKE ONE TABLET BY MOUTH ONCE NIGHTLY, Disp: 90 tablet, Rfl: 0       Physical Exam  His incisions all look good.  His abdomen is soft.  Objective     Vital Signs:   Resp 16   Ht 162.6 cm (64\")   Wt 123 kg (271 lb)   BMI 46.52 kg/m²              Assessment and Plan    Diagnoses and all orders for this visit:    1. Recurrent umbilical hernia (Primary)    I will see him back in 2 weeks.  We will let him go back to work without " restrictions at about that time.

## 2024-04-23 ENCOUNTER — TRANSCRIBE ORDERS (OUTPATIENT)
Dept: GENERAL RADIOLOGY | Facility: HOSPITAL | Age: 59
End: 2024-04-23
Payer: COMMERCIAL

## 2024-04-23 DIAGNOSIS — M54.12 CERVICAL RADICULOPATHY: Primary | ICD-10-CM

## 2024-06-04 ENCOUNTER — HOSPITAL ENCOUNTER (OUTPATIENT)
Dept: MRI IMAGING | Facility: HOSPITAL | Age: 59
Discharge: HOME | End: 2024-06-04
Payer: COMMERCIAL

## 2024-06-04 ENCOUNTER — ANESTHESIA EVENT (OUTPATIENT)
Dept: MRI IMAGING | Facility: HOSPITAL | Age: 59
End: 2024-06-04
Payer: COMMERCIAL

## 2024-06-04 ENCOUNTER — ANESTHESIA (OUTPATIENT)
Dept: MRI IMAGING | Facility: HOSPITAL | Age: 59
End: 2024-06-04
Payer: COMMERCIAL

## 2024-06-04 VITALS
HEIGHT: 65 IN | WEIGHT: 262 LBS | TEMPERATURE: 97.5 F | BODY MASS INDEX: 43.65 KG/M2 | HEART RATE: 74 BPM | RESPIRATION RATE: 17 BRPM | OXYGEN SATURATION: 94 % | DIASTOLIC BLOOD PRESSURE: 86 MMHG | SYSTOLIC BLOOD PRESSURE: 144 MMHG

## 2024-06-04 DIAGNOSIS — M54.12 CERVICAL RADICULOPATHY: ICD-10-CM

## 2024-06-04 LAB — GLUCOSE BLDC GLUCOMTR-MCNC: 147 MG/DL (ref 70–130)

## 2024-06-04 PROCEDURE — 25810000003 LACTATED RINGERS PER 1000 ML: Performed by: ANESTHESIOLOGY

## 2024-06-04 PROCEDURE — 82948 REAGENT STRIP/BLOOD GLUCOSE: CPT

## 2024-06-04 PROCEDURE — 72141 MRI NECK SPINE W/O DYE: CPT

## 2024-06-04 PROCEDURE — 25010000002 PROPOFOL 10 MG/ML EMULSION: Performed by: ANESTHESIOLOGY

## 2024-06-04 RX ORDER — FENTANYL CITRATE 50 UG/ML
50 INJECTION, SOLUTION INTRAMUSCULAR; INTRAVENOUS
Status: DISCONTINUED | OUTPATIENT
Start: 2024-06-04 | End: 2024-06-05 | Stop reason: HOSPADM

## 2024-06-04 RX ORDER — PROMETHAZINE HYDROCHLORIDE 25 MG/1
25 TABLET ORAL ONCE AS NEEDED
Status: DISCONTINUED | OUTPATIENT
Start: 2024-06-04 | End: 2024-06-05 | Stop reason: HOSPADM

## 2024-06-04 RX ORDER — PROMETHAZINE HYDROCHLORIDE 25 MG/1
25 SUPPOSITORY RECTAL ONCE AS NEEDED
Status: DISCONTINUED | OUTPATIENT
Start: 2024-06-04 | End: 2024-06-05 | Stop reason: HOSPADM

## 2024-06-04 RX ORDER — EPHEDRINE SULFATE 50 MG/ML
5 INJECTION, SOLUTION INTRAVENOUS ONCE AS NEEDED
Status: DISCONTINUED | OUTPATIENT
Start: 2024-06-04 | End: 2024-06-05 | Stop reason: HOSPADM

## 2024-06-04 RX ORDER — DIPHENHYDRAMINE HYDROCHLORIDE 50 MG/ML
12.5 INJECTION INTRAMUSCULAR; INTRAVENOUS
Status: DISCONTINUED | OUTPATIENT
Start: 2024-06-04 | End: 2024-06-05 | Stop reason: HOSPADM

## 2024-06-04 RX ORDER — IPRATROPIUM BROMIDE AND ALBUTEROL SULFATE 2.5; .5 MG/3ML; MG/3ML
3 SOLUTION RESPIRATORY (INHALATION) ONCE AS NEEDED
Status: DISCONTINUED | OUTPATIENT
Start: 2024-06-04 | End: 2024-06-05 | Stop reason: HOSPADM

## 2024-06-04 RX ORDER — DROPERIDOL 2.5 MG/ML
0.62 INJECTION, SOLUTION INTRAMUSCULAR; INTRAVENOUS
Status: DISCONTINUED | OUTPATIENT
Start: 2024-06-04 | End: 2024-06-05 | Stop reason: HOSPADM

## 2024-06-04 RX ORDER — LABETALOL HYDROCHLORIDE 5 MG/ML
5 INJECTION, SOLUTION INTRAVENOUS
Status: DISCONTINUED | OUTPATIENT
Start: 2024-06-04 | End: 2024-06-05 | Stop reason: HOSPADM

## 2024-06-04 RX ORDER — ACETAMINOPHEN 500 MG
1000 TABLET ORAL ONCE
Status: COMPLETED | OUTPATIENT
Start: 2024-06-04 | End: 2024-06-04

## 2024-06-04 RX ORDER — LIDOCAINE HYDROCHLORIDE 20 MG/ML
INJECTION, SOLUTION INFILTRATION; PERINEURAL AS NEEDED
Status: DISCONTINUED | OUTPATIENT
Start: 2024-06-04 | End: 2024-06-04 | Stop reason: SURG

## 2024-06-04 RX ORDER — ONDANSETRON 2 MG/ML
4 INJECTION INTRAMUSCULAR; INTRAVENOUS ONCE AS NEEDED
Status: DISCONTINUED | OUTPATIENT
Start: 2024-06-04 | End: 2024-06-05 | Stop reason: HOSPADM

## 2024-06-04 RX ORDER — NALOXONE HCL 0.4 MG/ML
0.2 VIAL (ML) INJECTION AS NEEDED
Status: DISCONTINUED | OUTPATIENT
Start: 2024-06-04 | End: 2024-06-05 | Stop reason: HOSPADM

## 2024-06-04 RX ORDER — HYDRALAZINE HYDROCHLORIDE 20 MG/ML
5 INJECTION INTRAMUSCULAR; INTRAVENOUS
Status: DISCONTINUED | OUTPATIENT
Start: 2024-06-04 | End: 2024-06-05 | Stop reason: HOSPADM

## 2024-06-04 RX ORDER — PROPOFOL 10 MG/ML
VIAL (ML) INTRAVENOUS AS NEEDED
Status: DISCONTINUED | OUTPATIENT
Start: 2024-06-04 | End: 2024-06-04 | Stop reason: SURG

## 2024-06-04 RX ORDER — SODIUM CHLORIDE, SODIUM LACTATE, POTASSIUM CHLORIDE, CALCIUM CHLORIDE 600; 310; 30; 20 MG/100ML; MG/100ML; MG/100ML; MG/100ML
INJECTION, SOLUTION INTRAVENOUS CONTINUOUS PRN
Status: DISCONTINUED | OUTPATIENT
Start: 2024-06-04 | End: 2024-06-04 | Stop reason: SURG

## 2024-06-04 RX ADMIN — ACETAMINOPHEN 1000 MG: 500 TABLET ORAL at 15:45

## 2024-06-04 RX ADMIN — SODIUM CHLORIDE, POTASSIUM CHLORIDE, SODIUM LACTATE AND CALCIUM CHLORIDE: 600; 310; 30; 20 INJECTION, SOLUTION INTRAVENOUS at 14:30

## 2024-06-04 RX ADMIN — LIDOCAINE HYDROCHLORIDE 100 MG: 20 INJECTION, SOLUTION INFILTRATION; PERINEURAL at 14:42

## 2024-06-04 RX ADMIN — PROPOFOL 80 MG: 10 INJECTION, EMULSION INTRAVENOUS at 14:42

## 2024-06-04 NOTE — ANESTHESIA POSTPROCEDURE EVALUATION
Patient: Fabio Juárez    Procedure Summary       Date: 06/04/24 Room / Location: [facility] MRI; [facility] INTERVENTIONAL    Anesthesia Start: 1415 Anesthesia Stop: 1547    Procedure: MRI CERVICAL SPINE WO CONTRAST Diagnosis: Cervical radiculopathy    Scheduled Providers:  Provider: Grzegorz Little MD    Anesthesia Type: general ASA Status: 3            Anesthesia Type: general    Vitals  Vitals Value Taken Time   /86 06/04/24 1630   Temp     Pulse 78 06/04/24 1631   Resp 17 06/04/24 1630   SpO2 94 % 06/04/24 1631   Vitals shown include unfiled device data.        Post Anesthesia Care and Evaluation    Patient location during evaluation: bedside  Patient participation: complete - patient participated  Level of consciousness: awake  Pain management: adequate    Airway patency: patent  Anesthetic complications: No anesthetic complications  PONV Status: none  Cardiovascular status: acceptable  Respiratory status: acceptable  Hydration status: acceptable  Post Neuraxial Block status: Motor and sensory function returned to baseline

## 2024-06-04 NOTE — ANESTHESIA PREPROCEDURE EVALUATION
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               Anesthesia Plan    ASA 3     general     intravenous induction     Anesthetic plan, risks, benefits, and alternatives have been provided, discussed and informed consent has been obtained with: patient.  Pre-procedure education provided    CODE STATUS:

## 2024-06-09 DIAGNOSIS — K21.9 GASTROESOPHAGEAL REFLUX DISEASE, UNSPECIFIED WHETHER ESOPHAGITIS PRESENT: ICD-10-CM

## 2024-06-09 DIAGNOSIS — E78.00 HYPERCHOLESTEREMIA: ICD-10-CM

## 2024-06-09 DIAGNOSIS — G47.00 INSOMNIA, UNSPECIFIED TYPE: Chronic | ICD-10-CM

## 2024-06-09 DIAGNOSIS — E11.9 TYPE 2 DIABETES MELLITUS WITHOUT COMPLICATION, WITHOUT LONG-TERM CURRENT USE OF INSULIN: ICD-10-CM

## 2024-06-10 ENCOUNTER — TELEPHONE (OUTPATIENT)
Dept: FAMILY MEDICINE CLINIC | Age: 59
End: 2024-06-10
Payer: COMMERCIAL

## 2024-06-10 RX ORDER — OMEPRAZOLE 40 MG/1
40 CAPSULE, DELAYED RELEASE ORAL DAILY
Qty: 90 CAPSULE | Refills: 0 | Status: SHIPPED | OUTPATIENT
Start: 2024-06-10

## 2024-06-10 RX ORDER — SEMAGLUTIDE 1.34 MG/ML
INJECTION, SOLUTION SUBCUTANEOUS
Qty: 9 ML | Refills: 0 | Status: SHIPPED | OUTPATIENT
Start: 2024-06-10

## 2024-06-10 RX ORDER — TRAZODONE HYDROCHLORIDE 50 MG/1
TABLET ORAL
Qty: 90 TABLET | Refills: 0 | Status: SHIPPED | OUTPATIENT
Start: 2024-06-10

## 2024-06-10 RX ORDER — ATORVASTATIN CALCIUM 80 MG/1
80 TABLET, FILM COATED ORAL
Qty: 90 TABLET | Refills: 0 | Status: SHIPPED | OUTPATIENT
Start: 2024-06-10

## 2024-06-10 NOTE — TELEPHONE ENCOUNTER
Caller: Fabio Juárez    Relationship: Self    Best call back number: [phone], DETAILED MESSAGE CAN BE LEFT PER PATIENT    What test was performed:MRI RESULTS    Additional notes: PLEASE CALL AND DISCUSS.

## 2024-06-12 ENCOUNTER — LAB (OUTPATIENT)
Dept: LAB | Facility: HOSPITAL | Age: 59
End: 2024-06-12
Payer: COMMERCIAL

## 2024-06-12 ENCOUNTER — OFFICE VISIT (OUTPATIENT)
Dept: FAMILY MEDICINE CLINIC | Age: 59
End: 2024-06-12
Payer: COMMERCIAL

## 2024-06-12 VITALS
HEART RATE: 90 BPM | OXYGEN SATURATION: 92 % | BODY MASS INDEX: 42.37 KG/M2 | TEMPERATURE: 98.6 F | SYSTOLIC BLOOD PRESSURE: 110 MMHG | DIASTOLIC BLOOD PRESSURE: 69 MMHG | WEIGHT: 254.6 LBS

## 2024-06-12 DIAGNOSIS — M54.40 CHRONIC LOW BACK PAIN WITH SCIATICA, SCIATICA LATERALITY UNSPECIFIED, UNSPECIFIED BACK PAIN LATERALITY: ICD-10-CM

## 2024-06-12 DIAGNOSIS — E11.9 TYPE 2 DIABETES MELLITUS WITHOUT COMPLICATION, WITHOUT LONG-TERM CURRENT USE OF INSULIN: ICD-10-CM

## 2024-06-12 DIAGNOSIS — G89.29 CHRONIC LOW BACK PAIN WITH SCIATICA, SCIATICA LATERALITY UNSPECIFIED, UNSPECIFIED BACK PAIN LATERALITY: ICD-10-CM

## 2024-06-12 DIAGNOSIS — E78.00 HYPERCHOLESTEREMIA: ICD-10-CM

## 2024-06-12 DIAGNOSIS — M54.2 CHRONIC NECK PAIN: ICD-10-CM

## 2024-06-12 DIAGNOSIS — E11.9 TYPE 2 DIABETES MELLITUS WITHOUT COMPLICATION, WITHOUT LONG-TERM CURRENT USE OF INSULIN: Primary | ICD-10-CM

## 2024-06-12 DIAGNOSIS — I10 ESSENTIAL (PRIMARY) HYPERTENSION: ICD-10-CM

## 2024-06-12 DIAGNOSIS — G89.29 CHRONIC NECK PAIN: ICD-10-CM

## 2024-06-12 DIAGNOSIS — G47.00 INSOMNIA, UNSPECIFIED TYPE: Chronic | ICD-10-CM

## 2024-06-12 DIAGNOSIS — R10.12 LUQ ABDOMINAL PAIN: ICD-10-CM

## 2024-06-12 LAB
ALBUMIN SERPL-MCNC: 4 G/DL (ref 3.5–5.2)
ALBUMIN/GLOB SERPL: 1.5 G/DL
ALP SERPL-CCNC: 60 U/L (ref 39–117)
ALT SERPL W P-5'-P-CCNC: 24 U/L (ref 1–41)
ANION GAP SERPL CALCULATED.3IONS-SCNC: 10 MMOL/L (ref 5–15)
AST SERPL-CCNC: 17 U/L (ref 1–40)
BILIRUB SERPL-MCNC: 1 MG/DL (ref 0–1.2)
BUN SERPL-MCNC: 15 MG/DL (ref 6–20)
BUN/CREAT SERPL: 19.5 (ref 7–25)
CALCIUM SPEC-SCNC: 9.7 MG/DL (ref 8.6–10.5)
CHLORIDE SERPL-SCNC: 97 MMOL/L (ref 98–107)
CO2 SERPL-SCNC: 29 MMOL/L (ref 22–29)
CREAT SERPL-MCNC: 0.77 MG/DL (ref 0.76–1.27)
EGFRCR SERPLBLD CKD-EPI 2021: 103.8 ML/MIN/1.73
GLOBULIN UR ELPH-MCNC: 2.7 GM/DL
GLUCOSE SERPL-MCNC: 168 MG/DL (ref 65–99)
HBA1C MFR BLD: 8.2 % (ref 4.8–5.6)
POTASSIUM SERPL-SCNC: 4 MMOL/L (ref 3.5–5.2)
PROT SERPL-MCNC: 6.7 G/DL (ref 6–8.5)
SODIUM SERPL-SCNC: 136 MMOL/L (ref 136–145)

## 2024-06-12 PROCEDURE — 99214 OFFICE O/P EST MOD 30 MIN: CPT | Performed by: NURSE PRACTITIONER

## 2024-06-12 PROCEDURE — 83036 HEMOGLOBIN GLYCOSYLATED A1C: CPT

## 2024-06-12 PROCEDURE — 80053 COMPREHEN METABOLIC PANEL: CPT

## 2024-06-12 PROCEDURE — 36415 COLL VENOUS BLD VENIPUNCTURE: CPT

## 2024-06-12 NOTE — PROGRESS NOTES
Chief Complaint  Fabio Juárez presents to [facility] for Diabetes (6 mo. F/U ), Hyperlipidemia, Hypertension, Insomnia, and Abdominal Pain (LT upper quadrant pain X 2 months )      Subjective     History of Present Illness    Diabetes type:  Type 2    A1C Last 3 Results          2023    11:06 2024    16:00   HGBA1C Last 3 Results   Hemoglobin A1C 7.70  8.20      Microalbumin          2023    11:06   Microalbumin   Microalbumin, Urine <1.2        Current diabetic medications include metformin 2000 mg, Ozempic (semaglutide) 0.5 mg, and glimepiride (Amaryl) 2 mg    Diabetic Review of Systems:  Medication compliance: compliant all of the time  Diabetic diet compliance: noncompliant much of the time  Blood sugar lo-130's and 140-190's  Any hypoglycemic episodes? - No    Is He on ACE inhibitor or angiotensin II receptor blocker and a statin? Lisinopril 10 mg    atorvastatin (lipitor) 80 mg    Fabio presents today for follow up on Hypertension.    Current medication / treatment includes lisinopril (generic), and is compliant with medications.   no side effects reported.    Home blood pressure monitoring readings reported as home BP checks: not checked  Medication changes are not recommended at this time.    Continues to follow up with pain management  taking oxycodone, gabapentin, diclofenac gel, and baclofen for chronic pain.  [facility]      Thinks his hernia may be coming back  wants to have this checked.  Continues with left upper quadrant abdominal pain- worse when heavy lifting.  He had a robotic assisted surgery last time he had a hernia and does not want to undergo this again, he experienced extreme pain after his surgery         Assessment and Plan       Diagnoses and all orders for this visit:    1. Type 2 diabetes mellitus without complication, without long-term current use of insulin (Primary)  -     Hemoglobin A1c; Future  -      Comprehensive metabolic panel; Future  -     lisinopril (PRINIVIL,ZESTRIL) 10 MG tablet; Take 1 tablet by mouth Every Morning.  Dispense: 90 tablet; Refill: 1  -     atorvastatin (LIPITOR) 80 MG tablet; Take 1 tablet by mouth every night at bedtime.  Dispense: 90 tablet; Refill: 1  -     glimepiride (AMARYL) 2 MG tablet; Take 1 tablet by mouth Every Morning Before Breakfast.  Dispense: 90 tablet; Refill: 1  -     metFORMIN ER (GLUCOPHAGE-XR) 500 MG 24 hr tablet; Take 2 tablets by mouth 2 (Two) Times a Day With Meals.  Dispense: 360 tablet; Refill: 1    2. LUQ abdominal pain  -     CT Abdomen Pelvis Without Contrast; Future    3. Chronic neck pain  -     baclofen (LIORESAL) 10 MG tablet; Take 0.5-1 tablets by mouth At Night As Needed for Muscle Spasms.  Dispense: 90 tablet; Refill: 1    4. Chronic low back pain with sciatica, sciatica laterality unspecified, unspecified back pain laterality  -     baclofen (LIORESAL) 10 MG tablet; Take 0.5-1 tablets by mouth At Night As Needed for Muscle Spasms.  Dispense: 90 tablet; Refill: 1    5. Type 2 diabetes mellitus without complication, without long-term current use of insulin  Comments:  increase ozempic to 1 mg, follow up in 3 months for A1C  Orders:  -     Hemoglobin A1c; Future  -     Comprehensive metabolic panel; Future  -     lisinopril (PRINIVIL,ZESTRIL) 10 MG tablet; Take 1 tablet by mouth Every Morning.  Dispense: 90 tablet; Refill: 1  -     atorvastatin (LIPITOR) 80 MG tablet; Take 1 tablet by mouth every night at bedtime.  Dispense: 90 tablet; Refill: 1  -     glimepiride (AMARYL) 2 MG tablet; Take 1 tablet by mouth Every Morning Before Breakfast.  Dispense: 90 tablet; Refill: 1  -     metFORMIN ER (GLUCOPHAGE-XR) 500 MG 24 hr tablet; Take 2 tablets by mouth 2 (Two) Times a Day With Meals.  Dispense: 360 tablet; Refill: 1    6. Essential (primary) hypertension  Comments:  refills today well-controlled  Orders:  -     lisinopril (PRINIVIL,ZESTRIL) 10 MG tablet;  Take 1 tablet by mouth Every Morning.  Dispense: 90 tablet; Refill: 1    7. Hypercholesteremia  Comments:  continue current treatment follow up in 6 months  Orders:  -     atorvastatin (LIPITOR) 80 MG tablet; Take 1 tablet by mouth every night at bedtime.  Dispense: 90 tablet; Refill: 1    8. Insomnia, unspecified type  Comments:  increase trazodone to 1-2 tabs PRN  Orders:  -     traZODone (DESYREL) 50 MG tablet; Take 2 tablets by mouth Every Night.  Dispense: 180 tablet; Refill: 1            Follow Up   Return in about 6 months (around 12/12/2024) for Pending imaging results, Recheck, Annual physical.  Future Appointments   Date Time Provider Department Center   12/16/2024  9:15 AM Karley Amor APRN [facility]       New Medications Ordered This Visit   Medications    baclofen (LIORESAL) 10 MG tablet     Sig: Take 0.5-1 tablets by mouth At Night As Needed for Muscle Spasms.     Dispense:  90 tablet     Refill:  1    lisinopril (PRINIVIL,ZESTRIL) 10 MG tablet     Sig: Take 1 tablet by mouth Every Morning.     Dispense:  90 tablet     Refill:  1    atorvastatin (LIPITOR) 80 MG tablet     Sig: Take 1 tablet by mouth every night at bedtime.     Dispense:  90 tablet     Refill:  1    traZODone (DESYREL) 50 MG tablet     Sig: Take 2 tablets by mouth Every Night.     Dispense:  180 tablet     Refill:  1    glimepiride (AMARYL) 2 MG tablet     Sig: Take 1 tablet by mouth Every Morning Before Breakfast.     Dispense:  90 tablet     Refill:  1    metFORMIN ER (GLUCOPHAGE-XR) 500 MG 24 hr tablet     Sig: Take 2 tablets by mouth 2 (Two) Times a Day With Meals.     Dispense:  360 tablet     Refill:  1       Medications Discontinued During This Encounter   Medication Reason    metFORMIN ER (GLUCOPHAGE-XR) 500 MG 24 hr tablet Reorder    baclofen (LIORESAL) 10 MG tablet Reorder    glimepiride (AMARYL) 2 MG tablet Reorder    lisinopril (PRINIVIL,ZESTRIL) 10 MG tablet Reorder    traZODone (DESYREL) 50 MG tablet Reorder     atorvastatin (LIPITOR) 80 MG tablet Reorder          Review of Systems    Objective     Vitals:    06/12/24 1506 06/12/24 1551   BP: 96/63 110/69   BP Location: Right arm Left arm   Patient Position: Sitting Sitting   Cuff Size: Adult Adult   Pulse: 94 90   Temp: 98.6 °F (37 °C)    TempSrc: Oral    SpO2: 92%    Weight: 115 kg (254 lb 9.6 oz)      Body mass index is 42.37 kg/m².          Physical Exam  Vitals reviewed.   Constitutional:       Appearance: Normal appearance. He is obese.   HENT:      Head: Normocephalic.   Eyes:      Pupils: Pupils are equal, round, and reactive to light.   Cardiovascular:      Rate and Rhythm: Normal rate and regular rhythm.      Heart sounds: No murmur heard.  Pulmonary:      Effort: Pulmonary effort is normal.      Breath sounds: Normal breath sounds.   Abdominal:      Tenderness: There is abdominal tenderness in the left upper quadrant.   Musculoskeletal:         General: Normal range of motion.   Neurological:      Mental Status: He is alert.   Psychiatric:         Mood and Affect: Mood normal.         Behavior: Behavior normal.            Result Review               No Known Allergies   Past Medical History:   Diagnosis Date    Chronic pain     Claustrophobia     Essential (primary) hypertension     GERD (gastroesophageal reflux disease)     Hemochromatosis     ASYMPTOMATIC     History of COVID-19     Left upper quadrant pain     Low back pain     Lung nodule     Mixed hyperlipidemia     Obstructive sleep apnea (adult) (pediatric)     Other testicular dysfunction     Pain in right foot     Pain in right shoulder     Recurrent umbilical hernia     Renal stone 03/2017    Tracheostomy status     R/T SLEEP APNEA - WAS FOLLOWED BY DR. PRICE - LAST O.V. 2018 F/U PRN    Type 2 diabetes mellitus     BG RUNS 120-125 IN AM      Current Outpatient Medications   Medication Sig Dispense Refill    albuterol sulfate  (90 Base) MCG/ACT inhaler Inhale 2 puffs Every 4 (Four) Hours As  Needed for Wheezing. 18 g 1    atorvastatin (LIPITOR) 80 MG tablet Take 1 tablet by mouth every night at bedtime. 90 tablet 1    baclofen (LIORESAL) 10 MG tablet Take 0.5-1 tablets by mouth At Night As Needed for Muscle Spasms. 90 tablet 1    cetirizine (zyrTEC) 5 MG tablet Take 1 tablet by mouth Daily. 90 tablet 3    Diclofenac Sodium (VOLTAREN) 1 % gel gel Apply 4 g topically to the appropriate area as directed 4 (Four) Times a Day. 100 g 0    gabapentin (NEURONTIN) 300 MG capsule 1 capsule 2 (Two) Times a Day.      glimepiride (AMARYL) 2 MG tablet Take 1 tablet by mouth Every Morning Before Breakfast. 90 tablet 1    hydroCHLOROthiazide 12.5 MG tablet TAKE 1 TABLET BY MOUTH DAILY 90 tablet 1    lisinopril (PRINIVIL,ZESTRIL) 10 MG tablet Take 1 tablet by mouth Every Morning. 90 tablet 1    metFORMIN ER (GLUCOPHAGE-XR) 500 MG 24 hr tablet Take 2 tablets by mouth 2 (Two) Times a Day With Meals. 360 tablet 1    naloxone (NARCAN) 4 MG/0.1ML nasal spray Call 911. Don't prime. [place]endor in 1 nostril for overdose. Repeat in 2-3 minutes in other nostril if no or minimal breathing/responsiveness. 2 each 0    omeprazole (priLOSEC) 40 MG capsule Take 1 capsule by mouth once daily 90 capsule 0    oxyCODONE (ROXICODONE) 5 MG immediate release tablet Take 1 tablet by mouth Every 6 (Six) Hours As Needed for Moderate Pain. 30 tablet 0    Ozempic, 1 MG/DOSE, 4 MG/3ML solution pen-injector INJECT 1 MG UNDER THE SKIN INTO THE APPROPRIATE AREA AS DIRECTED 1 TIME PER WEEK 9 mL 0    traZODone (DESYREL) 50 MG tablet Take 2 tablets by mouth Every Night. 180 tablet 1     No current facility-administered medications for this visit.     Past Surgical History:   Procedure Laterality Date    ACHILLES TENDON REPAIR Right 10/2018    APPENDECTOMY      CARDIAC CATHETERIZATION  11/03/2015    LEFT VENTRIULOGRAM, CORONARY ANGIOGRAM     CARPAL TUNNEL RELEASE Right     CHOLECYSTECTOMY  07/2013    COLONOSCOPY  07/28/2014    NORMAL RESULT     HERNIA REPAIR       JOINT REPLACEMENT Bilateral     RIGHT KNEE 01/2016    SHOULDER ARTHROSCOPY W/ ROTATOR CUFF REPAIR Right 5/9/2023    Procedure: SHOULDER ARTHROSCOPY WITH BICEPS TENODESIS, ROTATOR CUFF REPAIR, AND SUBACROMIAL DECOMPRESSION;  Surgeon: Phu Lebron MD;  [place] [facility]  Service: Orthopedics;  Laterality: Right;    TONSILLECTOMY AND ADENOIDECTOMY      TRACHEOSTOMY      SECONDARY TO SLEEP APNEA    VENTRAL HERNIA REPAIR N/A 02/23/2022    Procedure: ROBOTIC UMBILICAL HERNIA REPAIR WITH MESH PLACEMENT;  Surgeon: aGrrett Adnerson MD;  Location: [facility] MAIN OR;  Service: Robotics - DaVinci;  Laterality: N/A;      Health Maintenance Due   Topic Date Due    DIABETIC FOOT EXAM  06/21/2023    COLORECTAL CANCER SCREENING  07/28/2024      Immunization History   Administered Date(s) Administered    COVID-19 (PFIZER) BIVALENT 12+YRS 10/25/2022    COVID-19 (PFIZER) Purple Cap Monovalent 02/24/2021, 03/17/2021, 12/09/2021    COVID-19 F23 (PFIZER) 12YRS+ (COMIRNATY) 12/13/2023    Covid-19 (Pfizer) Gray Cap Monovalent 06/21/2022    Fluzone (or Fluarix & Flulaval for VFC) >6mos 12/09/2021, 11/08/2023    Influenza Quad Vaccine (Inpatient) 01/09/2018    Influenza TIV (IM) 01/08/2013    Influenza, Unspecified 01/20/2021    Pneumococcal, Unspecified 08/08/2016, 08/08/2016    Td (TDVAX) 03/26/2019, 03/26/2019         Part of this note may be an electronic transcription/translation of spoken language to printed   text using the Dragon Dictation System.      Karley Amor APRN

## 2024-06-19 RX ORDER — TRAZODONE HYDROCHLORIDE 50 MG/1
100 TABLET ORAL NIGHTLY
Qty: 180 TABLET | Refills: 1 | Status: SHIPPED | OUTPATIENT
Start: 2024-06-19

## 2024-06-19 RX ORDER — LISINOPRIL 10 MG/1
10 TABLET ORAL EVERY MORNING
Qty: 90 TABLET | Refills: 1 | Status: SHIPPED | OUTPATIENT
Start: 2024-06-19

## 2024-06-19 RX ORDER — ATORVASTATIN CALCIUM 80 MG/1
80 TABLET, FILM COATED ORAL
Qty: 90 TABLET | Refills: 1 | Status: SHIPPED | OUTPATIENT
Start: 2024-06-19

## 2024-06-19 RX ORDER — METFORMIN HYDROCHLORIDE 500 MG/1
1000 TABLET, EXTENDED RELEASE ORAL 2 TIMES DAILY WITH MEALS
Qty: 360 TABLET | Refills: 1 | Status: SHIPPED | OUTPATIENT
Start: 2024-06-19

## 2024-06-19 RX ORDER — BACLOFEN 10 MG/1
5-10 TABLET ORAL NIGHTLY PRN
Qty: 90 TABLET | Refills: 1 | Status: SHIPPED | OUTPATIENT
Start: 2024-06-19

## 2024-06-19 RX ORDER — GLIMEPIRIDE 2 MG/1
2 TABLET ORAL
Qty: 90 TABLET | Refills: 1 | Status: SHIPPED | OUTPATIENT
Start: 2024-06-19

## 2024-06-27 ENCOUNTER — HOSPITAL ENCOUNTER (OUTPATIENT)
Dept: CT IMAGING | Facility: HOSPITAL | Age: 59
Discharge: HOME | End: 2024-06-27
Admitting: NURSE PRACTITIONER
Payer: COMMERCIAL

## 2024-06-27 DIAGNOSIS — R10.12 LUQ ABDOMINAL PAIN: ICD-10-CM

## 2024-06-27 PROCEDURE — 74176 CT ABD & PELVIS W/O CONTRAST: CPT

## 2024-07-30 DIAGNOSIS — K21.9 GASTROESOPHAGEAL REFLUX DISEASE, UNSPECIFIED WHETHER ESOPHAGITIS PRESENT: ICD-10-CM

## 2024-07-30 RX ORDER — OMEPRAZOLE 40 MG/1
40 CAPSULE, DELAYED RELEASE ORAL DAILY
Qty: 90 CAPSULE | Refills: 0 | Status: SHIPPED | OUTPATIENT
Start: 2024-07-30

## 2024-09-05 DIAGNOSIS — E11.9 TYPE 2 DIABETES MELLITUS WITHOUT COMPLICATION, WITHOUT LONG-TERM CURRENT USE OF INSULIN: ICD-10-CM

## 2024-09-07 RX ORDER — SEMAGLUTIDE 1.34 MG/ML
INJECTION, SOLUTION SUBCUTANEOUS
Qty: 9 ML | Refills: 0 | Status: SHIPPED | OUTPATIENT
Start: 2024-09-07

## 2024-09-25 ENCOUNTER — LAB (OUTPATIENT)
Dept: LAB | Facility: HOSPITAL | Age: 59
End: 2024-09-25
Payer: COMMERCIAL

## 2024-09-25 ENCOUNTER — OFFICE VISIT (OUTPATIENT)
Dept: FAMILY MEDICINE CLINIC | Age: 59
End: 2024-09-25
Payer: COMMERCIAL

## 2024-09-25 VITALS
HEART RATE: 81 BPM | DIASTOLIC BLOOD PRESSURE: 79 MMHG | OXYGEN SATURATION: 93 % | BODY MASS INDEX: 42.42 KG/M2 | HEIGHT: 65 IN | WEIGHT: 254.6 LBS | SYSTOLIC BLOOD PRESSURE: 121 MMHG | TEMPERATURE: 97.5 F

## 2024-09-25 DIAGNOSIS — Z12.11 SCREENING FOR COLON CANCER: ICD-10-CM

## 2024-09-25 DIAGNOSIS — E83.42 HYPOMAGNESEMIA: ICD-10-CM

## 2024-09-25 DIAGNOSIS — E11.9 TYPE 2 DIABETES MELLITUS WITHOUT COMPLICATION, WITHOUT LONG-TERM CURRENT USE OF INSULIN: Primary | ICD-10-CM

## 2024-09-25 DIAGNOSIS — E78.00 HYPERCHOLESTEREMIA: ICD-10-CM

## 2024-09-25 DIAGNOSIS — R79.89 ABNORMAL CBC: ICD-10-CM

## 2024-09-25 DIAGNOSIS — Z23 ENCOUNTER FOR IMMUNIZATION: ICD-10-CM

## 2024-09-25 DIAGNOSIS — E11.9 TYPE 2 DIABETES MELLITUS WITHOUT COMPLICATION, WITHOUT LONG-TERM CURRENT USE OF INSULIN: ICD-10-CM

## 2024-09-25 LAB
ALBUMIN SERPL-MCNC: 4 G/DL (ref 3.5–5.2)
ALBUMIN/GLOB SERPL: 1.6 G/DL
ALP SERPL-CCNC: 65 U/L (ref 39–117)
ALT SERPL W P-5'-P-CCNC: 22 U/L (ref 1–41)
ANION GAP SERPL CALCULATED.3IONS-SCNC: 10 MMOL/L (ref 5–15)
AST SERPL-CCNC: 21 U/L (ref 1–40)
BILIRUB SERPL-MCNC: 1.2 MG/DL (ref 0–1.2)
BUN SERPL-MCNC: 17 MG/DL (ref 6–20)
BUN/CREAT SERPL: 23 (ref 7–25)
CALCIUM SPEC-SCNC: 9.4 MG/DL (ref 8.6–10.5)
CHLORIDE SERPL-SCNC: 99 MMOL/L (ref 98–107)
CHOLEST SERPL-MCNC: 102 MG/DL (ref 0–200)
CO2 SERPL-SCNC: 28 MMOL/L (ref 22–29)
CREAT SERPL-MCNC: 0.74 MG/DL (ref 0.76–1.27)
DEPRECATED RDW RBC AUTO: 40 FL (ref 37–54)
EGFRCR SERPLBLD CKD-EPI 2021: 105 ML/MIN/1.73
ERYTHROCYTE [DISTWIDTH] IN BLOOD BY AUTOMATED COUNT: 11.7 % (ref 12.3–15.4)
GLOBULIN UR ELPH-MCNC: 2.5 GM/DL
GLUCOSE SERPL-MCNC: 149 MG/DL (ref 65–99)
HBA1C MFR BLD: 7.6 % (ref 4.8–5.6)
HCT VFR BLD AUTO: 45.3 % (ref 37.5–51)
HDLC SERPL-MCNC: 35 MG/DL (ref 40–60)
HGB BLD-MCNC: 15.2 G/DL (ref 13–17.7)
LDLC SERPL CALC-MCNC: 36 MG/DL (ref 0–100)
LDLC/HDLC SERPL: 0.81 {RATIO}
MAGNESIUM SERPL-MCNC: 1.9 MG/DL (ref 1.6–2.6)
MCH RBC QN AUTO: 30.8 PG (ref 26.6–33)
MCHC RBC AUTO-ENTMCNC: 33.6 G/DL (ref 31.5–35.7)
MCV RBC AUTO: 91.9 FL (ref 79–97)
PLATELET # BLD AUTO: 262 10*3/MM3 (ref 140–450)
PMV BLD AUTO: 8.7 FL (ref 6–12)
POTASSIUM SERPL-SCNC: 4.1 MMOL/L (ref 3.5–5.2)
PROT SERPL-MCNC: 6.5 G/DL (ref 6–8.5)
RBC # BLD AUTO: 4.93 10*6/MM3 (ref 4.14–5.8)
SODIUM SERPL-SCNC: 137 MMOL/L (ref 136–145)
TRIGL SERPL-MCNC: 193 MG/DL (ref 0–150)
VLDLC SERPL-MCNC: 31 MG/DL (ref 5–40)
WBC NRBC COR # BLD AUTO: 8.1 10*3/MM3 (ref 3.4–10.8)

## 2024-09-25 PROCEDURE — 83735 ASSAY OF MAGNESIUM: CPT

## 2024-09-25 PROCEDURE — 99214 OFFICE O/P EST MOD 30 MIN: CPT | Performed by: NURSE PRACTITIONER

## 2024-09-25 PROCEDURE — 36415 COLL VENOUS BLD VENIPUNCTURE: CPT

## 2024-09-25 PROCEDURE — 80061 LIPID PANEL: CPT

## 2024-09-25 PROCEDURE — 90471 IMMUNIZATION ADMIN: CPT | Performed by: NURSE PRACTITIONER

## 2024-09-25 PROCEDURE — 83036 HEMOGLOBIN GLYCOSYLATED A1C: CPT

## 2024-09-25 PROCEDURE — 80053 COMPREHEN METABOLIC PANEL: CPT

## 2024-09-25 PROCEDURE — 85027 COMPLETE CBC AUTOMATED: CPT

## 2024-09-25 PROCEDURE — 90656 IIV3 VACC NO PRSV 0.5 ML IM: CPT | Performed by: NURSE PRACTITIONER

## 2024-09-30 DIAGNOSIS — E11.9 TYPE 2 DIABETES MELLITUS WITHOUT COMPLICATION, WITHOUT LONG-TERM CURRENT USE OF INSULIN: Primary | ICD-10-CM

## 2024-09-30 RX ORDER — SEMAGLUTIDE 2.68 MG/ML
2 INJECTION, SOLUTION SUBCUTANEOUS WEEKLY
Qty: 9 ML | Refills: 0 | Status: SHIPPED | OUTPATIENT
Start: 2024-09-30

## 2024-12-06 ENCOUNTER — OFFICE VISIT (OUTPATIENT)
Dept: FAMILY MEDICINE CLINIC | Age: 59
End: 2024-12-06
Payer: COMMERCIAL

## 2024-12-06 ENCOUNTER — LAB (OUTPATIENT)
Dept: LAB | Facility: HOSPITAL | Age: 59
End: 2024-12-06
Payer: COMMERCIAL

## 2024-12-06 VITALS
SYSTOLIC BLOOD PRESSURE: 115 MMHG | WEIGHT: 260 LBS | OXYGEN SATURATION: 98 % | HEART RATE: 78 BPM | BODY MASS INDEX: 43.32 KG/M2 | HEIGHT: 65 IN | TEMPERATURE: 97.5 F | DIASTOLIC BLOOD PRESSURE: 74 MMHG

## 2024-12-06 DIAGNOSIS — R10.84 GENERALIZED ABDOMINAL PAIN: ICD-10-CM

## 2024-12-06 DIAGNOSIS — R19.7 DIARRHEA, UNSPECIFIED TYPE: ICD-10-CM

## 2024-12-06 DIAGNOSIS — M79.10 MYALGIA: Primary | ICD-10-CM

## 2024-12-06 DIAGNOSIS — R53.83 OTHER FATIGUE: ICD-10-CM

## 2024-12-06 DIAGNOSIS — E11.65 TYPE 2 DIABETES MELLITUS WITH HYPERGLYCEMIA, WITHOUT LONG-TERM CURRENT USE OF INSULIN: ICD-10-CM

## 2024-12-06 LAB
ALBUMIN SERPL-MCNC: 3.7 G/DL (ref 3.5–5.2)
ALBUMIN/GLOB SERPL: 1.4 G/DL
ALP SERPL-CCNC: 63 U/L (ref 39–117)
ALT SERPL W P-5'-P-CCNC: 27 U/L (ref 1–41)
ANION GAP SERPL CALCULATED.3IONS-SCNC: 9.6 MMOL/L (ref 5–15)
AST SERPL-CCNC: 29 U/L (ref 1–40)
BASOPHILS # BLD AUTO: 0.05 10*3/MM3 (ref 0–0.2)
BASOPHILS NFR BLD AUTO: 0.7 % (ref 0–1.5)
BILIRUB SERPL-MCNC: 1.2 MG/DL (ref 0–1.2)
BUN SERPL-MCNC: 15 MG/DL (ref 6–20)
BUN/CREAT SERPL: 19.7 (ref 7–25)
CALCIUM SPEC-SCNC: 8.9 MG/DL (ref 8.6–10.5)
CHLORIDE SERPL-SCNC: 102 MMOL/L (ref 98–107)
CO2 SERPL-SCNC: 25.4 MMOL/L (ref 22–29)
CREAT SERPL-MCNC: 0.76 MG/DL (ref 0.76–1.27)
DEPRECATED RDW RBC AUTO: 40 FL (ref 37–54)
EGFRCR SERPLBLD CKD-EPI 2021: 104.2 ML/MIN/1.73
EOSINOPHIL # BLD AUTO: 0.21 10*3/MM3 (ref 0–0.4)
EOSINOPHIL NFR BLD AUTO: 3.1 % (ref 0.3–6.2)
ERYTHROCYTE [DISTWIDTH] IN BLOOD BY AUTOMATED COUNT: 11.9 % (ref 12.3–15.4)
EXPIRATION DATE: NORMAL
FLUAV AG UPPER RESP QL IA.RAPID: NOT DETECTED
FLUBV AG UPPER RESP QL IA.RAPID: NOT DETECTED
GLOBULIN UR ELPH-MCNC: 2.6 GM/DL
GLUCOSE SERPL-MCNC: 226 MG/DL (ref 65–99)
HCT VFR BLD AUTO: 47.4 % (ref 37.5–51)
HGB BLD-MCNC: 15.7 G/DL (ref 13–17.7)
IMM GRANULOCYTES # BLD AUTO: 0.02 10*3/MM3 (ref 0–0.05)
IMM GRANULOCYTES NFR BLD AUTO: 0.3 % (ref 0–0.5)
INTERNAL CONTROL: NORMAL
LIPASE SERPL-CCNC: 42 U/L (ref 13–60)
LYMPHOCYTES # BLD AUTO: 2.77 10*3/MM3 (ref 0.7–3.1)
LYMPHOCYTES NFR BLD AUTO: 40.7 % (ref 19.6–45.3)
Lab: NORMAL
MCH RBC QN AUTO: 30 PG (ref 26.6–33)
MCHC RBC AUTO-ENTMCNC: 33.1 G/DL (ref 31.5–35.7)
MCV RBC AUTO: 90.5 FL (ref 79–97)
MONOCYTES # BLD AUTO: 0.92 10*3/MM3 (ref 0.1–0.9)
MONOCYTES NFR BLD AUTO: 13.5 % (ref 5–12)
NEUTROPHILS NFR BLD AUTO: 2.83 10*3/MM3 (ref 1.7–7)
NEUTROPHILS NFR BLD AUTO: 41.7 % (ref 42.7–76)
PLATELET # BLD AUTO: 252 10*3/MM3 (ref 140–450)
PMV BLD AUTO: 8.6 FL (ref 6–12)
POTASSIUM SERPL-SCNC: 4.2 MMOL/L (ref 3.5–5.2)
PROT SERPL-MCNC: 6.3 G/DL (ref 6–8.5)
RBC # BLD AUTO: 5.24 10*6/MM3 (ref 4.14–5.8)
SARS-COV-2 AG UPPER RESP QL IA.RAPID: NOT DETECTED
SODIUM SERPL-SCNC: 137 MMOL/L (ref 136–145)
WBC NRBC COR # BLD AUTO: 6.8 10*3/MM3 (ref 3.4–10.8)

## 2024-12-06 PROCEDURE — 99213 OFFICE O/P EST LOW 20 MIN: CPT | Performed by: NURSE PRACTITIONER

## 2024-12-06 PROCEDURE — 87428 SARSCOV & INF VIR A&B AG IA: CPT | Performed by: NURSE PRACTITIONER

## 2024-12-06 PROCEDURE — 36415 COLL VENOUS BLD VENIPUNCTURE: CPT

## 2024-12-06 PROCEDURE — 83690 ASSAY OF LIPASE: CPT

## 2024-12-06 PROCEDURE — 85025 COMPLETE CBC W/AUTO DIFF WBC: CPT

## 2024-12-06 PROCEDURE — 83036 HEMOGLOBIN GLYCOSYLATED A1C: CPT

## 2024-12-06 PROCEDURE — 80053 COMPREHEN METABOLIC PANEL: CPT

## 2024-12-06 RX ORDER — DOXYCYCLINE HYCLATE 100 MG
1 TABLET ORAL EVERY 12 HOURS SCHEDULED
COMMUNITY
Start: 2024-11-29 | End: 2024-12-06

## 2024-12-06 NOTE — ASSESSMENT & PLAN NOTE
Negative for COVID or flu.  Could potentially be other viral illness.  Encouraged symptomatic treatment, rest, and increase fluid intake.  Continue to look for other acute causes that could be bacterial in origin with stool studies.  Further treatment recommendations pending lab results.

## 2024-12-06 NOTE — LETTER
December 6, 2024     Patient: Fabio Juárez   YOB: 1965   Date of Visit: 12/6/2024       To Whom It May Concern:    It is my medical opinion that Fabio Juárez be excused from work December 5 and 7.         Sincerely,        ONEIL Lutz    CC: No Recipients

## 2024-12-06 NOTE — PROGRESS NOTES
"Chief Complaint  Abdominal Pain (Patient c/o getting shingles shot and has felt sick since taking it on 12/4/24) and Earache    Subjective          Fabio Juárez presents to [facility]     Patient is a 59-year-old male who reports onset of bodyaches and generally not feeling well with earache and abdominal pain the morning of December 4 after he received his second dose of Shingrix vaccine on December 3.  He has some chronic nature to diarrhea but has been a little more noticeable the last couple of days.  He had 3-4 yellow loose bowel movements in the last 24 hours without blood or mucus noted.  He has not had fever but has been exposed to unspecified ill contacts in his family and coworkers.  He had some abdominal pain yesterday that has resolved completely.  He denies nausea or vomiting.    Patient was seen November 28 at [facility] emergency room for an abscess of the scrotum for which she completed Augmentin and doxycycline yesterday.  He states the area has remarkedly improved.  He has not felt as if he has had any stomach upset from the antibiotics.     Objective   Vital Signs:   Vitals:    12/06/24 1028   BP: 115/74   BP Location: Left arm   Patient Position: Sitting   Cuff Size: Adult   Pulse: 78   Temp: 97.5 °F (36.4 °C)   TempSrc: Temporal   SpO2: 98%   Weight: 118 kg (260 lb)   Height: 165.1 cm (65\")       Wt Readings from Last 3 Encounters:   12/06/24 118 kg (260 lb)   09/25/24 115 kg (254 lb 9.6 oz)   06/12/24 115 kg (254 lb 9.6 oz)      BP Readings from Last 3 Encounters:   12/06/24 115/74   09/25/24 121/79   06/12/24 110/69       Body mass index is 43.27 kg/m².             Physical Exam  Vitals reviewed.   Constitutional:       General: He is not in acute distress.     Appearance: Normal appearance. He is well-developed. He is obese.   HENT:      Right Ear: A middle ear effusion is present.      Left Ear: A middle ear effusion is present.      Mouth/Throat:      " Pharynx: No oropharyngeal exudate or posterior oropharyngeal erythema.   Cardiovascular:      Rate and Rhythm: Normal rate and regular rhythm.      Heart sounds: Normal heart sounds.   Pulmonary:      Effort: Pulmonary effort is normal.      Breath sounds: Normal breath sounds.   Abdominal:      General: Abdomen is flat. Bowel sounds are normal. There is no distension.      Palpations: Abdomen is soft. There is no mass.      Tenderness: There is no abdominal tenderness. There is no guarding or rebound.   Musculoskeletal:      Right lower leg: No edema.      Left lower leg: No edema.   Skin:     General: Skin is warm and dry.   Neurological:      General: No focal deficit present.      Mental Status: He is alert.   Psychiatric:         Attention and Perception: Attention normal.         Mood and Affect: Mood and affect normal.         Behavior: Behavior normal.           Current Outpatient Medications:     albuterol sulfate  (90 Base) MCG/ACT inhaler, Inhale 2 puffs Every 4 (Four) Hours As Needed for Wheezing., Disp: 18 g, Rfl: 1    atorvastatin (LIPITOR) 80 MG tablet, Take 1 tablet by mouth every night at bedtime., Disp: 90 tablet, Rfl: 1    baclofen (LIORESAL) 10 MG tablet, Take 0.5-1 tablets by mouth At Night As Needed for Muscle Spasms., Disp: 90 tablet, Rfl: 1    cetirizine (zyrTEC) 5 MG tablet, Take 1 tablet by mouth Daily., Disp: 90 tablet, Rfl: 3    Diclofenac Sodium (VOLTAREN) 1 % gel gel, Apply 4 g topically to the appropriate area as directed 4 (Four) Times a Day., Disp: 100 g, Rfl: 0    gabapentin (NEURONTIN) 300 MG capsule, 1 capsule 2 (Two) Times a Day., Disp: , Rfl:     glimepiride (AMARYL) 2 MG tablet, Take 1 tablet by mouth Every Morning Before Breakfast., Disp: 90 tablet, Rfl: 1    hydroCHLOROthiazide 12.5 MG tablet, TAKE 1 TABLET BY MOUTH DAILY, Disp: 90 tablet, Rfl: 1    lisinopril (PRINIVIL,ZESTRIL) 10 MG tablet, Take 1 tablet by mouth Every Morning., Disp: 90 tablet, Rfl: 1    metFORMIN  ER (GLUCOPHAGE-XR) 500 MG 24 hr tablet, Take 2 tablets by mouth 2 (Two) Times a Day With Meals., Disp: 360 tablet, Rfl: 1    naloxone (NARCAN) 4 MG/0.1ML nasal spray, Call 911. Don't prime. [place] in 1 nostril for overdose. Repeat in 2-3 minutes in other nostril if no or minimal breathing/responsiveness., Disp: 2 each, Rfl: 0    omeprazole (priLOSEC) 40 MG capsule, Take 1 capsule by mouth once daily, Disp: 90 capsule, Rfl: 0    oxyCODONE (ROXICODONE) 5 MG immediate release tablet, Take 1 tablet by mouth Every 6 (Six) Hours As Needed for Moderate Pain., Disp: 30 tablet, Rfl: 0    Semaglutide, 2 MG/DOSE, (Ozempic, 2 MG/DOSE,) 8 MG/3ML solution pen-injector, Inject 2 mg under the skin into the appropriate area as directed 1 (One) Time Per Week., Disp: 9 mL, Rfl: 0    traZODone (DESYREL) 50 MG tablet, Take 2 tablets by mouth Every Night., Disp: 180 tablet, Rfl: 1   Past Medical History:   Diagnosis Date    Chronic pain     Claustrophobia     Essential (primary) hypertension     GERD (gastroesophageal reflux disease)     Hemochromatosis     ASYMPTOMATIC     History of COVID-19     Left upper quadrant pain     Low back pain     Lung nodule     Mixed hyperlipidemia     Obstructive sleep apnea (adult) (pediatric)     Other testicular dysfunction     Pain in right foot     Pain in right shoulder     Recurrent umbilical hernia     Renal stone 03/2017    Tracheostomy status     R/T SLEEP APNEA - WAS FOLLOWED BY DR. PRICE - LAST O.V. 2018 F/U PRN    Type 2 diabetes mellitus     BG RUNS 120-125 IN AM      No Known Allergies            Result Review :     Common labs          6/12/2024    16:00 9/25/2024    11:10 12/6/2024    11:25   Common Labs   Glucose 168  149     BUN 15  17     Creatinine 0.77  0.74     Sodium 136  137     Potassium 4.0  4.1     Chloride 97  99     Calcium 9.7  9.4     Albumin 4.0  4.0     Total Bilirubin 1.0  1.2     Alkaline Phosphatase 60  65     AST (SGOT) 17  21     ALT (SGPT) 24  22     WBC  8.10   6.80    Hemoglobin  15.2  15.7    Hematocrit  45.3  47.4    Platelets  262  252    Total Cholesterol  102     Triglycerides  193     HDL Cholesterol  35     LDL Cholesterol   36     Hemoglobin A1C 8.20  7.60          No Images in the past 120 days found..           Social History     Tobacco Use   Smoking Status Never   Smokeless Tobacco Never           Assessment and Plan    Diagnoses and all orders for this visit:    1. Myalgia (Primary)  Assessment & Plan:  Negative for COVID or flu.  Could potentially be other viral illness.  Encouraged symptomatic treatment, rest, and increase fluid intake.  Continue to look for other acute causes that could be bacterial in origin with stool studies.  Further treatment recommendations pending lab results.    Orders:  -     POCT SARS-CoV-2 Antigen ALESSANDRA + Flu    2. Other fatigue  -     POCT SARS-CoV-2 Antigen ALESSANDRA + Flu    3. Diarrhea, unspecified type  -     Enteric Bacterial Panel - Stool, Per Rectum; Future  -     Clostridioides difficile Toxin, PCR - Stool, Per Rectum; Future    4. Generalized abdominal pain  -     CBC w AUTO Differential; Future  -     Comprehensive metabolic panel; Future  -     Lipase; Future        Follow Up    No follow-ups on file.  Patient was given instructions and counseling regarding his condition or for health maintenance advice. Please see specific information pulled into the AVS if appropriate.

## 2024-12-07 DIAGNOSIS — E11.65 TYPE 2 DIABETES MELLITUS WITH HYPERGLYCEMIA, WITHOUT LONG-TERM CURRENT USE OF INSULIN: Primary | ICD-10-CM

## 2024-12-09 LAB — HBA1C MFR BLD: 8.4 % (ref 4.8–5.6)

## 2024-12-10 DIAGNOSIS — K21.9 GASTROESOPHAGEAL REFLUX DISEASE, UNSPECIFIED WHETHER ESOPHAGITIS PRESENT: ICD-10-CM

## 2024-12-10 RX ORDER — OMEPRAZOLE 40 MG/1
40 CAPSULE, DELAYED RELEASE ORAL DAILY
Qty: 90 CAPSULE | Refills: 0 | Status: SHIPPED | OUTPATIENT
Start: 2024-12-10 | End: 2024-12-16 | Stop reason: SDUPTHER

## 2024-12-10 NOTE — PROGRESS NOTES
Kidney and liver function, white blood cell count, and pancreatic enzymes are normal.  Diabetes is not under good control.  I am forwarding your labs to your PCP.  These labs do not show any cause for your recent symptoms of illness.  Follow up for any persisting symptoms.

## 2024-12-16 ENCOUNTER — LAB (OUTPATIENT)
Dept: LAB | Facility: HOSPITAL | Age: 59
End: 2024-12-16
Payer: COMMERCIAL

## 2024-12-16 ENCOUNTER — OFFICE VISIT (OUTPATIENT)
Dept: FAMILY MEDICINE CLINIC | Age: 59
End: 2024-12-16
Payer: COMMERCIAL

## 2024-12-16 VITALS
WEIGHT: 260 LBS | TEMPERATURE: 98.3 F | BODY MASS INDEX: 43.32 KG/M2 | HEIGHT: 65 IN | SYSTOLIC BLOOD PRESSURE: 105 MMHG | OXYGEN SATURATION: 94 % | HEART RATE: 76 BPM | DIASTOLIC BLOOD PRESSURE: 69 MMHG

## 2024-12-16 DIAGNOSIS — E11.9 TYPE 2 DIABETES MELLITUS WITHOUT COMPLICATION, WITHOUT LONG-TERM CURRENT USE OF INSULIN: ICD-10-CM

## 2024-12-16 DIAGNOSIS — Z00.00 ROUTINE GENERAL MEDICAL EXAMINATION AT A HEALTH CARE FACILITY: Primary | ICD-10-CM

## 2024-12-16 DIAGNOSIS — Z91.09 ENVIRONMENTAL ALLERGIES: ICD-10-CM

## 2024-12-16 DIAGNOSIS — Z12.5 SCREENING FOR MALIGNANT NEOPLASM OF PROSTATE: ICD-10-CM

## 2024-12-16 DIAGNOSIS — Z23 IMMUNIZATION DUE: ICD-10-CM

## 2024-12-16 DIAGNOSIS — G89.29 CHRONIC LOW BACK PAIN WITH SCIATICA, SCIATICA LATERALITY UNSPECIFIED, UNSPECIFIED BACK PAIN LATERALITY: ICD-10-CM

## 2024-12-16 DIAGNOSIS — M54.12 RADICULOPATHY, CERVICAL: ICD-10-CM

## 2024-12-16 DIAGNOSIS — I10 ESSENTIAL (PRIMARY) HYPERTENSION: ICD-10-CM

## 2024-12-16 DIAGNOSIS — E78.00 HYPERCHOLESTEREMIA: ICD-10-CM

## 2024-12-16 DIAGNOSIS — G47.00 INSOMNIA, UNSPECIFIED TYPE: Chronic | ICD-10-CM

## 2024-12-16 DIAGNOSIS — E66.01 OBESITY, CLASS III, BMI 40-49.9 (MORBID OBESITY): ICD-10-CM

## 2024-12-16 DIAGNOSIS — K21.9 GASTROESOPHAGEAL REFLUX DISEASE, UNSPECIFIED WHETHER ESOPHAGITIS PRESENT: ICD-10-CM

## 2024-12-16 DIAGNOSIS — Z13.6 SCREENING FOR CARDIOVASCULAR CONDITION: ICD-10-CM

## 2024-12-16 DIAGNOSIS — M54.2 CHRONIC NECK PAIN: ICD-10-CM

## 2024-12-16 DIAGNOSIS — R20.0 NUMBNESS AND TINGLING OF LEFT SIDE OF FACE: ICD-10-CM

## 2024-12-16 DIAGNOSIS — M54.40 CHRONIC LOW BACK PAIN WITH SCIATICA, SCIATICA LATERALITY UNSPECIFIED, UNSPECIFIED BACK PAIN LATERALITY: ICD-10-CM

## 2024-12-16 DIAGNOSIS — R20.2 NUMBNESS AND TINGLING OF LEFT SIDE OF FACE: ICD-10-CM

## 2024-12-16 DIAGNOSIS — E83.110 HEREDITARY HEMOCHROMATOSIS: ICD-10-CM

## 2024-12-16 DIAGNOSIS — G89.29 CHRONIC NECK PAIN: ICD-10-CM

## 2024-12-16 DIAGNOSIS — Z93.0 TRACHEOSTOMY STATUS: ICD-10-CM

## 2024-12-16 LAB
FERRITIN SERPL-MCNC: 246 NG/ML (ref 30–400)
IRON 24H UR-MRATE: 81 MCG/DL (ref 59–158)
IRON SATN MFR SERPL: 21 % (ref 20–50)
PSA SERPL-MCNC: 0.75 NG/ML (ref 0–4)
TIBC SERPL-MCNC: 378 MCG/DL (ref 298–536)
TRANSFERRIN SERPL-MCNC: 254 MG/DL (ref 200–360)

## 2024-12-16 PROCEDURE — 36415 COLL VENOUS BLD VENIPUNCTURE: CPT

## 2024-12-16 PROCEDURE — 91320 SARSCV2 VAC 30MCG TRS-SUC IM: CPT | Performed by: NURSE PRACTITIONER

## 2024-12-16 PROCEDURE — G0103 PSA SCREENING: HCPCS

## 2024-12-16 PROCEDURE — 83540 ASSAY OF IRON: CPT

## 2024-12-16 PROCEDURE — 84466 ASSAY OF TRANSFERRIN: CPT

## 2024-12-16 PROCEDURE — 90480 ADMN SARSCOV2 VAC 1/ONLY CMP: CPT | Performed by: NURSE PRACTITIONER

## 2024-12-16 PROCEDURE — 99214 OFFICE O/P EST MOD 30 MIN: CPT | Performed by: NURSE PRACTITIONER

## 2024-12-16 PROCEDURE — 99396 PREV VISIT EST AGE 40-64: CPT | Performed by: NURSE PRACTITIONER

## 2024-12-16 PROCEDURE — 82728 ASSAY OF FERRITIN: CPT

## 2024-12-16 RX ORDER — CETIRIZINE HYDROCHLORIDE 5 MG/1
5 TABLET ORAL DAILY
Qty: 90 TABLET | Refills: 3 | Status: SHIPPED | OUTPATIENT
Start: 2024-12-16

## 2024-12-16 RX ORDER — TRAZODONE HYDROCHLORIDE 50 MG/1
100 TABLET, FILM COATED ORAL NIGHTLY
Qty: 180 TABLET | Refills: 1 | Status: SHIPPED | OUTPATIENT
Start: 2024-12-16

## 2024-12-16 RX ORDER — LISINOPRIL 10 MG/1
10 TABLET ORAL EVERY MORNING
Qty: 90 TABLET | Refills: 1 | Status: SHIPPED | OUTPATIENT
Start: 2024-12-16

## 2024-12-16 RX ORDER — HYDROCHLOROTHIAZIDE 12.5 MG/1
12.5 TABLET ORAL DAILY
Qty: 90 TABLET | Refills: 1 | Status: SHIPPED | OUTPATIENT
Start: 2024-12-16

## 2024-12-16 RX ORDER — OMEPRAZOLE 40 MG/1
40 CAPSULE, DELAYED RELEASE ORAL DAILY
Qty: 90 CAPSULE | Refills: 3 | Status: SHIPPED | OUTPATIENT
Start: 2024-12-16

## 2024-12-16 RX ORDER — ATORVASTATIN CALCIUM 80 MG/1
80 TABLET, FILM COATED ORAL
Qty: 90 TABLET | Refills: 1 | Status: SHIPPED | OUTPATIENT
Start: 2024-12-16

## 2024-12-16 RX ORDER — METFORMIN HYDROCHLORIDE 500 MG/1
1000 TABLET, EXTENDED RELEASE ORAL 2 TIMES DAILY WITH MEALS
Qty: 360 TABLET | Refills: 1 | Status: SHIPPED | OUTPATIENT
Start: 2024-12-16

## 2024-12-16 RX ORDER — ALBUTEROL SULFATE 90 UG/1
2 INHALANT RESPIRATORY (INHALATION) EVERY 4 HOURS PRN
Qty: 18 G | Refills: 1 | Status: SHIPPED | OUTPATIENT
Start: 2024-12-16

## 2024-12-16 RX ORDER — BACLOFEN 10 MG/1
5-10 TABLET ORAL NIGHTLY PRN
Qty: 90 TABLET | Refills: 1 | Status: SHIPPED | OUTPATIENT
Start: 2024-12-16

## 2024-12-16 RX ORDER — GLIMEPIRIDE 2 MG/1
2 TABLET ORAL
Qty: 90 TABLET | Refills: 1 | Status: SHIPPED | OUTPATIENT
Start: 2024-12-16

## 2024-12-16 RX ORDER — SEMAGLUTIDE 2.68 MG/ML
2 INJECTION, SOLUTION SUBCUTANEOUS WEEKLY
Qty: 9 ML | Refills: 1 | Status: SHIPPED | OUTPATIENT
Start: 2024-12-16

## 2024-12-16 NOTE — PROGRESS NOTES
Chief Complaint  Fabio Juárez presents to [facility] for Annual Exam, Diabetes, and Hyperlipidemia      Subjective     Diabetes    Hyperlipidemia      Fabio is here today for annual exam.   Last annual exam was  1 year(s)  Last eye exam: 1 year  PROVIDER:  [facility]   Diet / exercise:   No special diet or specific exercise program     10 year cardiovascular risk assessment  The ASCVD Risk score (Cynthia DK, et al., 2019) failed to calculate for the following reasons:    The valid total cholesterol range is 130 to 320 mg/dL     Patient Care Team:  Karley Amor APRN as PCP - General (Nurse Practitioner)  Jimmy April, APRMAJOR as Nurse Practitioner (Nurse Practitioner)  Faviola Ulloa MD (Pain Medicine)  Garrett Anderson MD as Consulting Physician (General Surgery)  Nick Acosta MD as Consulting Physician (Orthopedic Surgery)     Fabio Juárez DECLINES or DEFERS THE FOLLOWING HEALTH MAINTENANCE RECOMMENDATIONS DISCUSSED TODAY:  >n/a  Fabio presents today for follow up on hypertension.    Current medication treatment includes Lisinopril 10 mg and hydrochlorothiazide 12.5 mg, and is compliant with medications.   Side effects reported :  No  Home blood pressure monitoring readings reported as home BP checks: not checked  Medication changes are not recommended at this time.    Diabetes type:  Type 2    A1C Last 3 Results          6/12/2024    16:00 9/25/2024    11:10 12/6/2024    11:25   HGBA1C Last 3 Results   Hemoglobin A1C 8.20  7.60  8.40      his last A1C was higher but  he had been hospitalized with a scrotal infection and has been ill lately.  He is better and his scrotal infection was resolved after augmentin treatment.      Current diabetic medications include metformin 2000 mg, Ozempic (semaglutide) 2 mg, and glimepiride (Amaryl) 2 mg    Diabetic Review of Systems:  Medication compliance: compliant all of the time  Diabetic diet compliance:  noncompliant much of the time  Blood sugar log: not checked  Any hypoglycemic episodes? - No    Is He on ACE inhibitor or angiotensin II receptor blocker and a statin? Lisinopril 10 mg    atorvastatin (lipitor) 80 mg    Taking omeprazole for GERD  40 mg daily     Taking trazodone 200 mg nightly for insomnia     Continues to follow up with Pain management for his chronic back / neck pain     Continues to take cetirizine for allergies    Fabio presents today for follow up on hyperlipidemia.  Previous values:   Lab Results   Component Value Date    CHOL 102 09/25/2024    CHLPL 96 (L) 01/27/2021    TRIG 193 (H) 09/25/2024    HDL 35 (L) 09/25/2024    LDL 36 09/25/2024      The ASCVD Risk score (Cynthia CID, et al., 2019) failed to calculate for the following reasons:    The valid total cholesterol range is 130 to 320 mg/dL  Currently taking medication :yes and reports compliant with medication which is atorvastatin (lipitor) 80 mg   No side effects reported from this medication .  No new concerns to discuss today.     Has a trach due to history of apnea - has been advised in past to have this reversed and declines reversal.      He does have a history of hemachromatosis.  We have not checked levels in some time of his iron and ferritin.  We will plan to check this today     He does report having some tingling in the left side of his face.  This has been going on for a while.  He discussed with pain management she did not feel that it was associated with his cervical spine however she did increase his gabapentin and he denies that this is done any thing for of this sensation.  He did have an MRI of his C-spine June 2024.  This shows multilevel degenerative disc and some flattening of the anterolateral aspect of the cord on the left of C6 and C7 area secondary to osteophyte complex.  He has not seen neurosurgery nor a neurologist but does have a follow-up with pain management in January and was planning on discussing at  that time.        Assessment and Plan         - well balanced diet and exercise as tolerated  - annual wellness exams are recommended  - health care maintenance and gaps in care and orders have been placed as necessary and as willing by the patient.     Diagnoses and all orders for this visit:    1. Routine general medical examination at a health care facility (Primary)    2. Numbness and tingling of left side of face  Comments:  previous CT head reviewed, advise we get him in with neurosurgery for evaluation  discussed trigeminal neuralgia / others    3. Radiculopathy, cervical  -     Ambulatory Referral to Neurosurgery    4. Hereditary hemochromatosis  Comments:  labs today and next visit  Orders:  -     Ferritin; Future  -     Iron Profile; Future  -     Iron Profile; Future  -     Ferritin; Future    5. Essential (primary) hypertension  Comments:  refills today well-controlled  Orders:  -     hydroCHLOROthiazide 12.5 MG tablet; Take 1 tablet by mouth Daily.  Dispense: 90 tablet; Refill: 1  -     lisinopril (PRINIVIL,ZESTRIL) 10 MG tablet; Take 1 tablet by mouth Every Morning.  Dispense: 90 tablet; Refill: 1    6. Type 2 diabetes mellitus without complication, without long-term current use of insulin  Comments:  A1C up from previous suspect due to recent infection / illness, may need to consider referral to diabetes specialty if persists with next check  Orders:  -     Hemoglobin A1c; Future  -     lisinopril (PRINIVIL,ZESTRIL) 10 MG tablet; Take 1 tablet by mouth Every Morning.  Dispense: 90 tablet; Refill: 1  -     atorvastatin (LIPITOR) 80 MG tablet; Take 1 tablet by mouth every night at bedtime.  Dispense: 90 tablet; Refill: 1  -     glimepiride (AMARYL) 2 MG tablet; Take 1 tablet by mouth Every Morning Before Breakfast.  Dispense: 90 tablet; Refill: 1  -     metFORMIN ER (GLUCOPHAGE-XR) 500 MG 24 hr tablet; Take 2 tablets by mouth 2 (Two) Times a Day With Meals.  Dispense: 360 tablet; Refill: 1  -      Semaglutide, 2 MG/DOSE, (Ozempic, 2 MG/DOSE,) 8 MG/3ML solution pen-injector; Inject 2 mg under the skin into the appropriate area as directed 1 (One) Time Per Week.  Dispense: 9 mL; Refill: 1    7. Gastroesophageal reflux disease, unspecified whether esophagitis present  Comments:  continue current treatment  Orders:  -     omeprazole (priLOSEC) 40 MG capsule; Take 1 capsule by mouth Daily.  Dispense: 90 capsule; Refill: 3    8. Obesity, Class III, BMI 40-49.9 (morbid obesity)    9. Hypercholesteremia  Comments:  continue current treatment follow up in 6 months  Orders:  -     atorvastatin (LIPITOR) 80 MG tablet; Take 1 tablet by mouth every night at bedtime.  Dispense: 90 tablet; Refill: 1    10. Insomnia, unspecified type  Comments:  continue current treatment  f/u next visit - interruption secondary to pain  Orders:  -     traZODone (DESYREL) 50 MG tablet; Take 2 tablets by mouth Every Night.  Dispense: 180 tablet; Refill: 1    11. Tracheostomy status  Comments:  continue PRN inhaler  Orders:  -     albuterol sulfate  (90 Base) MCG/ACT inhaler; Inhale 2 puffs Every 4 (Four) Hours As Needed for Wheezing.  Dispense: 18 g; Refill: 1    12. Chronic neck pain  Comments:  continue ctreatment with pain management as recommended  Orders:  -     baclofen (LIORESAL) 10 MG tablet; Take 0.5-1 tablets by mouth At Night As Needed for Muscle Spasms.  Dispense: 90 tablet; Refill: 1    13. Chronic low back pain with sciatica, sciatica laterality unspecified, unspecified back pain laterality  -     baclofen (LIORESAL) 10 MG tablet; Take 0.5-1 tablets by mouth At Night As Needed for Muscle Spasms.  Dispense: 90 tablet; Refill: 1    14. Environmental allergies  -     cetirizine (zyrTEC) 5 MG tablet; Take 1 tablet by mouth Daily.  Dispense: 90 tablet; Refill: 3    15. Immunization due  -     COVID-19 (Pfizer) 12yrs+ (COMIRNATY)    16. Screening for cardiovascular condition  -     Comprehensive metabolic panel; Future    17.  Screening for malignant neoplasm of prostate  -     Cancel: PSA SCREENING; Future  -     PSA SCREENING; Future         Follow Up     Return for Pending lab results.  Future Appointments   Date Time Provider Department Center   3/17/2025  8:00 AM Karley Amor APRN [facility]   4/9/2025  9:30 AM Jimmy April, APRMAJOR [facility]       New Medications Ordered This Visit   Medications    hydroCHLOROthiazide 12.5 MG tablet     Sig: Take 1 tablet by mouth Daily.     Dispense:  90 tablet     Refill:  1    lisinopril (PRINIVIL,ZESTRIL) 10 MG tablet     Sig: Take 1 tablet by mouth Every Morning.     Dispense:  90 tablet     Refill:  1    omeprazole (priLOSEC) 40 MG capsule     Sig: Take 1 capsule by mouth Daily.     Dispense:  90 capsule     Refill:  3    atorvastatin (LIPITOR) 80 MG tablet     Sig: Take 1 tablet by mouth every night at bedtime.     Dispense:  90 tablet     Refill:  1    traZODone (DESYREL) 50 MG tablet     Sig: Take 2 tablets by mouth Every Night.     Dispense:  180 tablet     Refill:  1    albuterol sulfate  (90 Base) MCG/ACT inhaler     Sig: Inhale 2 puffs Every 4 (Four) Hours As Needed for Wheezing.     Dispense:  18 g     Refill:  1    glimepiride (AMARYL) 2 MG tablet     Sig: Take 1 tablet by mouth Every Morning Before Breakfast.     Dispense:  90 tablet     Refill:  1    metFORMIN ER (GLUCOPHAGE-XR) 500 MG 24 hr tablet     Sig: Take 2 tablets by mouth 2 (Two) Times a Day With Meals.     Dispense:  360 tablet     Refill:  1    Semaglutide, 2 MG/DOSE, (Ozempic, 2 MG/DOSE,) 8 MG/3ML solution pen-injector     Sig: Inject 2 mg under the skin into the appropriate area as directed 1 (One) Time Per Week.     Dispense:  9 mL     Refill:  1    baclofen (LIORESAL) 10 MG tablet     Sig: Take 0.5-1 tablets by mouth At Night As Needed for Muscle Spasms.     Dispense:  90 tablet     Refill:  1    cetirizine (zyrTEC) 5 MG tablet     Sig: Take 1 tablet by mouth Daily.     Dispense:  90 tablet      "Refill:  3       Medications Discontinued During This Encounter   Medication Reason    albuterol sulfate  (90 Base) MCG/ACT inhaler Reorder    cetirizine (zyrTEC) 5 MG tablet Reorder    hydroCHLOROthiazide 12.5 MG tablet Reorder    baclofen (LIORESAL) 10 MG tablet Reorder    lisinopril (PRINIVIL,ZESTRIL) 10 MG tablet Reorder    atorvastatin (LIPITOR) 80 MG tablet Reorder    traZODone (DESYREL) 50 MG tablet Reorder    glimepiride (AMARYL) 2 MG tablet Reorder    metFORMIN ER (GLUCOPHAGE-XR) 500 MG 24 hr tablet Reorder    Semaglutide, 2 MG/DOSE, (Ozempic, 2 MG/DOSE,) 8 MG/3ML solution pen-injector Reorder    omeprazole (priLOSEC) 40 MG capsule Reorder         Review of Systems    Objective     Vitals:    12/16/24 0914   BP: 105/69   BP Location: Right arm   Patient Position: Sitting   Cuff Size: Large Adult   Pulse: 76   Temp: 98.3 °F (36.8 °C)   TempSrc: Oral   SpO2: 94%   Weight: 118 kg (260 lb)   Height: 165.1 cm (65\")       Physical Exam  Vitals reviewed.   Constitutional:       Appearance: Normal appearance. He is obese.   HENT:      Head: Normocephalic.      Mouth/Throat:      Mouth: Mucous membranes are moist.      Pharynx: Oropharynx is clear.   Eyes:      Conjunctiva/sclera: Conjunctivae normal.   Cardiovascular:      Rate and Rhythm: Normal rate and regular rhythm.      Heart sounds: No murmur heard.  Pulmonary:      Effort: Pulmonary effort is normal.      Breath sounds: Normal breath sounds.      Comments: Tracheostomy in place- plugged   Abdominal:      General: Bowel sounds are normal.      Tenderness: There is no abdominal tenderness.   Musculoskeletal:      Left shoulder: Tenderness present. Decreased range of motion.      Cervical back: Normal range of motion. Pain with movement present.   Skin:     General: Skin is warm and dry.   Neurological:      General: No focal deficit present.      Mental Status: He is alert and oriented to person, place, and time. Mental status is at baseline.      " Cranial Nerves: No cranial nerve deficit, dysarthria or facial asymmetry.      Motor: No weakness.      Gait: Gait normal.   Psychiatric:         Mood and Affect: Mood normal.         Behavior: Behavior normal.         Thought Content: Thought content normal.                 Result Review     Hemoglobin A1c (12/06/2024 11:25)  Lipase (12/06/2024 11:25)  Comprehensive metabolic panel (12/06/2024 11:25)  CBC w AUTO Differential (12/06/2024 11:25)  Clostridioides difficile Toxin, PCR - Stool, Per Rectum (12/06/2024 11:16)  Enteric Bacterial Panel - Stool, Per Rectum (12/06/2024 11:16)  POCT SARS-CoV-2 Antigen ALESSANDRA + Flu (12/06/2024 11:09)    MRI Cervical Spine Without Contrast (06/04/2024 15:25)  MRI BRAIN WO MRA HEAD WO (02/13/2023 08:49)  CT Abdomen Pelvis Without Contrast (06/27/2024 15:57)  No Known Allergies   Past Medical History:   Diagnosis Date    Chronic pain     Claustrophobia     Essential (primary) hypertension     GERD (gastroesophageal reflux disease)     Hemochromatosis     ASYMPTOMATIC     History of COVID-19     Left upper quadrant pain     Low back pain     Lung nodule     Mixed hyperlipidemia     Obstructive sleep apnea (adult) (pediatric)     Other testicular dysfunction     Pain in right foot     Pain in right shoulder     Recurrent umbilical hernia     Renal stone 03/2017    Tracheostomy status     R/T SLEEP APNEA - WAS FOLLOWED BY DR. PRICE - LAST O.V. 2018 F/U PRN    Type 2 diabetes mellitus     BG RUNS 120-125 IN AM      Current Outpatient Medications   Medication Sig Dispense Refill    albuterol sulfate  (90 Base) MCG/ACT inhaler Inhale 2 puffs Every 4 (Four) Hours As Needed for Wheezing. 18 g 1    atorvastatin (LIPITOR) 80 MG tablet Take 1 tablet by mouth every night at bedtime. 90 tablet 1    baclofen (LIORESAL) 10 MG tablet Take 0.5-1 tablets by mouth At Night As Needed for Muscle Spasms. 90 tablet 1    cetirizine (zyrTEC) 5 MG tablet Take 1 tablet by mouth Daily. 90 tablet 3     Diclofenac Sodium (VOLTAREN) 1 % gel gel Apply 4 g topically to the appropriate area as directed 4 (Four) Times a Day. 100 g 0    gabapentin (NEURONTIN) 300 MG capsule 1 capsule 2 (Two) Times a Day.      glimepiride (AMARYL) 2 MG tablet Take 1 tablet by mouth Every Morning Before Breakfast. 90 tablet 1    hydroCHLOROthiazide 12.5 MG tablet Take 1 tablet by mouth Daily. 90 tablet 1    lisinopril (PRINIVIL,ZESTRIL) 10 MG tablet Take 1 tablet by mouth Every Morning. 90 tablet 1    metFORMIN ER (GLUCOPHAGE-XR) 500 MG 24 hr tablet Take 2 tablets by mouth 2 (Two) Times a Day With Meals. 360 tablet 1    naloxone (NARCAN) 4 MG/0.1ML nasal spray Call 911. Don't prime. Spooner in 1 nostril for overdose. Repeat in 2-3 minutes in other nostril if no or minimal breathing/responsiveness. 2 each 0    omeprazole (priLOSEC) 40 MG capsule Take 1 capsule by mouth Daily. 90 capsule 3    oxyCODONE (ROXICODONE) 5 MG immediate release tablet Take 1 tablet by mouth Every 6 (Six) Hours As Needed for Moderate Pain. 30 tablet 0    Semaglutide, 2 MG/DOSE, (Ozempic, 2 MG/DOSE,) 8 MG/3ML solution pen-injector Inject 2 mg under the skin into the appropriate area as directed 1 (One) Time Per Week. 9 mL 1    traZODone (DESYREL) 50 MG tablet Take 2 tablets by mouth Every Night. 180 tablet 1     No current facility-administered medications for this visit.     Past Surgical History:   Procedure Laterality Date    ACHILLES TENDON REPAIR Right 10/2018    APPENDECTOMY      CARDIAC CATHETERIZATION  11/03/2015    LEFT VENTRIULOGRAM, CORONARY ANGIOGRAM     CARPAL TUNNEL RELEASE Right     CHOLECYSTECTOMY  07/2013    COLONOSCOPY  07/28/2014    NORMAL RESULT     HERNIA REPAIR      JOINT REPLACEMENT Bilateral     RIGHT KNEE 01/2016    SHOULDER ARTHROSCOPY W/ ROTATOR CUFF REPAIR Right 5/9/2023    Procedure: SHOULDER ARTHROSCOPY WITH BICEPS TENODESIS, ROTATOR CUFF REPAIR, AND SUBACROMIAL DECOMPRESSION;  Surgeon: Phu Lebron MD;  [place] [facility] [place] [facility] [place]   Service: Orthopedics;  Laterality: Right;    TONSILLECTOMY AND ADENOIDECTOMY      TRACHEOSTOMY      SECONDARY TO SLEEP APNEA    VENTRAL HERNIA REPAIR N/A 02/23/2022    Procedure: ROBOTIC UMBILICAL HERNIA REPAIR WITH MESH PLACEMENT;  Surgeon: Garrett Anderson MD;  Location: [facility];  Service: Robotics - DaVinci;  Laterality: N/A;      Health Maintenance Due   Topic Date Due    COLORECTAL CANCER SCREENING  07/28/2024    DIABETIC EYE EXAM  01/03/2025      Immunization History   Administered Date(s) Administered    COVID-19 (PFIZER) 12YRS+ (COMIRNATY) 12/13/2023, 12/16/2024    COVID-19 (PFIZER) BIVALENT 12+YRS 10/25/2022    COVID-19 (PFIZER) Purple Cap Monovalent 02/24/2021, 03/17/2021, 12/09/2021    Covid-19 (Pfizer) Gray Cap Monovalent 06/21/2022    Fluzone  >6mos 01/09/2018, 09/25/2024    Fluzone (or Fluarix & Flulaval for VFC) >6mos 12/09/2021, 11/08/2023    Influenza TIV (IM) 01/08/2013    Influenza, Unspecified 01/20/2021    Pneumococcal, Unspecified 08/08/2016, 08/08/2016    Shingrix 10/15/2024, 12/03/2024    Td (TDVAX) 03/26/2019, 03/26/2019         Part of this note may be an electronic transcription/translation of spoken language to printed   text using the Dragon Dictation System.      ONEIL Fox

## 2024-12-17 ENCOUNTER — PATIENT MESSAGE (OUTPATIENT)
Dept: FAMILY MEDICINE CLINIC | Age: 59
End: 2024-12-17
Payer: COMMERCIAL

## 2025-01-02 ENCOUNTER — TELEPHONE (OUTPATIENT)
Dept: NEUROSURGERY | Facility: CLINIC | Age: 60
End: 2025-01-02
Payer: COMMERCIAL

## 2025-01-02 NOTE — TELEPHONE ENCOUNTER
Attempted to contact patient regarding updating his insurance information for his upcoming appointment with Edilberto Allen PA-C on 01/03/2024 at 8AM.  His current policy termed on 12/31/2024.  I verified this via website and telephone.  Left voicemail for the patient to call the office back to give updated insurance information prior to his appointment.  wnt

## 2025-01-03 ENCOUNTER — TELEPHONE (OUTPATIENT)
Dept: FAMILY MEDICINE CLINIC | Age: 60
End: 2025-01-03
Payer: COMMERCIAL

## 2025-01-03 ENCOUNTER — OFFICE VISIT (OUTPATIENT)
Dept: NEUROSURGERY | Facility: CLINIC | Age: 60
End: 2025-01-03
Payer: COMMERCIAL

## 2025-01-03 VITALS
SYSTOLIC BLOOD PRESSURE: 121 MMHG | WEIGHT: 257.4 LBS | HEIGHT: 65 IN | BODY MASS INDEX: 42.88 KG/M2 | DIASTOLIC BLOOD PRESSURE: 69 MMHG | HEART RATE: 99 BPM

## 2025-01-03 DIAGNOSIS — M50.21 HERNIATED NUCLEUS PULPOSUS, C3-4 LEFT: ICD-10-CM

## 2025-01-03 DIAGNOSIS — R29.898 WEAKNESS OF LEFT HAND: ICD-10-CM

## 2025-01-03 DIAGNOSIS — M54.2 CERVICALGIA: ICD-10-CM

## 2025-01-03 DIAGNOSIS — M50.223 HERNIATED NUCLEUS PULPOSUS, C6-7 LEFT: ICD-10-CM

## 2025-01-03 DIAGNOSIS — M50.222 HERNIATED NUCLEUS PULPOSUS, C5-6 RIGHT: ICD-10-CM

## 2025-01-03 DIAGNOSIS — M50.21: Primary | ICD-10-CM

## 2025-01-03 NOTE — TELEPHONE ENCOUNTER
1ST ATTEMPT - spoke with pt in regards to overdue labs ordered by Erma Thornton on 12/6/24. Pt states he is no longer having sx and would like to cancel stool studies.

## 2025-01-03 NOTE — PROGRESS NOTES
"Chief Complaint  Neck Pain (Pt c/o neck pain that radiates into left shoulder and arm /C/o left arm pain, tingling and some numbness /No sx, ca or prev mri aoi /Pt has a trach /)    Subjective          Fabio Juárez who is a 59 y.o. year old male who presents to [facility] for Evaluation of the Spine.     The patient complains of pain located in the Cervical Spine.  Patients states the pain has been present for 3 months.  The pain came on gradually.  The pain scaled level is 0.  The pain does radiate. Dermatomes are located on left Cervical at: a non-specific dermatome and to the left shoulder.  The pain is Intermittent and described as sharp, aching, and burning.  The pain is worse at nighttime and awakens the patient at night. Patient states  rubbing his neck makes the pain better.  Patient states Head Turning and sleeping makes the pain worse.    Associated Symptoms Include: Numbness and Tingling in the left neck and left arm. Reports subjective weakness in the left arm.  Conservative Interventions Include: Epidural Steroids that were not very effective. Oxycodone and Gabapentin are somewhat effective.    Was this the result of an injury or accident? : No    History of Previous Spinal Surgery?: No     reports that he has never smoked. He has never used smokeless tobacco.    Review of Systems   Musculoskeletal:  Positive for neck pain (to the left shoulder).   Neurological:  Positive for weakness (left arm (reported)) and numbness (left neck and left arm).        Objective   Vital Signs:   /69   Pulse 99   Ht 165.1 cm (65\")   Wt 117 kg (257 lb 6.4 oz)   BMI 42.83 kg/m²       Physical Exam  Constitutional:       Appearance: Normal appearance. He is obese.   Neck:      Comments: Pain with ROM  Pulmonary:      Effort: Pulmonary effort is normal.   Musculoskeletal:         General: No tenderness.      Comments: Arreguin's negative bilaterally,  Tinel's testing " negative in left arm,  Pain in the left shoulder with abduction and internal and external rotation   Neurological:      General: No focal deficit present.      Mental Status: He is alert and oriented to person, place, and time.      Sensory: No sensory deficit.      Motor: Weakness present.      Deep Tendon Reflexes: Reflexes normal.   Psychiatric:         Mood and Affect: Mood normal.         Behavior: Behavior normal.        Neurological Exam  Mental Status  Alert. Oriented to person, place, and time.      Result Review     I have personally interpreted the MRI of cervical spine without contrast from 6/4/2024 which shows multilevel degenerative disc disease.  There is moderate left foraminal stenosis at C2-C3 and C3-C4.  There is moderate right foraminal narrowing at C5-C6 and moderate left foraminal narrowing at C6-C7. Otherwise there is mild multilevel foraminal stenosis.     Assessment and Plan    Diagnoses and all orders for this visit:    1. Herniated nucleus pulposus, C2-3 left (Primary)    2. Herniated nucleus pulposus, C3-4 left    3. Herniated nucleus pulposus, C5-6 right    4. Herniated nucleus pulposus, C6-7 left    5. Cervicalgia    6. Weakness of left hand  -     EMG & Nerve Conduction Test; Future    He has left sided neck pain and left arm pain to the shoulder.    He reports numbness/tingling and weakness in the left arm. There is minimal weakness notable in the left hand  on exam.    There is some moderate left foraminal stenosis most notable at C6-C7, but his pain does not match a C7 pattern. I expect surgery is unlikely to offer benefit given the non-specific complaints and the moderate degree of stenosis. If he develops left arm pain in a C7 pattern (middle 3 fingers) I expect surgery is more likely to be helpful.    He may consider a trial of EDIS. He will discuss with his pain specialist, Dr. Ulloa.    He may consider PT conservatively.    I would consider NCV/EMG testing of the left arm  to evaluate the weakness further.    He has pain in the left shoulder with abduction and internal and external rotation. I suspect he may have a rotator cuff injury. He may discuss further eval with his PCP or Ortho.    The patient was counseled on basic recommendations for the reduction and prevention of back, neck, or spine pain in association with spinal disorders, including: cessation/avoidance of nicotine use, maintenance of a healthy BMI and weight, focusing on building/maintaining core strength through core exercise, and avoidance of activities which worsen the pain. The patient will monitor for changes in symptoms and notify our clinic of these changes as needed.    Follow Up   Return in about 4 weeks (around 1/31/2025).  Patient was given instructions and counseling regarding his condition or for health maintenance advice. Please see specific information pulled into the AVS if appropriate.

## 2025-01-30 ENCOUNTER — TELEPHONE (OUTPATIENT)
Dept: NEUROSURGERY | Facility: CLINIC | Age: 60
End: 2025-01-30
Payer: COMMERCIAL

## 2025-01-30 NOTE — TELEPHONE ENCOUNTER
Appointment with Edilberto Allen PA-C, tomorrow, 1/31/2025 will need to be rescheduled for after your EMG has been completed.

## 2025-02-18 ENCOUNTER — HOSPITAL ENCOUNTER (OUTPATIENT)
Facility: HOSPITAL | Age: 60
Discharge: HOME | End: 2025-02-18
Admitting: PHYSICIAN ASSISTANT
Payer: COMMERCIAL

## 2025-02-18 DIAGNOSIS — R29.898 WEAKNESS OF LEFT HAND: ICD-10-CM

## 2025-02-18 PROCEDURE — 95886 MUSC TEST DONE W/N TEST COMP: CPT

## 2025-02-18 PROCEDURE — 95909 NRV CNDJ TST 5-6 STUDIES: CPT

## 2025-02-18 NOTE — THERAPY EVALUATION
Physical Therapy Evaluation    SUBJECTIVE  Please see EMG report for details    OBJECTIVE  Please see EMG report for details.    ASSESSMENT  This was an abnormal study.     Please see EMG report for details.    PLAN  LTG 1:  Today:  Complete EMG / NCV study as appropriate and provide full report to the referring provider.   Treatment:  None    Frequency/Duration:  Evaluation only and discharge today.    Pt/responsible party agrees with plan of care and has been informed of all alternatives, risks and benefits.

## 2025-02-21 ENCOUNTER — TELEPHONE (OUTPATIENT)
Dept: FAMILY MEDICINE CLINIC | Age: 60
End: 2025-02-21
Payer: COMMERCIAL

## 2025-02-21 NOTE — TELEPHONE ENCOUNTER
Caller: PEE FAUSTIN    Relationship: Emergency Contact    Best call back number: [phone]     What form or medical record are you requesting: DISABLED PARKING FORM - CURRENT PARKING PERMIT EXPIRES 2/28    How would you like to receive the form or medical records (pick-up, mail, fax):

## 2025-03-05 ENCOUNTER — TELEPHONE (OUTPATIENT)
Dept: FAMILY MEDICINE CLINIC | Age: 60
End: 2025-03-05
Payer: COMMERCIAL

## 2025-03-05 NOTE — TELEPHONE ENCOUNTER
Pt can not afford ozempic and he has a high deductible with his new insurance he tried a coupon but it only took $100 off I advised he check with the community clinic  He takes his last shot today

## 2025-04-03 ENCOUNTER — TELEPHONE (OUTPATIENT)
Dept: FAMILY MEDICINE CLINIC | Age: 60
End: 2025-04-03
Payer: COMMERCIAL

## 2025-04-03 NOTE — TELEPHONE ENCOUNTER
1st attempt - lmtrc in regards to overdue labs ordered by pcp on 12/16/24 with a due date of 3/16/25.

## 2025-04-14 DIAGNOSIS — M54.2 CHRONIC NECK PAIN: ICD-10-CM

## 2025-04-14 DIAGNOSIS — G89.29 CHRONIC LOW BACK PAIN WITH SCIATICA, SCIATICA LATERALITY UNSPECIFIED, UNSPECIFIED BACK PAIN LATERALITY: ICD-10-CM

## 2025-04-14 DIAGNOSIS — M54.40 CHRONIC LOW BACK PAIN WITH SCIATICA, SCIATICA LATERALITY UNSPECIFIED, UNSPECIFIED BACK PAIN LATERALITY: ICD-10-CM

## 2025-04-14 DIAGNOSIS — G89.29 CHRONIC NECK PAIN: ICD-10-CM

## 2025-04-15 RX ORDER — BACLOFEN 10 MG/1
TABLET ORAL
Qty: 90 TABLET | Refills: 1 | Status: SHIPPED | OUTPATIENT
Start: 2025-04-15

## 2025-07-23 ENCOUNTER — OFFICE VISIT (OUTPATIENT)
Dept: WOUND CARE | Facility: HOSPITAL | Age: 60
End: 2025-07-23
Payer: COMMERCIAL

## 2025-07-23 VITALS
DIASTOLIC BLOOD PRESSURE: 72 MMHG | RESPIRATION RATE: 18 BRPM | HEART RATE: 79 BPM | SYSTOLIC BLOOD PRESSURE: 116 MMHG | TEMPERATURE: 97.7 F

## 2025-07-23 DIAGNOSIS — E11.65 UNCONTROLLED TYPE 2 DIABETES MELLITUS WITH HYPERGLYCEMIA: ICD-10-CM

## 2025-07-23 DIAGNOSIS — T24.202A PARTIAL THICKNESS BURN OF LEFT LOWER EXTREMITY, INITIAL ENCOUNTER: Primary | ICD-10-CM

## 2025-07-23 PROCEDURE — G0463 HOSPITAL OUTPT CLINIC VISIT: HCPCS | Performed by: EMERGENCY MEDICINE

## 2025-07-23 RX ORDER — SILVER SULFADIAZINE 10 MG/G
1 CREAM TOPICAL DAILY
Qty: 50 G | Refills: 1 | Status: SHIPPED | OUTPATIENT
Start: 2025-07-23

## 2025-07-23 RX ORDER — CEPHALEXIN 500 MG/1
500 CAPSULE ORAL 4 TIMES DAILY
COMMUNITY
Start: 2025-07-21 | End: 2025-07-28
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
## 2025-07-23 NOTE — LETTER
July 23, 2025     Patient: Fabio Juárez   YOB: 1965   Date of Visit: 7/23/2025       To Whom It May Concern:    It is my medical opinion that Fabio Juárez may return to work on 07/24/2025.           Sincerely,        Nicole Cheng MD    CC: No Recipients

## 2025-07-29 DIAGNOSIS — E11.9 TYPE 2 DIABETES MELLITUS WITHOUT COMPLICATION, WITHOUT LONG-TERM CURRENT USE OF INSULIN: ICD-10-CM

## 2025-07-29 DIAGNOSIS — E78.00 HYPERCHOLESTEREMIA: ICD-10-CM

## 2025-07-30 RX ORDER — ATORVASTATIN CALCIUM 80 MG/1
80 TABLET, FILM COATED ORAL
Qty: 90 TABLET | Refills: 0 | Status: SHIPPED | OUTPATIENT
Start: 2025-07-30

## 2025-08-05 ENCOUNTER — TELEPHONE (OUTPATIENT)
Dept: FAMILY MEDICINE CLINIC | Age: 60
End: 2025-08-05
Payer: COMMERCIAL

## 2025-08-06 DIAGNOSIS — G89.29 CHRONIC NECK PAIN: ICD-10-CM

## 2025-08-06 DIAGNOSIS — M54.12 RADICULOPATHY, CERVICAL: ICD-10-CM

## 2025-08-06 DIAGNOSIS — M54.40 CHRONIC LOW BACK PAIN WITH SCIATICA, SCIATICA LATERALITY UNSPECIFIED, UNSPECIFIED BACK PAIN LATERALITY: Primary | ICD-10-CM

## 2025-08-06 DIAGNOSIS — M54.2 CHRONIC NECK PAIN: ICD-10-CM

## 2025-08-06 DIAGNOSIS — G89.29 CHRONIC LOW BACK PAIN WITH SCIATICA, SCIATICA LATERALITY UNSPECIFIED, UNSPECIFIED BACK PAIN LATERALITY: Primary | ICD-10-CM

## (undated) DEVICE — STERILE POLYISOPRENE POWDER-FREE SURGICAL GLOVES WITH EMOLLIENT COATING: Brand: PROTEXIS

## (undated) DEVICE — ENDOPATH PNEUMONEEDLE INSUFFLATION NEEDLES WITH LUER LOCK CONNECTORS 120MM: Brand: ENDOPATH

## (undated) DEVICE — GAUZE,SPONGE,4"X4",16PLY,STRL,LF,10/TRAY: Brand: MEDLINE

## (undated) DEVICE — VESSEL SEALER EXTEND: Brand: ENDOWRIST

## (undated) DEVICE — SUT MERSILENE POLYSTR UR6 BR 0 75CM GRN

## (undated) DEVICE — ENDOPATH XCEL WITH OPTIVIEW TECHNOLOGY BLADELESS TROCARS WITH STABILITY SLEEVES: Brand: ENDOPATH XCEL OPTIVIEW

## (undated) DEVICE — SYS CLOSE PORTII CARTR/THOMASN XL

## (undated) DEVICE — SUT PROLN 0 CT1 30IN 8424H

## (undated) DEVICE — BLD CUT FORMLA AGGR PLS 4.0MM

## (undated) DEVICE — INTEGRATED CASSETTE TUBING, DO NOT USE IF PACKAGE IS DAMAGED: Brand: CROSSFLOW

## (undated) DEVICE — GLV SURG SENSICARE PI ORTHO SZ8 LF STRL

## (undated) DEVICE — TOTAL TRAY, 16FR 10ML SIL FOLEY, URN: Brand: MEDLINE

## (undated) DEVICE — STERILE POLYISOPRENE POWDER-FREE SURGICAL GLOVES: Brand: PROTEXIS

## (undated) DEVICE — SUT/ANCH FIBERTAK/RC/2.6 SFT SP 1.7MM/TIGERTAPE/LP WHT/BLK: Type: IMPLANTABLE DEVICE | Site: SHOULDER | Status: NON-FUNCTIONAL

## (undated) DEVICE — SHOULDER P.A.D. III: Brand: DEROYAL

## (undated) DEVICE — LAPAROSCOPIC SCISSORS: Brand: EPIX LAPAROSCOPIC SCISSORS

## (undated) DEVICE — NON-WOVEN ADHESIVE WOUND DRESSING: Brand: PRIMAPORE ADHESIVE WOUND DRSG 7.2*5CM

## (undated) DEVICE — GOWN,REINFRCE,POLY,SIRUS,BREATH SLV,XXLG: Brand: MEDLINE

## (undated) DEVICE — SLV DISTRACTION STAR VELCRO XL DISP

## (undated) DEVICE — ANTIBACTERIAL UNDYED BRAIDED (POLYGLACTIN 910), SYNTHETIC ABSORBABLE SUTURE: Brand: COATED VICRYL

## (undated) DEVICE — INTENDED FOR TISSUE SEPARATION, AND OTHER PROCEDURES THAT REQUIRE A SHARP SURGICAL BLADE TO PUNCTURE OR CUT.: Brand: BARD-PARKER ® CARBON RIB-BACK BLADES

## (undated) DEVICE — DRSNG GZ PETROLTM XEROFORM CURAD 1X8IN STRL

## (undated) DEVICE — TOWEL,OR,DSP,ST,BLUE,STD,4/PK,20PK/CS: Brand: MEDLINE

## (undated) DEVICE — APPL CHLORAPREP HI/LITE 26ML ORNG

## (undated) DEVICE — CANN TRPL DAM 7X7MM

## (undated) DEVICE — TP NDL SCORPION MULTIFIRE

## (undated) DEVICE — SOL IRR NACL 0.9PCT 3000ML

## (undated) DEVICE — KT PUSHLOCK  2.9MM DISP

## (undated) DEVICE — SLV SCD KN/LEN ADJ EXPRSS BLENDED MD 1P/U

## (undated) DEVICE — 90-S CRUISE, SUCTION PROBE, NON-BENDABLE, MAX CUT LEVEL 1: Brand: SERFAS ENERGY

## (undated) DEVICE — PENCL E/S SMOKEEVAC W/TELESCP CANN

## (undated) DEVICE — SUT ETHLN 3-0 FS118IN 663H

## (undated) DEVICE — CANN PASSPORT BUTN 8MM 4CM

## (undated) DEVICE — CANNULA SEAL

## (undated) DEVICE — SOL ANTISTICK CAUTRY ELECTROLUBE LF

## (undated) DEVICE — SHOULDER ARTHROSCOPY-LF: Brand: MEDLINE INDUSTRIES, INC.

## (undated) DEVICE — ARM DRAPE

## (undated) DEVICE — DAVINCI-LF: Brand: MEDLINE INDUSTRIES, INC.

## (undated) DEVICE — 3M™ STERI-STRIP™ REINFORCED ADHESIVE SKIN CLOSURES, R1547, 1/2 IN X 4 IN (12 MM X 100 MM), 6 STRIPS/ENVELOPE: Brand: 3M™ STERI-STRIP™

## (undated) DEVICE — GLV SURG SENSICARE SLT PF LF 6 STRL

## (undated) DEVICE — BURSECTOR 5.5 MM X 125 MM.  DO NOT RESTERILIZE, DO NOT USE IF PACKAGE IS DAMAGED, KEEP DRY, KEEP AWAY FROM DIRECT SUNLIGHT: Brand: CROSSBLADE

## (undated) DEVICE — TIP COVER ACCESSORY